# Patient Record
Sex: FEMALE | Race: WHITE | NOT HISPANIC OR LATINO | Employment: UNEMPLOYED | URBAN - METROPOLITAN AREA
[De-identification: names, ages, dates, MRNs, and addresses within clinical notes are randomized per-mention and may not be internally consistent; named-entity substitution may affect disease eponyms.]

---

## 2017-01-14 ENCOUNTER — APPOINTMENT (OUTPATIENT)
Dept: GENERAL RADIOLOGY | Facility: HOSPITAL | Age: 82
End: 2017-01-14

## 2017-01-14 ENCOUNTER — ANESTHESIA EVENT (OUTPATIENT)
Dept: PERIOP | Facility: HOSPITAL | Age: 82
End: 2017-01-14

## 2017-01-14 ENCOUNTER — ANESTHESIA (OUTPATIENT)
Dept: PERIOP | Facility: HOSPITAL | Age: 82
End: 2017-01-14

## 2017-01-14 ENCOUNTER — HOSPITAL ENCOUNTER (INPATIENT)
Facility: HOSPITAL | Age: 82
LOS: 3 days | Discharge: NURSING, REHAB OR LONG-TERM CARE | End: 2017-01-17
Attending: EMERGENCY MEDICINE | Admitting: INTERNAL MEDICINE

## 2017-01-14 DIAGNOSIS — S72.002A CLOSED LEFT HIP FRACTURE, INITIAL ENCOUNTER (HCC): Primary | ICD-10-CM

## 2017-01-14 DIAGNOSIS — Z74.09 IMPAIRED MOBILITY AND ADLS: ICD-10-CM

## 2017-01-14 DIAGNOSIS — Z74.09 IMPAIRED FUNCTIONAL MOBILITY, BALANCE, GAIT, AND ENDURANCE: ICD-10-CM

## 2017-01-14 DIAGNOSIS — M25.511 ACUTE PAIN OF RIGHT SHOULDER: ICD-10-CM

## 2017-01-14 DIAGNOSIS — Z78.9 IMPAIRED MOBILITY AND ADLS: ICD-10-CM

## 2017-01-14 LAB
ALBUMIN SERPL-MCNC: 3.8 G/DL (ref 3.2–4.8)
ALBUMIN/GLOB SERPL: 1.3 G/DL (ref 1.5–2.5)
ALP SERPL-CCNC: 74 U/L (ref 25–100)
ALT SERPL W P-5'-P-CCNC: 15 U/L (ref 7–40)
ANION GAP SERPL CALCULATED.3IONS-SCNC: 6 MMOL/L (ref 3–11)
AST SERPL-CCNC: 20 U/L (ref 0–33)
BACTERIA UR QL AUTO: ABNORMAL /HPF
BASOPHILS # BLD AUTO: 0.03 10*3/MM3 (ref 0–0.2)
BASOPHILS NFR BLD AUTO: 0.4 % (ref 0–1)
BILIRUB SERPL-MCNC: 0.7 MG/DL (ref 0.3–1.2)
BILIRUB UR QL STRIP: NEGATIVE
BUN BLD-MCNC: 12 MG/DL (ref 9–23)
BUN/CREAT SERPL: 20 (ref 7–25)
CALCIUM SPEC-SCNC: 9.7 MG/DL (ref 8.7–10.4)
CHLORIDE SERPL-SCNC: 101 MMOL/L (ref 99–109)
CLARITY UR: ABNORMAL
CO2 SERPL-SCNC: 31 MMOL/L (ref 20–31)
COD CRY URNS QL: ABNORMAL /HPF
COLOR UR: ABNORMAL
CREAT BLD-MCNC: 0.6 MG/DL (ref 0.6–1.3)
CRP SERPL-MCNC: 50.8 MG/DL (ref 0–10)
DEPRECATED RDW RBC AUTO: 55.2 FL (ref 37–54)
EOSINOPHIL # BLD AUTO: 0.77 10*3/MM3 (ref 0.1–0.3)
EOSINOPHIL NFR BLD AUTO: 9.6 % (ref 0–3)
ERYTHROCYTE [DISTWIDTH] IN BLOOD BY AUTOMATED COUNT: 15.5 % (ref 11.3–14.5)
ERYTHROCYTE [SEDIMENTATION RATE] IN BLOOD: 53 MM/HR (ref 0–30)
GFR SERPL CREATININE-BSD FRML MDRD: 94 ML/MIN/1.73
GLOBULIN UR ELPH-MCNC: 2.9 GM/DL
GLUCOSE BLD-MCNC: 101 MG/DL (ref 70–100)
GLUCOSE UR STRIP-MCNC: NEGATIVE MG/DL
HCT VFR BLD AUTO: 31.8 % (ref 34.5–44)
HGB BLD-MCNC: 10.4 G/DL (ref 11.5–15.5)
HGB UR QL STRIP.AUTO: NEGATIVE
HOLD SPECIMEN: NORMAL
HOLD SPECIMEN: NORMAL
HYALINE CASTS UR QL AUTO: ABNORMAL /LPF
IMM GRANULOCYTES # BLD: 0.02 10*3/MM3 (ref 0–0.03)
IMM GRANULOCYTES NFR BLD: 0.3 % (ref 0–0.6)
KETONES UR QL STRIP: NEGATIVE
LEUKOCYTE ESTERASE UR QL STRIP.AUTO: ABNORMAL
LYMPHOCYTES # BLD AUTO: 1.27 10*3/MM3 (ref 0.6–4.8)
LYMPHOCYTES NFR BLD AUTO: 15.9 % (ref 24–44)
MCH RBC QN AUTO: 31.6 PG (ref 27–31)
MCHC RBC AUTO-ENTMCNC: 32.7 G/DL (ref 32–36)
MCV RBC AUTO: 96.7 FL (ref 80–99)
MONOCYTES # BLD AUTO: 0.65 10*3/MM3 (ref 0–1)
MONOCYTES NFR BLD AUTO: 8.1 % (ref 0–12)
NEUTROPHILS # BLD AUTO: 5.25 10*3/MM3 (ref 1.5–8.3)
NEUTROPHILS NFR BLD AUTO: 65.7 % (ref 41–71)
NITRITE UR QL STRIP: NEGATIVE
PH UR STRIP.AUTO: 6 [PH] (ref 5–8)
PLATELET # BLD AUTO: 302 10*3/MM3 (ref 150–450)
PMV BLD AUTO: 8.6 FL (ref 6–12)
POTASSIUM BLD-SCNC: 3.7 MMOL/L (ref 3.5–5.5)
PROCALCITONIN SERPL-MCNC: 0.05 NG/ML
PROT SERPL-MCNC: 6.7 G/DL (ref 5.7–8.2)
PROT UR QL STRIP: NEGATIVE
RBC # BLD AUTO: 3.29 10*6/MM3 (ref 3.89–5.14)
RBC # UR: ABNORMAL /HPF
REF LAB TEST METHOD: ABNORMAL
SODIUM BLD-SCNC: 138 MMOL/L (ref 132–146)
SP GR UR STRIP: 1.02 (ref 1–1.03)
SQUAMOUS #/AREA URNS HPF: ABNORMAL /HPF
URATE SERPL-MCNC: 3.1 MG/DL (ref 3.1–7.8)
UROBILINOGEN UR QL STRIP: ABNORMAL
WBC NRBC COR # BLD: 7.99 10*3/MM3 (ref 3.5–10.8)
WBC UR QL AUTO: ABNORMAL /HPF
WHOLE BLOOD HOLD SPECIMEN: NORMAL
WHOLE BLOOD HOLD SPECIMEN: NORMAL

## 2017-01-14 PROCEDURE — 93005 ELECTROCARDIOGRAM TRACING: CPT | Performed by: EMERGENCY MEDICINE

## 2017-01-14 PROCEDURE — 72170 X-RAY EXAM OF PELVIS: CPT

## 2017-01-14 PROCEDURE — 25010000002 HYDROMORPHONE PER 4 MG: Performed by: EMERGENCY MEDICINE

## 2017-01-14 PROCEDURE — 87086 URINE CULTURE/COLONY COUNT: CPT | Performed by: EMERGENCY MEDICINE

## 2017-01-14 PROCEDURE — 84145 PROCALCITONIN (PCT): CPT | Performed by: EMERGENCY MEDICINE

## 2017-01-14 PROCEDURE — 25010000002 PROPOFOL 10 MG/ML EMULSION: Performed by: ANESTHESIOLOGY

## 2017-01-14 PROCEDURE — 0QH734Z INSERTION OF INTERNAL FIXATION DEVICE INTO LEFT UPPER FEMUR, PERCUTANEOUS APPROACH: ICD-10-PCS | Performed by: ORTHOPAEDIC SURGERY

## 2017-01-14 PROCEDURE — 85652 RBC SED RATE AUTOMATED: CPT | Performed by: EMERGENCY MEDICINE

## 2017-01-14 PROCEDURE — 25010000002 FENTANYL CITRATE (PF) 100 MCG/2ML SOLUTION: Performed by: ANESTHESIOLOGY

## 2017-01-14 PROCEDURE — 25010000002 SUCCINYLCHOLINE PER 20 MG: Performed by: ANESTHESIOLOGY

## 2017-01-14 PROCEDURE — 25010000003 CEFAZOLIN IN DEXTROSE 2-4 GM/100ML-% SOLUTION: Performed by: ORTHOPAEDIC SURGERY

## 2017-01-14 PROCEDURE — 84550 ASSAY OF BLOOD/URIC ACID: CPT | Performed by: EMERGENCY MEDICINE

## 2017-01-14 PROCEDURE — 85025 COMPLETE CBC W/AUTO DIFF WBC: CPT | Performed by: EMERGENCY MEDICINE

## 2017-01-14 PROCEDURE — 25010000002 ONDANSETRON PER 1 MG: Performed by: EMERGENCY MEDICINE

## 2017-01-14 PROCEDURE — C1713 ANCHOR/SCREW BN/BN,TIS/BN: HCPCS | Performed by: ORTHOPAEDIC SURGERY

## 2017-01-14 PROCEDURE — 99284 EMERGENCY DEPT VISIT MOD MDM: CPT

## 2017-01-14 PROCEDURE — 73560 X-RAY EXAM OF KNEE 1 OR 2: CPT

## 2017-01-14 PROCEDURE — 80053 COMPREHEN METABOLIC PANEL: CPT | Performed by: EMERGENCY MEDICINE

## 2017-01-14 PROCEDURE — 73502 X-RAY EXAM HIP UNI 2-3 VIEWS: CPT

## 2017-01-14 PROCEDURE — 76000 FLUOROSCOPY <1 HR PHYS/QHP: CPT

## 2017-01-14 PROCEDURE — 73030 X-RAY EXAM OF SHOULDER: CPT

## 2017-01-14 PROCEDURE — 71010 HC CHEST PA OR AP: CPT

## 2017-01-14 PROCEDURE — 25010000002 PHENYLEPHRINE PER 1 ML: Performed by: ANESTHESIOLOGY

## 2017-01-14 PROCEDURE — 81001 URINALYSIS AUTO W/SCOPE: CPT | Performed by: EMERGENCY MEDICINE

## 2017-01-14 PROCEDURE — 25010000002 ROPIVACAINE PER 1 MG: Performed by: ANESTHESIOLOGY

## 2017-01-14 PROCEDURE — 86140 C-REACTIVE PROTEIN: CPT | Performed by: EMERGENCY MEDICINE

## 2017-01-14 DEVICE — SCRW CANN 16THRD 6.5X95MM: Type: IMPLANTABLE DEVICE | Site: HIP | Status: FUNCTIONAL

## 2017-01-14 DEVICE — SCRW CANN THRD 7.3X16X85MM: Type: IMPLANTABLE DEVICE | Site: HIP | Status: FUNCTIONAL

## 2017-01-14 RX ORDER — DIPHENHYDRAMINE HCL 25 MG
25 CAPSULE ORAL EVERY 6 HOURS PRN
Status: DISCONTINUED | OUTPATIENT
Start: 2017-01-14 | End: 2017-01-17 | Stop reason: HOSPADM

## 2017-01-14 RX ORDER — ROPIVACAINE HYDROCHLORIDE 2 MG/ML
INJECTION, SOLUTION EPIDURAL; INFILTRATION AS NEEDED
Status: DISCONTINUED | OUTPATIENT
Start: 2017-01-14 | End: 2017-01-14 | Stop reason: SURG

## 2017-01-14 RX ORDER — SODIUM CHLORIDE 9 MG/ML
125 INJECTION, SOLUTION INTRAVENOUS CONTINUOUS
Status: DISCONTINUED | OUTPATIENT
Start: 2017-01-14 | End: 2017-01-17 | Stop reason: HOSPADM

## 2017-01-14 RX ORDER — HYDROCODONE BITARTRATE AND ACETAMINOPHEN 5; 325 MG/1; MG/1
1 TABLET ORAL EVERY 4 HOURS PRN
Status: DISCONTINUED | OUTPATIENT
Start: 2017-01-14 | End: 2017-01-17 | Stop reason: HOSPADM

## 2017-01-14 RX ORDER — NALOXONE HCL 0.4 MG/ML
0.1 VIAL (ML) INJECTION
Status: DISCONTINUED | OUTPATIENT
Start: 2017-01-14 | End: 2017-01-17 | Stop reason: HOSPADM

## 2017-01-14 RX ORDER — LIDOCAINE HYDROCHLORIDE 10 MG/ML
1 INJECTION, SOLUTION EPIDURAL; INFILTRATION; INTRACAUDAL; PERINEURAL ONCE
Status: DISCONTINUED | OUTPATIENT
Start: 2017-01-14 | End: 2017-01-17 | Stop reason: HOSPADM

## 2017-01-14 RX ORDER — BENAZEPRIL HYDROCHLORIDE 20 MG/1
40 TABLET ORAL DAILY
Status: DISCONTINUED | OUTPATIENT
Start: 2017-01-15 | End: 2017-01-17 | Stop reason: HOSPADM

## 2017-01-14 RX ORDER — HYDROMORPHONE HYDROCHLORIDE 1 MG/ML
0.25 INJECTION, SOLUTION INTRAMUSCULAR; INTRAVENOUS; SUBCUTANEOUS
Status: DISCONTINUED | OUTPATIENT
Start: 2017-01-14 | End: 2017-01-17 | Stop reason: HOSPADM

## 2017-01-14 RX ORDER — ONDANSETRON 4 MG/1
4 TABLET, FILM COATED ORAL EVERY 6 HOURS PRN
Status: DISCONTINUED | OUTPATIENT
Start: 2017-01-14 | End: 2017-01-17 | Stop reason: HOSPADM

## 2017-01-14 RX ORDER — AMLODIPINE BESYLATE 5 MG/1
5 TABLET ORAL DAILY
Status: DISCONTINUED | OUTPATIENT
Start: 2017-01-15 | End: 2017-01-17 | Stop reason: HOSPADM

## 2017-01-14 RX ORDER — FENTANYL CITRATE 50 UG/ML
INJECTION, SOLUTION INTRAMUSCULAR; INTRAVENOUS AS NEEDED
Status: DISCONTINUED | OUTPATIENT
Start: 2017-01-14 | End: 2017-01-14 | Stop reason: SURG

## 2017-01-14 RX ORDER — MAGNESIUM HYDROXIDE 1200 MG/15ML
LIQUID ORAL AS NEEDED
Status: DISCONTINUED | OUTPATIENT
Start: 2017-01-14 | End: 2017-01-14 | Stop reason: HOSPADM

## 2017-01-14 RX ORDER — DOCUSATE SODIUM 100 MG/1
100 CAPSULE, LIQUID FILLED ORAL 2 TIMES DAILY
Status: DISCONTINUED | OUTPATIENT
Start: 2017-01-14 | End: 2017-01-17 | Stop reason: HOSPADM

## 2017-01-14 RX ORDER — FENTANYL CITRATE 50 UG/ML
50 INJECTION, SOLUTION INTRAMUSCULAR; INTRAVENOUS
Status: DISCONTINUED | OUTPATIENT
Start: 2017-01-14 | End: 2017-01-17 | Stop reason: HOSPADM

## 2017-01-14 RX ORDER — SODIUM CHLORIDE 0.9 % (FLUSH) 0.9 %
1-10 SYRINGE (ML) INJECTION AS NEEDED
Status: DISCONTINUED | OUTPATIENT
Start: 2017-01-14 | End: 2017-01-17 | Stop reason: HOSPADM

## 2017-01-14 RX ORDER — FAMOTIDINE 10 MG/ML
20 INJECTION, SOLUTION INTRAVENOUS ONCE
Status: DISCONTINUED | OUTPATIENT
Start: 2017-01-14 | End: 2017-01-17 | Stop reason: HOSPADM

## 2017-01-14 RX ORDER — ACETAMINOPHEN 325 MG/1
650 TABLET ORAL EVERY 4 HOURS PRN
Status: DISCONTINUED | OUTPATIENT
Start: 2017-01-14 | End: 2017-01-17 | Stop reason: HOSPADM

## 2017-01-14 RX ORDER — DIPHENHYDRAMINE HYDROCHLORIDE 50 MG/ML
25 INJECTION INTRAMUSCULAR; INTRAVENOUS EVERY 6 HOURS PRN
Status: DISCONTINUED | OUTPATIENT
Start: 2017-01-14 | End: 2017-01-17 | Stop reason: HOSPADM

## 2017-01-14 RX ORDER — ONDANSETRON 2 MG/ML
4 INJECTION INTRAMUSCULAR; INTRAVENOUS ONCE
Status: COMPLETED | OUTPATIENT
Start: 2017-01-14 | End: 2017-01-14

## 2017-01-14 RX ORDER — BISACODYL 10 MG
10 SUPPOSITORY, RECTAL RECTAL DAILY PRN
Status: DISCONTINUED | OUTPATIENT
Start: 2017-01-14 | End: 2017-01-17 | Stop reason: HOSPADM

## 2017-01-14 RX ORDER — LANOLIN ALCOHOL/MO/W.PET/CERES
1000 CREAM (GRAM) TOPICAL DAILY
Status: DISCONTINUED | OUTPATIENT
Start: 2017-01-15 | End: 2017-01-17 | Stop reason: HOSPADM

## 2017-01-14 RX ORDER — FAMOTIDINE 20 MG/1
20 TABLET, FILM COATED ORAL ONCE
Status: COMPLETED | OUTPATIENT
Start: 2017-01-14 | End: 2017-01-14

## 2017-01-14 RX ORDER — ATRACURIUM BESYLATE 10 MG/ML
INJECTION, SOLUTION INTRAVENOUS AS NEEDED
Status: DISCONTINUED | OUTPATIENT
Start: 2017-01-14 | End: 2017-01-14 | Stop reason: SURG

## 2017-01-14 RX ORDER — ONDANSETRON 2 MG/ML
4 INJECTION INTRAMUSCULAR; INTRAVENOUS EVERY 6 HOURS PRN
Status: DISCONTINUED | OUTPATIENT
Start: 2017-01-14 | End: 2017-01-17 | Stop reason: HOSPADM

## 2017-01-14 RX ORDER — HYDROCODONE BITARTRATE AND ACETAMINOPHEN 5; 325 MG/1; MG/1
2 TABLET ORAL EVERY 4 HOURS PRN
Status: DISCONTINUED | OUTPATIENT
Start: 2017-01-14 | End: 2017-01-17 | Stop reason: HOSPADM

## 2017-01-14 RX ORDER — SODIUM CHLORIDE 0.9 % (FLUSH) 0.9 %
10 SYRINGE (ML) INJECTION AS NEEDED
Status: DISCONTINUED | OUTPATIENT
Start: 2017-01-14 | End: 2017-01-17 | Stop reason: HOSPADM

## 2017-01-14 RX ORDER — DOCUSATE SODIUM 100 MG/1
100 CAPSULE, LIQUID FILLED ORAL 2 TIMES DAILY PRN
Status: DISCONTINUED | OUTPATIENT
Start: 2017-01-14 | End: 2017-01-17 | Stop reason: HOSPADM

## 2017-01-14 RX ORDER — CEFAZOLIN SODIUM 2 G/100ML
2 INJECTION, SOLUTION INTRAVENOUS EVERY 8 HOURS
Status: COMPLETED | OUTPATIENT
Start: 2017-01-15 | End: 2017-01-15

## 2017-01-14 RX ORDER — SUCCINYLCHOLINE CHLORIDE 20 MG/ML
INJECTION INTRAMUSCULAR; INTRAVENOUS AS NEEDED
Status: DISCONTINUED | OUTPATIENT
Start: 2017-01-14 | End: 2017-01-14 | Stop reason: SURG

## 2017-01-14 RX ORDER — ROPIVACAINE HYDROCHLORIDE 2 MG/ML
10 INJECTION, SOLUTION EPIDURAL; INFILTRATION CONTINUOUS
Status: DISCONTINUED | OUTPATIENT
Start: 2017-01-14 | End: 2017-01-17 | Stop reason: HOSPADM

## 2017-01-14 RX ORDER — SENNA AND DOCUSATE SODIUM 50; 8.6 MG/1; MG/1
2 TABLET, FILM COATED ORAL 2 TIMES DAILY
Status: DISCONTINUED | OUTPATIENT
Start: 2017-01-14 | End: 2017-01-17 | Stop reason: HOSPADM

## 2017-01-14 RX ORDER — ONDANSETRON 2 MG/ML
4 INJECTION INTRAMUSCULAR; INTRAVENOUS ONCE AS NEEDED
Status: DISCONTINUED | OUTPATIENT
Start: 2017-01-14 | End: 2017-01-17 | Stop reason: HOSPADM

## 2017-01-14 RX ORDER — SODIUM CHLORIDE 9 MG/ML
100 INJECTION, SOLUTION INTRAVENOUS CONTINUOUS
Status: DISCONTINUED | OUTPATIENT
Start: 2017-01-14 | End: 2017-01-17 | Stop reason: HOSPADM

## 2017-01-14 RX ORDER — SODIUM CHLORIDE, SODIUM LACTATE, POTASSIUM CHLORIDE, CALCIUM CHLORIDE 600; 310; 30; 20 MG/100ML; MG/100ML; MG/100ML; MG/100ML
9 INJECTION, SOLUTION INTRAVENOUS CONTINUOUS
Status: DISCONTINUED | OUTPATIENT
Start: 2017-01-14 | End: 2017-01-17 | Stop reason: HOSPADM

## 2017-01-14 RX ORDER — SODIUM CHLORIDE, SODIUM LACTATE, POTASSIUM CHLORIDE, CALCIUM CHLORIDE 600; 310; 30; 20 MG/100ML; MG/100ML; MG/100ML; MG/100ML
INJECTION, SOLUTION INTRAVENOUS CONTINUOUS PRN
Status: DISCONTINUED | OUTPATIENT
Start: 2017-01-14 | End: 2017-01-14 | Stop reason: SURG

## 2017-01-14 RX ORDER — CEFAZOLIN SODIUM 2 G/100ML
2 INJECTION, SOLUTION INTRAVENOUS ONCE
Status: COMPLETED | OUTPATIENT
Start: 2017-01-14 | End: 2017-01-14

## 2017-01-14 RX ORDER — PROPOFOL 10 MG/ML
VIAL (ML) INTRAVENOUS AS NEEDED
Status: DISCONTINUED | OUTPATIENT
Start: 2017-01-14 | End: 2017-01-14 | Stop reason: SURG

## 2017-01-14 RX ORDER — HYDROMORPHONE HYDROCHLORIDE 1 MG/ML
0.5 INJECTION, SOLUTION INTRAMUSCULAR; INTRAVENOUS; SUBCUTANEOUS
Status: DISCONTINUED | OUTPATIENT
Start: 2017-01-14 | End: 2017-01-17 | Stop reason: HOSPADM

## 2017-01-14 RX ADMIN — FAMOTIDINE 20 MG: 20 TABLET ORAL at 20:32

## 2017-01-14 RX ADMIN — SODIUM CHLORIDE, POTASSIUM CHLORIDE, SODIUM LACTATE AND CALCIUM CHLORIDE: 600; 310; 30; 20 INJECTION, SOLUTION INTRAVENOUS at 15:30

## 2017-01-14 RX ADMIN — DOCUSATE SODIUM AND SENNOSIDES 2 TABLET: 8.6; 5 TABLET, FILM COATED ORAL at 20:32

## 2017-01-14 RX ADMIN — CEFAZOLIN SODIUM 2 G: 2 INJECTION, SOLUTION INTRAVENOUS at 23:48

## 2017-01-14 RX ADMIN — FENTANYL CITRATE 50 MCG: 50 INJECTION, SOLUTION INTRAMUSCULAR; INTRAVENOUS at 15:33

## 2017-01-14 RX ADMIN — ATRACURIUM BESYLATE 10 MG: 10 INJECTION, SOLUTION INTRAVENOUS at 15:33

## 2017-01-14 RX ADMIN — HYDROCODONE BITARTRATE AND ACETAMINOPHEN 1 TABLET: 5; 325 TABLET ORAL at 23:48

## 2017-01-14 RX ADMIN — DOCUSATE SODIUM 100 MG: 100 CAPSULE, LIQUID FILLED ORAL at 20:32

## 2017-01-14 RX ADMIN — HYDROMORPHONE HYDROCHLORIDE 0.25 MG: 1 INJECTION, SOLUTION INTRAMUSCULAR; INTRAVENOUS; SUBCUTANEOUS at 11:56

## 2017-01-14 RX ADMIN — FENTANYL CITRATE 50 MCG: 50 INJECTION, SOLUTION INTRAMUSCULAR; INTRAVENOUS at 15:55

## 2017-01-14 RX ADMIN — SUCCINYLCHOLINE CHLORIDE 100 MG: 20 INJECTION, SOLUTION INTRAMUSCULAR; INTRAVENOUS at 15:33

## 2017-01-14 RX ADMIN — ROPIVACAINE HYDROCHLORIDE 10 ML/HR: 2 INJECTION, SOLUTION EPIDURAL; INFILTRATION at 20:34

## 2017-01-14 RX ADMIN — ONDANSETRON 4 MG: 2 INJECTION INTRAMUSCULAR; INTRAVENOUS at 11:53

## 2017-01-14 RX ADMIN — SODIUM CHLORIDE, POTASSIUM CHLORIDE, SODIUM LACTATE AND CALCIUM CHLORIDE 9 ML/HR: 600; 310; 30; 20 INJECTION, SOLUTION INTRAVENOUS at 20:33

## 2017-01-14 RX ADMIN — SODIUM CHLORIDE 125 ML/HR: 9 INJECTION, SOLUTION INTRAVENOUS at 14:43

## 2017-01-14 RX ADMIN — Medication 50 ML: at 15:47

## 2017-01-14 RX ADMIN — PHENYLEPHRINE HYDROCHLORIDE 200 MCG: 10 INJECTION INTRAVENOUS at 15:52

## 2017-01-14 RX ADMIN — SODIUM CHLORIDE 100 ML/HR: 9 INJECTION, SOLUTION INTRAVENOUS at 21:03

## 2017-01-14 RX ADMIN — PROPOFOL 100 MG: 10 INJECTION, EMULSION INTRAVENOUS at 15:33

## 2017-01-14 RX ADMIN — CEFAZOLIN SODIUM 2 G: 2 INJECTION, SOLUTION INTRAVENOUS at 15:36

## 2017-01-14 NOTE — ED PROVIDER NOTES
Subjective   HPI Comments: Mrs. Alberts is a 88 y.o female who presents to the ED c/o LLE and RUE pain for the past two days. She notes that her left knee, left hip, and right shoulder have been worsening in pain for the past two days. She is unable to bear any weight on her left knee at this point. She did have a fall one month ago and was admitted for a subdural hematoma. She is currently undergoing physical therapy and notes that 5 days ago she went through a particularly strenuous workout. She also has associated chills, decreased appetite, and joint swelling, but denies any urinary problems, fever, or any other acute sx at this time. She does have a hx of HTN but denies any hx of gout.     Patient is a 88 y.o. female presenting with lower extremity pain and upper extremity pain.   History provided by:  Patient  Lower Extremity Issue   Location:  Hip and knee  Time since incident:  2 days  Injury: no    Hip location:  L hip  Knee location:  L knee  Chronicity:  Recurrent  Dislocation: no    Foreign body present:  No foreign bodies  Tetanus status:  Unknown  Prior injury to area:  Unable to specify  Relieved by:  None tried  Worsened by:  Nothing  Ineffective treatments:  None tried  Associated symptoms: decreased ROM and swelling    Associated symptoms: no fever and no neck pain    Upper Extremity Issue   Location:  Shoulder  Shoulder location:  R shoulder  Injury: no    Dislocation: no    Foreign body present:  No foreign bodies  Tetanus status:  Unknown  Prior injury to area:  Unable to specify  Relieved by:  None tried  Worsened by:  Nothing  Ineffective treatments:  None tried  Associated symptoms: decreased range of motion    Associated symptoms: no fever and no neck pain        Review of Systems   Constitutional: Positive for appetite change (Decrased) and chills. Negative for fever.   Genitourinary: Negative for difficulty urinating and dysuria.   Musculoskeletal: Positive for arthralgias and joint  "swelling. Negative for neck pain.   All other systems reviewed and are negative.      Past Medical History   Diagnosis Date   • Hypertension    11:17 AM  Neurosurgery follow up on 12/27/16  \"History of Present Illness Ms. Alberts is a very kind 88-year-old female who suffered a fall and had transient loss of consciousness. She was evaluated at [facility] and admitted overnight. CT revealed an acute skim subdural hematoma on the left. She was sent home with plans for a follow-up CT scan. The patient return 10 days later with altered mental status and headache. She had nausea and vomiting. Studies revealed significant acute on chronic subdural hematoma with marked left-to-right shift. As such on 11/27/2016 she underwent a left craniotomy for evacuation of acute subdural hematoma. Surgery was without overt intraoperative complication. Postoperatively, while in the hospital, her mental status seemed to wax and wane. She had some dysphagia. She was discharged to [facility] for further rehabilitation.     Today Ms. Alberts is one month postop. She is now home living with her daughter. She will start home physical and occupational therapy this week. She has some generalized weakness. She's a bit off balance at times. She ambulates around her home with a rolling walker. She complains of some mild visual disturbances. Her daughter says her speech and cognition have greatly improved. No incisional problems.\"    Admitted in 12/15 for UTI and shoulder pain, had a tap of left shoulder and cultures were negative. She saw Dr. Sim at that time. -ER       Allergies   Allergen Reactions   • Erythromycin        Past Surgical History   Procedure Laterality Date   • Appendectomy     • Joint replacement     • Total shoulder replacement     • Total hip arthroplasty Right    • [place] hole Left 11/27/2016     Procedure: HALIMA HOLE;  Surgeon: Jos Christian MD;  Location: [facility];  Service:        History reviewed. No pertinent family " history.    Social History     Social History   • Marital status:      Spouse name: N/A   • Number of children: N/A   • Years of education: N/A     Social History Main Topics   • Smoking status: Never Smoker   • Smokeless tobacco: None   • Alcohol use No   • Drug use: No   • Sexual activity: No     Other Topics Concern   • None     Social History Narrative    Lives with her daughter         Objective   Physical Exam   Constitutional: She is oriented to person, place, and time. She appears well-developed and well-nourished. No distress.   HENT:   Head: Normocephalic and atraumatic.   Mouth/Throat: Oropharynx is clear and moist.   Healing scar on left side of head from recent subdural evacuation   Eyes: Conjunctivae are normal.   Neck: Normal range of motion. Neck supple.   T and L spine normal   Cardiovascular: Normal rate and regular rhythm.    Murmur (2/6 systolic at base) heard.  Pulmonary/Chest: Effort normal and breath sounds normal. No respiratory distress.   Abdominal: Soft. There is no tenderness.   Musculoskeletal: She exhibits tenderness.   Upper Extremity: chronic synovitis of MCV and ulnar region  L shoulder: well healded scar, fair ROM  R should: Minimal tenderness to palpation, fair ROM but still painful. Not red or hot.  L hip: Tenderness to palpation and limited ROM  L knee: Mild inflammatory periarticular swelling that I could not definitely palpate synovial fluid, limited ROM. Neurovascullary intact   Neurological: She is alert and oriented to person, place, and time. She has normal strength and normal reflexes. No sensory deficit.   Memory embarrasment, moderate global weakness   Skin: Skin is warm and dry.   Psychiatric: She has a normal mood and affect. Her behavior is normal.   Nursing note and vitals reviewed.      Procedures         ED Course  ED Course       Course of Care      Lab Results (last 24 hours)     Procedure Component Value Units Date/Time    CBC & Differential [[id number]]  Collected:  01/14/17 1151    Specimen:  Blood Updated:  01/14/17 1220    Narrative:       The following orders were created for panel order CBC & Differential.  Procedure                               Abnormality         Status                     ---------                               -----------         ------                     CBC Auto Differential[[id number]]         Abnormal            Final result                 Please view results for these tests on the individual orders.    Comprehensive Metabolic Panel [[id number]]  (Abnormal) Collected:  01/14/17 1151    Specimen:  Blood Updated:  01/14/17 1237     Glucose 101 (H) mg/dL      BUN 12 mg/dL      Creatinine 0.60 mg/dL      Sodium 138 mmol/L      Potassium 3.7 mmol/L      Chloride 101 mmol/L      CO2 31.0 mmol/L      Calcium 9.7 mg/dL      Total Protein 6.7 g/dL      Albumin 3.80 g/dL      ALT (SGPT) 15 U/L      AST (SGOT) 20 U/L      Alkaline Phosphatase 74 U/L      Total Bilirubin 0.7 mg/dL      eGFR Non African Amer 94 mL/min/1.73      Globulin 2.9 gm/dL      A/G Ratio 1.3 (L) g/dL      BUN/Creatinine Ratio 20.0      Anion Gap 6.0 mmol/L     Narrative:       [employer] Guidelines    Stage                           Description                             GFR                      1                               Normal or High                          90+  2                               Mild decrease                            60-89  3                               Moderate decrease                   30-59  4                               Severe decrease                       15-29  5                               Kidney failure                             <15    Procalcitonin [[id number]] Collected:  01/14/17 1151    Specimen:  Blood Updated:  01/14/17 1250     Procalcitonin 0.05 ng/mL     Narrative:       As a Marker for Sepsis (Non-Neonates):   1. <0.5 ng/mL represents a low risk of severe sepsis and/or septic shock.  2. >2 ng/mL represents a  high risk of severe sepsis and/or septic shock.    As a Marker for Lower Respiratory Tract Infections that require antibiotic therapy:    PCT on Admission     Antibiotic Therapy       6-12 Hrs later  > 0.5                Strongly Recommended             >0.25 - <0.5         Recommended  0.1 - 0.25           Discouraged              Remeasure/reassess PCT  <0.1                 Strongly Discouraged     Remeasure/reassess PCT                     PCT values of < 0.5 ng/mL do not exclude an infection, because localized infections (without systemic signs) may be associated with such low concentrations, or a systemic infection in its initial stages (< 6 hours). Furthermore, increased PCT can occur without infection. PCT concentrations between 0.5 and 2.0 ng/mL should be interpreted taking into account the patient's history. It is recommended to retest PCT within 6-24 hours if any concentrations < 2 ng/mL are obtained.    Sedimentation Rate [[id number]]  (Abnormal) Collected:  01/14/17 1151    Specimen:  Blood Updated:  01/14/17 1323     Sed Rate 53 (H) mm/hr     C-reactive Protein [[id number]]  (Abnormal) Collected:  01/14/17 1151    Specimen:  Blood Updated:  01/14/17 1242     C-Reactive Protein 50.80 (H) mg/dL     Uric Acid [[id number]]  (Normal) Collected:  01/14/17 1151    Specimen:  Blood Updated:  01/14/17 1237     Uric Acid 3.1 mg/dL       Falsely depressed results may occur on samples drawn from patients receiving N-Acetylcysteine (NAC) or Metamizole.       CBC Auto Differential [[id number]]  (Abnormal) Collected:  01/14/17 1151    Specimen:  Blood Updated:  01/14/17 1220     WBC 7.99 10*3/mm3      RBC 3.29 (L) 10*6/mm3      Hemoglobin 10.4 (L) g/dL      Hematocrit 31.8 (L) %      MCV 96.7 fL      MCH 31.6 (H) pg      MCHC 32.7 g/dL      RDW 15.5 (H) %      RDW-SD 55.2 (H) fl      MPV 8.6 fL      Platelets 302 10*3/mm3      Neutrophil % 65.7 %      Lymphocyte % 15.9 (L) %      Monocyte % 8.1 %      Eosinophil % 9.6 (H) %       Basophil % 0.4 %      Immature Grans % 0.3 %      Neutrophils, Absolute 5.25 10*3/mm3      Lymphocytes, Absolute 1.27 10*3/mm3      Monocytes, Absolute 0.65 10*3/mm3      Eosinophils, Absolute 0.77 (H) 10*3/mm3      Basophils, Absolute 0.03 10*3/mm3      Immature Grans, Absolute 0.02 10*3/mm3     Urinalysis With / Culture If Indicated [[id number]]  (Abnormal) Collected:  01/14/17 1526    Specimen:  Urine from Urine, Clean Catch Updated:  01/14/17 1545     Color, UA Dark Yellow (A)      Appearance, UA Cloudy (A)      pH, UA 6.0      Specific Gravity, UA 1.019      Glucose, UA Negative      Ketones, UA Negative      Bilirubin, UA Negative      Blood, UA Negative      Protein, UA Negative      Leuk Esterase, UA Small (1+) (A)      Nitrite, UA Negative      Urobilinogen, UA 1.0 E.U./dL     Urinalysis, Microscopic Only [[id number]]  (Abnormal) Collected:  01/14/17 1526    Specimen:  Urine from Urine, Clean Catch Updated:  01/14/17 1545     RBC, UA 3-6 (A) /HPF      WBC, UA 6-12 (A) /HPF      Bacteria, UA None Seen /HPF      Squamous Epithelial Cells, UA 3-6 (A) /HPF      Hyaline Casts, UA 0-6 /LPF      Calcium Oxalate Crystals, UA Large/3+ /HPF      Methodology Manual Light Microscopy     Urine Culture [[id number]] Collected:  01/14/17 1526    Specimen:  Urine from Urine, Clean Catch Updated:  01/14/17 1533          Note: In addition to lab results from this visit, the labs listed above may include labs taken at another facility or during a different encounter within the last 24 hours. Please correlate lab times with ED admission and discharge times for further clarification of the services performed during this visit.    XR Chest 1 View   Preliminary Result   No acute chest pathology, stable since 11/27/2016.       DICTATED:     01/14/2017   EDITED:          01/14/2017              XR Shoulder 2+ View Right   Preliminary Result   Mild to moderate degenerative change of the glenohumeral   joint.       DICTATED:      01/14/2017   EDITED:          01/14/2017          XR Pelvis 1 or 2 View   Preliminary Result   Calcified fibroid. No acute pelvic pathology.       LEFT HIP: Normal anatomic alignment of the left acetabular joint is   preserved. There is suggestion of a subcapital fracture of the left   femoral neck with minimal displacement. Soft tissues are normal.       IMPRESSION: Suggestion of subcapital fracture of the left femoral neck.       LEFT KNEE: Normal anatomic alignment of the knee joint is preserved.   There is no acute fracture or subluxation. There are no areas of   osteolysis or osteosclerosis.       IMPRESSION: No acute osseous abnormality.       DICTATED:     01/14/2017   EDITED:          01/14/2017          XR Hip With or Without Pelvis 2 - 3 View Left   Preliminary Result   Calcified fibroid. No acute pelvic pathology.       LEFT HIP: Normal anatomic alignment of the left acetabular joint is   preserved. There is suggestion of a subcapital fracture of the left   femoral neck with minimal displacement. Soft tissues are normal.       IMPRESSION: Suggestion of subcapital fracture of the left femoral neck.       LEFT KNEE: Normal anatomic alignment of the knee joint is preserved.   There is no acute fracture or subluxation. There are no areas of   osteolysis or osteosclerosis.       IMPRESSION: No acute osseous abnormality.       DICTATED:     01/14/2017   EDITED:          01/14/2017          XR Knee 1 or 2 View Left   Preliminary Result   Calcified fibroid. No acute pelvic pathology.       LEFT HIP: Normal anatomic alignment of the left acetabular joint is   preserved. There is suggestion of a subcapital fracture of the left   femoral neck with minimal displacement. Soft tissues are normal.       IMPRESSION: Suggestion of subcapital fracture of the left femoral neck.       LEFT KNEE: Normal anatomic alignment of the knee joint is preserved.   There is no acute fracture or subluxation. There are no areas of    osteolysis or osteosclerosis.       IMPRESSION: No acute osseous abnormality.       DICTATED:     01/14/2017   EDITED:          01/14/2017          FL C Arm During Surgery    (Results Pending)       Vitals:    01/14/17 1715 01/14/17 1730 01/14/17 1745 01/14/17 2006   BP: 160/72 163/75 (!) 144/111 147/64   BP Location:    Left arm   Patient Position:       Pulse: 76 83 78 92   Resp: 16 16 16 16   Temp: 97.5 °F (36.4 °C) 97.6 °F (36.4 °C) 97.9 °F (36.6 °C) 98.6 °F (37 °C)   TempSrc: Temporal Artery  Temporal Artery  Oral Oral   SpO2: 100% 100% 98% 98%   Weight:       Height:           Medications   sodium chloride 0.9 % flush 10 mL ( Intravenous MAR Unhold 1/14/17 1850)   HYDROmorphone (DILAUDID) injection 0.25 mg ( Intravenous MAR Unhold 1/14/17 1850)   sodium chloride 0.9 % infusion (125 mL/hr Intravenous New Bag 1/14/17 1443)   sodium chloride 0.9 % flush 1-10 mL (not administered)   lactated ringers infusion (9 mL/hr Intravenous New Bag 1/14/17 2033)   lidocaine PF (XYLOCAINE) 1 % injection 1 mL (1 mL Infiltration Not Given 1/14/17 2033)   famotidine (PEPCID) injection 20 mg (20 mg Intravenous Not Given 1/14/17 2033)   lactated ringers bolus 500 mL (not administered)   FentaNYL Citrate (PF) (SUBLIMAZE) injection 50 mcg (not administered)   ondansetron (ZOFRAN) injection 4 mg (not administered)   ropivacaine (NAROPIN) 0.2% epidural pump (moog) 200 mL (10 mL/hr Infiltration New Bag 1/14/17 2034)   docusate sodium (COLACE) capsule 100 mg (100 mg Oral Given 1/14/17 2032)   benazepril (LOTENSIN) tablet 40 mg (not administered)   amLODIPine (NORVASC) tablet 5 mg (not administered)   vitamin B-12 (CYANOCOBALAMIN) tablet 1,000 mcg (not administered)   enoxaparin (LOVENOX) syringe 40 mg (not administered)   sodium chloride 0.9 % infusion (100 mL/hr Intravenous New Bag 1/14/17 6896)   acetaminophen (TYLENOL) tablet 650 mg (not administered)   HYDROcodone-acetaminophen (NORCO) 5-325 MG per tablet 1 tablet (not  administered)   HYDROcodone-acetaminophen (NORCO) 5-325 MG per tablet 2 tablet (not administered)   HYDROmorphone (DILAUDID) injection 0.5 mg (not administered)     And   naloxone (NARCAN) injection 0.1 mg (not administered)   ceFAZolin in dextrose (ANCEF) IVPB solution 2 g (not administered)   ondansetron (ZOFRAN) tablet 4 mg (not administered)     Or   ondansetron (ZOFRAN) injection 4 mg (not administered)   sennosides-docusate sodium (SENOKOT-S) 8.6-50 MG tablet 2 tablet (2 tablets Oral Given 1/14/17 2032)   docusate sodium (COLACE) capsule 100 mg (not administered)   bisacodyl (DULCOLAX) EC tablet 10 mg (not administered)   bisacodyl (DULCOLAX) suppository 10 mg (not administered)   magnesium hydroxide (MILK OF MAGNESIA) suspension 2400 mg/10mL 10 mL (not administered)   diphenhydrAMINE (BENADRYL) injection 25 mg (not administered)   diphenhydrAMINE (BENADRYL) capsule 25 mg (not administered)   ondansetron (ZOFRAN) injection 4 mg (4 mg Intravenous Given 1/14/17 1153)   ceFAZolin in dextrose (ANCEF) IVPB solution 2 g (2 g Intravenous Given 1/14/17 1536)   famotidine (PEPCID) tablet 20 mg (20 mg Oral Given 1/14/17 2032)       ECG/EMG Results (last 24 hours)     ** No results found for the last 24 hours. **                          MDM  Number of Diagnoses or Management Options  Acute pain of right shoulder:   Closed left hip fracture, initial encounter:   Diagnosis management comments:       I reviewed all available studies at the bedside with the patient and her family.  Her pelvis is unremarkable but her left hip has a very subtle supple Fracture that is present.  I think that is the likely source of her pain.    Her inflammatory markers are actually improved from her previous I do not don't think she has any sort of infection.    She will need orthopedic evaluation for possible repair of this.  She has been bearing weight on it.  A call to Dr. adame to evaluate the patient.    He generally admits to   [place].    All are agreeable with the plan       Amount and/or Complexity of Data Reviewed  Clinical lab tests: reviewed  Tests in the radiology section of CPT®: reviewed  Decide to obtain previous medical records or to obtain history from someone other than the patient: yes        Final diagnoses:   Closed left hip fracture, initial encounter   Acute pain of right shoulder   EMR Dragon/Transcription disclaimer:  Much of this encounter note is an electronic transcription/translation of spoken language to printed text. The electronic translation of spoken language may permit erroneous, or at times, nonsensical words or phrases to be inadvertently transcribed; Although I have reviewed the note for such errors, some may still exist.      Documentation assistance provided by arminda MULTANI.  Information recorded by the arminda was done at my direction and has been verified and validated by me.     Titi Multani  01/14/17 1156       Titi Multani  01/14/17 1210       Titi Multani  01/14/17 1313       Anand Rivas MD  01/14/17 0289

## 2017-01-14 NOTE — IP AVS SNAPSHOT
INTER-FACILITY TRANSFER   AFTER VISIT SUMMARY             Keila Alberts            Summary of Your Hospitalization        About Your Hospitalization     You were admitted on:  January 14, 2017 You last received care in the:  [address]      Reason for Hospitalization     Your primary diagnosis was:  Closed Fracture Of Hip    Your diagnoses also included:  High Blood Pressure, Status Post Head Surgery, Urinary Tract Infection      Care Providers     Provider Service Role Specialty    Bulmaro Camarillo MD Medicine Attending Provider Hospitalist    Edgar Albert MD Surgery Orthopedic Consulting Physician  Orthopedic Surgery     Edgar Albert MD Surgery Orthopedic Surgeon  Orthopedic Surgery      Your Allergies  Date Reviewed: 1/14/2017    Allergen Reactions    Erythromycin Not Noted      Pending Labs     Order Current Status    Green Top (No Gel) Collected (01/14/17 1151)    Quinn Draw In process       Medications    Based on the information you provided to us as well as any changes during this visit, the following is your updated medication list.  This is subject to change based on the care your receive at the receiving facility.  You should receive a new list once you are discharged.      If you have any questions or concerns, contact your primary care physician's office.             Your Medications      START taking these medications     enoxaparin 40 MG/0.4ML solution syringe   Inject 0.4 mL under the skin Daily. For 1 month   Last time this was given:  1/16/2017  4:26 PM   Commonly known as:  LOVENOX           HYDROcodone-acetaminophen 5-325 MG per tablet   Take 1 tablet by mouth Every 4 (Four) Hours As Needed for moderate pain (4-6) for up to 7 days.   Last time this was given:  1/17/2017 11:17 AM   Commonly known as:  NORCO             CONTINUE taking these medications     amLODIPine 5 MG tablet   Take 5 mg by mouth Daily.   Last time this was given:  1/17/2017  10:12 AM   Commonly known as:  NORVASC           benazepril 40 MG tablet   Take 40 mg by mouth Daily.   Last time this was given:  1/17/2017 10:12 AM   Commonly known as:  LOTENSIN           docusate sodium 100 MG capsule   Take 100 mg by mouth 2 (Two) Times a Day.   Last time this was given:  1/17/2017 10:12 AM   Commonly known as:  COLACE           PRESERVISION AREDS 2 capsule   Take 1 capsule by mouth Daily.           vitamin B-12 1000 MCG tablet   Take 1,000 mcg by mouth Daily.   Last time this was given:  1/17/2017 10:12 AM   Commonly known as:  CYANOCOBALAMIN                Where to Get Your Medications      These medications were sent to [facility] [address] [place] [address] - [address] [place] [address] - [phone]  - [phone] [place] [id number]     Phone:  [phone]     enoxaparin 40 MG/0.4ML solution syringe         Information about where to get these medications is not yet available     ! Ask your nurse or doctor about these medications     HYDROcodone-acetaminophen 5-325 MG per tablet                Additional Instructions    Information is subject to change based on the care your receive at the receiving facility.  If you have any questions or concerns, contact your primary care physician's office.          Activity Instructions     Continued PT as ordered at Inpatient rehabilitation facility. Still requires her knee immobilizer.           Diet Instructions     Regular diet.            Follow-ups for After Discharge        Follow-up Information     Follow up with Edgar Albert MD .    Specialty:  Orthopedic Surgery    Why:  APPOINTMENT:  January 30, 2017 at 1:00pm    Contact information:    [address]  [address]  [facility] [address]  [phone]          Follow up with [facility] .    Specialty:  Rehabilitation    Contact information:    [address]  [facility] [address] [phone]        Follow up with [facility]  Kain Clarke MD .    Specialty:  Internal Medicine    Contact information:    [address]   [facility] [id number] [phone]        Referrals and Follow-ups to Schedule     Follow-Up    As directed    Dr. Albert per his orders             Scheduled Appointments     Jan 24, 2017 10:30 AM EST   CT mary jo head wo contrast with MARY JO CT 1   [facility] ([place])    [address] [facility] [address] [phone]           Please arrive 15 minutes prior to appointment time.            Jan 24, 2017 12:00 PM EST   Office Visit with Jos Christian MD   [facility] (--)    [address] [facility]  [address]  [facility] [address] [phone]           Arrive 15 minutes prior to appointment.                         Patient Signature:  ____________________________________________________________    Date:  ____________________________________________________________        More Information      Hip Fracture  A hip fracture is a fracture of the upper part of your thigh bone (femur).   CAUSES  A hip fracture is caused by a direct blow to the side of your hip. This is usually the result of a fall but can occur in other circumstances, such as an automobile accident.  RISK FACTORS  There is an increased risk of hip fractures in people with:  · An unsteady walking pattern (gait) and those with conditions that contribute to poor balance, such as Parkinson's disease or dementia.  · Osteopenia and osteoporosis.  · Cancer that spreads to the leg bones.  · Certain metabolic diseases.  SYMPTOMS   Symptoms of hip fracture include:  · Pain over the injured hip.  · Inability to put weight on the leg in which the fracture occurred (although, some patients are able to walk after a hip fracture).  · Toes and foot of the affected leg point outward when you lie down.  DIAGNOSIS  A physical exam can determine if a hip fracture is likely to have occurred. X-ray  exams are needed to confirm the fracture and to look for other injuries. The X-ray exam can help to determine the type of hip fracture. Rarely, the fracture is not visible on an X-ray image and a CT scan or MRI will have to be done.  TREATMENT   The treatment for a fracture is usually surgery. This means using a screw, nail, or pipe to hold the bones in place.   HOME CARE INSTRUCTIONS  Take all medicines as directed by your health care provider.  SEEK MEDICAL CARE IF:  Pain continues, even after taking pain medicine.  MAKE SURE YOU:  · Understand these instructions.    · Will watch your condition.  · Will get help right away if you are not doing well or get worse.     This information is not intended to replace advice given to you by your health care provider. Make sure you discuss any questions you have with your health care provider.     Document Released: 12/18/2006 Document Revised: 12/23/2014 Document Reviewed: 07/30/2014  [employer] Interactive Patient Education ©2016 [employer]          Urinary Tract Infection  Urinary tract infections (UTIs) can develop anywhere along your urinary tract. Your urinary tract is your body's drainage system for removing wastes and extra water. Your urinary tract includes two kidneys, two ureters, a bladder, and a urethra. Your kidneys are a pair of bean-shaped organs. Each kidney is about the size of your fist. They are located below your ribs, one on each side of your spine.  CAUSES  Infections are caused by microbes, which are microscopic organisms, including fungi, viruses, and bacteria. These organisms are so small that they can only be seen through a microscope. Bacteria are the microbes that most commonly cause UTIs.  SYMPTOMS   Symptoms of UTIs may vary by age and gender of the patient and by the location of the infection. Symptoms in young women typically include a frequent and intense urge to urinate and a painful, burning feeling in the bladder or urethra during  urination. Older women and men are more likely to be tired, shaky, and weak and have muscle aches and abdominal pain. A fever may mean the infection is in your kidneys. Other symptoms of a kidney infection include pain in your back or sides below the ribs, nausea, and vomiting.  DIAGNOSIS  To diagnose a UTI, your caregiver will ask you about your symptoms. Your caregiver will also ask you to provide a urine sample. The urine sample will be tested for bacteria and white blood cells. White blood cells are made by your body to help fight infection.  TREATMENT   Typically, UTIs can be treated with medication. Because most UTIs are caused by a bacterial infection, they usually can be treated with the use of antibiotics. The choice of antibiotic and length of treatment depend on your symptoms and the type of bacteria causing your infection.  HOME CARE INSTRUCTIONS  · If you were prescribed antibiotics, take them exactly as your caregiver instructs you. Finish the medication even if you feel better after you have only taken some of the medication.  · Drink enough water and fluids to keep your urine clear or pale yellow.  · Avoid caffeine, tea, and carbonated beverages. They tend to irritate your bladder.  · Empty your bladder often. Avoid holding urine for long periods of time.  · Empty your bladder before and after sexual intercourse.  · After a bowel movement, women should cleanse from front to back. Use each tissue only once.  SEEK MEDICAL CARE IF:   · You have back pain.  · You develop a fever.  · Your symptoms do not begin to resolve within 3 days.  SEEK IMMEDIATE MEDICAL CARE IF:   · You have severe back pain or lower abdominal pain.  · You develop chills.  · You have nausea or vomiting.  · You have continued burning or discomfort with urination.  MAKE SURE YOU:   · Understand these instructions.  · Will watch your condition.  · Will get help right away if you are not doing well or get worse.     This information is  not intended to replace advice given to you by your health care provider. Make sure you discuss any questions you have with your health care provider.     Document Released: 09/27/2006 Document Revised: 09/07/2016 Document Reviewed: 01/25/2013  [employer] Interactive Patient Education ©2016 [employer]          Enoxaparin injection  What is this medicine?  ENOXAPARIN (ee nox a PA rin) is used after knee, hip, or abdominal surgeries to prevent blood clotting. It is also used to treat existing blood clots in the lungs or in the veins.  This medicine may be used for other purposes; ask your health care provider or pharmacist if you have questions.  What should I tell my health care provider before I take this medicine?  They need to know if you have any of these conditions:  -bleeding disorders, hemorrhage, or hemophilia  -infection of the heart or heart valves  -kidney or liver disease  -previous stroke  -prosthetic heart valve  -recent surgery or delivery of a baby  -ulcer in the stomach or intestine, diverticulitis, or other bowel disease  -an unusual or allergic reaction to enoxaparin, heparin, pork or pork products, other medicines, foods, dyes, or preservatives  -pregnant or trying to get pregnant  -breast-feeding  How should I use this medicine?  This medicine is for injection under the skin. It is usually given by a health-care professional. You or a family member may be trained on how to give the injections. If you are to give yourself injections, make sure you understand how to use the syringe, measure the dose if necessary, and give the injection. To avoid bruising, do not rub the site where this medicine has been injected. Do not take your medicine more often than directed. Do not stop taking except on the advice of your doctor or health care professional.  Make sure you receive a puncture-resistant container to dispose of the needles and syringes once you have finished with them. Do not reuse these items.  Return the container to your doctor or health care professional for proper disposal.  Talk to your pediatrician regarding the use of this medicine in children. Special care may be needed.  Overdosage: If you think you have taken too much of this medicine contact a poison control center or emergency room at once.  NOTE: This medicine is only for you. Do not share this medicine with others.  What if I miss a dose?  If you miss a dose, take it as soon as you can. If it is almost time for your next dose, take only that dose. Do not take double or extra doses.  What may interact with this medicine?  -aspirin and aspirin-like medicines  -certain medicines that treat or prevent blood clots  -dipyridamole  -NSAIDs, medicines for pain and inflammation, like ibuprofen or naproxen  This list may not describe all possible interactions. Give your health care provider a list of all the medicines, herbs, non-prescription drugs, or dietary supplements you use. Also tell them if you smoke, drink alcohol, or use illegal drugs. Some items may interact with your medicine.  What should I watch for while using this medicine?  Visit your doctor or health care professional for regular checks on your progress. Your condition will be monitored carefully while you are receiving this medicine.  Notify your doctor or health care professional and seek emergency treatment if you develop breathing problems; changes in vision; chest pain; severe, sudden headache; pain, swelling, warmth in the leg; trouble speaking; sudden numbness or weakness of the face, arm, or leg. These can be signs that your condition has gotten worse.  If you are going to have surgery, tell your doctor or health care professional that you are taking this medicine.  Do not stop taking this medicine without first talking to your doctor. Be sure to refill your prescription before you run out of medicine.  Avoid sports and activities that might cause injury while you are using  this medicine. Severe falls or injuries can cause unseen bleeding. Be careful when using sharp tools or knives. Consider using an electric razor. Take special care brushing or flossing your teeth. Report any injuries, bruising, or red spots on the skin to your doctor or health care professional.  What side effects may I notice from receiving this medicine?  Side effects that you should report to your doctor or health care professional as soon as possible:  -allergic reactions like skin rash, itching or hives, swelling of the face, lips, or tongue  -feeling faint or lightheaded, falls  -signs and symptoms of bleeding such as bloody or black, tarry stools; red or dark-brown urine; spitting up blood or brown material that looks like coffee grounds; red spots on the skin; unusual bruising or bleeding from the eye, gums, or nose  Side effects that usually do not require medical attention (report to your doctor or health care professional if they continue or are bothersome):  -pain, redness, or irritation at site where injected  This list may not describe all possible side effects. Call your doctor for medical advice about side effects. You may report side effects to FDA at 7-070-FDA-7430.  Where should I keep my medicine?  Keep out of the reach of children.  Store at room temperature between 15 and 30 degrees C (59 and 86 degrees F). Do not freeze. If your injections have been specially prepared, you may need to store them in the refrigerator. Ask your pharmacist. Throw away any unused medicine after the expiration date.  NOTE: This sheet is a summary. It may not cover all possible information. If you have questions about this medicine, talk to your doctor, pharmacist, or health care provider.     © 2016, Elsevier/[employer] Standard. (2015-04-21 16:06:21)          Acetaminophen; Hydrocodone tablets or capsules  What is this medicine?  ACETAMINOPHEN; HYDROCODONE (a set a RAVEN abdirizak fen; gera droe KOE done) is a pain reliever. It  is used to treat moderate to severe pain.  This medicine may be used for other purposes; ask your health care provider or pharmacist if you have questions.  What should I tell my health care provider before I take this medicine?  They need to know if you have any of these conditions:  -brain tumor  -Crohn's disease, inflammatory bowel disease, or ulcerative colitis  -drug abuse or addiction  -head injury  -heart or circulation problems  -if you often drink alcohol  -kidney disease or problems going to the bathroom  -liver disease  -lung disease, asthma, or breathing problems  -an unusual or allergic reaction to acetaminophen, hydrocodone, other opioid analgesics, other medicines, foods, dyes, or preservatives  -pregnant or trying to get pregnant  -breast-feeding  How should I use this medicine?  Take this medicine by mouth. Swallow it with a full glass of water. Follow the directions on the prescription label. If the medicine upsets your stomach, take the medicine with food or milk. Do not take more than you are told to take.  Talk to your pediatrician regarding the use of this medicine in children. This medicine is not approved for use in children.  Patients over 65 years may have a stronger reaction and need a smaller dose.  Overdosage: If you think you have taken too much of this medicine contact a poison control center or emergency room at once.  NOTE: This medicine is only for you. Do not share this medicine with others.  What if I miss a dose?  If you miss a dose, take it as soon as you can. If it is almost time for your next dose, take only that dose. Do not take double or extra doses.  What may interact with this medicine?  -alcohol  -antihistamines  -isoniazid  -medicines for depression, anxiety, or psychotic disturbances  -medicines for sleep  -muscle relaxants  -naltrexone  -narcotic medicines (opiates) for pain  -phenobarbital  -ritonavir  -tramadol  This list may not describe all possible interactions.  Give your health care provider a list of all the medicines, herbs, non-prescription drugs, or dietary supplements you use. Also tell them if you smoke, drink alcohol, or use illegal drugs. Some items may interact with your medicine.  What should I watch for while using this medicine?  Tell your doctor or health care professional if your pain does not go away, if it gets worse, or if you have new or a different type of pain. You may develop tolerance to the medicine. Tolerance means that you will need a higher dose of the medicine for pain relief. Tolerance is normal and is expected if you take the medicine for a long time.  Do not suddenly stop taking your medicine because you may develop a severe reaction. Your body becomes used to the medicine. This does NOT mean you are addicted. Addiction is a behavior related to getting and using a drug for a non-medical reason. If you have pain, you have a medical reason to take pain medicine. Your doctor will tell you how much medicine to take. If your doctor wants you to stop the medicine, the dose will be slowly lowered over time to avoid any side effects.  You may get drowsy or dizzy when you first start taking the medicine or change doses. Do not drive, use machinery, or do anything that may be dangerous until you know how the medicine affects you. Stand or sit up slowly.  There are different types of narcotic medicines (opiates) for pain. If you take more than one type at the same time, you may have more side effects. Give your health care provider a list of all medicines you use. Your doctor will tell you how much medicine to take. Do not take more medicine than directed. Call emergency for help if you have problems breathing.  The medicine will cause constipation. Try to have a bowel movement at least every 2 to 3 days. If you do not have a bowel movement for 3 days, call your doctor or health care professional.  Too much acetaminophen can be very dangerous. Do not  take Tylenol (acetaminophen) or medicines that contain acetaminophen with this medicine. Many non-prescription medicines contain acetaminophen. Always read the labels carefully.  What side effects may I notice from receiving this medicine?  Side effects that you should report to your doctor or health care professional as soon as possible:  -allergic reactions like skin rash, itching or hives, swelling of the face, lips, or tongue  -breathing problems  -confusion  -feeling faint or lightheaded, falls  -stomach pain  -yellowing of the eyes or skin  Side effects that usually do not require medical attention (report to your doctor or health care professional if they continue or are bothersome):  -nausea, vomiting  -stomach upset  This list may not describe all possible side effects. Call your doctor for medical advice about side effects. You may report side effects to FDA at 0-894-FDA-5000.  Where should I keep my medicine?  Keep out of the reach of children. This medicine can be abused. Keep your medicine in a safe place to protect it from theft. Do not share this medicine with anyone. Selling or giving away this medicine is dangerous and against the law.  This medicine may cause accidental overdose and death if it taken by other adults, children, or pets. Mix any unused medicine with a substance like cat litter or coffee grounds. Then throw the medicine away in a sealed container like a sealed bag or a coffee can with a lid. Do not use the medicine after the expiration date.  Store at room temperature between 15 and 30 degrees C (59 and 86 degrees F).  NOTE: This sheet is a summary. It may not cover all possible information. If you have questions about this medicine, talk to your doctor, pharmacist, or health care provider.     © 2016, [employer]vier/Gold Standard. (2015-11-18 15:29:20)          Incentive Spirometer  An incentive spirometer is a tool that can help keep your lungs clear and active. This tool measures how  well you are filling your lungs with each breath. Taking long, deep breaths may help reverse or decrease the chance of developing breathing (pulmonary) problems (especially infection) following:  · Surgery of the chest or abdomen.  · Surgery if you have a history of smoking or a lung problem.  · A long period of time when you are unable to move or be active.  BEFORE THE PROCEDURE   · If the spirometer includes an indicator to show your best effort, your nurse or respiratory therapist will set it to a desired goal.  · If possible, sit up straight or lean slightly forward. Try not to slouch.  · Hold the incentive spirometer in an upright position.  INSTRUCTIONS FOR USE   1. Sit on the edge of your bed if possible, or sit up as far as you can in bed or on a chair.  2. Hold the incentive spirometer in an upright position.  3. Breathe out normally.  4. Place the mouthpiece in your mouth and seal your lips tightly around it.  5. Breathe in slowly and as deeply as possible, raising the piston or the ball toward the top of the column.  6. Hold your breath for 3-5 seconds or for as long as possible. Allow the piston or ball to fall to the bottom of the column.  7. Remove the mouthpiece from your mouth and breathe out normally.  8. Rest for a few seconds and repeat Steps 1 through 7 at least 10 times every 1-2 hours when you are awake. Take your time and take a few normal breaths between deep breaths.  9. The spirometer may include an indicator to show your best effort. Use the indicator as a goal to work toward during each repetition.  10. After each set of 10 deep breaths, practice coughing to be sure your lungs are clear. If you have an incision (the cut made at the time of surgery), support your incision when coughing by placing a pillow or rolled-up towels firmly against it.  Once you are able to get out of bed, walk around indoors and cough well. You may stop using the incentive spirometer when instructed by your  caregiver.   RISKS AND COMPLICATIONS  · Breathing too quickly may cause dizziness. At an extreme, this could cause you to pass out. Take your time so you do not get dizzy or light-headed.  · If you are in pain, you may need to take or ask for pain medication before doing incentive spirometry. It is harder to take a deep breath if you are having pain.  AFTER USE  · Rest and breathe slowly and easily.  · It can be helpful to keep a log of your progress. Your caregiver can provide you with a simple table to help with this.  If you are using the spirometer at home, follow these instructions:  SEEK MEDICAL CARE IF:   · You are having difficultly using the spirometer.  · You have trouble using the spirometer as often as instructed.  · Your pain medication is not giving enough relief while using the spirometer.  · You develop fever of 100.5°F (38.1°C) or higher.  SEEK IMMEDIATE MEDICAL CARE IF:   · You cough up bloody sputum that had not been present before.  · You develop fever of 102°F (38.9°C) or greater.  · You develop worsening pain at or near the incision site.  MAKE SURE YOU:   · Understand these instructions.  · Will watch your condition.  · Will get help right away if you are not doing well or get worse.     This information is not intended to replace advice given to you by your health care provider. Make sure you discuss any questions you have with your health care provider.     Document Released: 04/29/2008 Document Revised: 01/08/2016 Document Reviewed: 07/27/2015  [employer] Interactive Patient Education ©2016 [employer]          How to Use Compression Stockings  Compression stockings are elastic socks that squeeze the legs. They help to increase blood flow to the legs, decrease swelling in the legs, and reduce the chance of developing blood clots in the lower legs. Compression stockings are often used by people who:  · Are recovering from surgery.  · Have poor circulation in their legs.  · Are prone to  getting blood clots in their legs.  · Have varicose veins.  · Sit or stay in bed for long periods of time.  HOW TO USE COMPRESSION STOCKINGS  Before you put on your compression stockings:  · Make sure that they are the correct size. If you do not know your size, ask your health care provider.  · Make sure that they are clean, dry, and in good condition.  · Check them for rips and tears. Do not put them on if they are ripped or torn.  Put your stockings on first thing in the morning, before you get out of bed. Keep them on for as long as your health care provider advises. When you are wearing your stockings:  · Keep them as smooth as possible. Do not allow them to bunch up. It is especially important to prevent the stockings from bunching up around your toes or behind your knees.  · Do not roll the stockings downward and leave them rolled down. This can decrease blood flow to your leg.  · Change them right away if they become wet or dirty.  When you take off your stockings, inspect your legs and feet. Anything that does not seem normal may require medical attention. Look for:  · Open sores.  · Red spots.  · Swelling.  INFORMATION AND TIPS  · Do not stop wearing your compression stockings without talking to your health care provider first.  · Wash your stockings everyday with mild detergent in cold or warm water. Do not use bleach. Air-dry your stockings or dry them in a clothes dryer on low heat.  · Replace your stockings every 3-6 months.  · If skin moisturizing is part of your treatment plan, apply lotion or cream at night so that your skin will be dry when you put on the stockings in the morning. It is harder to put the stockings on when you have lotion on your legs or feet.  SEEK MEDICAL CARE IF:  Remove your stockings and seek medical care if:  · You have a feeling of pins and needles in your feet or legs.  · You have any new changes in your skin.  · You have skin lesions that are getting worse.  · You have  swelling or pain that is getting worse.  SEEK IMMEDIATE MEDICAL CARE IF:  · You have numbness or tingling in your lower legs that does not get better immediately after you take the stockings off.  · Your toes or feet become cold and blue.  · You develop open sores or red spots on your legs that do not go away.  · You see or feel a warm spot on your leg.  · You have new swelling or soreness in your leg.  · You are short of breath or you have chest pain for no reason.  · You have a rapid or irregular heartbeat.  · You feel light-headed or dizzy.     This information is not intended to replace advice given to you by your health care provider. Make sure you discuss any questions you have with your health care provider.     Document Released: 10/15/2010 Document Revised: 05/03/2016 Document Reviewed: 11/25/2015  [employer] Interactive Patient Education ©2016 [employer]

## 2017-01-14 NOTE — Clinical Note
Level of Care: Med/Surg [1]   Diagnosis: Closed left hip fracture, initial encounter [650608]   Admitting Physician: THEA GARCIA [[id number]]   Attending Physician: THEA GARCIA [[id number]]

## 2017-01-14 NOTE — BRIEF OP NOTE
HIP PERCUTANEOUS PINNING  Procedure Note    Keila Alberts  1/14/2017    Pre-op Diagnosis:   Left hip femoral neck fracture    Post-op Diagnosis:     Post-Op Diagnosis Codes:     * Fracture of femoral neck, left [S72.002A]    Procedure/CPT® Codes:  VA PERCUT FIX PROX/NECK FEMUR FX [57718]    Procedure(s):  Left HIP PERCUTANEOUS PINNING    Surgeon(s):  Edgar Albert MD    Anesthesia: General, iliofascial catheter placed postop    Staff:   Circulator: Bhakti Ramsey RN  Radiology Technologist: Eben Reilly RT  Scrub Person: Dinorah Dias; Eloina Travis    Estimated Blood Loss: * No values recorded between 1/14/2017  3:27 PM and 1/14/2017  4:52 PM *    Specimens:                * No specimens in log *      Drains: none          Findings: minimally displaced left femoral neck fracture    Complications: none    Implants: Synthes 6.5 x1 and 7.3 x2 cannulated screws      Edgar Albert MD     Date: 1/14/2017  Time: 4:53 PM

## 2017-01-14 NOTE — IP AVS SNAPSHOT
AFTER VISIT SUMMARY             Keila Alberts           About your hospitalization     You were admitted on:  January 14, 2017 You last received care in the:  [address]       Procedures & Surgeries      Procedure(s) (LRB):  HIP PERCUTANEOUS PINNING (Left)     1/14/2017     Surgeon(s):  Edgar Albert MD  -------------------      Medications    If you or your caregiver advised us that you are currently taking a medication and that medication is marked below as “Resume”, this simply indicates that we have reviewed those medications to make sure our new therapy recommendations do not interfere.  If you have concerns about medications other than those new ones which we are prescribing today, please consult the physician who prescribed them (or your primary physician).  Our review of your home medications is not meant to indicate that we are directing their use.             Your Medications      START taking these medications     enoxaparin 40 MG/0.4ML solution syringe   Inject 0.4 mL under the skin Daily. For 1 month   Last time this was given:  1/16/2017  4:26 PM   Commonly known as:  LOVENOX           HYDROcodone-acetaminophen 5-325 MG per tablet   Take 1 tablet by mouth Every 4 (Four) Hours As Needed for moderate pain (4-6) for up to 7 days.   Last time this was given:  1/17/2017 11:17 AM   Commonly known as:  NORCO             CONTINUE taking these medications     amLODIPine 5 MG tablet   Take 5 mg by mouth Daily.   Last time this was given:  1/17/2017 10:12 AM   Commonly known as:  NORVASC           benazepril 40 MG tablet   Take 40 mg by mouth Daily.   Last time this was given:  1/17/2017 10:12 AM   Commonly known as:  LOTENSIN           docusate sodium 100 MG capsule   Take 100 mg by mouth 2 (Two) Times a Day.   Last time this was given:  1/17/2017 10:12 AM   Commonly known as:  COLACE           PRESERVISION AREDS 2 capsule   Take 1 capsule by mouth Daily.           vitamin B-12 1000  MCG tablet   Take 1,000 mcg by mouth Daily.   Last time this was given:  1/17/2017 10:12 AM   Commonly known as:  CYANOCOBALAMIN                Where to Get Your Medications      These medications were sent to [id number] - [place], [place] - [address] [place] [address] - [phone]  - [phone] FX  [address] [place] [id number]     Phone:  [phone]     enoxaparin 40 MG/0.4ML solution syringe         Information about where to get these medications is not yet available     ! Ask your nurse or doctor about these medications     HYDROcodone-acetaminophen 5-325 MG per tablet                  Your Medications      Your Medication List           Morning Noon Evening Bedtime As Needed    amLODIPine 5 MG tablet   Take 5 mg by mouth Daily.   Commonly known as:  NORVASC                                   benazepril 40 MG tablet   Take 40 mg by mouth Daily.   Commonly known as:  LOTENSIN                                   docusate sodium 100 MG capsule   Take 100 mg by mouth 2 (Two) Times a Day.   Commonly known as:  COLACE                                      enoxaparin 40 MG/0.4ML solution syringe   Inject 0.4 mL under the skin Daily. For 1 month   Commonly known as:  LOVENOX                                   HYDROcodone-acetaminophen 5-325 MG per tablet   Take 1 tablet by mouth Every 4 (Four) Hours As Needed for moderate pain (4-6) for up to 7 days.   Commonly known as:  NORCO                                   PRESERVISION AREDS 2 capsule   Take 1 capsule by mouth Daily.                                   vitamin B-12 1000 MCG tablet   Take 1,000 mcg by mouth Daily.   Commonly known as:  CYANOCOBALAMIN                                            Instructions for After Discharge        Activity Instructions     Continued PT as ordered at Inpatient rehabilitation facility. Still requires her knee immobilizer.           Diet Instructions     Regular diet.           Discharge References/Attachments     HIP  FRACTURE (ENGLISH)    URINARY TRACT INFECTION (ENGLISH)    ENOXAPARIN INJECTION (ENGLISH)    ACETAMINOPHEN; HYDROCODONE TABLETS OR CAPSULES (ENGLISH)    INCENTIVE SPIROMETER (ENGLISH)    COMPRESSION STOCKINGS (ENGLISH)       Follow-ups for After Discharge        Follow-up Information     Follow up with Edgar Albert MD .    Specialty:  Orthopedic Surgery    Why:  APPOINTMENT:  January 30, 2017 at 1:00pm    Contact information:    [address]  [address]  [place] [id number] [phone]          Follow up with [facility] .    Specialty:  Rehabilitation    Contact information:    [address]  [facility] [address] [phone]        Follow up with Kodak Clarke MD .    Specialty:  Internal Medicine    Contact information:    [address]  [place] [id number] [phone]        Referrals and Follow-ups to Schedule     Follow-Up    As directed    Dr. Albert per his orders             Scheduled Appointments     Jan 24, 2017 10:30 AM EST   CT mary jo head wo contrast with MARY JO CT 1   [facility]    [address] [facility] [address] [phone]           Please arrive 15 minutes prior to appointment time.            Jan 24, 2017 12:00 PM EST   Office Visit with Jos Christian MD   [facility] (--)    [address]  [facility] [address] [phone]           Arrive 15 minutes prior to appointment.              MyChart Signup     Our records indicate that you have declined [facility] sign[facility]. If you would like to sign up for [place], please email [email or web address] or call [phone] to obtain an activation code.         Summary of Your Hospitalization        Reason for Hospitalization     Your primary diagnosis was:  Closed Fracture Of Hip    Your diagnoses also included:  High Blood Pressure, Status Post Head Surgery,  Urinary Tract Infection      Care Providers     Provider Service Role Specialty    Bulmaro Camarillo MD Medicine Attending Provider Hospitalist    Edgar Albert MD Surgery Orthopedic Consulting Physician  Orthopedic Surgery     Edgar Albert MD Surgery Orthopedic Surgeon  Orthopedic Surgery      Your Allergies  Date Reviewed: 1/14/2017    Allergen Reactions    Erythromycin Not Noted      Pending Labs     Order Current Status    Green Top (No Gel) Collected (01/14/17 1151)    [place] Draw In process      Patient Belongings Returned     Document Return of Belongings Flowsheet     Were the patient bedside belongings sent home?   Yes   Belongings Retrieved from Security & Sent Home   N/A    Belongings Sent to Safe   --   Medications Retrieved from Pharmacy & Sent Home   N/A              More Information      Hip Fracture  A hip fracture is a fracture of the upper part of your thigh bone (femur).   CAUSES  A hip fracture is caused by a direct blow to the side of your hip. This is usually the result of a fall but can occur in other circumstances, such as an automobile accident.  RISK FACTORS  There is an increased risk of hip fractures in people with:  · An unsteady walking pattern (gait) and those with conditions that contribute to poor balance, such as Parkinson's disease or dementia.  · Osteopenia and osteoporosis.  · Cancer that spreads to the leg bones.  · Certain metabolic diseases.  SYMPTOMS   Symptoms of hip fracture include:  · Pain over the injured hip.  · Inability to put weight on the leg in which the fracture occurred (although, some patients are able to walk after a hip fracture).  · Toes and foot of the affected leg point outward when you lie down.  DIAGNOSIS  A physical exam can determine if a hip fracture is likely to have occurred. X-ray exams are needed to confirm the fracture and to look for other injuries. The X-ray exam can help to determine the type of hip fracture. Rarely, the  fracture is not visible on an X-ray image and a CT scan or MRI will have to be done.  TREATMENT   The treatment for a fracture is usually surgery. This means using a screw, nail, or pipe to hold the bones in place.   HOME CARE INSTRUCTIONS  Take all medicines as directed by your health care provider.  SEEK MEDICAL CARE IF:  Pain continues, even after taking pain medicine.  MAKE SURE YOU:  · Understand these instructions.    · Will watch your condition.  · Will get help right away if you are not doing well or get worse.     This information is not intended to replace advice given to you by your health care provider. Make sure you discuss any questions you have with your health care provider.     Document Released: 12/18/2006 Document Revised: 12/23/2014 Document Reviewed: 07/30/2014  [employer] Interactive Patient Education ©2016 Elsevier Inc.          Urinary Tract Infection  Urinary tract infections (UTIs) can develop anywhere along your urinary tract. Your urinary tract is your body's drainage system for removing wastes and extra water. Your urinary tract includes two kidneys, two ureters, a bladder, and a urethra. Your kidneys are a pair of bean-shaped organs. Each kidney is about the size of your fist. They are located below your ribs, one on each side of your spine.  CAUSES  Infections are caused by microbes, which are microscopic organisms, including fungi, viruses, and bacteria. These organisms are so small that they can only be seen through a microscope. Bacteria are the microbes that most commonly cause UTIs.  SYMPTOMS   Symptoms of UTIs may vary by age and gender of the patient and by the location of the infection. Symptoms in young women typically include a frequent and intense urge to urinate and a painful, burning feeling in the bladder or urethra during urination. Older women and men are more likely to be tired, shaky, and weak and have muscle aches and abdominal pain. A fever may mean the infection is in  your kidneys. Other symptoms of a kidney infection include pain in your back or sides below the ribs, nausea, and vomiting.  DIAGNOSIS  To diagnose a UTI, your caregiver will ask you about your symptoms. Your caregiver will also ask you to provide a urine sample. The urine sample will be tested for bacteria and white blood cells. White blood cells are made by your body to help fight infection.  TREATMENT   Typically, UTIs can be treated with medication. Because most UTIs are caused by a bacterial infection, they usually can be treated with the use of antibiotics. The choice of antibiotic and length of treatment depend on your symptoms and the type of bacteria causing your infection.  HOME CARE INSTRUCTIONS  · If you were prescribed antibiotics, take them exactly as your caregiver instructs you. Finish the medication even if you feel better after you have only taken some of the medication.  · Drink enough water and fluids to keep your urine clear or pale yellow.  · Avoid caffeine, tea, and carbonated beverages. They tend to irritate your bladder.  · Empty your bladder often. Avoid holding urine for long periods of time.  · Empty your bladder before and after sexual intercourse.  · After a bowel movement, women should cleanse from front to back. Use each tissue only once.  SEEK MEDICAL CARE IF:   · You have back pain.  · You develop a fever.  · Your symptoms do not begin to resolve within 3 days.  SEEK IMMEDIATE MEDICAL CARE IF:   · You have severe back pain or lower abdominal pain.  · You develop chills.  · You have nausea or vomiting.  · You have continued burning or discomfort with urination.  MAKE SURE YOU:   · Understand these instructions.  · Will watch your condition.  · Will get help right away if you are not doing well or get worse.     This information is not intended to replace advice given to you by your health care provider. Make sure you discuss any questions you have with your health care provider.      Document Released: 09/27/2006 Document Revised: 09/07/2016 Document Reviewed: 01/25/2013  [employer] Interactive Patient Education ©2016 Elsevier Inc.          Enoxaparin injection  What is this medicine?  ENOXAPARIN (ee nox a PA rin) is used after knee, hip, or abdominal surgeries to prevent blood clotting. It is also used to treat existing blood clots in the lungs or in the veins.  This medicine may be used for other purposes; ask your health care provider or pharmacist if you have questions.  What should I tell my health care provider before I take this medicine?  They need to know if you have any of these conditions:  -bleeding disorders, hemorrhage, or hemophilia  -infection of the heart or heart valves  -kidney or liver disease  -previous stroke  -prosthetic heart valve  -recent surgery or delivery of a baby  -ulcer in the stomach or intestine, diverticulitis, or other bowel disease  -an unusual or allergic reaction to enoxaparin, heparin, pork or pork products, other medicines, foods, dyes, or preservatives  -pregnant or trying to get pregnant  -breast-feeding  How should I use this medicine?  This medicine is for injection under the skin. It is usually given by a health-care professional. You or a family member may be trained on how to give the injections. If you are to give yourself injections, make sure you understand how to use the syringe, measure the dose if necessary, and give the injection. To avoid bruising, do not rub the site where this medicine has been injected. Do not take your medicine more often than directed. Do not stop taking except on the advice of your doctor or health care professional.  Make sure you receive a puncture-resistant container to dispose of the needles and syringes once you have finished with them. Do not reuse these items. Return the container to your doctor or health care professional for proper disposal.  Talk to your pediatrician regarding the use of this medicine in  children. Special care may be needed.  Overdosage: If you think you have taken too much of this medicine contact a poison control center or emergency room at once.  NOTE: This medicine is only for you. Do not share this medicine with others.  What if I miss a dose?  If you miss a dose, take it as soon as you can. If it is almost time for your next dose, take only that dose. Do not take double or extra doses.  What may interact with this medicine?  -aspirin and aspirin-like medicines  -certain medicines that treat or prevent blood clots  -dipyridamole  -NSAIDs, medicines for pain and inflammation, like ibuprofen or naproxen  This list may not describe all possible interactions. Give your health care provider a list of all the medicines, herbs, non-prescription drugs, or dietary supplements you use. Also tell them if you smoke, drink alcohol, or use illegal drugs. Some items may interact with your medicine.  What should I watch for while using this medicine?  Visit your doctor or health care professional for regular checks on your progress. Your condition will be monitored carefully while you are receiving this medicine.  Notify your doctor or health care professional and seek emergency treatment if you develop breathing problems; changes in vision; chest pain; severe, sudden headache; pain, swelling, warmth in the leg; trouble speaking; sudden numbness or weakness of the face, arm, or leg. These can be signs that your condition has gotten worse.  If you are going to have surgery, tell your doctor or health care professional that you are taking this medicine.  Do not stop taking this medicine without first talking to your doctor. Be sure to refill your prescription before you run out of medicine.  Avoid sports and activities that might cause injury while you are using this medicine. Severe falls or injuries can cause unseen bleeding. Be careful when using sharp tools or knives. Consider using an electric razor. Take  special care brushing or flossing your teeth. Report any injuries, bruising, or red spots on the skin to your doctor or health care professional.  What side effects may I notice from receiving this medicine?  Side effects that you should report to your doctor or health care professional as soon as possible:  -allergic reactions like skin rash, itching or hives, swelling of the face, lips, or tongue  -feeling faint or lightheaded, falls  -signs and symptoms of bleeding such as bloody or black, tarry stools; red or dark-brown urine; spitting up blood or brown material that looks like coffee grounds; red spots on the skin; unusual bruising or bleeding from the eye, gums, or nose  Side effects that usually do not require medical attention (report to your doctor or health care professional if they continue or are bothersome):  -pain, redness, or irritation at site where injected  This list may not describe all possible side effects. Call your doctor for medical advice about side effects. You may report side effects to FDA at 2-674-FDA-1839.  Where should I keep my medicine?  Keep out of the reach of children.  Store at room temperature between 15 and 30 degrees C (59 and 86 degrees F). Do not freeze. If your injections have been specially prepared, you may need to store them in the refrigerator. Ask your pharmacist. Throw away any unused medicine after the expiration date.  NOTE: This sheet is a summary. It may not cover all possible information. If you have questions about this medicine, talk to your doctor, pharmacist, or health care provider.     © 2016, Elsevier/Gold Standard. (2015-04-21 16:06:21)          Acetaminophen; Hydrocodone tablets or capsules  What is this medicine?  ACETAMINOPHEN; HYDROCODONE (a set a RAVEN abdirizak fen; gera droe KOE done) is a pain reliever. It is used to treat moderate to severe pain.  This medicine may be used for other purposes; ask your health care provider or pharmacist if you have  questions.  What should I tell my health care provider before I take this medicine?  They need to know if you have any of these conditions:  -brain tumor  -Crohn's disease, inflammatory bowel disease, or ulcerative colitis  -drug abuse or addiction  -head injury  -heart or circulation problems  -if you often drink alcohol  -kidney disease or problems going to the bathroom  -liver disease  -lung disease, asthma, or breathing problems  -an unusual or allergic reaction to acetaminophen, hydrocodone, other opioid analgesics, other medicines, foods, dyes, or preservatives  -pregnant or trying to get pregnant  -breast-feeding  How should I use this medicine?  Take this medicine by mouth. Swallow it with a full glass of water. Follow the directions on the prescription label. If the medicine upsets your stomach, take the medicine with food or milk. Do not take more than you are told to take.  Talk to your pediatrician regarding the use of this medicine in children. This medicine is not approved for use in children.  Patients over 65 years may have a stronger reaction and need a smaller dose.  Overdosage: If you think you have taken too much of this medicine contact a poison control center or emergency room at once.  NOTE: This medicine is only for you. Do not share this medicine with others.  What if I miss a dose?  If you miss a dose, take it as soon as you can. If it is almost time for your next dose, take only that dose. Do not take double or extra doses.  What may interact with this medicine?  -alcohol  -antihistamines  -isoniazid  -medicines for depression, anxiety, or psychotic disturbances  -medicines for sleep  -muscle relaxants  -naltrexone  -narcotic medicines (opiates) for pain  -phenobarbital  -ritonavir  -tramadol  This list may not describe all possible interactions. Give your health care provider a list of all the medicines, herbs, non-prescription drugs, or dietary supplements you use. Also tell them if you  smoke, drink alcohol, or use illegal drugs. Some items may interact with your medicine.  What should I watch for while using this medicine?  Tell your doctor or health care professional if your pain does not go away, if it gets worse, or if you have new or a different type of pain. You may develop tolerance to the medicine. Tolerance means that you will need a higher dose of the medicine for pain relief. Tolerance is normal and is expected if you take the medicine for a long time.  Do not suddenly stop taking your medicine because you may develop a severe reaction. Your body becomes used to the medicine. This does NOT mean you are addicted. Addiction is a behavior related to getting and using a drug for a non-medical reason. If you have pain, you have a medical reason to take pain medicine. Your doctor will tell you how much medicine to take. If your doctor wants you to stop the medicine, the dose will be slowly lowered over time to avoid any side effects.  You may get drowsy or dizzy when you first start taking the medicine or change doses. Do not drive, use machinery, or do anything that may be dangerous until you know how the medicine affects you. Stand or sit up slowly.  There are different types of narcotic medicines (opiates) for pain. If you take more than one type at the same time, you may have more side effects. Give your health care provider a list of all medicines you use. Your doctor will tell you how much medicine to take. Do not take more medicine than directed. Call emergency for help if you have problems breathing.  The medicine will cause constipation. Try to have a bowel movement at least every 2 to 3 days. If you do not have a bowel movement for 3 days, call your doctor or health care professional.  Too much acetaminophen can be very dangerous. Do not take Tylenol (acetaminophen) or medicines that contain acetaminophen with this medicine. Many non-prescription medicines contain acetaminophen.  Always read the labels carefully.  What side effects may I notice from receiving this medicine?  Side effects that you should report to your doctor or health care professional as soon as possible:  -allergic reactions like skin rash, itching or hives, swelling of the face, lips, or tongue  -breathing problems  -confusion  -feeling faint or lightheaded, falls  -stomach pain  -yellowing of the eyes or skin  Side effects that usually do not require medical attention (report to your doctor or health care professional if they continue or are bothersome):  -nausea, vomiting  -stomach upset  This list may not describe all possible side effects. Call your doctor for medical advice about side effects. You may report side effects to FDA at 4-547-FDA-0531.  Where should I keep my medicine?  Keep out of the reach of children. This medicine can be abused. Keep your medicine in a safe place to protect it from theft. Do not share this medicine with anyone. Selling or giving away this medicine is dangerous and against the law.  This medicine may cause accidental overdose and death if it taken by other adults, children, or pets. Mix any unused medicine with a substance like cat litter or coffee grounds. Then throw the medicine away in a sealed container like a sealed bag or a coffee can with a lid. Do not use the medicine after the expiration date.  Store at room temperature between 15 and 30 degrees C (59 and 86 degrees F).  NOTE: This sheet is a summary. It may not cover all possible information. If you have questions about this medicine, talk to your doctor, pharmacist, or health care provider.     © 2016, Elsevier/Gold Standard. (2015-11-18 15:29:20)          Incentive Spirometer  An incentive spirometer is a tool that can help keep your lungs clear and active. This tool measures how well you are filling your lungs with each breath. Taking long, deep breaths may help reverse or decrease the chance of developing breathing  (pulmonary) problems (especially infection) following:  · Surgery of the chest or abdomen.  · Surgery if you have a history of smoking or a lung problem.  · A long period of time when you are unable to move or be active.  BEFORE THE PROCEDURE   · If the spirometer includes an indicator to show your best effort, your nurse or respiratory therapist will set it to a desired goal.  · If possible, sit up straight or lean slightly forward. Try not to slouch.  · Hold the incentive spirometer in an upright position.  INSTRUCTIONS FOR USE   1. Sit on the edge of your bed if possible, or sit up as far as you can in bed or on a chair.  2. Hold the incentive spirometer in an upright position.  3. Breathe out normally.  4. Place the mouthpiece in your mouth and seal your lips tightly around it.  5. Breathe in slowly and as deeply as possible, raising the piston or the ball toward the top of the column.  6. Hold your breath for 3-5 seconds or for as long as possible. Allow the piston or ball to fall to the bottom of the column.  7. Remove the mouthpiece from your mouth and breathe out normally.  8. Rest for a few seconds and repeat Steps 1 through 7 at least 10 times every 1-2 hours when you are awake. Take your time and take a few normal breaths between deep breaths.  9. The spirometer may include an indicator to show your best effort. Use the indicator as a goal to work toward during each repetition.  10. After each set of 10 deep breaths, practice coughing to be sure your lungs are clear. If you have an incision (the cut made at the time of surgery), support your incision when coughing by placing a pillow or rolled-up towels firmly against it.  Once you are able to get out of bed, walk around indoors and cough well. You may stop using the incentive spirometer when instructed by your caregiver.   RISKS AND COMPLICATIONS  · Breathing too quickly may cause dizziness. At an extreme, this could cause you to pass out. Take your time  so you do not get dizzy or light-headed.  · If you are in pain, you may need to take or ask for pain medication before doing incentive spirometry. It is harder to take a deep breath if you are having pain.  AFTER USE  · Rest and breathe slowly and easily.  · It can be helpful to keep a log of your progress. Your caregiver can provide you with a simple table to help with this.  If you are using the spirometer at home, follow these instructions:  SEEK MEDICAL CARE IF:   · You are having difficultly using the spirometer.  · You have trouble using the spirometer as often as instructed.  · Your pain medication is not giving enough relief while using the spirometer.  · You develop fever of 100.5°F (38.1°C) or higher.  SEEK IMMEDIATE MEDICAL CARE IF:   · You cough up bloody sputum that had not been present before.  · You develop fever of 102°F (38.9°C) or greater.  · You develop worsening pain at or near the incision site.  MAKE SURE YOU:   · Understand these instructions.  · Will watch your condition.  · Will get help right away if you are not doing well or get worse.     This information is not intended to replace advice given to you by your health care provider. Make sure you discuss any questions you have with your health care provider.     Document Released: 04/29/2008 Document Revised: 01/08/2016 Document Reviewed: 07/27/2015  [employer] Interactive Patient Education ©2016 [employer]          How to Use Compression Stockings  Compression stockings are elastic socks that squeeze the legs. They help to increase blood flow to the legs, decrease swelling in the legs, and reduce the chance of developing blood clots in the lower legs. Compression stockings are often used by people who:  · Are recovering from surgery.  · Have poor circulation in their legs.  · Are prone to getting blood clots in their legs.  · Have varicose veins.  · Sit or stay in bed for long periods of time.  HOW TO USE COMPRESSION STOCKINGS  Before you  put on your compression stockings:  · Make sure that they are the correct size. If you do not know your size, ask your health care provider.  · Make sure that they are clean, dry, and in good condition.  · Check them for rips and tears. Do not put them on if they are ripped or torn.  Put your stockings on first thing in the morning, before you get out of bed. Keep them on for as long as your health care provider advises. When you are wearing your stockings:  · Keep them as smooth as possible. Do not allow them to bunch up. It is especially important to prevent the stockings from bunching up around your toes or behind your knees.  · Do not roll the stockings downward and leave them rolled down. This can decrease blood flow to your leg.  · Change them right away if they become wet or dirty.  When you take off your stockings, inspect your legs and feet. Anything that does not seem normal may require medical attention. Look for:  · Open sores.  · Red spots.  · Swelling.  INFORMATION AND TIPS  · Do not stop wearing your compression stockings without talking to your health care provider first.  · Wash your stockings everyday with mild detergent in cold or warm water. Do not use bleach. Air-dry your stockings or dry them in a clothes dryer on low heat.  · Replace your stockings every 3-6 months.  · If skin moisturizing is part of your treatment plan, apply lotion or cream at night so that your skin will be dry when you put on the stockings in the morning. It is harder to put the stockings on when you have lotion on your legs or feet.  SEEK MEDICAL CARE IF:  Remove your stockings and seek medical care if:  · You have a feeling of pins and needles in your feet or legs.  · You have any new changes in your skin.  · You have skin lesions that are getting worse.  · You have swelling or pain that is getting worse.  SEEK IMMEDIATE MEDICAL CARE IF:  · You have numbness or tingling in your lower legs that does not get better  immediately after you take the stockings off.  · Your toes or feet become cold and blue.  · You develop open sores or red spots on your legs that do not go away.  · You see or feel a warm spot on your leg.  · You have new swelling or soreness in your leg.  · You are short of breath or you have chest pain for no reason.  · You have a rapid or irregular heartbeat.  · You feel light-headed or dizzy.     This information is not intended to replace advice given to you by your health care provider. Make sure you discuss any questions you have with your health care provider.     Document Released: 10/15/2010 Document Revised: 05/03/2016 Document Reviewed: 11/25/2015  [employer] Interactive Patient Education ©2016 [employer]         PREVENTING SURGICAL SITE INFECTIONS     Surgical Site Infections FAQs  What is a Surgical Site Infection (SSI)?  A surgical site infection is an infection that occurs after surgery in the part of the body where the surgery took place. Most patients who have surgery do not develop an infection. However, infections develop in about 1 to 3 out of every 100 patients who have surgery.  Some of the common symptoms of a surgical site infection are:  · Redness and pain around the area where you had surgery  · Drainage of cloudy fluid from your surgical wound  · Fever  Can SSIs be treated?  Yes. Most surgical site infections can be treated with antibiotics. The antibiotic given to you depends on the bacteria (germs) causing the infection. Sometimes patients with SSIs also need another surgery to treat the infection.  What are some of the things that hospitals are doing to prevent SSIs?  To prevent SSIs, doctors, nurses, and other healthcare providers:  · Clean their hands and arms up to their elbows with an antiseptic agent just before the surgery.  · Clean their hands with soap and water or an alcohol-based hand rub before and after caring for each patient.  · May remove some of your hair immediately  before your surgery using electric clippers if the hair is in the same area where the procedure will occur. They should not shave you with a razor.  · Wear special hair covers, masks, gowns, and gloves during surgery to keep the surgery area clean.  · Give you antibiotics before your surgery starts. In most cases, you should get antibiotics within 60 minutes before the surgery starts and the antibiotics should be stopped within 24 hours after surgery.  · Clean the skin at the site of your surgery with a special soap that kills germs.  What can I do to help prevent SSIs?  Before your surgery:  · Tell your doctor about other medical problems you may have. Health problems such as allergies, diabetes, and obesity could affect your surgery and your treatment.  · Quit smoking. Patients who smoke get more infections. Talk to your doctor about how you can quit before your surgery.  · Do not shave near where you will have surgery. Shaving with a razor can irritate your skin and make it easier to develop an infection.  At the time of your surgery:  · Speak up if someone tries to shave you with a razor before surgery. Ask why you need to be shaved and talk with your surgeon if you have any concerns.  · Ask if you will get antibiotics before surgery.  After your surgery:  · Make sure that your healthcare providers clean their hands before examining you, either with soap and water or an alcohol-based hand rub.    If you do not see your providers clean their hands, please ask them to do so.  · Family and friends who visit you should not touch the surgical wound or dressings.  · Family and friends should clean their hands with soap and water or an alcohol-based hand rub before and after visiting you. If you do not see them clean their hands, ask them to clean their hands.  What do I need to do when I go home from the hospital?  · Before you go home, your doctor or nurse should explain everything you need to know about taking care  of your wound. Make sure you understand how to care for your wound before you leave the hospital.  · Always clean your hands before and after caring for your wound.  · Before you go home, make sure you know who to contact if you have questions or problems after you get home.  · If you have any symptoms of an infection, such as redness and pain at the surgery site, drainage, or fever, call your doctor immediately.  If you have additional questions, please ask your doctor or nurse.  Developed and co-sponsored by The Society for Healthcare Epidemiology of [place] (SHEA); Infectious Diseases Society of [place] (IDSA); American Hospital Association; Association for Professionals in Infection Control and Epidemiology (APIC); Centers for Disease Control and Prevention (CDC); and The Joint Commission.     This information is not intended to replace advice given to you by your health care provider. Make sure you discuss any questions you have with your health care provider.     Document Released: 12/23/2014 Document Revised: 01/08/2016 Document Reviewed: 03/02/2016  [employer] Interactive Patient Education ©2016 [employer]             SYMPTOMS OF A STROKE    Call 911 or have someone take you to the Emergency Department if you have any of the following:    · Sudden numbness or weakness of your face, arm or leg especially on one side of the body  · Sudden confusion, diffiiculty speaking or trouble understanding   · Changes in your vision or loss of sight in one eye  · Sudden severe headache with no known cause  · sudden dizziness, trouble walking, loss of balance or coordination    It is important to seek emergency care right away if you have further stroke symptoms. If you get emergency help quickly, the powerful clot-dissolving medicines can reduce the disabilities caused by a stroke.     For more information:    American Stroke Association  1-530-5-STROKE  www.strokeassociation.org           IF YOU SMOKE OR USE TOBACCO  PLEASE READ THE FOLLOWING:    Why is smoking bad for me?  Smoking increases the risk of heart disease, lung disease, vascular disease, stroke, and cancer.     If you smoke, STOP!    If you would like more information on quitting smoking, please visit the [employer] website: [email or web address]   This link will provide additional resources including the QUIT line and the [employer] support groups.     For more information:    American Cancer Society  ([phone] 496-4454    American Heart Association  1-842.202.2746               YOU ARE THE MOST IMPORTANT FACTOR IN YOUR RECOVERY.     Follow all instructions carefully.     I have reviewed my discharge instructions with my nurse, including the following information, if applicable:     Information about my illness and diagnosis   Follow up appointments (including lab draws)   Wound Care   Equipment Needs   Medications (new and continuing) along with side effects   Preventative information such as vaccines and smoking cessations   Diet   Pain   I know when to contact my Doctor's office or seek emergency care      I want my nurse to describe the side effects of my medications: YES NO   If the answer is no, I understand the side effects of my medications: YES NO   My nurse described the side effects of my medications in a way that I could understand: YES NO   I have taken my personal belongings and my own medications with me at discharge: YES NO            I have received this information and my questions have been answered. I have discussed any concerns I see with this plan with the nurse or physician. I understand these instructions.    Signature of Patient or Responsible Person: _____________________________________    Date: _________________  Time: __________________    Signature of Healthcare Provider: _______________________________________  Date: _________________  Time: __________________

## 2017-01-14 NOTE — ANESTHESIA POSTPROCEDURE EVALUATION
Patient: Keila Alberts    Procedure Summary     Date Anesthesia Start Anesthesia Stop Room / Location    01/14/17 1527 1656  [facility] / [facility]       Procedure Diagnosis Surgeon Provider    HIP PERCUTANEOUS PINNING (Left Hip) Fracture of femoral neck, left MD Tom Walls MD          Anesthesia Type: general  Last vitals  BP      Temp      Pulse     Resp      SpO2        Post Anesthesia Care and Evaluation    Patient location during evaluation: PACU  Patient participation: complete - patient participated  Level of consciousness: awake and alert  Pain score: 2  Pain management: adequate  Airway patency: patent  Anesthetic complications: No anesthetic complications    Cardiovascular status: hemodynamically stable and acceptable  Respiratory status: nonlabored ventilation, acceptable and nasal cannula  Hydration status: acceptable

## 2017-01-14 NOTE — OP NOTE
DATE OF PROCEDURE: 01/14/2016    PREOPERATIVE DIAGNOSIS: Left hip minimally displaced femoral neck fracture.     POSTOPERATIVE DIAGNOSIS: Left hip minimally displaced femoral neck fracture.     PROCEDURE PERFORMED: Left hip femoral neck fracture percutaneous pinning.    ANESTHESIA: General with iliofascial block performed postoperatively.     ESTIMATED BLOOD LOSS: Minimal.     COMPLICATIONS: None.     SPECIMENS: None.     IMPLANTS: Synthes 6.5 and 7.3 mm cannulated screws x3.     INDICATIONS: The patient is a very pleasant 88-year-old female who presented to the ER with complaints of worsening left hip pain over the past couple of days. There is no documented fall other than a fall a couple of months ago where she sustained a subdural hematoma treated with an evacuation by Dr. Christian on 11/27/2016. She did rehab at [facility] after this hospitalization and was back home living with her daughter and son-in-law and getting home physical therapy over the past couple of weeks when she presented to the ER with hip pain today. Evaluation in the emergency room revealed minimally displaced valgus impacted left femoral neck fracture. I was consulted for definitive management of this fracture. Upon my evaluation of this patient, she had an isolated complaint of left hip and knee pain. Left knee x-rays revealed no acute injury. 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 Greene. She normally ambulates with a walker or cane assist. X-rays of the left hip revealed a very minimally displaced femoral neck fracture. The skin was intact. There is no obvious deformity of left lower extremity. She is neurovascularly intact distally. I discussed with the patient and her family that I would recommend left hip percutaneous pinning. All the risks, benefits, and alternatives to surgery were discussed with them in detail and they agreed to proceed. A  surgical consent form was signed.     DESCRIPTION OF PROCEDURE: She was seen in the preoperative holding area. The left lower extremity was marked to confirm the correct operative site. She was seen by anesthesia. She received Ancef 2 g IV prophylactic antibiotics before incision time. She was brought back to the operating room and general anesthesia was induced without difficulty. She was placed on the fracture table and brought down to the perineal post. The well leg was placed in the well leg terrell. The left foot was placed in a fracture boot, but no significant traction was applied to the left lower extremity for this minimally displaced fracture. C-arm was brought in and confirmed we could obtain adequate AP and lateral images of the femoral head and neck. C-arm was backed out. The left lower extremity was then prepped and draped in the usual sterile fashion. Timeout was performed to confirm left hip percutaneous pinning on the correct patient.  I made a longitudinal incision in line with the femur just distal to the greater trochanter. This was taken down through subcutaneous tissue. Adequate hemostasis was maintained with Bovie electrocautery. The IT band fascia was incised, as was the vastus lateralis fascia. I took a guide pin and placed it at the level of the lesser trochanter. This was drilled up the femoral neck into the inferior third of the femoral head. It was adjusted to allow size 5 placement in AP and lateral planes. I then placed 2 more guide pins proximal to this and an inverted a triangle configuration with good spread throughout the femoral neck and head. Once again, these were adjusted to a allow a size 5 placement in AP and lateral planes. Once I was satisfied with placement of the guide pins I then measured them and drilled the lateral cortex. I then placed a 6.5 partially threaded cannulated screw through the inferior guide pin; this was measured to 95 mm in length. This had excellent  purchase. The 2 proximal guide pins were both measured to 85 mm in length. These were 7.3 mm partially threaded cannulated screws. These all had excellent purchase with manual screwdriver placement. Guide pins were then removed and final pictures were taken in the AP and lateral planes, which confirmed good placement of the cannulated screws in the femoral head and neck. The C-arm was backed out. The wound was copiously irrigated with bulb irrigation. The deep fascia was closed with 0 Vicryl. The dermis was closed with 2-0 Vicryl. The skin was closed with staples. Sterile dressing was applied with Xeroform and Covaderm. She was awakened from anesthesia without difficulty after anesthesia performed an iliofascial catheter with ultrasound guidance. She was taken off the fracture table and transferred to the recovery room in stable condition. All sponge and needle counts were correct x2.     PLAN: She will be admitted to Dr. Camarillo for medical management. We will plan on starting physical therapy tomorrow for weight-bearing as tolerated of the left lower extremity.   We will have the  work on rehab placement and plan on starting Lovenox tomorrow for DVT prophylaxis.        MD IQ Field/angel  DD: 01/14/2017 16:52:20  DT: 01/14/2017 17:30:26  Voice Rec. ID #[id number]  Voice Original ID #[id number]  Doc ID #[id number]  Rev. #0  cc:

## 2017-01-14 NOTE — IP AVS SNAPSHOT
ZAKI DAVILA   Facility: [facility] ([place])   SA: [facility]    MRN:  [id number]   PT#:     Report:  [id number] - Summary of Care Document           Basic Information     Date Of Birth Sex Race Ethnicity Preferred Language Preferred Written Language    7/15/1928 Female White or  Not  or  English English      Allergies as of 1/17/2017  Reviewed On: 1/14/2017 By: Micaela Patel RN       Noted Reaction Type Reactions    Erythromycin 11/16/2016          Current Medications Are     amLODIPine (NORVASC) 5 MG tablet Take 5 mg by mouth Daily.    benazepril (LOTENSIN) 40 MG tablet Take 40 mg by mouth Daily.    docusate sodium (COLACE) 100 MG capsule Take 100 mg by mouth 2 (Two) Times a Day.    enoxaparin (LOVENOX) 40 MG/0.4ML solution syringe Inject 0.4 mL under the skin Daily. For 1 month    HYDROcodone-acetaminophen (NORCO) 5-325 MG per tablet Take 1 tablet by mouth Every 4 (Four) Hours As Needed for moderate pain (4-6) for up to 7 days.    Multiple Vitamins-Minerals (PRESERVISION AREDS 2) capsule Take 1 capsule by mouth Daily.    vitamin B-12 (CYANOCOBALAMIN) 1000 MCG tablet Take 1,000 mcg by mouth Daily.      Current Immunizations     Name Date Dose VIS Date Route    Influenza Vac Quardvalent Preservative Free 3yrs Plus IM 11/17/2016 0.5 mL 42085 Intramuscular    Site: Right deltoid    Given By: John Campbell RN      Problem List as of 1/17/2017  Date Reviewed: 1/14/2017             Codes Priority Class Noted - Resolved    Subdural hematoma, post-traumatic ICD-10-CM: S06.5X9A  ICD-9-CM: 852.20   11/16/2016 - Present    Hypertension ICD-10-CM: I10  ICD-9-CM: 401.9   11/16/2016 - Present    Constipation ICD-10-CM: K59.00  ICD-9-CM: 564.00   11/16/2016 - Present    Normocytic anemia ICD-10-CM: D64.9  ICD-9-CM: 285.9   11/16/2016 - Present    Acute/Subacute Traumatic SDH (subdural hematoma) ICD-10-CM: I62.00  ICD-9-CM: 432.1   11/27/2016 - Present            Altered mental status ICD-10-CM: R41.82  ICD-9-CM: 780.97   11/27/2016 - Present    Leukocytosis, ? Etiology ICD-10-CM: D72.829  ICD-9-CM: 288.60   11/27/2016 - Present    s/p Left Craniotomy for Evacuation of Left SDH 11/27/16  ICD-10-CM: Z98.890  ICD-9-CM: V45.89   12/4/2016 - Present    Hyponatremia ICD-10-CM: E87.1  ICD-9-CM: 276.1   12/4/2016 - Present    6.9 x 5.3cm pelvic mass, seen on imaging prior to admission ICD-10-CM: R19.00  ICD-9-CM: 789.30   12/4/2016 - Present    * (Principal)Closed left hip fracture, s/p left hip percutaneous pinning ICD-10-CM: S72.002A  ICD-9-CM: 820.8   1/14/2017 - Present    UTI (urinary tract infection) ICD-10-CM: N39.0  ICD-9-CM: 599.0   1/15/2017 - Present      Diagnoses        Codes Comments    Closed left hip fracture, initial encounter    -  Primary ICD-10-CM: S72.002A  ICD-9-CM: 820.8 Subcapital    Acute pain of right shoulder     ICD-10-CM: M25.511  ICD-9-CM: 719.41     Impaired mobility and ADLs     ICD-10-CM: Z74.09  ICD-9-CM: 799.89     Impaired functional mobility, balance, gait, and endurance     ICD-10-CM: Z74.09  ICD-9-CM: V49.89       Lab and Imaging Results     Procedure Component Value Units Date/Time    CBC & Differential [[id number]] Collected:  01/17/17 0507    Specimen:  Blood Updated:  01/17/17 0637    Narrative:       The following orders were created for panel order CBC & Differential.  Procedure                               Abnormality         Status                     ---------                               -----------         ------                     CBC Auto Differential[[id number]]         Abnormal            Final result                 Please view results for these tests on the individual orders.    CBC Auto Differential [[id number]]  (Abnormal) Collected:  01/17/17 0507    Specimen:  Blood Updated:  01/17/17 0637     WBC 7.22 10*3/mm3      RBC 2.94 (L) 10*6/mm3      Hemoglobin 9.1 (L) g/dL      Hematocrit 28.2 (L) %      MCV 95.9 fL      MCH 31.0 pg       MCHC 32.3 g/dL      RDW 15.2 (H) %      RDW-SD 54.2 (H) fl      MPV 8.8 fL      Platelets 333 10*3/mm3      Neutrophil % 46.8 %      Lymphocyte % 26.9 %      Monocyte % 7.9 %      Eosinophil % 18.0 (H) %      Basophil % 0.3 %      Immature Grans % 0.1 %      Neutrophils, Absolute 3.38 10*3/mm3      Lymphocytes, Absolute 1.94 10*3/mm3      Monocytes, Absolute 0.57 10*3/mm3      Eosinophils, Absolute 1.30 (H) 10*3/mm3      Basophils, Absolute 0.02 10*3/mm3      Immature Grans, Absolute 0.01 10*3/mm3     FL C Arm During Surgery [[id number]] Collected:  01/16/17 1031     Updated:  01/16/17 1333    Narrative:       EXAMINATION: FL C ARM DURING SURGERY-01/14/2017     INDICATION: hip pinning; S72.002A-Fracture of unspecified part of neck  of left femur, initial encounter for closed fracture; M25.511-Pain in  right shoulder      TECHNIQUE: Use of fluoroscopy and intraoperative imaging during a left  hip repair.     COMPARISON: NONE     FINDINGS:   1. 1 minute 33 seconds fluoroscopic time was used.  2. 3 images were obtained during which 3 pins were placed into the left  femoral neck and head in excellent position for stabilization of the  fracture of the subcapital region of the left hip.       Impression:       1. Well-positioned pin placement for stabilization of left hip fracture.  2. Please refer to the procedural note otherwise.     D:  01/16/2017  E:  01/16/2017         This report was finalized on 1/16/2017 1:30 PM EST by Dr. Juan Durán MD.       CT Head Without Contrast [[id number]] Collected:  01/16/17 0921     Updated:  01/16/17 0958    Narrative:       EXAMINATION: CT HEAD WITHOUT CONTRAST-01/16/2017:      INDICATION: Followup brain surgery; S72.002A-Fracture of unspecified  part of neck of left femur, initial encounter for closed fracture;  M25.511-Pain in right shoulder; Z74.09-Other reduced mobility;  Z74.09-Other reduced mobility.         TECHNIQUE: CT scan of the head was performed without contrast.      COMPARISON: 12/27/2016.     FINDINGS: There is no intra-axial mass. There is a small chronic left  subdural hematoma deep to the calvarial flap, this has not changed  significantly since 12/27/2016 and there is little mass effect. There  are periventricular white matter changes typical of aging.       Impression:       Small chronic left subdural hematoma, insignificantly  changed since 12/27/2016.     D:  01/16/2017  E:  01/16/2017           This report was finalized on 1/16/2017 9:56 AM EST by Dr. Aj Coyle MD.       Urine Culture [[id number]] Collected:  01/14/17 1526    Specimen:  Urine from Urine, Clean Catch Updated:  01/16/17 0844     Urine Culture >100,000 CFU/mL Normal Urogenital Deena     CBC & Differential [[id number]] Collected:  01/16/17 0441    Specimen:  Blood Updated:  01/16/17 0524    Narrative:       The following orders were created for panel order CBC & Differential.  Procedure                               Abnormality         Status                     ---------                               -----------         ------                     CBC Auto Differential[[id number]]         Abnormal            Final result                 Please view results for these tests on the individual orders.    CBC Auto Differential [[id number]]  (Abnormal) Collected:  01/16/17 0441    Specimen:  Blood Updated:  01/16/17 0524     WBC 8.48 10*3/mm3      RBC 2.79 (L) 10*6/mm3      Hemoglobin 8.8 (L) g/dL      Hematocrit 26.9 (L) %      MCV 96.4 fL      MCH 31.5 (H) pg      MCHC 32.7 g/dL      RDW 15.3 (H) %      RDW-SD 54.6 (H) fl      MPV 8.5 fL      Platelets 274 10*3/mm3      Neutrophil % 65.4 %      Lymphocyte % 13.9 (L) %      Monocyte % 8.3 %      Eosinophil % 11.9 (H) %      Basophil % 0.4 %      Immature Grans % 0.1 %      Neutrophils, Absolute 5.55 10*3/mm3      Lymphocytes, Absolute 1.18 10*3/mm3      Monocytes, Absolute 0.70 10*3/mm3      Eosinophils, Absolute 1.01 (H) 10*3/mm3      Basophils, Absolute 0.03  10*3/mm3      Immature Grans, Absolute 0.01 10*3/mm3     XR Chest 1 View [[id number]] Collected:  01/14/17 1823     Updated:  01/15/17 1355    Narrative:          EXAMINATION: XR CHEST, SINGLE VIEW - 01/14/2017     INDICATION: S72.002A-Fracture of unspecified part of neck of left femur,  initial encounter for closed fracture; M25.511-Pain in right shoulder.      COMPARISON: 11/27/2016.     FINDINGS: The heart size is normal. There is no pneumothorax or  effusion. The lungs are free of opacities. The osseous structures of the  chest are normal.           Impression:       No acute chest pathology, stable since 11/27/2016.     DICTATED:     01/14/2017  EDITED:          01/14/2017     This report was finalized on 1/15/2017 1:53 PM by Dr. Nawaf Travis MD.       XR Shoulder 2+ View Right [[id number]] Collected:  01/14/17 1822     Updated:  01/15/17 9717    Narrative:          EXAMINATION: XR RIGHT SHOULDER, 2 VIEWS - 01/14/2017     INDICATION: S72.002A-Fracture of unspecified part of neck of left femur,  initial encounter for closed fracture; M25.511-Pain in right shoulder.     COMPARISON: None.     FINDINGS: There is some degenerative change around the humeral head. The  acromioclavicular joint is normal. The undersurface of the acromion  process causes some impingement on the supraspinatus muscle. There is no  acute fracture or subluxation.           Impression:       Mild to moderate degenerative change of the glenohumeral  joint.     DICTATED:     01/14/2017  EDITED:          01/14/2017     This report was finalized on 1/15/2017 1:53 PM by Dr. Nawaf Travis MD.       XR Pelvis 1 or 2 View [[id number]] Collected:  01/14/17 1808     Updated:  01/15/17 1356    Narrative:          EXAMINATION: XR PELVIS, 1-2 VIEWS, XR LEFT HIP, 2 VIEWS, XR LEFT KNEE,  1-2 VIEWS - 01/14/2017     INDICATION: Pain.      COMPARISON: None.     FINDINGS:   PELVIS: A large calcified fibroid is present in the pelvis. The  sacroiliac joints and pubic  symphysis are normal. 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           Impression:       Calcified fibroid. No acute pelvic pathology.     LEFT HIP: Normal anatomic alignment of the left acetabular joint is  preserved. There is suggestion of a subcapital fracture of the left  femoral neck with minimal displacement. Soft tissues are normal.     IMPRESSION: Suggestion of subcapital fracture of the left femoral neck.     LEFT KNEE: Normal anatomic alignment of the knee joint is preserved.  There is no acute fracture or subluxation. There are no areas of  osteolysis or osteosclerosis.     IMPRESSION: No acute osseous abnormality.     DICTATED:     01/14/2017  EDITED:          01/14/2017     This report was finalized on 1/15/2017 1:52 PM by Dr. Nawaf Travis MD.       XR Hip With or Without Pelvis 2 - 3 View Left [[id number]] Collected:  01/14/17 1808     Updated:  01/15/17 1354    Narrative:          EXAMINATION: XR PELVIS, 1-2 VIEWS, XR LEFT HIP, 2 VIEWS, XR LEFT KNEE,  1-2 VIEWS - 01/14/2017     INDICATION: Pain.      COMPARISON: None.     FINDINGS:   PELVIS: A large calcified fibroid is present in the pelvis. The  sacroiliac joints and pubic symphysis are normal. 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           Impression:       Calcified fibroid. No acute pelvic pathology.     LEFT HIP: Normal anatomic alignment of the left acetabular joint is  preserved. There is suggestion of a subcapital fracture of the left  femoral neck with minimal displacement. Soft tissues are normal.     IMPRESSION: Suggestion of subcapital fracture of the left femoral neck.     LEFT KNEE: Normal anatomic alignment of the knee joint is preserved.  There is no acute fracture or subluxation. There are no areas of  osteolysis or osteosclerosis.     IMPRESSION: No acute osseous abnormality.     DICTATED:     01/14/2017  EDITED:          01/14/2017     This report was finalized on 1/15/2017  1:52 PM by Dr. Nawaf Travis MD.       XR Knee 1 or 2 View Left [[id number]] Collected:  01/14/17 1808     Updated:  01/15/17 1354    Narrative:          EXAMINATION: XR PELVIS, 1-2 VIEWS, XR LEFT HIP, 2 VIEWS, XR LEFT KNEE,  1-2 VIEWS - 01/14/2017     INDICATION: Pain.      COMPARISON: None.     FINDINGS:   PELVIS: A large calcified fibroid is present in the pelvis. The  sacroiliac joints and pubic symphysis are normal. 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           Impression:       Calcified fibroid. No acute pelvic pathology.     LEFT HIP: Normal anatomic alignment of the left acetabular joint is  preserved. There is suggestion of a subcapital fracture of the left  femoral neck with minimal displacement. Soft tissues are normal.     IMPRESSION: Suggestion of subcapital fracture of the left femoral neck.     LEFT KNEE: Normal anatomic alignment of the knee joint is preserved.  There is no acute fracture or subluxation. There are no areas of  osteolysis or osteosclerosis.     IMPRESSION: No acute osseous abnormality.     DICTATED:     01/14/2017  EDITED:          01/14/2017     This report was finalized on 1/15/2017 1:52 PM by Dr. Nawaf Travis MD.       Basic Metabolic Panel [[id number]]  (Abnormal) Collected:  01/15/17 0442    Specimen:  Blood Updated:  01/15/17 0704     Glucose 98 mg/dL      BUN 9 mg/dL      Creatinine 0.50 (L) mg/dL      Sodium 139 mmol/L      Potassium 3.9 mmol/L      Chloride 103 mmol/L      CO2 29.0 mmol/L      Calcium 8.8 mg/dL      eGFR Non African Amer 116 mL/min/1.73      BUN/Creatinine Ratio 18.0      Anion Gap 7.0 mmol/L     Narrative:       National Kidney [employer] Guidelines    Stage                           Description                             GFR                      1                               Normal or High                          90+  2                               Mild decrease                            60-89  3                                Moderate decrease                   30-59  4                               Severe decrease                       15-29  5                               Kidney failure                             <15    CBC & Differential [[id number]] Collected:  01/15/17 0442    Specimen:  Blood Updated:  01/15/17 0628    Narrative:       The following orders were created for panel order CBC & Differential.  Procedure                               Abnormality         Status                     ---------                               -----------         ------                     CBC Auto Differential[[id number]]         Abnormal            Final result                 Please view results for these tests on the individual orders.    CBC Auto Differential [[id number]]  (Abnormal) Collected:  01/15/17 0442    Specimen:  Blood Updated:  01/15/17 0628     WBC 8.91 10*3/mm3      RBC 2.76 (L) 10*6/mm3      Hemoglobin 8.8 (L) g/dL      Hematocrit 26.8 (L) %      MCV 97.1 fL      MCH 31.9 (H) pg      MCHC 32.8 g/dL      RDW 15.8 (H) %      RDW-SD 55.9 (H) fl      MPV 8.7 fL      Platelets 281 10*3/mm3      Neutrophil % 60.2 %      Lymphocyte % 22.4 (L) %      Monocyte % 8.8 %      Eosinophil % 8.3 (H) %      Basophil % 0.2 %      Immature Grans % 0.1 %      Neutrophils, Absolute 5.36 10*3/mm3      Lymphocytes, Absolute 2.00 10*3/mm3      Monocytes, Absolute 0.78 10*3/mm3      Eosinophils, Absolute 0.74 (H) 10*3/mm3      Basophils, Absolute 0.02 10*3/mm3      Immature Grans, Absolute 0.01 10*3/mm3     [facility] Draw [[id number]] Collected:  01/14/17 1151    Specimen:  Blood Updated:  01/14/17 1601    Narrative:       The following orders were created for panel order [facility] Draw.  Procedure                               Abnormality         Status                     ---------                               -----------         ------                     Light Blue Top[[id number]]                                    Final result               Green Top  (Gel)[[id number]]                                   Final result               Lavender Top[[id number]]                                      Final result               Gold Top - SST[[id number]]                                    Final result               Green Top (No Gel)[[id number]]                                                             Please view results for these tests on the individual orders.    Light Blue Top [[id number]] Collected:  01/14/17 1151    Specimen:  Blood Updated:  01/14/17 1601     Extra Tube hold for add-on       Auto resulted       Green Top (Gel) [[id number]] Collected:  01/14/17 1151    Specimen:  Blood Updated:  01/14/17 1601     Extra Tube Hold for add-ons.       Auto resulted.       Lavender Top [[id number]] Collected:  01/14/17 1151    Specimen:  Blood Updated:  01/14/17 1601     Extra Tube hold for add-on       Auto resulted       Gold Top - SST [[id number]] Collected:  01/14/17 1151    Specimen:  Blood Updated:  01/14/17 1601     Extra Tube Hold for add-ons.       Auto resulted.       Urinalysis With / Culture If Indicated [[id number]]  (Abnormal) Collected:  01/14/17 1526    Specimen:  Urine from Urine, Clean Catch Updated:  01/14/17 1545     Color, UA Dark Yellow (A)      Appearance, UA Cloudy (A)      pH, UA 6.0      Specific Gravity, UA 1.019      Glucose, UA Negative      Ketones, UA Negative      Bilirubin, UA Negative      Blood, UA Negative      Protein, UA Negative      Leuk Esterase, UA Small (1+) (A)      Nitrite, UA Negative      Urobilinogen, UA 1.0 E.U./dL     Urinalysis, Microscopic Only [[id number]]  (Abnormal) Collected:  01/14/17 1526    Specimen:  Urine from Urine, Clean Catch Updated:  01/14/17 1545     RBC, UA 3-6 (A) /HPF      WBC, UA 6-12 (A) /HPF      Bacteria, UA None Seen /HPF      Squamous Epithelial Cells, UA 3-6 (A) /HPF      Hyaline Casts, UA 0-6 /LPF      Calcium Oxalate Crystals, UA Large/3+ /HPF      Methodology Manual Light Microscopy     Sedimentation Rate  [[id number]]  (Abnormal) Collected:  01/14/17 1151    Specimen:  Blood Updated:  01/14/17 1323     Sed Rate 53 (H) mm/hr     Procalcitonin [[id number]] Collected:  01/14/17 1151    Specimen:  Blood Updated:  01/14/17 1250     Procalcitonin 0.05 ng/mL     Narrative:       As a Marker for Sepsis (Non-Neonates):   1. <0.5 ng/mL represents a low risk of severe sepsis and/or septic shock.  2. >2 ng/mL represents a high risk of severe sepsis and/or septic shock.    As a Marker for Lower Respiratory Tract Infections that require antibiotic therapy:    PCT on Admission     Antibiotic Therapy       6-12 Hrs later  > 0.5                Strongly Recommended             >0.25 - <0.5         Recommended  0.1 - 0.25           Discouraged              Remeasure/reassess PCT  <0.1                 Strongly Discouraged     Remeasure/reassess PCT                     PCT values of < 0.5 ng/mL do not exclude an infection, because localized infections (without systemic signs) may be associated with such low concentrations, or a systemic infection in its initial stages (< 6 hours). Furthermore, increased PCT can occur without infection. PCT concentrations between 0.5 and 2.0 ng/mL should be interpreted taking into account the patient's history. It is recommended to retest PCT within 6-24 hours if any concentrations < 2 ng/mL are obtained.    C-reactive Protein [[id number]]  (Abnormal) Collected:  01/14/17 1151    Specimen:  Blood Updated:  01/14/17 1242     C-Reactive Protein 50.80 (H) mg/dL     Comprehensive Metabolic Panel [[id number]]  (Abnormal) Collected:  01/14/17 1151    Specimen:  Blood Updated:  01/14/17 1237     Glucose 101 (H) mg/dL      BUN 12 mg/dL      Creatinine 0.60 mg/dL      Sodium 138 mmol/L      Potassium 3.7 mmol/L      Chloride 101 mmol/L      CO2 31.0 mmol/L      Calcium 9.7 mg/dL      Total Protein 6.7 g/dL      Albumin 3.80 g/dL      ALT (SGPT) 15 U/L      AST (SGOT) 20 U/L      Alkaline Phosphatase 74 U/L      Total  Bilirubin 0.7 mg/dL      eGFR Non African Amer 94 mL/min/1.73      Globulin 2.9 gm/dL      A/G Ratio 1.3 (L) g/dL      BUN/Creatinine Ratio 20.0      Anion Gap 6.0 mmol/L     Narrative:       National Kidney Foundation Guidelines    Stage                           Description                             GFR                      1                               Normal or High                          90+  2                               Mild decrease                            60-89  3                               Moderate decrease                   30-59  4                               Severe decrease                       15-29  5                               Kidney failure                             <15    Uric Acid [[id number]]  (Normal) Collected:  01/14/17 1151    Specimen:  Blood Updated:  01/14/17 1237     Uric Acid 3.1 mg/dL       Falsely depressed results may occur on samples drawn from patients receiving N-Acetylcysteine (NAC) or Metamizole.       CBC & Differential [[id number]] Collected:  01/14/17 1151    Specimen:  Blood Updated:  01/14/17 1220    Narrative:       The following orders were created for panel order CBC & Differential.  Procedure                               Abnormality         Status                     ---------                               -----------         ------                     CBC Auto Differential[[id number]]         Abnormal            Final result                 Please view results for these tests on the individual orders.    CBC Auto Differential [[id number]]  (Abnormal) Collected:  01/14/17 1151    Specimen:  Blood Updated:  01/14/17 1220     WBC 7.99 10*3/mm3      RBC 3.29 (L) 10*6/mm3      Hemoglobin 10.4 (L) g/dL      Hematocrit 31.8 (L) %      MCV 96.7 fL      MCH 31.6 (H) pg      MCHC 32.7 g/dL      RDW 15.5 (H) %      RDW-SD 55.2 (H) fl      MPV 8.6 fL      Platelets 302 10*3/mm3      Neutrophil % 65.7 %      Lymphocyte % 15.9 (L) %      Monocyte % 8.1 %       "Eosinophil % 9.6 (H) %      Basophil % 0.4 %      Immature Grans % 0.3 %      Neutrophils, Absolute 5.25 10*3/mm3      Lymphocytes, Absolute 1.27 10*3/mm3      Monocytes, Absolute 0.65 10*3/mm3      Eosinophils, Absolute 0.77 (H) 10*3/mm3      Basophils, Absolute 0.03 10*3/mm3      Immature Grans, Absolute 0.02 10*3/mm3       Vitals     BP Pulse Temp Resp Ht Wt    115/59 86 97.9 °F (36.6 °C) (Oral) 16 63\" (160 cm) 120 lb (54.4 kg)    SpO2 BMI             92% 21.26 kg/m2       Vitals History      Social History     Category History    Smoking Tobacco Use Never Smoker    Smokeless Tobacco Use Unknown    Tobacco Comment     Alcohol Use No    Drug Use No    Sexual Activity No    ADL Not Asked      Patient Care Team        Relationship Specialty Notifications Start End    Kodak Clarke MD PCP - General Internal Medicine  11/16/16     Kodak Clarke MD Referring Physician Internal Medicine  12/27/16        Instructions for After Discharge        Follow-up Information     Follow up with Edgar Albert MD .    Specialty:  Orthopedic Surgery    Why:  APPOINTMENT:  January 30, 2017 at 1:00pm    Contact information:    [address]  [address]  [place] [id number]  [phone]          Follow up with [facility] .    Specialty:  Rehabilitation    Contact information:    [address] [facility] [id number] [phone]        Follow up with Kodak Clarke MD .    Specialty:  Internal Medicine    Contact information:    [address]  [facility] [address]  [phone]        Activity Instructions     Continued PT as ordered at Inpatient rehabilitation facility. Still requires her knee immobilizer.           Diet Instructions     Regular diet.           Referrals and Follow-ups to Schedule     Follow-Up    As directed    Dr. Albert per his orders             "

## 2017-01-14 NOTE — CONSULTS
Patient: Keila Alberts    Date of Admission: 1/14/2017 11:12 AM    YOB: 1928    Medical Record Number: [id number]    Attending Physician: Bulmaro Camarillo MD    Consulting Physician: Edgar Albert MD      Chief Complaints: Closed left hip fracture, initial encounter [S72.002A]      History of Present Illness: Ms. Alberts is a very pleasant 88-year-old female who presents to [facility] ER with left hip and knee pain and difficulty bearing weight on the left lower extremity.  She fell in November and sustained a subdural hematoma that required evacuation by Dr. Christian on November 27.  She went to rehabilitation at [facility] after this hospital stay and was eventually discharged home.  She has been doing home physical therapy.  She lives with her daughter and son-in-law.  She is supposed to use a walker or cane for assist with ambulation but they state that she often walks around the house without assist.  The patient does not remember a more recent fall but reports increased left hip and knee pain over the past couple of days.  Upon my evaluation of the patient in the ER her primary complaint is left hip pain.  She has a history of right hip and left shoulder replacements by Dr. Greene.       Allergies   Allergen Reactions   • Erythromycin         Home Medications:    (Not in a hospital admission)    Current Medications:  Scheduled Meds:   Continuous Infusions:  sodium chloride 125 mL/hr     PRN Meds:.•  HYDROmorphone  •  Insert peripheral IV **AND** sodium chloride    Past Medical History   Diagnosis Date   • Hypertension         Past Surgical History   Procedure Laterality Date   • Appendectomy     • Joint replacement     • Total shoulder replacement     • Total hip arthroplasty Right    • [place] hole Left 11/27/2016     Procedure: HALIMA HOLE;  Surgeon: Jos Christian MD;  Location: [facility];  Service:         Social History     Occupational History   • Not on file.     Social  History Main Topics   • Smoking status: Never Smoker   • Smokeless tobacco: Not on file   • Alcohol use No   • Drug use: No   • Sexual activity: No    Social History     Social History Narrative    Lives with her daughter      History reviewed. No pertinent family history.      Review of Systems:   HEENT: Patient denies any headaches, vision changes, change in hearing, or tinnitus, Patient denies any rhinorrhea,epistaxis, sinus pain, mouth or dental problems, sore throat or hoarseness, or dysphagia  Pulmonary: Patient denies any cough, congestion, SOA, or wheezing  Cardiovascular: Patient denies any chest pain, dyspnea, palpitations, weakness, intolerance of exercise, varicosities, swelling of extremities, known murmur  Gastrointestinal:  Patient denies nausea, vomiting, diarrhea, constipation, loss  of appetite, change in appetite, dysphagia, gas, heartburn, melena, change in bowel habits, use of laxatives or other drugs to alter the function of the gastrointestinal tract.  Genital/Urinary: Patient denies dysuria, change in color of urine, change in frequency of urination, pain with urgency, incontinence, retention, or nocturia.  Musculoskeletal: Patient denies increased warmth; redness; or swelling of joints; limitation of function; deformity; crepitation: pain in a joint or an extremity, the neck, or the back, especially with movement.  Neurological: Patient denies dizziness, tremor, ataxia, difficulty in speaking, change in speech, paresthesia, loss of sensation, seizures, syncope, changes in memory.  Endocrine system: Patient denies tremors, palpitations, intolerance of heat or cold, polyuria, polydipsia, polyphagia, diaphoresis, exophthalmos, or goiter.  Psychological: Patient denies thoughts/plans or harming self or other; depression,  insomnia, night terrors, stephanie, memory loss, disorientation.  Skin: Patient denies any bruising, rashes, discoloration, pruritus, wounds, ulcers, decubiti, changes in the hair  "or nails  Hematopoietic: Patient denies history of spontaneous or excessive bleeding, epistaxis, hematuria, melena, fatigue, enlarged or tender lymph nodes, pallor, history of anemia.    Physical Exam: 88 y.o. female  General Appearance:    Alert, cooperative, in no acute distress                 Vitals:    01/14/17 1111   BP: 147/67   BP Location: Left arm   Patient Position: Sitting   Pulse: 95   Resp: 16   Temp: 98.4 °F (36.9 °C)   TempSrc: Oral   SpO2: 96%   Weight: 120 lb (54.4 kg)   Height: 63\" (160 cm)        Extremities:  Left lower extremity skin is intact with no obvious deformity.  Thigh and calf are soft and nontender  5 out of 5 dorsiflexion, plantar flexion and EHL  Sensation intact to light touch  2+ dorsalis pedis pulse and brisk capillary refill      Diagnostic Tests:    Admission on 01/14/2017   Component Date Value Ref Range Status   • Glucose 01/14/2017 101* 70 - 100 mg/dL Final   • BUN 01/14/2017 12  9 - 23 mg/dL Final   • Creatinine 01/14/2017 0.60  0.60 - 1.30 mg/dL Final   • Sodium 01/14/2017 138  132 - 146 mmol/L Final   • Potassium 01/14/2017 3.7  3.5 - 5.5 mmol/L Final   • Chloride 01/14/2017 101  99 - 109 mmol/L Final   • CO2 01/14/2017 31.0  20.0 - 31.0 mmol/L Final   • Calcium 01/14/2017 9.7  8.7 - 10.4 mg/dL Final   • Total Protein 01/14/2017 6.7  5.7 - 8.2 g/dL Final   • Albumin 01/14/2017 3.80  3.20 - 4.80 g/dL Final   • ALT (SGPT) 01/14/2017 15  7 - 40 U/L Final   • AST (SGOT) 01/14/2017 20  0 - 33 U/L Final   • Alkaline Phosphatase 01/14/2017 74  25 - 100 U/L Final   • Total Bilirubin 01/14/2017 0.7  0.3 - 1.2 mg/dL Final   • eGFR Non African Amer 01/14/2017 94  >60 mL/min/1.73 Final   • Globulin 01/14/2017 2.9  gm/dL Final   • A/G Ratio 01/14/2017 1.3* 1.5 - 2.5 g/dL Final   • BUN/Creatinine Ratio 01/14/2017 20.0  7.0 - 25.0 Final   • Anion Gap 01/14/2017 6.0  3.0 - 11.0 mmol/L Final   • Procalcitonin 01/14/2017 0.05  ng/mL Final   • Sed Rate 01/14/2017 53* 0 - 30 mm/hr Final   • " C-Reactive Protein 01/14/2017 50.80* 0.00 - 10.00 mg/dL Final   • Uric Acid 01/14/2017 3.1  3.1 - 7.8 mg/dL Final    Falsely depressed results may occur on samples drawn from patients receiving N-Acetylcysteine (NAC) or Metamizole.   • WBC 01/14/2017 7.99  3.50 - 10.80 10*3/mm3 Final   • RBC 01/14/2017 3.29* 3.89 - 5.14 10*6/mm3 Final   • Hemoglobin 01/14/2017 10.4* 11.5 - 15.5 g/dL Final   • Hematocrit 01/14/2017 31.8* 34.5 - 44.0 % Final   • MCV 01/14/2017 96.7  80.0 - 99.0 fL Final   • MCH 01/14/2017 31.6* 27.0 - 31.0 pg Final   • MCHC 01/14/2017 32.7  32.0 - 36.0 g/dL Final   • RDW 01/14/2017 15.5* 11.3 - 14.5 % Final   • RDW-SD 01/14/2017 55.2* 37.0 - 54.0 fl Final   • MPV 01/14/2017 8.6  6.0 - 12.0 fL Final   • Platelets 01/14/2017 302  150 - 450 10*3/mm3 Final   • Neutrophil % 01/14/2017 65.7  41.0 - 71.0 % Final   • Lymphocyte % 01/14/2017 15.9* 24.0 - 44.0 % Final   • Monocyte % 01/14/2017 8.1  0.0 - 12.0 % Final   • Eosinophil % 01/14/2017 9.6* 0.0 - 3.0 % Final   • Basophil % 01/14/2017 0.4  0.0 - 1.0 % Final   • Immature Grans % 01/14/2017 0.3  0.0 - 0.6 % Final   • Neutrophils, Absolute 01/14/2017 5.25  1.50 - 8.30 10*3/mm3 Final   • Lymphocytes, Absolute 01/14/2017 1.27  0.60 - 4.80 10*3/mm3 Final   • Monocytes, Absolute 01/14/2017 0.65  0.00 - 1.00 10*3/mm3 Final   • Eosinophils, Absolute 01/14/2017 0.77* 0.10 - 0.30 10*3/mm3 Final   • Basophils, Absolute 01/14/2017 0.03  0.00 - 0.20 10*3/mm3 Final   • Immature Grans, Absolute 01/14/2017 0.02  0.00 - 0.03 10*3/mm3 Final       AP pelvis and left hip x-rays in the ER today reveal a very minimally displaced valgus impacted subcapital left femoral neck fracture with no significant degenerative changes  Left knee x-rays reveal mild degenerative changes but no acute findings      Assessment: 88-year-old female with left hip minimally displaced femoral neck fracture  Patient Active Problem List   Diagnosis   • Subdural hematoma, post-traumatic   •  Hypertension   • Constipation   • Normocytic anemia   • Acute/Subacute Traumatic SDH (subdural hematoma)   • Altered mental status   • Leukocytosis, ? Etiology   • s/p Left Craniotomy for Evacuation of Left SDH 11/27/16    • Hyponatremia   • 6.9 x 5.3cm pelvic mass, seen on imaging prior to admission   • Closed left hip fracture           Plan:  The patient voiced understanding of the risks, benefits, and alternative forms of treatment that were discussed and the patient consents to proceed with left hip percutaneous pinning.  I discussed with the patient and her family that I would recommend proceeding with left hip percutaneous pinning for this minimally displaced femoral neck fracture.  The only documented fall is a couple of months ago when she sustained a subdural hematoma that required evacuation by Dr. Christian.  However the fracture does look relatively new on X-ray and she has an acute complaint of left hip pain so likely from a more recent undocumented fall.  -I discussed all the risks, benefits and alternatives to left hip percutaneous pinning and they agreed to proceed.  They understand risks to include but not limited to infection, pain, stiffness, malunion, nonunion, AVN, screw cut out necessitating conversion to arthroplasty, leg length inequality, neurovascular injury, functional decline and medical risks associated with surgery.  They understand the long rehabilitation course involved.  They understand the significant morbidity and mortality risks associated with hip fractures.  -We will proceed with left hip percutaneous pinning later today pending OR availability.  -I will make her nothing by mouth and plan on Ancef for prophylactic antibiotics.  -Postoperatively plan on physical therapy and  for rehabilitation placement.  -She will be admitted to Dr. BERTRAND for medical management.  -Thank you very much for this consult this patient was a pleasure to evaluate and treat.      Discharge Plan:  Plan transfer to rehab in 3-5 days      Date: 1/14/2017    Edgar Albert MD

## 2017-01-14 NOTE — ANESTHESIA PROCEDURE NOTES
Airway  Urgency: elective    Airway not difficult    General Information and Staff    Patient location during procedure: OR  Anesthesiologist: BERNIE RODRIGUEZ    Indications and Patient Condition  Indications for airway management: airway protection    Preoxygenated: yes  MILS not maintained throughout  Mask difficulty assessment: 1 - vent by mask    Final Airway Details  Final airway type: endotracheal airway      Successful airway: ETT  Cuffed: yes   Successful intubation technique: direct laryngoscopy  Endotracheal tube insertion site: oral  Blade: Brii  Blade size: #3  ETT size: 7.0 mm  Cormack-Lehane Classification: grade I - full view of glottis  Placement verified by: chest auscultation and capnometry   Measured from: lips  ETT to lips (cm): 20  Number of attempts at approach: 1    Additional Comments  Negative epigastric sounds, Breath sound equal bilaterally with symmetric chest rise and fall

## 2017-01-14 NOTE — ANESTHESIA PROCEDURE NOTES
Peripheral Block    Patient location during procedure: OR  Reason for block: procedure for pain and at surgeon's request  Performed by  Anesthesiologist: BERNIE RODRIGUEZ  Preanesthetic Checklist  Completed: patient identified, site marked, surgical consent, pre-op evaluation, timeout performed, IV checked, risks and benefits discussed and monitors and equipment checked  Peripheral Block Prep:  Sterile barriers:cap, gloves and mask  Prep: ChloraPrep  Patient monitoring: blood pressure monitoring, continuous pulse oximetry and EKG  Peripheral Procedure  Guidance:ultrasound guided  Images:still images not obtained  Laterality:leftBlock Type:fascia iliaca catheter  Injection Technique:catheterNeedle Type:echogenic  Needle Gauge:18 G  Catheter Size:20 G (20g)  Medications  Preservative Free Saline:10ml  Local Injected:bupivacaine 0.25% without epinephrine and ropivacaine 0.2% Local Amount Injected:50mL  Post Assessment  Patient Tolerance:comfortable throughout block  Complications:no  Additional Notes  Procedure:          CATHETER at skin:  10                                Analgesia was achieved with General Anesthesia      Pt placed in supine position.   The insertion site was prepped in sterile fashion with Chlorapreop and clear plastic drapes.  A B-Pulido 18 g , 4 inch echogenic Touhy needle was advance In-plane under ultrasound guidance. The   Anterior superior Iliac crest was initially visualized and the probe was directed slightly medially and slightly towards the umbilicus.  The course of the needle was tracked over the sartorius muscle through the fascia Iliacus and into the anterior portion of the Iliacus muscle.  Major vessels where identified and avoided as where structures of the peritoneal cavity.  LA injection was made incrementally in 1-5ml amounts spread was visualized superiorly below fascia iliacus.  Injection was completed with negative aspiration of blood and negative intravascular injection.   Injection pressures where normal or minimal resistance.  A 20 g B-Pulido wire styleted catheter was then advance thru the needle and very easily placed in a superior or cephalad direction.  The catheter was secured at insertion site with skin afix , mastisol, steristreps.  A CHG tegaderm dressing was placed over the insertion site and the nerve catheter labeled and capped.  Thank You.

## 2017-01-14 NOTE — ANESTHESIA PREPROCEDURE EVALUATION
Anesthesia Evaluation      Airway   Mallampati: II  TM distance: >3 FB  Neck ROM: full  no difficulty expected  Dental      Pulmonary     breath sounds clear to auscultation  Cardiovascular   (+) hypertension, murmur,     Rhythm: regular  Rate: normal    Neuro/Psych  (+) weakness,    GI/Hepatic/Renal/Endo      Musculoskeletal     Abdominal    Substance History      OB/GYN          Other       ROS/Med Hx Other: Stable neuro status s/p evac SDH      Phys Exam Other: Missing several upper incisors                      Anesthesia Plan    ASA 3     general   (+ fascia iliaca block)  intravenous induction   Anesthetic plan and risks discussed with patient and child.    Plan discussed with CRNA.

## 2017-01-15 PROBLEM — N39.0 UTI (URINARY TRACT INFECTION): Status: ACTIVE | Noted: 2017-01-15

## 2017-01-15 LAB
ANION GAP SERPL CALCULATED.3IONS-SCNC: 7 MMOL/L (ref 3–11)
BASOPHILS # BLD AUTO: 0.02 10*3/MM3 (ref 0–0.2)
BASOPHILS NFR BLD AUTO: 0.2 % (ref 0–1)
BUN BLD-MCNC: 9 MG/DL (ref 9–23)
BUN/CREAT SERPL: 18 (ref 7–25)
CALCIUM SPEC-SCNC: 8.8 MG/DL (ref 8.7–10.4)
CHLORIDE SERPL-SCNC: 103 MMOL/L (ref 99–109)
CO2 SERPL-SCNC: 29 MMOL/L (ref 20–31)
CREAT BLD-MCNC: 0.5 MG/DL (ref 0.6–1.3)
DEPRECATED RDW RBC AUTO: 55.9 FL (ref 37–54)
EOSINOPHIL # BLD AUTO: 0.74 10*3/MM3 (ref 0.1–0.3)
EOSINOPHIL NFR BLD AUTO: 8.3 % (ref 0–3)
ERYTHROCYTE [DISTWIDTH] IN BLOOD BY AUTOMATED COUNT: 15.8 % (ref 11.3–14.5)
GFR SERPL CREATININE-BSD FRML MDRD: 116 ML/MIN/1.73
GLUCOSE BLD-MCNC: 98 MG/DL (ref 70–100)
HCT VFR BLD AUTO: 26.8 % (ref 34.5–44)
HGB BLD-MCNC: 8.8 G/DL (ref 11.5–15.5)
IMM GRANULOCYTES # BLD: 0.01 10*3/MM3 (ref 0–0.03)
IMM GRANULOCYTES NFR BLD: 0.1 % (ref 0–0.6)
LYMPHOCYTES # BLD AUTO: 2 10*3/MM3 (ref 0.6–4.8)
LYMPHOCYTES NFR BLD AUTO: 22.4 % (ref 24–44)
MCH RBC QN AUTO: 31.9 PG (ref 27–31)
MCHC RBC AUTO-ENTMCNC: 32.8 G/DL (ref 32–36)
MCV RBC AUTO: 97.1 FL (ref 80–99)
MONOCYTES # BLD AUTO: 0.78 10*3/MM3 (ref 0–1)
MONOCYTES NFR BLD AUTO: 8.8 % (ref 0–12)
NEUTROPHILS # BLD AUTO: 5.36 10*3/MM3 (ref 1.5–8.3)
NEUTROPHILS NFR BLD AUTO: 60.2 % (ref 41–71)
PLATELET # BLD AUTO: 281 10*3/MM3 (ref 150–450)
PMV BLD AUTO: 8.7 FL (ref 6–12)
POTASSIUM BLD-SCNC: 3.9 MMOL/L (ref 3.5–5.5)
RBC # BLD AUTO: 2.76 10*6/MM3 (ref 3.89–5.14)
SODIUM BLD-SCNC: 139 MMOL/L (ref 132–146)
WBC NRBC COR # BLD: 8.91 10*3/MM3 (ref 3.5–10.8)

## 2017-01-15 PROCEDURE — 25010000002 ROPIVACAINE PER 1 MG: Performed by: ANESTHESIOLOGY

## 2017-01-15 PROCEDURE — 25010000003 CEFTRIAXONE PER 250 MG: Performed by: NURSE PRACTITIONER

## 2017-01-15 PROCEDURE — 97116 GAIT TRAINING THERAPY: CPT

## 2017-01-15 PROCEDURE — 85025 COMPLETE CBC W/AUTO DIFF WBC: CPT | Performed by: ORTHOPAEDIC SURGERY

## 2017-01-15 PROCEDURE — 97166 OT EVAL MOD COMPLEX 45 MIN: CPT | Performed by: OCCUPATIONAL THERAPIST

## 2017-01-15 PROCEDURE — 80048 BASIC METABOLIC PNL TOTAL CA: CPT | Performed by: ORTHOPAEDIC SURGERY

## 2017-01-15 PROCEDURE — 97530 THERAPEUTIC ACTIVITIES: CPT | Performed by: OCCUPATIONAL THERAPIST

## 2017-01-15 PROCEDURE — 97162 PT EVAL MOD COMPLEX 30 MIN: CPT

## 2017-01-15 PROCEDURE — 25010000002 ENOXAPARIN PER 10 MG: Performed by: ORTHOPAEDIC SURGERY

## 2017-01-15 PROCEDURE — 25010000003 CEFAZOLIN IN DEXTROSE 2-4 GM/100ML-% SOLUTION: Performed by: ORTHOPAEDIC SURGERY

## 2017-01-15 RX ORDER — CEFTRIAXONE SODIUM 1 G/50ML
1 INJECTION, SOLUTION INTRAVENOUS EVERY 24 HOURS
Status: DISCONTINUED | OUTPATIENT
Start: 2017-01-15 | End: 2017-01-17 | Stop reason: HOSPADM

## 2017-01-15 RX ORDER — POLYETHYLENE GLYCOL 3350 17 G/17G
17 POWDER, FOR SOLUTION ORAL DAILY
Status: DISCONTINUED | OUTPATIENT
Start: 2017-01-15 | End: 2017-01-17 | Stop reason: HOSPADM

## 2017-01-15 RX ADMIN — CEFAZOLIN SODIUM 2 G: 2 INJECTION, SOLUTION INTRAVENOUS at 09:12

## 2017-01-15 RX ADMIN — ROPIVACAINE HYDROCHLORIDE 10 ML/HR: 2 INJECTION, SOLUTION EPIDURAL; INFILTRATION at 11:07

## 2017-01-15 RX ADMIN — ENOXAPARIN SODIUM 40 MG: 40 INJECTION SUBCUTANEOUS at 17:37

## 2017-01-15 RX ADMIN — HYDROCODONE BITARTRATE AND ACETAMINOPHEN 1 TABLET: 5; 325 TABLET ORAL at 09:13

## 2017-01-15 RX ADMIN — DOCUSATE SODIUM 100 MG: 100 CAPSULE, LIQUID FILLED ORAL at 17:38

## 2017-01-15 RX ADMIN — DOCUSATE SODIUM 100 MG: 100 CAPSULE, LIQUID FILLED ORAL at 09:13

## 2017-01-15 RX ADMIN — BENAZEPRIL HYDROCHLORIDE 40 MG: 20 TABLET, FILM COATED ORAL at 09:12

## 2017-01-15 RX ADMIN — AMLODIPINE BESYLATE 5 MG: 5 TABLET ORAL at 09:13

## 2017-01-15 RX ADMIN — HYDROCODONE BITARTRATE AND ACETAMINOPHEN 1 TABLET: 5; 325 TABLET ORAL at 17:37

## 2017-01-15 RX ADMIN — CYANOCOBALAMIN TAB 1000 MCG 1000 MCG: 1000 TAB at 09:12

## 2017-01-15 RX ADMIN — CEFTRIAXONE SODIUM 1 G: 1 INJECTION, SOLUTION INTRAVENOUS at 12:46

## 2017-01-15 RX ADMIN — HYDROCODONE BITARTRATE AND ACETAMINOPHEN 1 TABLET: 5; 325 TABLET ORAL at 22:33

## 2017-01-15 RX ADMIN — DOCUSATE SODIUM AND SENNOSIDES 2 TABLET: 8.6; 5 TABLET, FILM COATED ORAL at 09:12

## 2017-01-15 RX ADMIN — POLYETHYLENE GLYCOL 3350 17 G: 17 POWDER, FOR SOLUTION ORAL at 12:47

## 2017-01-15 RX ADMIN — DOCUSATE SODIUM AND SENNOSIDES 2 TABLET: 8.6; 5 TABLET, FILM COATED ORAL at 17:38

## 2017-01-15 RX ADMIN — HYDROCODONE BITARTRATE AND ACETAMINOPHEN 1 TABLET: 5; 325 TABLET ORAL at 04:41

## 2017-01-15 RX ADMIN — HYDROCODONE BITARTRATE AND ACETAMINOPHEN 1 TABLET: 5; 325 TABLET ORAL at 12:47

## 2017-01-15 NOTE — DISCHARGE PLACEMENT REQUEST
"Zaki Alberts (88 y.o. Female)     Date of Birth Social Security Number Address Home Phone MRN    07/15/1928  [address] [facility] [address] [phone] [id number]    Jewish Marital Status          Hindu        Admission Date Admission Type Admitting Provider Attending Provider Department, Room/Bed    17 Emergency Bulmaro Camarillo MD [place]ac[place]ha, [place], MD [address], [address]1    Discharge Date Discharge Disposition Discharge Destination                      Attending Provider: Bulmaro Camarillo MD     Allergies:  Erythromycin    Isolation:  None   Infection:  None   Code Status:  FULL    Ht:  63\" (160 cm)   Wt:  120 lb (54.4 kg)    Admission Cmt:  None   Principal Problem:  Closed left hip fracture, s/p left hip percutaneous pinning [S72.002A]                 Active Insurance as of 2017     Primary Coverage     Payor Plan Insurance Group Employer/Plan Group    MEDICARE MEDICARE A & B      Payor Plan Address Payor Plan Phone Number Effective From Effective To    [address] [phone] 1993     [place] [address]       Subscriber Name Subscriber Birth Date Member ID       ZAKI ALBERTS 7/15/1928 [id number]           Secondary Coverage     Payor Plan Insurance Group Employer/Plan Group    [facility] SUPP [id number]     Payor Plan Address Payor Plan Phone Number Effective From Effective To    [address]  [address]     [place] [address]       Subscriber Name Subscriber Birth Date Member ID       ZAKI ALBERTS 7/15/1928 [id number]                 Emergency Contacts      (Rel.) Home Phone Work Phone Mobile Phone    Aretha Lockhart (Daughter) [phone] -- [phone]               History & Physical      Bulmaro Camarillo MD at 2017 11:28 PM          Patient Name: Zaki Alberts  MRN: [id number]  : 7/15/1928  DOS: 2017    Attending: Bulmaro Camarillo MD    Primary Care Provider: Kodak Finnegan" MD Vince      Chief complaint:  Left hip pain.    Subjective   Patient is a 88 y.o. female with history of hypertension , osteoarthritis and history of a fall leading to subdural hematoma in November of this year for which she underwent a bur hole with hematoma evacuation by Dr. Zuniga.  After that hospitalization she was discharged to [facility] and eventually was discharged home with home health physical therapy.      She has since had a follow-up with Dr. Zuniga and she has another follow-up with a head CT scan scheduled for the 24th of this month.      She has been doing reasonably well and getting around the house with minimal difficulty.  She and yesterday complaint of increasing pain in her left hip.    She was brought to [facility] and  where she was diagnosed with left hip fracture.  I was kindly asked to admit her and Dr. Martin with orthopedic surgery was consulted.    Upon consultation it was recommended that she undergoes percutaneous pinning which was performed today under general and with ileo-fascial block.      She tolerated surgery well and is admitted to the hospital for further management.  I saw in her room postoperatively and she is doing fairly well.  She is awake and alert.  She has some pain in her left hip with movement.  No shortness breath or chest pain.  No nausea or vomiting.    Allergies:  Allergies   Allergen Reactions   • Erythromycin        Meds:  Prescriptions Prior to Admission   Medication Sig Dispense Refill Last Dose   • amLODIPine (NORVASC) 5 MG tablet Take 5 mg by mouth Daily.   Taking   • benazepril (LOTENSIN) 40 MG tablet Take 40 mg by mouth Daily.   Taking   • docusate sodium (COLACE) 100 MG capsule Take 100 mg by mouth 2 (Two) Times a Day.   Taking   • Multiple Vitamins-Minerals (PRESERVISION AREDS 2) capsule Take 1 capsule by mouth Daily.   Taking   • vitamin B-12 (CYANOCOBALAMIN) 1000 MCG tablet Take 1,000  "mcg by mouth Daily.   Taking         History:   Past Medical History   Diagnosis Date   • Hypertension       Past Surgical History   Procedure Laterality Date   • Appendectomy     • Joint replacement     • Total shoulder replacement     • Total hip arthroplasty Right    • Cartwright hole Left 11/27/2016     Procedure: HALIMA HOLE;  Surgeon: Jos Christian MD;  Location: [facility];  Service:      History reviewed. No pertinent family history.  Social History   Substance Use Topics   • Smoking status: Never Smoker   • Smokeless tobacco: None   • Alcohol use No   . Lives at home with her , a son and a daughter in law.    Review of Systems  Pertinent items are noted in HPI, all other systems reviewed and negative    Vital Signs  Visit Vitals   • /64 (BP Location: Left arm)   • Pulse 92   • Temp 98.6 °F (37 °C) (Oral)   • Resp 16   • Ht 63\" (160 cm)   • Wt 120 lb (54.4 kg)   • SpO2 98%   • BMI 21.26 kg/m2       Physical Exam:    General Appearance:    Alert, cooperative, in no acute distress   Head:    Normocephalic, without obvious abnormality, atraumatic   Eyes:            Lids and lashes normal, conjunctivae and sclerae normal, no   icterus, no pallor, corneas clear    Ears:    Ears appear intact with no abnormalities noted   Throat:   No oral lesions, no thrush, oral mucosa moist   Neck:   No adenopathy, supple, trachea midline, no thyromegaly, no     carotid bruit, no JVD        Lungs:     Clear to auscultation,respirations regular, even and                   unlabored    Heart:    Regular rhythm and normal rate, normal S1 and S2, 2/6          murmur, no gallop, no rub, no click   Abdomen:     Normal bowel sounds, no masses, no organomegaly, soft        non-tender, non-distended, no guarding, no rebound                 tenderness   Genitalia:    Deferred   Extremities:   Left hip with CDI dressing. No C/C/E.    Pulses:   Pulses palpable and equal bilaterally   Skin:   No bleeding, bruising or rash "   Neurologic:   Cranial nerves 2 - 12 grossly intact, sensation intact       I reviewed the patient's new clinical results.         Results from last 7 days  Lab Units 01/14/17  1151   WBC 10*3/mm3 7.99   HEMOGLOBIN g/dL 10.4*   HEMATOCRIT % 31.8*   PLATELETS 10*3/mm3 302       Results from last 7 days  Lab Units 01/14/17  1151   SODIUM mmol/L 138   POTASSIUM mmol/L 3.7   CHLORIDE mmol/L 101   TOTAL CO2 mmol/L 31.0   BUN mg/dL 12   CREATININE mg/dL 0.60   CALCIUM mg/dL 9.7   BILIRUBIN mg/dL 0.7   ALK PHOS U/L 74   ALT (SGPT) U/L 15   AST (SGOT) U/L 20   GLUCOSE mg/dL 101*     No results found for: HGBA1C        Assessment and Plan:      Principal Problem:    Closed left hip fracture, s/p left hip percutaneous pinning  Active Problems:    Hypertension    s/p Left Craniotomy for Evacuation of Left SDH 11/27/16     Anemia.    Plan  1. PT/OT. Start tomorrow.   2. Pain control-prns along with nerve block cath.   3. IS-encouraged  4. DVT proph- Lovenox.  5. Bowel regimen  6. Resume home medications as appropriate  7. Monitor post-op labs  8. DC planning . Likely to rehab( ? [facility]).  Consider f/u CT scan of the head prior to discharge, to document further improvement/resolution of remaining fluid   from recent injury ( November).       Discussed with patient and daughter.     Bulmaro Camarillo MD  01/14/17  11:43 PM           Electronically signed by Bulmaro Camarillo MD at 1/14/2017 11:52 PM           Operative/Procedure Notes (most recent note)      Edgar Albert MD at 1/14/2017  4:53 PM  Version 1 of 1         HIP PERCUTANEOUS PINNING  Procedure Note    Keila Alberts  1/14/2017    Pre-op Diagnosis:   Left hip femoral neck fracture    Post-op Diagnosis:     Post-Op Diagnosis Codes:     * Fracture of femoral neck, left [S72.002A]    Procedure/CPT® Codes:  IL PERCUT FIX PROX/NECK FEMUR FX [35633]    Procedure(s):  Left HIP PERCUTANEOUS PINNING    Surgeon(s):  Edgar Albert MD    Anesthesia: General,  "iliofascial catheter placed postop    Staff:   Circulator: Bhakti Ramsey RN  Radiology Technologist: Eben Reilly, RT  Scrub Person: Dinorah Dias; Eloina Travis    Estimated Blood Loss: * No values recorded between 1/14/2017  3:27 PM and 1/14/2017  4:52 PM *    Specimens:                * No specimens in log *      Drains: none          Findings: minimally displaced left femoral neck fracture    Complications: none    Implants: Synthes 6.5 x1 and 7.3 x2 cannulated screws      Edgar Albert MD     Date: 1/14/2017  Time: 4:53 PM         Electronically signed by Edgar Albert MD at 1/14/2017  4:54 PM           Physician Progress Notes (most recent note)      Edgar Albert MD at 1/15/2017  9:40 AM  Version 1 of 1         Visit Vitals   • /62   • Pulse 84   • Temp 98.8 °F (37.1 °C) (Oral)   • Resp 16   • Ht 63\" (160 cm)   • Wt 120 lb (54.4 kg)   • SpO2 93%   • BMI 21.26 kg/m2       Lab Results (last 24 hours)     Procedure Component Value Units Date/Time    CBC & Differential [[id number]] Collected:  01/14/17 1151    Specimen:  Blood Updated:  01/14/17 1220    Narrative:       The following orders were created for panel order CBC & Differential.  Procedure                               Abnormality         Status                     ---------                               -----------         ------                     CBC Auto Differential[[id number]]         Abnormal            Final result                 Please view results for these tests on the individual orders.    CBC Auto Differential [[id number]]  (Abnormal) Collected:  01/14/17 1151    Specimen:  Blood Updated:  01/14/17 1220     WBC 7.99 10*3/mm3      RBC 3.29 (L) 10*6/mm3      Hemoglobin 10.4 (L) g/dL      Hematocrit 31.8 (L) %      MCV 96.7 fL      MCH 31.6 (H) pg      MCHC 32.7 g/dL      RDW 15.5 (H) %      RDW-SD 55.2 (H) fl      MPV 8.6 fL      Platelets 302 10*3/mm3      Neutrophil % 65.7 %      Lymphocyte % " 15.9 (L) %      Monocyte % 8.1 %      Eosinophil % 9.6 (H) %      Basophil % 0.4 %      Immature Grans % 0.3 %      Neutrophils, Absolute 5.25 10*3/mm3      Lymphocytes, Absolute 1.27 10*3/mm3      Monocytes, Absolute 0.65 10*3/mm3      Eosinophils, Absolute 0.77 (H) 10*3/mm3      Basophils, Absolute 0.03 10*3/mm3      Immature Grans, Absolute 0.02 10*3/mm3     Comprehensive Metabolic Panel [[id number]]  (Abnormal) Collected:  01/14/17 1151    Specimen:  Blood Updated:  01/14/17 1237     Glucose 101 (H) mg/dL      BUN 12 mg/dL      Creatinine 0.60 mg/dL      Sodium 138 mmol/L      Potassium 3.7 mmol/L      Chloride 101 mmol/L      CO2 31.0 mmol/L      Calcium 9.7 mg/dL      Total Protein 6.7 g/dL      Albumin 3.80 g/dL      ALT (SGPT) 15 U/L      AST (SGOT) 20 U/L      Alkaline Phosphatase 74 U/L      Total Bilirubin 0.7 mg/dL      eGFR Non African Amer 94 mL/min/1.73      Globulin 2.9 gm/dL      A/G Ratio 1.3 (L) g/dL      BUN/Creatinine Ratio 20.0      Anion Gap 6.0 mmol/L     Narrative:       National Kidney Foundation Guidelines    Stage                           Description                             GFR                      1                               Normal or High                          90+  2                               Mild decrease                            60-89  3                               Moderate decrease                   30-59  4                               Severe decrease                       15-29  5                               Kidney failure                             <15    Uric Acid [[id number]]  (Normal) Collected:  01/14/17 1151    Specimen:  Blood Updated:  01/14/17 1237     Uric Acid 3.1 mg/dL       Falsely depressed results may occur on samples drawn from patients receiving N-Acetylcysteine (NAC) or Metamizole.       C-reactive Protein [[id number]]  (Abnormal) Collected:  01/14/17 1151    Specimen:  Blood Updated:  01/14/17 1242     C-Reactive Protein 50.80 (H) mg/dL      Procalcitonin [[id number]] Collected:  01/14/17 1151    Specimen:  Blood Updated:  01/14/17 1250     Procalcitonin 0.05 ng/mL     Narrative:       As a Marker for Sepsis (Non-Neonates):   1. <0.5 ng/mL represents a low risk of severe sepsis and/or septic shock.  2. >2 ng/mL represents a high risk of severe sepsis and/or septic shock.    As a Marker for Lower Respiratory Tract Infections that require antibiotic therapy:    PCT on Admission     Antibiotic Therapy       6-12 Hrs later  > 0.5                Strongly Recommended             >0.25 - <0.5         Recommended  0.1 - 0.25           Discouraged              Remeasure/reassess PCT  <0.1                 Strongly Discouraged     Remeasure/reassess PCT                     PCT values of < 0.5 ng/mL do not exclude an infection, because localized infections (without systemic signs) may be associated with such low concentrations, or a systemic infection in its initial stages (< 6 hours). Furthermore, increased PCT can occur without infection. PCT concentrations between 0.5 and 2.0 ng/mL should be interpreted taking into account the patient's history. It is recommended to retest PCT within 6-24 hours if any concentrations < 2 ng/mL are obtained.    Sedimentation Rate [[id number]]  (Abnormal) Collected:  01/14/17 1151    Specimen:  Blood Updated:  01/14/17 1323     Sed Rate 53 (H) mm/hr     Urinalysis With / Culture If Indicated [[id number]]  (Abnormal) Collected:  01/14/17 1526    Specimen:  Urine from Urine, Clean Catch Updated:  01/14/17 1545     Color, UA Dark Yellow (A)      Appearance, UA Cloudy (A)      pH, UA 6.0      Specific Gravity, UA 1.019      Glucose, UA Negative      Ketones, UA Negative      Bilirubin, UA Negative      Blood, UA Negative      Protein, UA Negative      Leuk Esterase, UA Small (1+) (A)      Nitrite, UA Negative      Urobilinogen, UA 1.0 E.U./dL     Urinalysis, Microscopic Only [[id number]]  (Abnormal) Collected:  01/14/17 1526    Specimen:   Urine from Urine, Clean Catch Updated:  01/14/17 1545     RBC, UA 3-6 (A) /HPF      WBC, UA 6-12 (A) /HPF      Bacteria, UA None Seen /HPF      Squamous Epithelial Cells, UA 3-6 (A) /HPF      Hyaline Casts, UA 0-6 /LPF      Calcium Oxalate Crystals, UA Large/3+ /HPF      Methodology Manual Light Microscopy     [place] Draw [[id number]] Collected:  01/14/17 1151    Specimen:  Blood Updated:  01/14/17 1601    Narrative:       The following orders were created for panel order [place] Draw.  Procedure                               Abnormality         Status                     ---------                               -----------         ------                     Light Blue Top[[id number]]                                    Final result               Green Top (Gel)[[id number]]                                   Final result               Lavender Top[[id number]]                                      Final result               Gold Top - SST[[id number]]                                    Final result               Green Top (No Gel)[[id number]]                                                             Please view results for these tests on the individual orders.    Light Blue Top [[id number]] Collected:  01/14/17 1151    Specimen:  Blood Updated:  01/14/17 1601     Extra Tube hold for add-on       Auto resulted       Green Top (Gel) [[id number]] Collected:  01/14/17 1151    Specimen:  Blood Updated:  01/14/17 1601     Extra Tube Hold for add-ons.       Auto resulted.       Lavender Top [[id number]] Collected:  01/14/17 1151    Specimen:  Blood Updated:  01/14/17 1601     Extra Tube hold for add-on       Auto resulted       Gold Top - SST [[id number]] Collected:  01/14/17 1151    Specimen:  Blood Updated:  01/14/17 1601     Extra Tube Hold for add-ons.       Auto resulted.       CBC & Differential [[id number]] Collected:  01/15/17 0442    Specimen:  Blood Updated:  01/15/17 0628    Narrative:       The following orders were created for panel  order CBC & Differential.  Procedure                               Abnormality         Status                     ---------                               -----------         ------                     CBC Auto Differential[[id number]]         Abnormal            Final result                 Please view results for these tests on the individual orders.    CBC Auto Differential [[id number]]  (Abnormal) Collected:  01/15/17 0442    Specimen:  Blood Updated:  01/15/17 0628     WBC 8.91 10*3/mm3      RBC 2.76 (L) 10*6/mm3      Hemoglobin 8.8 (L) g/dL      Hematocrit 26.8 (L) %      MCV 97.1 fL      MCH 31.9 (H) pg      MCHC 32.8 g/dL      RDW 15.8 (H) %      RDW-SD 55.9 (H) fl      MPV 8.7 fL      Platelets 281 10*3/mm3      Neutrophil % 60.2 %      Lymphocyte % 22.4 (L) %      Monocyte % 8.8 %      Eosinophil % 8.3 (H) %      Basophil % 0.2 %      Immature Grans % 0.1 %      Neutrophils, Absolute 5.36 10*3/mm3      Lymphocytes, Absolute 2.00 10*3/mm3      Monocytes, Absolute 0.78 10*3/mm3      Eosinophils, Absolute 0.74 (H) 10*3/mm3      Basophils, Absolute 0.02 10*3/mm3      Immature Grans, Absolute 0.01 10*3/mm3     Basic Metabolic Panel [[id number]]  (Abnormal) Collected:  01/15/17 0442    Specimen:  Blood Updated:  01/15/17 0704     Glucose 98 mg/dL      BUN 9 mg/dL      Creatinine 0.50 (L) mg/dL      Sodium 139 mmol/L      Potassium 3.9 mmol/L      Chloride 103 mmol/L      CO2 29.0 mmol/L      Calcium 8.8 mg/dL      eGFR Non African Amer 116 mL/min/1.73      BUN/Creatinine Ratio 18.0      Anion Gap 7.0 mmol/L     Narrative:       National Kidney Foundation Guidelines    Stage                           Description                             GFR                      1                               Normal or High                          90+  2                               Mild decrease                            60-89  3                               Moderate decrease                   30-59  4                                Severe decrease                       15-29  5                               Kidney failure                             <15    Urine Culture [[id number]]  (Normal) Collected:  01/14/17 1526    Specimen:  Urine from Urine, Clean Catch Updated:  01/15/17 0824     Urine Culture Culture in progress           Imaging Results (last 24 hours)     Procedure Component Value Units Date/Time    FL C Arm During Surgery [[id number]]      Updated:  01/14/17 1707    XR Pelvis 1 or 2 View [[id number]] Collected:  01/14/17 1808     Updated:  01/14/17 1808    Narrative:          EXAMINATION: XR PELVIS, 1-2 VIEWS, XR LEFT HIP, 2 VIEWS, XR LEFT KNEE,  1-2 VIEWS - 01/14/2017     INDICATION: Pain.      COMPARISON: None.     FINDINGS:   PELVIS: A large calcified fibroid is present in the pelvis. The  sacroiliac joints and pubic symphysis are normal. 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           Impression:       Calcified fibroid. No acute pelvic pathology.     LEFT HIP: Normal anatomic alignment of the left acetabular joint is  preserved. There is suggestion of a subcapital fracture of the left  femoral neck with minimal displacement. Soft tissues are normal.     IMPRESSION: Suggestion of subcapital fracture of the left femoral neck.     LEFT KNEE: Normal anatomic alignment of the knee joint is preserved.  There is no acute fracture or subluxation. There are no areas of  osteolysis or osteosclerosis.     IMPRESSION: No acute osseous abnormality.     DICTATED:     01/14/2017  EDITED:          01/14/2017       XR Hip With or Without Pelvis 2 - 3 View Left [[id number]] Collected:  01/14/17 1808     Updated:  01/14/17 1808    Narrative:          EXAMINATION: XR PELVIS, 1-2 VIEWS, XR LEFT HIP, 2 VIEWS, XR LEFT KNEE,  1-2 VIEWS - 01/14/2017     INDICATION: Pain.      COMPARISON: None.     FINDINGS:   PELVIS: A large calcified fibroid is present in the pelvis. The  sacroiliac joints and pubic symphysis are normal.  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           Impression:       Calcified fibroid. No acute pelvic pathology.     LEFT HIP: Normal anatomic alignment of the left acetabular joint is  preserved. There is suggestion of a subcapital fracture of the left  femoral neck with minimal displacement. Soft tissues are normal.     IMPRESSION: Suggestion of subcapital fracture of the left femoral neck.     LEFT KNEE: Normal anatomic alignment of the knee joint is preserved.  There is no acute fracture or subluxation. There are no areas of  osteolysis or osteosclerosis.     IMPRESSION: No acute osseous abnormality.     DICTATED:     01/14/2017  EDITED:          01/14/2017       XR Knee 1 or 2 View Left [[id number]] Collected:  01/14/17 1808     Updated:  01/14/17 1808    Narrative:          EXAMINATION: XR PELVIS, 1-2 VIEWS, XR LEFT HIP, 2 VIEWS, XR LEFT KNEE,  1-2 VIEWS - 01/14/2017     INDICATION: Pain.      COMPARISON: None.     FINDINGS:   PELVIS: A large calcified fibroid is present in the pelvis. The  sacroiliac joints and pubic symphysis are normal. 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           Impression:       Calcified fibroid. No acute pelvic pathology.     LEFT HIP: Normal anatomic alignment of the left acetabular joint is  preserved. There is suggestion of a subcapital fracture of the left  femoral neck with minimal displacement. Soft tissues are normal.     IMPRESSION: Suggestion of subcapital fracture of the left femoral neck.     LEFT KNEE: Normal anatomic alignment of the knee joint is preserved.  There is no acute fracture or subluxation. There are no areas of  osteolysis or osteosclerosis.     IMPRESSION: No acute osseous abnormality.     DICTATED:     01/14/2017  EDITED:          01/14/2017       XR Shoulder 2+ View Right [[id number]] Collected:  01/14/17 1822     Updated:  01/14/17 1822    Narrative:          EXAMINATION: XR RIGHT SHOULDER, 2 VIEWS -  01/14/2017     INDICATION: S72.002A-Fracture of unspecified part of neck of left femur,  initial encounter for closed fracture; M25.511-Pain in right shoulder.     COMPARISON: None.     FINDINGS: There is some degenerative change around the humeral head. The  acromioclavicular joint is normal. The undersurface of the acromion  process causes some impingement on the supraspinatus muscle. There is no  acute fracture or subluxation.           Impression:       Mild to moderate degenerative change of the glenohumeral  joint.     DICTATED:     01/14/2017  EDITED:          01/14/2017       XR Chest 1 View [[id number]] Collected:  01/14/17 1823     Updated:  01/14/17 1823    Narrative:          EXAMINATION: XR CHEST, SINGLE VIEW - 01/14/2017     INDICATION: S72.002A-Fracture of unspecified part of neck of left femur,  initial encounter for closed fracture; M25.511-Pain in right shoulder.      COMPARISON: 11/27/2016.     FINDINGS: The heart size is normal. There is no pneumothorax or  effusion. The lungs are free of opacities. The osseous structures of the  chest are normal.           Impression:       No acute chest pathology, stable since 11/27/2016.     DICTATED:     01/14/2017  EDITED:          01/14/2017                Patient Care Team:  Kodak Clarke MD as PCP - General (Internal Medicine)  Kodak Clarke MD as Referring Physician (Internal Medicine)    SUBJECTIVE: Keila reports she is doing well.  She is eating breakfast in a chair and reports she walked in the lee with therapy.    PHYSICAL EXAM  Left hip dressing is clean, dry and intact  Thigh and calf are soft nontender  Neurovascularly intact distally     Principal Problem:    Closed left hip fracture, s/p left hip percutaneous pinning  Active Problems:    Hypertension    s/p Left Craniotomy for Evacuation of Left SDH 11/27/16     UTI (urinary tract infection)      PLAN / DISPOSITION: 88-year-old female postop day #1 status post left  hip percutaneous pinning  -Continue to mobilize with therapy as tolerated  -Postop anemia, asymptomatic, monitor H&H  -Lovenox for DVT prophylaxis  -Rehabilitation planning for [facility]    Edgar Albert MD  01/15/17  9:40 AM         Electronically signed by Edgar Albert MD at 1/15/2017  9:41 AM           Physical Therapy Notes (most recent note)      Ynes Slaughter, PT at 1/15/2017  9:42 AM  Version 2 of 2         Acute Care - Physical Therapy Initial Evaluation  [facility]     Patient Name: Keila Alberts  : 7/15/1928  MRN: [id number]  Today's Date: 1/15/2017   Onset of Illness/Injury or Date of Surgery Date: 17  Date of Referral to PT: 17  Referring Physician: Dr. Albert      Admit Date: 2017     Visit Dx:    ICD-10-CM ICD-9-CM   1. Closed left hip fracture, initial encounter S72.002A 820.8   2. Acute pain of right shoulder M25.511 719.41   3. Impaired mobility and ADLs Z74.09 799.89   4. Impaired functional mobility, balance, gait, and endurance Z74.09 V49.89     Patient Active Problem List   Diagnosis   • Subdural hematoma, post-traumatic   • Hypertension   • Constipation   • Normocytic anemia   • Acute/Subacute Traumatic SDH (subdural hematoma)   • Altered mental status   • Leukocytosis, ? Etiology   • s/p Left Craniotomy for Evacuation of Left SDH 16    • Hyponatremia   • 6.9 x 5.3cm pelvic mass, seen on imaging prior to admission   • Closed left hip fracture, s/p left hip percutaneous pinning   • UTI (urinary tract infection)     Past Medical History   Diagnosis Date   • Hypertension      Past Surgical History   Procedure Laterality Date   • Appendectomy     • Joint replacement     • Total shoulder replacement     • Total hip arthroplasty Right    • Halima hole Left 2016     Procedure: HALIMA HOLE;  Surgeon: Jos Christian MD;  Location: [facility];  Service:           PT ASSESSMENT (last 72 hours)      PT Evaluation       01/15/17 0830 01/15/17 0829     Rehab Evaluation    Document Type evaluation;therapy note (daily note)  -AR evaluation  -    Subjective Information no complaints;agree to therapy  -AR no complaints;agree to therapy  -    Patient Effort, Rehab Treatment excellent  -AR     Symptoms Noted During/After Treatment fatigue;increased pain  -AR increased pain  -    Symptoms Noted Comment Nurse aware  -AR     General Information    Patient Profile Review yes  -AR yes  -EH    Onset of Illness/Injury or Date of Surgery Date 01/14/17  -AR 01/14/17  -    Referring Physician Dr. Albert  -AR MD [place]  -    General Observations pt supine with fascia iliaca nerve catheter and IV, daughter-in-law at bedside  -AR Pt in fowlers with dtr in law in room, SCDs donned  -    Pertinent History Of Current Problem Pt is an 88 yof who presetned to ED with c/o worsesning left hip pain x several days with no known fall. Imaging in ED revealed left hip minimally displaced femoral neck fracture. Pt is POD#1 L hip femoral neck fracture percuteneouos pinning with placement of fasica iliaca nerve catheter. Of mention, pt s/p fall with resultant left SDH requiring evacuation by Dr. Christian 11/27/16 at [facility]. She DC to acute rehab, and ultimately DC to her family's home in 12/22/16. She was receiving PT and SLP HH services, and was DC from OT HH last week.  -AR Pt with pain x 2 days in LLE and RUE, unclear cause, had rigerous PT workout 5 days ago. Recent hx of fall with subdural hematoma x 1 month ago with surgical evacuation, hospital stay with d/c to [facility] and subsequent d/c home with HHPT/OT/Speech. Graduated on OT recently.  -    Precautions/Limitations fall precautions;brace on when up;other (see comments);insensate limb   KI when up d/t fascia iliaca untl quad weakness resolved, HO  -AR fall precautions;brace on when up   iliofascial nerve block; KI until lucita strength return  -    Prior Level of Function independent:;all household mobility;gait;transfer;dressing;min  assist:;bathing   used walker in home, occasional use of cane in community  -AR independent:;all household mobility;dressing;min assist:;bathing;dependent:;driving  -EH    Equipment Currently Used at Home bath bench;cane, straight;grab bar;hospital bed;raised toilet;walker, rolling  -AR bath bench;walker, rolling;cane, straight;hospital bed   Pt and  living with dtr in law and son.  -EH    Plans/Goals Discussed With patient and family;agreed upon  -AR patient and family;agreed upon  -EH    Risks Reviewed patient and family:;LOB;nausea/vomiting;dizziness;increased discomfort;change in vital signs;increased drainage;lines disloged  -AR patient and family:;dizziness;LOB;increased discomfort;change in vital signs  -EH    Benefits Reviewed patient and family:;improve function;increase independence;increase balance;increase strength;decrease pain;decrease risk of DVT;increase knowledge  -AR patient and family:;improve function;increase independence  -    Barriers to Rehab none identified  -AR previous functional deficit;visual deficit  -EH    Living Environment    Lives With child(rose), dependent;spouse  -AR child(rose), adult;spouse  -EH    Living Arrangements house  -AR house  -EH    Home Accessibility stairs within home;tub/shower is not walk in;stairs to enter home  -AR stairs within home;stairs to enter home  -EH    Number of Stairs to Enter Home 1  -AR 1  -EH    Number of Stairs Within Home 1  -AR 1   between rooms of home  -EH    Transportation Available ambulance  -AR     Living Environment Comment Pt and her spouse live with their son and daughter-in-law and they have 24/hr assistance.   -AR     Clinical Impression    Date of Referral to PT  01/14/17  -    PT Diagnosis  [place]  -    Criteria for Skilled Therapeutic Interventions Met  yes;treatment indicated  -    Rehab Potential  good, to achieve stated therapy goals  -    Vital Signs    Pre Systolic BP Rehab  --   WNL in sitting prior to further  mobility.  -EH    Intra Systolic BP Rehab 112  -AR     Intra Treatment Diastolic BP 62  -AR     Intra Patient Position Sitting  -AR     Pain Assessment    Pain Assessment 0-10  -AR 0-10  -EH    Pain Score 0  -AR 0  -EH    Post Pain Score 5  -AR 5  -EH    Pain Type Acute pain  -AR Acute pain  -EH    Pain Location Hip  -AR Hip  -EH    Pain Orientation Left  -AR Left  -EH    Pain Intervention(s) Repositioned;Ambulation/increased activity  -AR Repositioned;Ambulation/increased activity;Medication (See MAR)   Med by NSG (NSg in room as PT exiting)  -EH    Response to Interventions nurse aware of pt'c c/o pain  -AR     Vision Assessment/Intervention    Visual Impairment WNL  -AR     Cognitive Assessment/Intervention    Current Cognitive/Communication Assessment functional   hard of hearing  -AR impaired   [place]  -EH    Orientation Status oriented x 4  -AR oriented x 4  -EH    Follows Commands/Answers Questions 100% of the time;able to follow single-step instructions  -% of the time;able to follow single-step instructions  -EH    Personal Safety WNL/WFL  -AR WNL/WFL  -    Personal Safety Interventions fall prevention program maintained  -AR fall prevention program maintained;elopement precautions initiated;gait belt;nonskid shoes/slippers when out of bed;supervised activity;muscle strengthening facilitated  -EH    ROM (Range of Motion)    General ROM no range of motion deficits identified   BUE  -AR lower extremity range of motion deficits identified  -EH    General ROM Detail  LLE hip /knee motion testing limited 22 ain  -EH    MMT (Manual Muscle Testing)    General MMT Assessment upper extremity strength deficits identified   RUE 3+/5, LUE 4/5  -AR     General MMT Assessment Detail  RLE functionally 4/5, LLE >3/5 DF. Sensation of knee absent.  -EH    Mobility Assessment/Training    Extremity Weight-Bearing Status left lower extremity  -AR left lower extremity  -EH    Left Lower Extremity Weight-Bearing  weight-bearing as tolerated  -AR weight-bearing as tolerated  -    Bed Mobility, Assessment/Treatment    Bed Mobility, Assistive Device bed rails;head of bed elevated  -AR bed rails;head of bed elevated  -    Bed Mobility, Scoot/Bridge, [place] contact guard assist;verbal cues required  -AR     Bed Mob, Supine to Sit, [place] minimum assist (75% patient effort);2 person assist required;verbal cues required  -AR minimum assist (75% patient effort);2 person assist required  -    Bed Mob, Sit to Supine, [place]  not tested  -    Bed Mobility, Comment Cues to sequence and for effective use of BUE. Issued leg  and educatd pt on use.   -AR     Transfer Assessment/Treatment    Transfers, Sit-Stand [place] minimum assist (75% patient effort);2 person assist required;verbal cues required  -AR minimum assist (75% patient effort);2 person assist required;verbal cues required  -    Transfers, Stand-Sit [place] verbal cues required;minimum assist (75% patient effort);2 person assist required  -AR minimum assist (75% patient effort);2 person assist required;verbal cues required  -    Transfers, Sit-Stand-Sit, Assist Device rolling walker;elevated surface  -AR rolling walker  -    Transfer, Safety Issues  step length decreased;steps too close front assistive device;sequencing ability decreased  -    Transfer, Impairments ROM decreased;sensation decreased;strength decreased;pain  -AR pain;strength decreased;sensation decreased  -    Transfer, Comment cues for hand placement, sequencing and chair approach   -AR VC for sequencing and hand placement.   -    Gait Assessment/Treatment    Gait, [place] Level  minimum assist (75% patient effort);2 person assist required  -    Gait, Assistive Device  rolling walker  -    Gait, Distance (Feet)  38  -    Gait, Gait Pattern Analysis  swing-to gait  -    Gait, Gait Deviations  right:;step length decreased;left:;weight-shifting  ability decreased;toe-to-floor clearance decreased;decreased heel strike;limb motion velocity decreased  -    Gait, Maintain Weight Bearing Status  able to maintain weight bearing status  -    Gait, Safety Issues  sequencing ability decreased;step length decreased;weight-shifting ability decreased;steps too close front assistive device  -    Gait, Impairments  pain;impaired balance;strength decreased;sensation decreased  -    Gait, Comment  Pt with tendency to hold walker too close, causing decreased balance. Pt assisted with positioning of walker. Pt performs step to gait with cueing for sequencing. BLE step length decreased. encouraged to increase RLE step length if able- progresses 1 inch pst LLE.  -    Therapy Exercises    Bilateral Upper Extremity AROM:;5 reps;supine;shoulder extension/flexion  -AR     Exercise Protocols  hip ORIF  -    Hip ORIF Exercises  left:;10 reps;ankle pumps/circles;glut set   educationon quad set performed with R knee; L unable.  -    Sensory Assessment/Intervention    Light Touch  [facility]  -    LLE Light Touch  other (see comments)   limited 2/2 nerve block at knee  -    General Interventions    Bed Mobility Training issued leg  and educated pt on use  -AR     Transfer Training educated pt on safe tranfer technqiue   -AR     Positioning and Restraints    Pre-Treatment Position in bed  -AR in bed  -    Post Treatment Position chair  -AR chair  -    In Chair reclined;call light within reach;encouraged to call for assist;exit alarm on;with family/caregiver;with nsg;on mechanical lift sling   nurse aware of need for KI,posted sign for KI  -AR notified [facility];reclined;call light within reach;encouraged to call for assist;exit alarm on;with family/caregiver;with [facility]      01/15/17 0000 01/14/17 2030    Muscle Tone Assessment    Muscle Tone Assessment  LLE  -ST    LLE Muscle Tone Assessment moderately decreased tone  -ST moderately decreased tone  -ST      User Key   (r) = Recorded By, (t) = Taken By, (c) = Cosigned By    Initials Name Provider Type     Ynes Slaughter, PT Physical Therapist    ONUR Moore, OT Occupational Therapist    ST Julianne Bullock RN Registered Nurse          Physical Therapy Education     Title: PT OT SLP Therapies (Active)     Topic: Physical Therapy (Done)     Point: Mobility training (Done)    Learning Progress Summary    Learner Readiness Method Response Comment Documented by Status   Patient Acceptance E VU,NR   01/15/17 0937 Done   Family Acceptance E VU,NR   01/15/17 0937 Done               Point: Home exercise program (Done)    Learning Progress Summary    Learner Readiness Method Response Comment Documented by Status   Patient Acceptance E VU,NR   01/15/17 0937 Done   Family Acceptance E VU,[facility] 01/15/17 0937 Done               Point: Body mechanics (Done)    Learning Progress Summary    Learner Readiness Method Response Comment Documented by Status   Patient Acceptance E VU,NR   01/15/17 0937 Done   Family Acceptance E VU,NR   01/15/17 0937 Done               Point: Precautions (Done)    Learning Progress Summary    Learner Readiness Method Response Comment Documented by Status   Patient Acceptance E VU,NR   01/15/17 0937 Done   Family Acceptance E VU,NR   01/15/17 0937 Done                      User Key     Initials Effective Dates Name Provider Type [facility] 06/19/15 -  Ynes Slaughter, PT Physical Therapist PT                PT Recommendation and Plan  Anticipated Discharge Disposition: inpatient rehabilitation facility  Planned Therapy Interventions: balance training, bed mobility training, gait training, home exercise program, strengthening, stair training, transfer training  PT Frequency: daily  Plan of Care Review  Plan Of Care Reviewed With: patient  Progress: improving  Outcome Summary/Follow up Plan: Pt ambulates into hallway with first PT session. Also demonstrates bed mobility and t/f with  MIN A x 2. Demonsrates gait/mobility deviations and continued need for education for safety and continued rehab.          IP PT Goals       01/15/17 0937          Bed Mobility PT LTG    Bed Mobility PT LTG, Date Established 01/15/17  -      Bed Mobility PT LTG, Time to Achieve 2 wks  -EH      Bed Mobility PT LTG, Activity Type supine to sit/sit to supine  -EH      Bed Mobility PT LTG, [place] Level supervision required  -EH      Transfer Training PT LTG    Transfer Training PT LTG, Date Established 01/15/17  -      Transfer Training PT LTG, Time to Achieve 2 wks  -EH      Transfer Training PT LTG, Activity Type sit to stand/stand to sit  -EH      Transfer Training PT LTG, [place] Level set up required;supervision required  -EH      Transfer Training PT LTG, Assist Device walker, rolling  -EH      Gait Training PT LTG    Gait Training Goal PT LTG, Date Established 01/15/17  -      Gait Training Goal PT LTG, Time to Achieve 2 wks  -EH      Gait Training Goal PT LTG, [place] Level contact guard assist  -EH      Gait Training Goal PT LTG, Assist Device walker, rolling  -EH      Gait Training Goal PT LTG, Distance to Achieve 350  -EH      Stair Training PT LTG    Stair Training Goal PT LTG, Date Established 01/15/17  -      Stair Training Goal PT LTG, Time to Achieve 2 wks  -EH      Stair Training Goal PT LTG, Number of Steps 1  -EH      Stair Training Goal PT LTG, [place] Level contact guard assist  -EH      Stair Training Goal PT LTG, Assist Device walker, rolling  -EH        User Key  (r) = Recorded By, (t) = Taken By, (c) = Cosigned By    Initials Name Provider Type     Ynes Slaughter, PT Physical Therapist                Outcome Measures       01/15/17 0829          How much help from another person do you currently need...    Turning from your back to your side while in flat bed without using bedrails? 3  -EH      Moving from lying on back to sitting on the side of a flat bed  without bedrails? 3  -EH      Moving to and from a bed to a chair (including a wheelchair)? 3  -EH      Standing up from a chair using your arms (e.g., wheelchair, bedside chair)? 3  -EH      Climbing 3-5 steps with a railing? 2  -EH      To walk in hospital room? 3  -EH      AM-PAC 6 Clicks Score 17  -EH      Functional Assessment    Outcome Measure Options AM-PAC 6 Clicks Basic Mobility (PT)  -        User Key  (r) = Recorded By, (t) = Taken By, (c) = Cosigned By    Initials Name Provider Type     Ynes Slaughter PT Physical Therapist           Time Calculation:         PT Charges       01/15/17 0941          Time Calculation    Start Time 829  -      PT Received On 01/15/17  -      PT Goal Re-Cert Due Date 17  -      Time Calculation- PT    Total Timed Code Minutes- PT 13 minute(s)  -        User Key  (r) = Recorded By, (t) = Taken By, (c) = Cosigned By    Initials Name Provider Type     Ynes Slaughter PT Physical Therapist          Therapy Charges for Today     Code Description Service Date Service Provider Modifiers Qty    05611379284 HC GAIT TRAINING EA 15 MIN 1/15/2017 Ynes Slaughter, PT GP 1    68704137659 HC PT EVAL MOD COMPLEXITY 4 1/15/2017 Ynes Slaughter, PT GP 1    32138578657 HC PT THER SUPP EA 15 MIN 1/15/2017 Ynes Slaughter PT GP 1          PT G-Codes  Outcome Measure Options: AM-PAC 6 Clicks Basic Mobility (PT)      Ynes Slaughter PT  1/15/2017          Electronically signed by Ynes Slaughter PT at 1/15/2017  9:45 AM      Ynes Slaughter PT at 1/15/2017  9:42 AM  Version 1 of 2         Acute Care - Physical Therapy Initial Evaluation   [place]     Patient Name: Keila Alberts  : 7/15/1928  MRN: [id number]  Today's Date: 1/15/2017   Onset of Illness/Injury or Date of Surgery Date: 17  Date of Referral to PT: 17  Referring Physician: Dr. Albert      Admit Date: 2017     Visit Dx:    ICD-10-CM ICD-9-CM   1. Closed left hip  fracture, initial encounter S72.002A 820.8   2. Acute pain of right shoulder M25.511 719.41   3. Impaired mobility and ADLs Z74.09 799.89   4. Impaired functional mobility, balance, gait, and endurance Z74.09 V49.89     Patient Active Problem List   Diagnosis   • Subdural hematoma, post-traumatic   • Hypertension   • Constipation   • Normocytic anemia   • Acute/Subacute Traumatic SDH (subdural hematoma)   • Altered mental status   • Leukocytosis, ? Etiology   • s/p Left Craniotomy for Evacuation of Left SDH 11/27/16    • Hyponatremia   • 6.9 x 5.3cm pelvic mass, seen on imaging prior to admission   • Closed left hip fracture, s/p left hip percutaneous pinning   • UTI (urinary tract infection)     Past Medical History   Diagnosis Date   • Hypertension      Past Surgical History   Procedure Laterality Date   • Appendectomy     • Joint replacement     • Total shoulder replacement     • Total hip arthroplasty Right    • Halima hole Left 11/27/2016     Procedure: HALIMA HOLE;  Surgeon: Jos Christian MD;  Location: [facility];  Service:           PT ASSESSMENT (last 72 hours)      PT Evaluation       01/15/17 0830 01/15/17 0829    Rehab Evaluation    Document Type evaluation;therapy note (daily note)  -AR evaluation  -    Subjective Information no complaints;agree to therapy  -AR no complaints;agree to therapy  -    Patient Effort, Rehab Treatment excellent  -AR     Symptoms Noted During/After Treatment fatigue;increased pain  -AR increased pain  -EH    Symptoms Noted Comment Nurse aware  -AR     General Information    Patient Profile Review yes  -AR yes  -EH    Onset of Illness/Injury or Date of Surgery Date 01/14/17  -AR 01/14/17  -    Referring Physician Dr. Albert  -AR MD [place]  -    General Observations pt supine with fascia iliaca nerve catheter and IV, daughter-in-law at bedside  -AR Pt in fowlers with dtr in law in room, SCDs donned  -    Pertinent History Of Current Problem Pt is an 88 yof who presetned to ED  with c/o worsesning left hip pain x several days with no known fall. Imaging in ED revealed left hip minimally displaced femoral neck fracture. Pt is POD#1 L hip femoral neck fracture percuteneouos pinning with placement of fasica iliaca nerve catheter. Of mention, pt s/p fall with resultant left SDH requiring evacuation by Dr. Christian 11/27/16 at [facility]. She DC to acute rehab, and ultimately DC to her family's home in 12/22/16. She was receiving PT and SLP HH services, and was DC from OT HH last week.  -AR Pt with pain x 2 days in LLE and RUE, unclear cause, had rigerous PT workout 5 days ago. Recent hx of fall with subdural hematoma x 1 month ago with surgical evacuation, hospital stay with d/c to [facility] and subsequent d/c home with HHPT/OT/Speech. Graduated on OT recently.  -EH    Precautions/Limitations fall precautions;brace on when up;other (see comments);insensate limb   KI when up d/t fascia iliaca untl quad weakness resolved, HO  -AR fall precautions;brace on when up   iliofascial nerve block; KI until lucita strength return  -EH    Prior Level of Function independent:;all household mobility;gait;transfer;dressing;min assist:;bathing   used walker in home, occasional use of cane in community  -AR independent:;all household mobility;dressing;min assist:;bathing;dependent:;driving  -EH    Equipment Currently Used at Home bath bench;cane, straight;grab bar;hospital bed;raised toilet;walker, rolling  -AR bath bench;walker, rolling;cane, straight;hospital bed   Pt and  living with dtr in law and son.  -EH    Plans/Goals Discussed With patient and family;agreed upon  -AR patient and family;agreed upon  -EH    Risks Reviewed patient and family:;LOB;nausea/vomiting;dizziness;increased discomfort;change in vital signs;increased drainage;lines disloged  -AR patient and family:;dizziness;LOB;increased discomfort;change in vital signs  -EH    Benefits Reviewed patient and family:;improve function;increase  independence;increase balance;increase strength;decrease pain;decrease risk of DVT;increase knowledge  -AR patient and family:;improve function;increase independence  -EH    Barriers to Rehab none identified  -AR previous functional deficit;visual deficit  -EH    Living Environment    Lives With child(rose), dependent;spouse  -AR child(rose), adult;spouse  -EH    Living Arrangements house  -AR house  -EH    Home Accessibility stairs within home;tub/shower is not walk in;stairs to enter home  -AR stairs within home;stairs to enter home  -EH    Number of Stairs to Enter Home 1  -AR 1  -EH    Number of Stairs Within Home 1  -AR 1   between rooms of home  -EH    Transportation Available ambulance  -AR     Living Environment Comment Pt and her spouse live with their son and daughter-in-law and they have 24/hr assistance.   -AR     Clinical Impression    Date of Referral to PT  01/14/17  -EH    PT Diagnosis  [place]  -    Criteria for Skilled Therapeutic Interventions Met  yes;treatment indicated  -EH    Rehab Potential  good, to achieve stated therapy goals  -EH    Vital Signs    Pre Systolic BP Rehab  --   WNL in sitting prior to further mobility.  -EH    Intra Systolic BP Rehab 112  -AR     Intra Treatment Diastolic BP 62  -AR     Intra Patient Position Sitting  -AR     Pain Assessment    Pain Assessment 0-10  -AR 0-10  -EH    Pain Score 0  -AR 0  -EH    Post Pain Score 5  -AR 5  -EH    Pain Type Acute pain  -AR Acute pain  -EH    Pain Location Hip  -AR Hip  -EH    Pain Orientation Left  -AR Left  -EH    Pain Intervention(s) Repositioned;Ambulation/increased activity  -AR Repositioned;Ambulation/increased activity;Medication (See MAR)   Med by NSG (NSg in room as PT exiting)  -EH    Response to Interventions nurse aware of pt'c c/o pain  -AR     Vision Assessment/Intervention    Visual Impairment WNL  -AR     Cognitive Assessment/Intervention    Current Cognitive/Communication Assessment functional   hard of hearing  -AR  impaired   [place]  -    Orientation Status oriented x 4  -AR oriented x 4  -EH    Follows Commands/Answers Questions 100% of the time;able to follow single-step instructions  -% of the time;able to follow single-step instructions  -    Personal Safety WNL/WFL  -AR WNL/WFL  -    Personal Safety Interventions fall prevention program maintained  -AR fall prevention program maintained;elopement precautions initiated;gait belt;nonskid shoes/slippers when out of bed;supervised activity;muscle strengthening facilitated  -    ROM (Range of Motion)    General ROM no range of motion deficits identified   BUE  -AR lower extremity range of motion deficits identified  -    General ROM Detail  LLE hip /knee motion testing limited 22 ain  -    MMT (Manual Muscle Testing)    General MMT Assessment upper extremity strength deficits identified   RUE 3+/5, LUE 4/5  -AR     General MMT Assessment Detail  RLE functionally 4/5, LLE >3/5 DF. Sensation of knee absent.  -    Mobility Assessment/Training    Extremity Weight-Bearing Status left lower extremity  -AR left lower extremity  -    Left Lower Extremity Weight-Bearing weight-bearing as tolerated  -AR weight-bearing as tolerated  -    Bed Mobility, Assessment/Treatment    Bed Mobility, Assistive Device bed rails;head of bed elevated  -AR bed rails;head of bed elevated  -    Bed Mobility, Scoot/Bridge, [place] contact guard assist;verbal cues required  -AR     Bed Mob, Supine to Sit, [place] minimum assist (75% patient effort);2 person assist required;verbal cues required  -AR minimum assist (75% patient effort);2 person assist required  -    Bed Mob, Sit to Supine, [place]  not tested  -    Bed Mobility, Comment Cues to sequence and for effective use of BUE. Issued leg  and educatd pt on use.   -AR     Transfer Assessment/Treatment    Transfers, Sit-Stand [place] minimum assist (75% patient effort);2 person assist required;verbal  cues required  -AR minimum assist (75% patient effort);2 person assist required;verbal cues required  -    Transfers, Stand-Sit [place] verbal cues required;minimum assist (75% patient effort);2 person assist required  -AR minimum assist (75% patient effort);2 person assist required;verbal cues required  -    Transfers, Sit-Stand-Sit, Assist Device rolling walker;elevated surface  -AR rolling walker  -    Transfer, Safety Issues  step length decreased;steps too close front assistive device;sequencing ability decreased  -    Transfer, Impairments ROM decreased;sensation decreased;strength decreased;pain  -AR pain;strength decreased;sensation decreased  -    Transfer, Comment cues for hand placement, sequencing and chair approach   -AR VC for sequencing and hand placement.   -    Gait Assessment/Treatment    Gait, [place] Level  minimum assist (75% patient effort);2 person assist required  -    Gait, Assistive Device  rolling walker  -    Gait, Distance (Feet)  38  -    Gait, Gait Pattern Analysis  swing-to gait  -    Gait, Gait Deviations  right:;step length decreased;left:;weight-shifting ability decreased;toe-to-floor clearance decreased;decreased heel strike;limb motion velocity decreased  -    Gait, Maintain Weight Bearing Status  able to maintain weight bearing status  -    Gait, Safety Issues  sequencing ability decreased;step length decreased;weight-shifting ability decreased;steps too close front assistive device  -    Gait, Impairments  pain;impaired balance;strength decreased;sensation decreased  -    Gait, Comment  Pt with tendency to hold walker too close, causing decreased balance. Pt assisted with positioning of walker. Pt performs step to gait with cueing for sequencing. BLE step length decreased. encouraged to increase RLE step length if able- progresses 1 inch pst LLE.  -    Therapy Exercises    Bilateral Upper Extremity AROM:;5 reps;supine;shoulder  extension/flexion  -AR     Exercise Protocols  hip ORIF  -    Hip ORIF Exercises  left:;10 reps;ankle pumps/circles;glut set   educationon quad set performed with R knee; L unable.  -    Sensory Assessment/Intervention    Light Touch  LLE  -EH    LLE Light Touch  other (see comments)   limited 2/2 nerve block at knee  -    General Interventions    Bed Mobility Training issued leg  and educated pt on use  -AR     Transfer Training educated pt on safe tranfer technqiue   -AR     Positioning and Restraints    Pre-Treatment Position in bed  -AR in bed  -    Post Treatment Position chair  -AR chair  -EH    In Chair reclined;call light within reach;encouraged to call for assist;exit alarm on;with family/caregiver;with nsg;on mechanical lift sling   nurse aware of need for KI,posted sign for KI  -AR notified nsg;reclined;call light within reach;encouraged to call for assist;exit alarm on;with family/caregiver;with nsg  -      01/15/17 0000 01/14/17 2030    Muscle Tone Assessment    Muscle Tone Assessment  LLE  -ST    LLE Muscle Tone Assessment moderately decreased tone  -ST moderately decreased tone  -ST      User Key  (r) = Recorded By, (t) = Taken By, (c) = Cosigned By    Initials Name Provider Type     Ynes Slaughter, PT Physical Therapist    ONUR Moore, OT Occupational Therapist    ST Julianne Bullock RN Registered Nurse          Physical Therapy Education     Title: PT OT SLP Therapies (Active)     Topic: Physical Therapy (Done)     Point: Mobility training (Done)    Learning Progress Summary    Learner Readiness Method Response Comment Documented by Status   Patient Acceptance E MILAN,NR   01/15/17 0937 Done   Family Acceptance E MILAN,NR   01/15/17 0937 Done               Point: Home exercise program (Done)    Learning Progress Summary    Learner Readiness Method Response Comment Documented by Status   Patient Acceptance E VU,NR   01/15/17 0937 Done   Family Acceptance E VU,NR    01/15/17 0937 Done               Point: Body mechanics (Done)    Learning Progress Summary    Learner Readiness Method Response Comment Documented by Status   Patient Acceptance E VU,NR   01/15/17 0937 Done   Family Acceptance E VU,NR   01/15/17 0937 Done               Point: Precautions (Done)    Learning Progress Summary    Learner Readiness Method Response Comment Documented by Status   Patient Acceptance E VU,NR   01/15/17 0937 Done   Family Acceptance E VU,NR   01/15/17 0937 Done                      User Key     Initials Effective Dates Name Provider Type Discipline     06/19/15 -  Ynes Slaughter, PT Physical Therapist PT                PT Recommendation and Plan  Anticipated Discharge Disposition: inpatient rehabilitation facility  Planned Therapy Interventions: balance training, bed mobility training, gait training, home exercise program, strengthening, stair training, transfer training  PT Frequency: daily  Plan of Care Review  Plan Of Care Reviewed With: patient  Progress: improving  Outcome Summary/Follow up Plan: Pt ambulates into hallway with first PT session. Also demonstrates bed mobility and t/f with MIN A x 2. Demonsrates gait/mobility deviations and continued need for education for safety and continued rehab.          IP PT Goals       01/15/17 0937          Bed Mobility PT LTG    Bed Mobility PT LTG, Date Established 01/15/17  -      Bed Mobility PT LTG, Time to Achieve 2 wks  -      Bed Mobility PT LTG, Activity Type supine to sit/sit to supine  -      Bed Mobility PT LTG, [place] Level supervision required  -      Transfer Training PT LTG    Transfer Training PT LTG, Date Established 01/15/17  -      Transfer Training PT LTG, Time to Achieve 2 wks  -      Transfer Training PT LTG, Activity Type sit to stand/stand to sit  -      Transfer Training PT LTG, [place] Level set up required;supervision required  -      Transfer Training PT LTG, Assist Device  walker, rolling  -EH      Gait Training PT LTG    Gait Training Goal PT LTG, Date Established 01/15/17  -EH      Gait Training Goal PT LTG, Time to Achieve 2 wks  -EH      Gait Training Goal PT LTG, [place] Level contact guard assist  -EH      Gait Training Goal PT LTG, Assist Device walker, rolling  -EH      Gait Training Goal PT LTG, Distance to Achieve 350  -EH      Stair Training PT LTG    Stair Training Goal PT LTG, Date Established 01/15/17  -EH      Stair Training Goal PT LTG, Time to Achieve 2 wks  -EH      Stair Training Goal PT LTG, Number of Steps 1  -EH      Stair Training Goal PT LTG, [place] Level contact guard assist  -EH      Stair Training Goal PT LTG, Assist Device walker, rolling  -EH        User Key  (r) = Recorded By, (t) = Taken By, (c) = Cosigned By    Initials Name Provider Type    JON Slaughter, PT Physical Therapist                Outcome Measures       01/15/17 0829          How much help from another person do you currently need...    Turning from your back to your side while in flat bed without using bedrails? 3  -EH      Moving from lying on back to sitting on the side of a flat bed without bedrails? 3  -EH      Moving to and from a bed to a chair (including a wheelchair)? 3  -EH      Standing up from a chair using your arms (e.g., wheelchair, bedside chair)? 3  -EH      Climbing 3-5 steps with a railing? 2  -EH      To walk in hospital room? 3  -EH      AM-PAC 6 Clicks Score 17  -EH      Functional Assessment    Outcome Measure Options AM-PAC 6 Clicks Basic Mobility (PT)  -        User Key  (r) = Recorded By, (t) = Taken By, (c) = Cosigned By    Initials Name Provider Type    JON Slaughter, PT Physical Therapist           Time Calculation:         PT Charges       01/15/17 0941          Time Calculation    Start Time 0829  -      Time Calculation- PT    Total Timed Code Minutes- PT 13 minute(s)  -        User Key  (r) = Recorded By, (t) = Taken By, (c) =  Cosigned By    Initials Name Provider Type     Ynes Slaughter, PT Physical Therapist          Therapy Charges for Today     Code Description Service Date Service Provider Modifiers Qty    39190749658 HC GAIT TRAINING EA 15 MIN 1/15/2017 Ynes Slaughter, PT GP 1    85679465274 HC PT EVAL MOD COMPLEXITY 4 1/15/2017 Ynes Slaughter, PT GP 1    27345934375 HC PT THER SUPP EA 15 MIN 1/15/2017 Ynes Slaughter, PT GP 1          PT G-Codes  Outcome Measure Options: AM-PAC 6 Clicks Basic Mobility (PT)      Ynes Slaughter PT  1/15/2017          Electronically signed by Ynes Slaughter PT at 1/15/2017  9:42 AM           Occupational Therapy Notes (most recent note)      Ramila Moore, OT at 1/15/2017  9:48 AM  Version 1 of 1         Acute Care - Occupational Therapy Initial Evaluation  [facility]     Patient Name: Keila Alberts  : 7/15/1928  MRN: [id number]  Today's Date: 1/15/2017  Onset of Illness/Injury or Date of Surgery Date: 17  Date of Referral to OT: 17  Referring Physician: Dr. Albert    Admit Date: 2017       ICD-10-CM ICD-9-CM   1. Closed left hip fracture, initial encounter S72.002A 820.8   2. Acute pain of right shoulder M25.511 719.41   3. Impaired mobility and ADLs Z74.09 799.89   4. Impaired functional mobility, balance, gait, and endurance Z74.09 V49.89     Patient Active Problem List   Diagnosis   • Subdural hematoma, post-traumatic   • Hypertension   • Constipation   • Normocytic anemia   • Acute/Subacute Traumatic SDH (subdural hematoma)   • Altered mental status   • Leukocytosis, ? Etiology   • s/p Left Craniotomy for Evacuation of Left SDH 16    • Hyponatremia   • 6.9 x 5.3cm pelvic mass, seen on imaging prior to admission   • Closed left hip fracture, s/p left hip percutaneous pinning   • UTI (urinary tract infection)     Past Medical History   Diagnosis Date   • Hypertension      Past Surgical History   Procedure Laterality Date   • Appendectomy      • Joint replacement     • Total shoulder replacement     • Total hip arthroplasty Right    • [place] hole Left 11/27/2016     Procedure: HALIMA HOLE;  Surgeon: Jos Christian MD;  Location: [facility];  Service:           OT ASSESSMENT FLOWSHEET (last 72 hours)      OT Evaluation       01/15/17 0830 01/15/17 0829 01/15/17 0000 01/14/17 2030       Rehab Evaluation    Document Type evaluation;therapy note (daily note)  -AR evaluation  -       Subjective Information no complaints;agree to therapy  -AR no complaints;agree to therapy  -EH       Patient Effort, Rehab Treatment excellent  -AR        Symptoms Noted During/After Treatment fatigue;increased pain  -AR increased pain  -EH       Symptoms Noted Comment Nurse aware  -AR        General Information    Patient Profile Review yes  -AR yes  -EH       Onset of Illness/Injury or Date of Surgery Date 01/14/17  -AR 01/14/17  -       Referring Physician Dr. Albert  -AR MD [place]  -       General Observations pt supine with fascia iliaca nerve catheter and IV, daughter-in-law at bedside  -AR Pt in fowlers with dtr in law in room, SCDs donned  -       Pertinent History Of Current Problem Pt is an 88 yof who presetned to ED with c/o worsesning left hip pain x several days with no known fall. Imaging in ED revealed left hip minimally displaced femoral neck fracture. Pt is POD#1 L hip femoral neck fracture percuteneouos pinning with placement of fasica iliaca nerve catheter. Of mention, pt s/p fall with resultant left SDH requiring evacuation by Dr. Christian 11/27/16 at [facility]. She DC to acute rehab, and ultimately DC to her family's home in 12/22/16. She was receiving PT and SLP HH services, and was DC from OT HH last week.  -AR Pt with pain x 2 days in LLE and RUE, unclear cause, had rigerous PT workout 5 days ago. Recent hx of fall with subdural hematoma x 1 month ago with surgical evacuation, hospital stay with d/c to [facility] and subsequent d/c home with HHPT/OT/Speech. Graduated  on OT recently.  -EH       Precautions/Limitations fall precautions;brace on when up;other (see comments);insensate limb   KI when up d/t fascia iliaca untl quad weakness resolved, HO  -AR fall precautions;brace on when up   iliofascial nerve block; KI until lucita strength return  -EH       Prior Level of Function independent:;all household mobility;gait;transfer;dressing;min assist:;bathing   used walker in home, occasional use of cane in community  -AR independent:;all household mobility;dressing;min assist:;bathing;dependent:;driving  -EH       Equipment Currently Used at Home bath bench;cane, straight;grab bar;hospital bed;raised toilet;walker, rolling  -AR bath bench;walker, rolling;cane, straight;hospital bed   Pt and  living with dtr in law and son.  -EH       Plans/Goals Discussed With patient and family;agreed upon  -AR patient and family;agreed upon  -       Risks Reviewed patient and family:;LOB;nausea/vomiting;dizziness;increased discomfort;change in vital signs;increased drainage;lines disloged  -AR patient and family:;dizziness;LOB;increased discomfort;change in vital signs  -EH       Benefits Reviewed patient and family:;improve function;increase independence;increase balance;increase strength;decrease pain;decrease risk of DVT;increase knowledge  -AR patient and family:;improve function;increase independence  -EH       Barriers to Rehab none identified  -AR previous functional deficit;visual deficit  -EH       Living Environment    Lives With child(rose), dependent;spouse  -AR child(rose), adult;spouse  -EH       Living Arrangements house  -AR house  -EH       Home Accessibility stairs within home;tub/shower is not walk in;stairs to enter home  -AR stairs within home;stairs to enter home  -EH       Number of Stairs to Enter Home 1  -AR 1  -EH       Number of Stairs Within Home 1  -AR 1   between rooms of home  -EH       Transportation Available ambulance  -AR        Living Environment Comment  Pt and her spouse live with their son and daughter-in-law and they have 24/hr assistance.   -AR        Clinical Impression    Date of Referral to OT 01/14/17  -AR        OT Diagnosis decreased independence with ADLS  -AR        Patient/Family Goals Statement go to [facility] for rehab  -AR        Criteria for Skilled Therapeutic Interventions Met yes;treatment indicated  -AR        Rehab Potential good, to achieve stated therapy goals  -AR        Therapy Frequency daily   per priority policy  -AR        Anticipated Equipment Needs At Discharge bathing equipment;dressing equipment   issued hip kit this date  -AR        Anticipated Discharge Disposition inpatient rehabilitation facility  -AR        Vital Signs    Pre Systolic BP Rehab  --   WNL in sitting prior to further mobility.  -EH       Intra Systolic BP Rehab 112  -AR        Intra Treatment Diastolic BP 62  -AR        Intra Patient Position Sitting  -AR        Pain Assessment    Pain Assessment 0-10  -AR 0-10  -EH       Pain Score 0  -AR 0  -EH       Post Pain Score 5  -AR 5  -EH       Pain Type Acute pain  -AR Acute pain  -EH       Pain Location Hip  -AR Hip  -EH       Pain Orientation Left  -AR Left  -EH       Pain Intervention(s) Repositioned;Ambulation/increased activity  -AR Repositioned;Ambulation/increased activity;Medication (See MAR)   Med by NSG (NSg in room as PT exiting)  -EH       Response to Interventions nurse aware of pt'c c/o pain  -AR        Vision Assessment/Intervention    Visual Impairment WNL  -AR        Cognitive Assessment/Intervention    Current Cognitive/Communication Assessment functional   hard of hearing  -AR impaired   [place]  -       Orientation Status oriented x 4  -AR oriented x 4  -EH       Follows Commands/Answers Questions 100% of the time;able to follow single-step instructions  -% of the time;able to follow single-step instructions  -EH       Personal Safety WNL/WFL  -AR WNL/WFL  -       Personal Safety  Interventions fall prevention program maintained  -AR fall prevention program maintained;elopement precautions initiated;gait belt;nonskid shoes/slippers when out of bed;supervised activity;muscle strengthening facilitated  -       ROM (Range of Motion)    General ROM no range of motion deficits identified   BUE  -AR lower extremity range of motion deficits identified  -       General ROM Detail  LLE hip /knee motion testing limited 22 ain  -EH       MMT (Manual Muscle Testing)    General MMT Assessment upper extremity strength deficits identified   RUE 3+/5, LUE 4/5  -AR        General MMT Assessment Detail  RLE functionally 4/5, LLE >3/5 DF. Sensation of knee absent.  -       Muscle Tone Assessment    Muscle Tone Assessment    LLE  -ST     LLE Muscle Tone Assessment   moderately decreased tone  -ST moderately decreased tone  -ST     Mobility Assessment/Training    Extremity Weight-Bearing Status left lower extremity  -AR left lower extremity  -EH       Left Lower Extremity Weight-Bearing weight-bearing as tolerated  -AR weight-bearing as tolerated  -       Bed Mobility, Assessment/Treatment    Bed Mobility, Assistive Device bed rails;head of bed elevated  -AR bed rails;head of bed elevated  -       Bed Mobility, Scoot/Bridge, [place] contact guard assist;verbal cues required  -AR        Bed Mob, Supine to Sit, [place] minimum assist (75% patient effort);2 person assist required;verbal cues required  -AR minimum assist (75% patient effort);2 person assist required  -       Bed Mob, Sit to Supine, [place]  not tested  -       Bed Mobility, Comment Cues to sequence and for effective use of BUE. Issued leg  and educatd pt on use.   -AR        Transfer Assessment/Treatment    Transfers, Sit-Stand [place] minimum assist (75% patient effort);2 person assist required;verbal cues required  -AR minimum assist (75% patient effort);2 person assist required;verbal cues required  -        Transfers, Stand-Sit [place] verbal cues required;minimum assist (75% patient effort);2 person assist required  -AR minimum assist (75% patient effort);2 person assist required;verbal cues required  -       Transfers, Sit-Stand-Sit, Assist Device rolling walker;elevated surface  -AR rolling walker  -EH       Transfer, Safety Issues  step length decreased;steps too close front assistive device;sequencing ability decreased  -       Transfer, Impairments ROM decreased;sensation decreased;strength decreased;pain  -AR pain;strength decreased;sensation decreased  -       Transfer, Comment cues for hand placement, sequencing and chair approach   -AR VC for sequencing and hand placement.   -       Functional Mobility    Functional Mobility- Ind. Level minimum assist (75% patient effort);2 person assist required  -AR        Functional Mobility- Device rolling walker  -AR        Upper Body Dressing Assessment/Training    UB Dressing Assess/Train, Clothing Type donning:;hospital gown  -AR        UB Dressing Assess/Train, Position edge of bed  -AR        UB Dressing Assess/Train, [place] minimum assist (75% patient effort);verbal cues required  -AR        Lower Body Dressing Assessment/Training    LB Dressing Assess/Train, Clothing Type donning:;slipper socks  -AR        LB Dressing Assess/Train, Position supine  -AR        LB Dressing Assess/Train, [place] dependent (less than 25% patient effort)  -AR        LB Dressing Assess/Train, Comment Issued hip kit and oriented pt to reacher and jg-dressing. Reinforcement needed. Pt required dependence to don/doff KI which was used for mobility. Educated pt and family on need for KI.   -AR        Therapy Exercises    Bilateral Upper Extremity AROM:;5 reps;supine;shoulder extension/flexion  -AR        Exercise Protocols  hip ORIF  -       Hip ORIF Exercises  left:;10 reps;ankle pumps/circles;glut set   educationon quad set performed with R knee; L unable.   -       Sensory Assessment/Intervention    Light Touch  LLE  -EH       LLE Light Touch  other (see comments)   limited 2/2 nerve block at knee  -       General Therapy Interventions    Adaptive Equipment Training Issued hip kit and eduated pt on use of reacher and leg   -AR        ADL Retraining Educated pt on incorporation of jg-dressing, AE use  -AR        Bed Mobility Training issued leg  and educated pt on use  -AR        Transfer Training educated pt on safe tranfer technqiue   -AR        Positioning and Restraints    Pre-Treatment Position in bed  -AR in bed  -       Post Treatment Position chair  -AR chair  -EH       In Chair reclined;call light within reach;encouraged to call for assist;exit alarm on;with family/caregiver;with nsg;on mechanical lift sling   nurse aware of need for KI,posted sign for KI  -AR notified nsg;reclined;call light within reach;encouraged to call for assist;exit alarm on;with family/caregiver;with nsg  -         User Key  (r) = Recorded By, (t) = Taken By, (c) = Cosigned By    Initials Name Effective [facility] Ynes Slaughter, PT 06/19/15 -     AR Ramila Moore, OT 06/22/15 -     ST Julianne Bullock, RN 06/16/16 -            Occupational Therapy Education     Title: PT OT SLP Therapies (Active)     Topic: Occupational Therapy (Active)     Point: ADL training (Done)    Description: Instruct learner(s) on proper safety adaptation and remediation techniques during self care or transfers.   Instruct in proper use of assistive devices.    Learning Progress Summary    Learner Readiness Method Response Comment Documented by Status   Patient Eager MYESHA HOYOS D VU, DU Educated pt and family on bed mobility, facial iliaca and need for brace on when up at present due to quad weakness, transfer training, ADL retraining AR 01/15/17 0943 Done   Family Eager MYESHA HOYOS D VU, DU Educated pt and family on bed mobility, facial iliaca and need for brace on when up at present due to quad  weakness, transfer training, ADL retraining AR 01/15/17 0943 Done               Point: Precautions (Done)    Description: Instruct learner(s) on prescribed precautions during self-care and functional transfers.    Learning Progress Summary    Learner Readiness Method Response Comment Documented by Status   Patient Eager MYESHA HOYOS D VU,MK Educated pt and family on bed mobility, facial iliaca and need for brace on when up at present due to quad weakness, transfer training, ADL retraining AR 01/15/17 0943 Done   Family Eager MYESHA HOYOS D VU, DU Educated pt and family on bed mobility, facial iliaca and need for brace on when up at present due to quad weakness, transfer training, ADL retraining AR 01/15/17 0943 Done               Point: Body mechanics (Done)    Description: Instruct learner(s) on proper positioning and spine alignment during self-care, functional mobility activities and/or exercises.    Learning Progress Summary    Learner Readiness Method Response Comment Documented by Status   Patient Eager MYESHA HOYOS D VU,DU Educated pt and family on bed mobility, facial iliaca and need for brace on when up at present due to quad weakness, transfer training, ADL retraining AR 01/15/17 0943 Done   Family Eager MYESHA HOYOS D VU,MK Educated pt and family on bed mobility, facial iliaca and need for brace on when up at present due to quad weakness, transfer training, ADL retraining AR 01/15/17 0943 Done                      User Key     Initials Effective Dates Name Provider Type Discipline    AR 06/22/15 -  Ramila Moore OT Occupational Therapist OT                  OT Recommendation and Plan  Anticipated Equipment Needs At Discharge: bathing equipment, dressing equipment (issued hip kit this date)  Anticipated Discharge Disposition: inpatient rehabilitation facility  Therapy Frequency: daily (per priority policy)  Plan of Care Review  Plan Of Care Reviewed With: patient, family  Progress: progress toward functional goals as  expected  Outcome Summary/Follow up Plan: OT evaluation complete and AE issued. Pt needs KI on when up due to quad weakness from fascia iliaca nerve catheter. Pt and family desire DC destination of acute rehab.           OT Goals       01/15/17 0945          Transfer Training OT LTG    Transfer Training OT LTG, Date Established 01/15/17  -AR      Transfer Training OT LTG, Time to Achieve by discharge  -AR      Transfer Training OT LTG, Activity Type sit to stand/stand to sit;toilet  -AR      Transfer Training OT LTG, [place] Level contact guard assist;verbal cues required  -AR      Transfer Training OT LTG, Assist Device commode, bedside;walker, rolling  -AR      Patient Education OT LTG    Patient Education OT LTG, Date Established 01/15/17  -AR      Patient Education OT LTG, Time to Achieve by discharge  -AR      Patient Education OT LTG, Education Type precautions per surgeon;1 hand/jg technique;adaptive equipment mgmt;sathya/doff brace  -AR      Patient Education OT LTG, Education Understanding demonstrates adequately;verbalizes understanding  -AR      LB Dressing OT LTG    LB Dressing Goal OT LTG, Date Established 01/15/17  -AR      LB Dressing Goal OT LTG, Time to Achieve by discharge  -AR      LB Dressing Goal OT LTG, Activity Type Pt will complete LB dressing task   -AR      LB Dressing Goal OT LTG, [place] Level verbal cues required;moderate assist (50% patient effort)  -AR      LB Dressing Goal OT LTG, Adaptive Equipment sock-aid;reacher  -AR        User Key  (r) = Recorded By, (t) = Taken By, (c) = Cosigned By    Initials Name Provider Type    ONUR Moore, OT Occupational Therapist                Outcome Measures       01/15/17 0830 01/15/17 0829       How much help from another person do you currently need...    Turning from your back to your side while in flat bed without using bedrails?  3  -EH     Moving from lying on back to sitting on the side of a flat bed without bedrails?  3   -EH     Moving to and from a bed to a chair (including a wheelchair)?  3  -EH     Standing up from a chair using your arms (e.g., wheelchair, bedside chair)?  3  -EH     Climbing 3-5 steps with a railing?  2  -EH     To walk in hospital room?  3  -EH     AM-PAC 6 Clicks Score  17  -EH     How much help from another is currently needed...    Putting on and taking off regular lower body clothing? 1  -AR      Bathing (including washing, rinsing, and drying) 2  -AR      Toileting (which includes using toilet bed pan or urinal) 2  -AR      Putting on and taking off regular upper body clothing 3  -AR      Taking care of personal grooming (such as brushing teeth) 3  -AR      Eating meals 4  -AR      Score 15  -AR      Functional Assessment    Outcome Measure Options AM-PAC 6 Clicks Daily Activity (OT)  -AR AM-PAC 6 Clicks Basic Mobility (PT)  -EH       User Key  (r) = Recorded By, (t) = Taken By, (c) = Cosigned By    Initials Name Provider Type     Ynes Slaughter, PT Physical Therapist    AR Ramila Moore OT Occupational Therapist          Time Calculation:   OT Start Time: 0830    Therapy Charges for Today     Code Description Service Date Service Provider Modifiers Qty    64889771676  OT EVAL MOD COMPLEXITY 4 1/15/2017 Ramila Moore OT GO 1    [id number]  OT THERAPEUTIC ACT EA 15 MIN 1/15/2017 Ramila Moore OT GO 1    [id number]  OT THER SUPP EA 15 MIN 1/15/2017 Ramila Moore OT GO 2               Ramila Moore OT  1/15/2017     Electronically signed by Ramila Moore OT at 1/15/2017  9:48 AM

## 2017-01-15 NOTE — PLAN OF CARE
Problem: Patient Care Overview (Adult)  Goal: Plan of Care Review  Outcome: Ongoing (interventions implemented as appropriate)    01/15/17 0945   Coping/Psychosocial Response Interventions   Plan Of Care Reviewed With patient;family   Patient Care Overview   Progress progress toward functional goals as expected   Outcome Evaluation   Outcome Summary/Follow up Plan OT evaluation complete and AE issued. Pt needs KI on when up due to quad weakness from fascia iliaca nerve catheter. Pt and family desire DC destination of acute rehab.          Problem: Inpatient Occupational Therapy  Goal: Transfer Training Goal 1 LTG- OT  Outcome: Ongoing (interventions implemented as appropriate)    01/15/17 0945   Transfer Training OT LTG   Transfer Training OT LTG, Date Established 01/15/17   Transfer Training OT LTG, Time to Achieve by discharge   Transfer Training OT LTG, Activity Type sit to stand/stand to sit;toilet   Transfer Training OT LTG, [place] Level contact guard assist;verbal cues required   Transfer Training OT LTG, Assist Device commode, bedside;walker, rolling       Goal: Patient Education Goal LTG- OT  Outcome: Ongoing (interventions implemented as appropriate)    01/15/17 0945   Patient Education OT LTG   Patient Education OT LTG, Date Established 01/15/17   Patient Education OT LTG, Time to Achieve by discharge   Patient Education OT LTG, Education Type precautions per surgeon;1 hand/jg technique;adaptive equipment mgmt;sathya/doff brace   Patient Education OT LTG, Education Understanding demonstrates adequately;verbalizes understanding       Goal: LB Dressing Goal LTG- OT  Outcome: Ongoing (interventions implemented as appropriate)    01/15/17 0945   LB Dressing OT LTG   LB Dressing Goal OT LTG, Date Established 01/15/17   LB Dressing Goal OT LTG, Time to Achieve by discharge   LB Dressing Goal OT LTG, Activity Type Pt will complete LB dressing task    LB Dressing Goal OT LTG, [place] Level verbal cues  required;moderate assist (50% patient effort)   LB Dressing Goal OT LTG, Adaptive Equipment sock-aid;reacher

## 2017-01-15 NOTE — PROGRESS NOTES
"IM progress note      Keila Alberts  [id number]  7/15/1928     LOS: 1 day     Attending: Bulmaro Camarillo MD    Primary Care Provider: Kodak Clarke MD      Chief Complaint/Reason for visit:    Chief Complaint   Patient presents with   • Leg Pain   • Arm Pain       Subjective   Feels ok. Pain control is adequate. Complains of constipation. Some difficulty emptying bladder. Denies f/c/n/v/sob/cp.    Objective     Vital Signs  Blood pressure 112/62, pulse 84, temperature 98.8 °F (37.1 °C), temperature source Oral, resp. rate 16, height 63\" (160 cm), weight 120 lb (54.4 kg), SpO2 93 %.  Temp (24hrs), Av.1 °F (36.7 °C), Min:97.5 °F (36.4 °C), Max:98.8 °F (37.1 °C)      Intake/Output:    Intake/Output Summary (Last 24 hours) at 01/15/17 0940  Last data filed at 01/15/17 0415   Gross per 24 hour   Intake   1900 ml   Output    500 ml   Net   1400 ml       Nutrition: PO    Respiratory: RA    Physical Therapy: ambulating with knee immobilizer    Physical Exam:     General Appearance:    Alert, cooperative, in no acute distress   Head:    Normocephalic, without obvious abnormality, atraumatic    Lungs:     Normal effort, symmetric chest rise, no crepitus, clear to      auscultation bilaterally, no chest wall tenderness, resonant to percussion throughout.                  Heart:    Regular rhythm and normal rate, normal S1 and S2   Abdomen:     Normal bowel sounds, no masses, no organomegaly, soft        non-tender, non-distended, no guarding, no rebound                tenderness   Extremities:   Left hip with Covaderm CDI. Knee immobilize in place.    Pulses:   Pulses palpable and equal bilaterally   Skin:   No bleeding, bruising or rash   Neurologic:   Moves all extremities with no obvious focal motor deficit.  Cranial nerves 2 - 12 grossly intact. Flexion and dorsiflexion intact bilateral feet.       Results Review:     I reviewed the patient's new clinical results.     Results from last 7 days  Lab " Units 01/15/17  0442 01/14/17  1151   WBC 10*3/mm3 8.91 7.99   HEMOGLOBIN g/dL 8.8* 10.4*   HEMATOCRIT % 26.8* 31.8*   PLATELETS 10*3/mm3 281 302       Results from last 7 days  Lab Units 01/15/17  0442 01/14/17  1151   SODIUM mmol/L 139 138   POTASSIUM mmol/L 3.9 3.7   CHLORIDE mmol/L 103 101   TOTAL CO2 mmol/L 29.0 31.0   BUN mg/dL 9 12   CREATININE mg/dL 0.50* 0.60   CALCIUM mg/dL 8.8 9.7   BILIRUBIN mg/dL  --  0.7   ALK PHOS U/L  --  74   ALT (SGPT) U/L  --  15   AST (SGOT) U/L  --  20   GLUCOSE mg/dL 98 101*     Results for ZAKI DAVILA (MRN [id number]) as of 1/15/2017 09:23   Ref. Range 1/14/2017 15:26   Color, UA Latest Ref Range: Yellow, Straw  Dark Yellow (A)   Appearance, UA Latest Ref Range: Clear  Cloudy (A)   Specific [place], UA Latest Ref Range: 1.001 - 1.030  1.019   pH, UA Latest Ref Range: 5.0 - 8.0  6.0   Glucose, UA Latest Ref Range: Negative  Negative   Ketones, UA Latest Ref Range: Negative  Negative   Blood, UA Latest Ref Range: Negative  Negative   Nitrite, UA Latest Ref Range: Negative  Negative   Leuk Esterase, UA Latest Ref Range: Negative  Small (1+) (A)   Protein, UA Latest Ref Range: Negative  Negative   Bilirubin, UA Latest Ref Range: Negative  Negative   Urobilinogen, UA Latest Ref Range: 0.2 - 1.0 E.U./dL  1.0 E.U./dL   RBC, UA Latest Ref Range: None Seen, 0-2 /HPF 3-6 (A)   WBC, UA Latest Ref Range: None Seen /HPF 6-12 (A)   Bacteria, UA Latest Ref Range: None Seen, Trace /HPF None Seen   Calcium Oxalate Crystals, UA Latest Ref Range: None Seen /HPF Large/3+   Squamous Epithelial Cells, UA Latest Ref Range: None Seen, 0-2 /HPF 3-6 (A)   Hyaline Casts, UA Latest Ref Range: 0 - 6 /LPF 0-6       I reviewed the patient's new imaging including images and reports.    All medications reviewed.     amLODIPine 5 mg Oral Daily   benazepril 40 mg Oral Daily   ceFAZolin 2 g Intravenous Q8H   ceftriaxone 1 g Intravenous Q24H   docusate sodium 100 mg Oral BID   enoxaparin 40 mg Subcutaneous  Q24H   famotidine 20 mg Intravenous Once   lidocaine PF 1 mL Infiltration Once   polyethylene glycol 17 g Oral Daily   sennosides-docusate sodium 2 tablet Oral BID   vitamin B-12 1,000 mcg Oral Daily       acetaminophen 650 mg Q4H PRN   bisacodyl 10 mg Daily PRN   bisacodyl 10 mg Daily PRN   diphenhydrAMINE 25 mg Q6H PRN   diphenhydrAMINE 25 mg Q6H PRN   docusate sodium 100 mg BID PRN   fentanyl 50 mcg Q5 Min PRN   HYDROcodone-acetaminophen 1 tablet Q4H PRN   HYDROcodone-acetaminophen 2 tablet Q4H PRN   HYDROmorphone 0.25 mg Q30 Min PRN   HYDROmorphone 0.5 mg Q2H PRN   And     naloxone 0.1 mg Q5 Min PRN   lactated ringers 500 mL Once PRN   magnesium hydroxide 10 mL Daily PRN   ondansetron 4 mg Once PRN   ondansetron 4 mg Q6H PRN   Or     ondansetron 4 mg Q6H PRN   sodium chloride 1-10 mL PRN   sodium chloride 10 mL PRN       Assessment/Plan     Principal Problem:    Closed left hip fracture, s/p left hip percutaneous pinning  Active Problems:    Hypertension    s/p Left Craniotomy for Evacuation of Left SDH 11/27/16     UTI (urinary tract infection)    Plan  1. PT/OT- WBAT LLE  2. Pain control-prns, nerve block   3. IS-encouraged  4. DVT proph- mechs/Lovenox  5. Bowel regimen- encouraged PRNs  6. Monitor post-op labs, H&H  7. DC planning when ready  CT head in AM  UTI- culture pending  PVR    Ramila Lyons, KATARZYNA  01/15/17  9:40 AM

## 2017-01-15 NOTE — PROGRESS NOTES
Acute Care - Occupational Therapy Initial Evaluation   [place]     Patient Name: Keila Alberts  : 7/15/1928  MRN: [id number]  Today's Date: 1/15/2017  Onset of Illness/Injury or Date of Surgery Date: 17  Date of Referral to OT: 17  Referring Physician: Dr. Albert    Admit Date: 2017       ICD-10-CM ICD-9-CM   1. Closed left hip fracture, initial encounter S72.002A 820.8   2. Acute pain of right shoulder M25.511 719.41   3. Impaired mobility and ADLs Z74.09 799.89   4. Impaired functional mobility, balance, gait, and endurance Z74.09 V49.89     Patient Active Problem List   Diagnosis   • Subdural hematoma, post-traumatic   • Hypertension   • Constipation   • Normocytic anemia   • Acute/Subacute Traumatic SDH (subdural hematoma)   • Altered mental status   • Leukocytosis, ? Etiology   • s/p Left Craniotomy for Evacuation of Left SDH 16    • Hyponatremia   • 6.9 x 5.3cm pelvic mass, seen on imaging prior to admission   • Closed left hip fracture, s/p left hip percutaneous pinning   • UTI (urinary tract infection)     Past Medical History   Diagnosis Date   • Hypertension      Past Surgical History   Procedure Laterality Date   • Appendectomy     • Joint replacement     • Total shoulder replacement     • Total hip arthroplasty Right    • [place] hole Left 2016     Procedure: HALIMA HOLE;  Surgeon: Jos Christian MD;  Location: [facility];  Service:           OT ASSESSMENT FLOWSHEET (last 72 hours)      OT Evaluation       01/15/17 0830 01/15/17 0829 01/15/17 0000 17 2030       Rehab Evaluation    Document Type evaluation;therapy note (daily note)  -AR evaluation  -EH       Subjective Information no complaints;agree to therapy  -AR no complaints;agree to therapy  -EH       Patient Effort, Rehab Treatment excellent  -AR        Symptoms Noted During/After Treatment fatigue;increased pain  -AR increased pain  -EH       Symptoms Noted Comment Nurse aware  -AR        General Information     Patient Profile Review yes  -AR yes  -       Onset of Illness/Injury or Date of Surgery Date 01/14/17  -AR 01/14/17  -       Referring Physician Dr. Albert  -AR MD [place]  -       General Observations pt supine with fascia iliaca nerve catheter and IV, daughter-in-law at bedside  -AR Pt in fowlers with dtr in law in room, SCDs donned  -       Pertinent History Of Current Problem Pt is an 88 yof who presetned to ED with c/o worsesning left hip pain x several days with no known fall. Imaging in ED revealed left hip minimally displaced femoral neck fracture. Pt is POD#1 L hip femoral neck fracture percuteneouos pinning with placement of fasica iliaca nerve catheter. Of mention, pt s/p fall with resultant left SDH requiring evacuation by Dr. Christian 11/27/16 at [facility]. She DC to acute rehab, and ultimately DC to her family's home in 12/22/16. She was receiving PT and SLP HH services, and was DC from OT HH last week.  -AR Pt with pain x 2 days in LLE and RUE, unclear cause, had rigerous PT workout 5 days ago. Recent hx of fall with subdural hematoma x 1 month ago with surgical evacuation, hospital stay with d/c to [facility] and subsequent d/c home with HHPT/OT/Speech. Graduated on OT recently.  -       Precautions/Limitations fall precautions;brace on when up;other (see comments);insensate limb   KI when up d/t fascia iliaca untl quad weakness resolved, HO  -AR fall precautions;brace on when up   iliofascial nerve block; KI until lucita strength return  -       Prior Level of Function independent:;all household mobility;gait;transfer;dressing;min assist:;bathing   used walker in home, occasional use of cane in community  -AR independent:;all household mobility;dressing;min assist:;bathing;dependent:;driving  -       Equipment Currently Used at Home bath bench;cane, straight;grab bar;hospital bed;raised toilet;walker, rolling  -AR bath bench;walker, rolling;cane, straight;hospital bed   Pt and  living with dtr in law  and son.  -EH       Plans/Goals Discussed With patient and family;agreed upon  -AR patient and family;agreed upon  -EH       Risks Reviewed patient and family:;LOB;nausea/vomiting;dizziness;increased discomfort;change in vital signs;increased drainage;lines disloged  -AR patient and family:;dizziness;LOB;increased discomfort;change in vital signs  -EH       Benefits Reviewed patient and family:;improve function;increase independence;increase balance;increase strength;decrease pain;decrease risk of DVT;increase knowledge  -AR patient and family:;improve function;increase independence  -EH       Barriers to Rehab none identified  -AR previous functional deficit;visual deficit  -EH       Living Environment    Lives With child(rose), dependent;spouse  -AR child(rose), adult;spouse  -EH       Living Arrangements house  -AR house  -EH       Home Accessibility stairs within home;tub/shower is not walk in;stairs to enter home  -AR stairs within home;stairs to enter home  -EH       Number of Stairs to Enter Home 1  -AR 1  -EH       Number of Stairs Within Home 1  -AR 1   between rooms of home  -EH       Transportation Available ambulance  -AR        Living Environment Comment Pt and her spouse live with their son and daughter-in-law and they have 24/hr assistance.   -AR        Clinical Impression    Date of Referral to OT 01/14/17  -AR        OT Diagnosis decreased independence with ADLS  -AR        Patient/Family Goals Statement go to [facility] for rehab  -AR        Criteria for Skilled Therapeutic Interventions Met yes;treatment indicated  -AR        Rehab Potential good, to achieve stated therapy goals  -AR        Therapy Frequency daily   per priority policy  -AR        Anticipated Equipment Needs At Discharge bathing equipment;dressing equipment   issued hip kit this date  -AR        Anticipated Discharge Disposition inpatient rehabilitation facility  -AR        Vital Signs    Pre Systolic BP Rehab  --   WNL in  sitting prior to further mobility.  -EH       Intra Systolic BP Rehab 112  -AR        Intra Treatment Diastolic BP 62  -AR        Intra Patient Position Sitting  -AR        Pain Assessment    Pain Assessment 0-10  -AR 0-10  -EH       Pain Score 0  -AR 0  -EH       Post Pain Score 5  -AR 5  -EH       Pain Type Acute pain  -AR Acute pain  -EH       Pain Location Hip  -AR Hip  -EH       Pain Orientation Left  -AR Left  -EH       Pain Intervention(s) Repositioned;Ambulation/increased activity  -AR Repositioned;Ambulation/increased activity;Medication (See MAR)   Med by NSG (NSg in room as PT exiting)  -EH       Response to Interventions nurse aware of pt'c c/o pain  -AR        Vision Assessment/Intervention    Visual Impairment WNL  -AR        Cognitive Assessment/Intervention    Current Cognitive/Communication Assessment functional   hard of hearing  -AR impaired   [place]  -       Orientation Status oriented x 4  -AR oriented x 4  -EH       Follows Commands/Answers Questions 100% of the time;able to follow single-step instructions  -% of the time;able to follow single-step instructions  -EH       Personal Safety WNL/WFL  -AR WNL/WFL  -       Personal Safety Interventions fall prevention program maintained  -AR fall prevention program maintained;elopement precautions initiated;gait belt;nonskid shoes/slippers when out of bed;supervised activity;muscle strengthening facilitated  -EH       ROM (Range of Motion)    General ROM no range of motion deficits identified   BUE  -AR lower extremity range of motion deficits identified  -EH       General ROM Detail  LLE hip /knee motion testing limited 22 ain  -EH       MMT (Manual Muscle Testing)    General MMT Assessment upper extremity strength deficits identified   RUE 3+/5, LUE 4/5  -AR        General MMT Assessment Detail  RLE functionally 4/5, LLE >3/5 DF. Sensation of knee absent.  -EH       Muscle Tone Assessment    Muscle Tone Assessment    LLE  -ST     LLE Muscle  Tone Assessment   moderately decreased tone  -ST moderately decreased tone  -ST     Mobility Assessment/Training    Extremity Weight-Bearing Status left lower extremity  -AR left lower extremity  -EH       Left Lower Extremity Weight-Bearing weight-bearing as tolerated  -AR weight-bearing as tolerated  -       Bed Mobility, Assessment/Treatment    Bed Mobility, Assistive Device bed rails;head of bed elevated  -AR bed rails;head of bed elevated  -       Bed Mobility, Scoot/Bridge, [place] contact guard assist;verbal cues required  -AR        Bed Mob, Supine to Sit, [place] minimum assist (75% patient effort);2 person assist required;verbal cues required  -AR minimum assist (75% patient effort);2 person assist required  -       Bed Mob, Sit to Supine, [place]  not tested  -       Bed Mobility, Comment Cues to sequence and for effective use of BUE. Issued leg  and educatd pt on use.   -AR        Transfer Assessment/Treatment    Transfers, Sit-Stand [place] minimum assist (75% patient effort);2 person assist required;verbal cues required  -AR minimum assist (75% patient effort);2 person assist required;verbal cues required  -       Transfers, Stand-Sit [place] verbal cues required;minimum assist (75% patient effort);2 person assist required  -AR minimum assist (75% patient effort);2 person assist required;verbal cues required  -       Transfers, Sit-Stand-Sit, Assist Device rolling walker;elevated surface  -AR rolling walker  -       Transfer, Safety Issues  step length decreased;steps too close front assistive device;sequencing ability decreased  -       Transfer, Impairments ROM decreased;sensation decreased;strength decreased;pain  -AR pain;strength decreased;sensation decreased  -       Transfer, Comment cues for hand placement, sequencing and chair approach   -AR VC for sequencing and hand placement.   -       Functional Mobility    Functional Mobility- Ind. Level  minimum assist (75% patient effort);2 person assist required  -AR        Functional Mobility- Device rolling walker  -AR        Upper Body Dressing Assessment/Training    UB Dressing Assess/Train, Clothing Type donning:;hospital gown  -AR        UB Dressing Assess/Train, Position edge of bed  -AR        UB Dressing Assess/Train, [place] minimum assist (75% patient effort);verbal cues required  -AR        Lower Body Dressing Assessment/Training    LB Dressing Assess/Train, Clothing Type donning:;slipper socks  -AR        LB Dressing Assess/Train, Position supine  -AR        LB Dressing Assess/Train, [place] dependent (less than 25% patient effort)  -AR        LB Dressing Assess/Train, Comment Issued hip kit and oriented pt to reacher and jg-dressing. Reinforcement needed. Pt required dependence to don/doff KI which was used for mobility. Educated pt and family on need for KI.   -AR        Therapy Exercises    Bilateral Upper Extremity AROM:;5 reps;supine;shoulder extension/flexion  -AR        Exercise Protocols  hip ORIF  -       Hip ORIF Exercises  left:;10 reps;ankle pumps/circles;glut set   educationon quad set performed with R knee; L unable.  -       Sensory Assessment/Intervention    Light Touch  LLE  -       LLE Light Touch  other (see comments)   limited 2/2 nerve block at knee  -       General Therapy Interventions    Adaptive Equipment Training Issued hip kit and eduated pt on use of reacher and leg   -AR        ADL Retraining Educated pt on incorporation of jg-dressing, AE use  -AR        Bed Mobility Training issued leg  and educated pt on use  -AR        Transfer Training educated pt on safe tranfer technqiue   -AR        Positioning and Restraints    Pre-Treatment Position in bed  -AR in bed  -       Post Treatment Position chair  -AR chair  -       In Chair reclined;call light within reach;encouraged to call for assist;exit alarm on;with family/caregiver;with  nsg;on mechanical lift sling   nurse aware of need for KI,posted sign for KI  -AR notified nsg;reclined;call light within reach;encouraged to call for assist;exit alarm on;with family/caregiver;with nsg  -         User Key  (r) = Recorded By, (t) = Taken By, (c) = Cosigned By    Initials Name Effective Dates     Ynes Slaughter, PT 06/19/15 -     ONUR Moore, OT 06/22/15 -     ST Julianne Bullock RN 06/16/16 -            Occupational Therapy Education     Title: PT OT SLP Therapies (Active)     Topic: Occupational Therapy (Active)     Point: ADL training (Done)    Description: Instruct learner(s) on proper safety adaptation and remediation techniques during self care or transfers.   Instruct in proper use of assistive devices.    Learning Progress Summary    Learner Readiness Method Response Comment Documented by Status   Patient Eager MYESHA HOYOS D VU, DU Educated pt and family on bed mobility, facial iliaca and need for brace on when up at present due to quad weakness, transfer training, ADL retraining AR 01/15/17 0943 Done   Family Eager MYESHA HOYOS D VU, DU Educated pt and family on bed mobility, facial iliaca and need for brace on when up at present due to quad weakness, transfer training, ADL retraining AR 01/15/17 0943 Done               Point: Precautions (Done)    Description: Instruct learner(s) on prescribed precautions during self-care and functional transfers.    Learning Progress Summary    Learner Readiness Method Response Comment Documented by Status   Patient Eager MYESHA HOYOS D VU, DU Educated pt and family on bed mobility, facial iliaca and need for brace on when up at present due to quad weakness, transfer training, ADL retraining AR 01/15/17 0943 Done   Family Eager MYESHA HOYOS D VU, DU Educated pt and family on bed mobility, facial iliaca and need for brace on when up at present due to quad weakness, transfer training, ADL retraining AR 01/15/17 0943 Done               Point: Body mechanics (Done)     Description: Instruct learner(s) on proper positioning and spine alignment during self-care, functional mobility activities and/or exercises.    Learning Progress Summary    Learner Readiness Method Response Comment Documented by Status   Patient Eager MYESHA HOYOS D VU, DU Educated pt and family on bed mobility, facial iliaca and need for brace on when up at present due to quad weakness, transfer training, ADL retraining AR 01/15/17 0943 Done   Family Eager MYESHA HOYOS D VU, DU Educated pt and family on bed mobility, facial iliaca and need for brace on when up at present due to quad weakness, transfer training, ADL retraining AR 01/15/17 0943 Done                      User Key     Initials Effective Dates Name Provider Type Discipline    AR 06/22/15 -  Ramila Moore, OT Occupational Therapist OT                  OT Recommendation and Plan  Anticipated Equipment Needs At Discharge: bathing equipment, dressing equipment (issued hip kit this date)  Anticipated Discharge Disposition: inpatient rehabilitation facility  Therapy Frequency: daily (per priority policy)  Plan of Care Review  Plan Of Care Reviewed With: patient, family  Progress: progress toward functional goals as expected  Outcome Summary/Follow up Plan: OT evaluation complete and AE issued. Pt needs KI on when up due to quad weakness from fascia iliaca nerve catheter. Pt and family desire DC destination of acute rehab.           OT Goals       01/15/17 0945          Transfer Training OT LTG    Transfer Training OT LTG, Date Established 01/15/17  -AR      Transfer Training OT LTG, Time to Achieve by discharge  -AR      Transfer Training OT LTG, Activity Type sit to stand/stand to sit;toilet  -AR      Transfer Training OT LTG, [place] Level contact guard assist;verbal cues required  -AR      Transfer Training OT LTG, Assist Device commode, bedside;walker, rolling  -AR      Patient Education OT LTG    Patient Education OT LTG, Date Established 01/15/17  -AR       Patient Education OT LTG, Time to Achieve by discharge  -AR      Patient Education OT LTG, Education Type precautions per surgeon;1 hand/jg technique;adaptive equipment mgmt;sathya/doff brace  -AR      Patient Education OT LTG, Education Understanding demonstrates adequately;verbalizes understanding  -AR      LB Dressing OT LTG    LB Dressing Goal OT LTG, Date Established 01/15/17  -AR      LB Dressing Goal OT LTG, Time to Achieve by discharge  -AR      LB Dressing Goal OT LTG, Activity Type Pt will complete LB dressing task   -AR      LB Dressing Goal OT LTG, [place] Level verbal cues required;moderate assist (50% patient effort)  -AR      LB Dressing Goal OT LTG, Adaptive Equipment sock-aid;reacher  -AR        User Key  (r) = Recorded By, (t) = Taken By, (c) = Cosigned By    Initials Name Provider Type    ONUR Moore, OT Occupational Therapist                Outcome Measures       01/15/17 0830 01/15/17 0829       How much help from another person do you currently need...    Turning from your back to your side while in flat bed without using bedrails?  3  -EH     Moving from lying on back to sitting on the side of a flat bed without bedrails?  3  -EH     Moving to and from a bed to a chair (including a wheelchair)?  3  -EH     Standing up from a chair using your arms (e.g., wheelchair, bedside chair)?  3  -EH     Climbing 3-5 steps with a railing?  2  -EH     To walk in hospital room?  3  -EH     AM-PAC 6 Clicks Score  17  -EH     How much help from another is currently needed...    Putting on and taking off regular lower body clothing? 1  -AR      Bathing (including washing, rinsing, and drying) 2  -AR      Toileting (which includes using toilet bed pan or urinal) 2  -AR      Putting on and taking off regular upper body clothing 3  -AR      Taking care of personal grooming (such as brushing teeth) 3  -AR      Eating meals 4  -AR      Score 15  -AR      Functional Assessment    Outcome Measure  Options AM-PAC 6 Clicks Daily Activity (OT)  -AR AM-PAC 6 Clicks Basic Mobility (PT)  -       User Key  (r) = Recorded By, (t) = Taken By, (c) = Cosigned By    Initials Name Provider Type     Ynes Slaughter, PT Physical Therapist    AR Ramila Moore OT Occupational Therapist          Time Calculation:   OT Start Time: 0830    Therapy Charges for Today     Code Description Service Date Service Provider Modifiers Qty    98767060105  OT EVAL MOD COMPLEXITY 4 1/15/2017 Ramila Moore, OT GO 1    54022709041  OT THERAPEUTIC ACT EA 15 MIN 1/15/2017 Ramila Moore OT GO 1    [id number]  OT THER SUPP EA 15 MIN 1/15/2017 Ramila Moore OT GO 2               Ramila Moore OT  1/15/2017

## 2017-01-15 NOTE — H&P
Patient Name: Keila Alberts  MRN: [id number]  : 7/15/1928  DOS: 2017    Attending: Bulmaro Camarillo MD    Primary Care Provider: Kodak Clarke MD      Chief complaint:  Left hip pain.    Subjective   Patient is a 88 y.o. female with history of hypertension , osteoarthritis and history of a fall leading to subdural hematoma in November of this year for which she underwent a bur hole with hematoma evacuation by Dr. Zuniga.  After that hospitalization she was discharged to [facility] and eventually was discharged home with home health physical therapy.      She has since had a follow-up with Dr. Zuniga and she has another follow-up with a head CT scan scheduled for the  of this month.      She has been doing reasonably well and getting around the house with minimal difficulty.  She and yesterday complaint of increasing pain in her left hip.    She was brought to [facility] where she was diagnosed with left hip fracture.  I was kindly asked to admit her and Dr. Martin with orthopedic surgery was consulted.    Upon consultation it was recommended that she undergoes percutaneous pinning which was performed today under general and with ileo-fascial block.      She tolerated surgery well and is admitted to the hospital for further management.  I saw in her room postoperatively and she is doing fairly well.  She is awake and alert.  She has some pain in her left hip with movement.  No shortness breath or chest pain.  No nausea or vomiting.    Allergies:  Allergies   Allergen Reactions   • Erythromycin        Meds:  Prescriptions Prior to Admission   Medication Sig Dispense Refill Last Dose   • amLODIPine (NORVASC) 5 MG tablet Take 5 mg by mouth Daily.   Taking   • benazepril (LOTENSIN) 40 MG tablet Take 40 mg by mouth Daily.   Taking   • docusate sodium (COLACE) 100 MG capsule Take 100 mg by mouth 2 (Two) Times a Day.   Taking   •  "Multiple Vitamins-Minerals (PRESERVISION AREDS 2) capsule Take 1 capsule by mouth Daily.   Taking   • vitamin B-12 (CYANOCOBALAMIN) 1000 MCG tablet Take 1,000 mcg by mouth Daily.   Taking         History:   Past Medical History   Diagnosis Date   • Hypertension       Past Surgical History   Procedure Laterality Date   • Appendectomy     • Joint replacement     • Total shoulder replacement     • Total hip arthroplasty Right    • [place] hole Left 11/27/2016     Procedure: HALIMA HOLE;  Surgeon: Jos Christian MD;  Location: [facility];  Service:      History reviewed. No pertinent family history.  Social History   Substance Use Topics   • Smoking status: Never Smoker   • Smokeless tobacco: None   • Alcohol use No   . Lives at home with her , a son and a daughter in law.    Review of Systems  Pertinent items are noted in HPI, all other systems reviewed and negative    Vital Signs  Visit Vitals   • /64 (BP Location: Left arm)   • Pulse 92   • Temp 98.6 °F (37 °C) (Oral)   • Resp 16   • Ht 63\" (160 cm)   • Wt 120 lb (54.4 kg)   • SpO2 98%   • BMI 21.26 kg/m2       Physical Exam:    General Appearance:    Alert, cooperative, in no acute distress   Head:    Normocephalic, without obvious abnormality, atraumatic   Eyes:            Lids and lashes normal, conjunctivae and sclerae normal, no   icterus, no pallor, corneas clear    Ears:    Ears appear intact with no abnormalities noted   Throat:   No oral lesions, no thrush, oral mucosa moist   Neck:   No adenopathy, supple, trachea midline, no thyromegaly, no     carotid bruit, no JVD        Lungs:     Clear to auscultation,respirations regular, even and                   unlabored    Heart:    Regular rhythm and normal rate, normal S1 and S2, 2/6          murmur, no gallop, no rub, no click   Abdomen:     Normal bowel sounds, no masses, no organomegaly, soft        non-tender, non-distended, no guarding, no rebound                 tenderness   Genitalia:    " Deferred   Extremities:   Left hip with CDI dressing. No C/C/E.    Pulses:   Pulses palpable and equal bilaterally   Skin:   No bleeding, bruising or rash   Neurologic:   Cranial nerves 2 - 12 grossly intact, sensation intact       I reviewed the patient's new clinical results.         Results from last 7 days  Lab Units 01/14/17  1151   WBC 10*3/mm3 7.99   HEMOGLOBIN g/dL 10.4*   HEMATOCRIT % 31.8*   PLATELETS 10*3/mm3 302       Results from last 7 days  Lab Units 01/14/17  1151   SODIUM mmol/L 138   POTASSIUM mmol/L 3.7   CHLORIDE mmol/L 101   TOTAL CO2 mmol/L 31.0   BUN mg/dL 12   CREATININE mg/dL 0.60   CALCIUM mg/dL 9.7   BILIRUBIN mg/dL 0.7   ALK PHOS U/L 74   ALT (SGPT) U/L 15   AST (SGOT) U/L 20   GLUCOSE mg/dL 101*     No results found for: HGBA1C        Assessment and Plan:      Principal Problem:    Closed left hip fracture, s/p left hip percutaneous pinning  Active Problems:    Hypertension    s/p Left Craniotomy for Evacuation of Left SDH 11/27/16     Anemia.    Plan  1. PT/OT. Start tomorrow.   2. Pain control-prns along with nerve block cath.   3. IS-encouraged  4. DVT proph- Lovenox.  5. Bowel regimen  6. Resume home medications as appropriate  7. Monitor post-op labs  8. DC planning . Likely to rehab( ? [facility]).  Consider f/u CT scan of the head prior to discharge, to document further improvement/resolution of remaining fluid   from recent injury ( November).       Discussed with patient and daughter.     Bulmaro Camarillo MD  01/14/17  11:43 PM

## 2017-01-15 NOTE — PROGRESS NOTES
JUAN MANUEL Ledesma    Acute pain service Inpatient Progress Note    Patient Name: Keila Alberts  :  7/15/1928  MRN:  [id number]        Acute Pain  Service Inpatient Progress Note:    Analgesia:Excellent  Pain Score:10  LOC: alert and awake  Resp Status: room air  Cardiac: VS stable  Side Effects:None  Catheter Site:clean  Cath type: peripheral nerve cath(MOOG pump)  Infusion rate: 10ml/hr  Catheter Plan:Catheter to remain Insit[facility]

## 2017-01-15 NOTE — PLAN OF CARE
Problem: Patient Care Overview (Adult)  Goal: Plan of Care Review  Outcome: Ongoing (interventions implemented as appropriate)    01/15/17 0937   Coping/Psychosocial Response Interventions   Plan Of Care Reviewed With patient   Outcome Evaluation   Outcome Summary/Follow up Plan Pt ambulates into hallway with first PT session. Also demonstrates bed mobility and t/f with MIN A x 2. Demonsrates gait/mobility deviations and continued need for education for safety and continued rehab.         Problem: Inpatient Physical Therapy  Goal: Bed Mobility Goal LTG- PT  Outcome: Ongoing (interventions implemented as appropriate)    01/15/17 0937   Bed Mobility PT LTG   Bed Mobility PT LTG, Date Established 01/15/17   Bed Mobility PT LTG, Time to Achieve 2 wks   Bed Mobility PT LTG, Activity Type supine to sit/sit to supine   Bed Mobility PT LTG, [place] Level supervision required       Goal: Transfer Training Goal 1 LTG- PT  Outcome: Ongoing (interventions implemented as appropriate)    01/15/17 0937   Transfer Training PT LTG   Transfer Training PT LTG, Date Established 01/15/17   Transfer Training PT LTG, Time to Achieve 2 wks   Transfer Training PT LTG, Activity Type sit to stand/stand to sit   Transfer Training PT LTG, [place] Level set up required;supervision required   Transfer Training PT LTG, Assist Device walker, rolling       Goal: Gait Training Goal LTG- PT  Outcome: Ongoing (interventions implemented as appropriate)    01/15/17 0937   Gait Training PT LTG   Gait Training Goal PT LTG, Date Established 01/15/17   Gait Training Goal PT LTG, Time to Achieve 2 wks   Gait Training Goal PT LTG, [place] Level contact guard assist   Gait Training Goal PT LTG, Assist Device walker, rolling   Gait Training Goal PT LTG, Distance to Achieve 350       Goal: Stair Training Goal LTG- PT  Outcome: Ongoing (interventions implemented as appropriate)    01/15/17 0937   Stair Training PT LTG   Stair Training Goal PT LTG,  Date Established 01/15/17   Stair Training Goal PT LTG, Time to Achieve 2 wks   Stair Training Goal PT LTG, Number of Steps 1   Stair Training Goal PT LTG, [place] Level contact guard assist   Stair Training Goal PT LTG, Assist Device walker, rolling

## 2017-01-15 NOTE — PROGRESS NOTES
"Visit Vitals   • /62   • Pulse 84   • Temp 98.8 °F (37.1 °C) (Oral)   • Resp 16   • Ht 63\" (160 cm)   • Wt 120 lb (54.4 kg)   • SpO2 93%   • BMI 21.26 kg/m2       Lab Results (last 24 hours)     Procedure Component Value Units Date/Time    CBC & Differential [[id number]] Collected:  01/14/17 1151    Specimen:  Blood Updated:  01/14/17 1220    Narrative:       The following orders were created for panel order CBC & Differential.  Procedure                               Abnormality         Status                     ---------                               -----------         ------                     CBC Auto Differential[[id number]]         Abnormal            Final result                 Please view results for these tests on the individual orders.    CBC Auto Differential [[id number]]  (Abnormal) Collected:  01/14/17 1151    Specimen:  Blood Updated:  01/14/17 1220     WBC 7.99 10*3/mm3      RBC 3.29 (L) 10*6/mm3      Hemoglobin 10.4 (L) g/dL      Hematocrit 31.8 (L) %      MCV 96.7 fL      MCH 31.6 (H) pg      MCHC 32.7 g/dL      RDW 15.5 (H) %      RDW-SD 55.2 (H) fl      MPV 8.6 fL      Platelets 302 10*3/mm3      Neutrophil % 65.7 %      Lymphocyte % 15.9 (L) %      Monocyte % 8.1 %      Eosinophil % 9.6 (H) %      Basophil % 0.4 %      Immature Grans % 0.3 %      Neutrophils, Absolute 5.25 10*3/mm3      Lymphocytes, Absolute 1.27 10*3/mm3      Monocytes, Absolute 0.65 10*3/mm3      Eosinophils, Absolute 0.77 (H) 10*3/mm3      Basophils, Absolute 0.03 10*3/mm3      Immature Grans, Absolute 0.02 10*3/mm3     Comprehensive Metabolic Panel [[id number]]  (Abnormal) Collected:  01/14/17 1151    Specimen:  Blood Updated:  01/14/17 1237     Glucose 101 (H) mg/dL      BUN 12 mg/dL      Creatinine 0.60 mg/dL      Sodium 138 mmol/L      Potassium 3.7 mmol/L      Chloride 101 mmol/L      CO2 31.0 mmol/L      Calcium 9.7 mg/dL      Total Protein 6.7 g/dL      Albumin 3.80 g/dL      ALT (SGPT) 15 U/L      AST (SGOT) 20 U/L  "     Alkaline Phosphatase 74 U/L      Total Bilirubin 0.7 mg/dL      eGFR Non African Amer 94 mL/min/1.73      Globulin 2.9 gm/dL      A/G Ratio 1.3 (L) g/dL      BUN/Creatinine Ratio 20.0      Anion Gap 6.0 mmol/L     Narrative:       National Kid[employer] Guidelines    Stage                           Description                             GFR                      1                               Normal or High                          90+  2                               Mild decrease                            60-89  3                               Moderate decrease                   30-59  4                               Severe decrease                       15-29  5                               Kidney failure                             <15    Uric Acid [[id number]]  (Normal) Collected:  01/14/17 1151    Specimen:  Blood Updated:  01/14/17 1237     Uric Acid 3.1 mg/dL       Falsely depressed results may occur on samples drawn from patients receiving N-Acetylcysteine (NAC) or Metamizole.       C-reactive Protein [[id number]]  (Abnormal) Collected:  01/14/17 1151    Specimen:  Blood Updated:  01/14/17 1242     C-Reactive Protein 50.80 (H) mg/dL     Procalcitonin [[id number]] Collected:  01/14/17 1151    Specimen:  Blood Updated:  01/14/17 1250     Procalcitonin 0.05 ng/mL     Narrative:       As a Marker for Sepsis (Non-Neonates):   1. <0.5 ng/mL represents a low risk of severe sepsis and/or septic shock.  2. >2 ng/mL represents a high risk of severe sepsis and/or septic shock.    As a Marker for Lower Respiratory Tract Infections that require antibiotic therapy:    PCT on Admission     Antibiotic Therapy       6-12 Hrs later  > 0.5                Strongly Recommended             >0.25 - <0.5         Recommended  0.1 - 0.25           Discouraged              Remeasure/reassess PCT  <0.1                 Strongly Discouraged     Remeasure/reassess PCT                     PCT values of < 0.5 ng/mL do not exclude an  infection, because localized infections (without systemic signs) may be associated with such low concentrations, or a systemic infection in its initial stages (< 6 hours). Furthermore, increased PCT can occur without infection. PCT concentrations between 0.5 and 2.0 ng/mL should be interpreted taking into account the patient's history. It is recommended to retest PCT within 6-24 hours if any concentrations < 2 ng/mL are obtained.    Sedimentation Rate [[id number]]  (Abnormal) Collected:  01/14/17 1151    Specimen:  Blood Updated:  01/14/17 1323     Sed Rate 53 (H) mm/hr     Urinalysis With / Culture If Indicated [[id number]]  (Abnormal) Collected:  01/14/17 1526    Specimen:  Urine from Urine, Clean Catch Updated:  01/14/17 1545     Color, UA Dark Yellow (A)      Appearance, UA Cloudy (A)      pH, UA 6.0      Specific Gravity, UA 1.019      Glucose, UA Negative      Ketones, UA Negative      Bilirubin, UA Negative      Blood, UA Negative      Protein, UA Negative      Leuk Esterase, UA Small (1+) (A)      Nitrite, UA Negative      Urobilinogen, UA 1.0 E.U./dL     Urinalysis, Microscopic Only [[id number]]  (Abnormal) Collected:  01/14/17 1526    Specimen:  Urine from Urine, Clean Catch Updated:  01/14/17 1545     RBC, UA 3-6 (A) /HPF      WBC, UA 6-12 (A) /HPF      Bacteria, UA None Seen /HPF      Squamous Epithelial Cells, UA 3-6 (A) /HPF      Hyaline Casts, UA 0-6 /LPF      Calcium Oxalate Crystals, UA Large/3+ /HPF      Methodology Manual Light Microscopy     [place] Draw [[id number]] Collected:  01/14/17 1151    Specimen:  Blood Updated:  01/14/17 1601    Narrative:       The following orders were created for panel order [place] Draw.  Procedure                               Abnormality         Status                     ---------                               -----------         ------                     Light Blue Top[[id number]]                                    Final result               Green Top (Gel)[[id number]]                                    Final result               Lavender Top[[id number]]                                      Final result               Gold Top - SST[[id number]]                                    Final result               Green Top (No Gel)[[id number]]                                                             Please view results for these tests on the individual orders.    Light Blue Top [[id number]] Collected:  01/14/17 1151    Specimen:  Blood Updated:  01/14/17 1601     Extra Tube hold for add-on       Auto resulted       Green Top (Gel) [[id number]] Collected:  01/14/17 1151    Specimen:  Blood Updated:  01/14/17 1601     Extra Tube Hold for add-ons.       Auto resulted.       Lavender Top [[id number]] Collected:  01/14/17 1151    Specimen:  Blood Updated:  01/14/17 1601     Extra Tube hold for add-on       Auto resulted       Gold Top - SST [[id number]] Collected:  01/14/17 1151    Specimen:  Blood Updated:  01/14/17 1601     Extra Tube Hold for add-ons.       Auto resulted.       CBC & Differential [[id number]] Collected:  01/15/17 0442    Specimen:  Blood Updated:  01/15/17 0628    Narrative:       The following orders were created for panel order CBC & Differential.  Procedure                               Abnormality         Status                     ---------                               -----------         ------                     CBC Auto Differential[[id number]]         Abnormal            Final result                 Please view results for these tests on the individual orders.    CBC Auto Differential [[id number]]  (Abnormal) Collected:  01/15/17 0442    Specimen:  Blood Updated:  01/15/17 0628     WBC 8.91 10*3/mm3      RBC 2.76 (L) 10*6/mm3      Hemoglobin 8.8 (L) g/dL      Hematocrit 26.8 (L) %      MCV 97.1 fL      MCH 31.9 (H) pg      MCHC 32.8 g/dL      RDW 15.8 (H) %      RDW-SD 55.9 (H) fl      MPV 8.7 fL      Platelets 281 10*3/mm3      Neutrophil % 60.2 %      Lymphocyte % 22.4 (L) %       Monocyte % 8.8 %      Eosinophil % 8.3 (H) %      Basophil % 0.2 %      Immature Grans % 0.1 %      Neutrophils, Absolute 5.36 10*3/mm3      Lymphocytes, Absolute 2.00 10*3/mm3      Monocytes, Absolute 0.78 10*3/mm3      Eosinophils, Absolute 0.74 (H) 10*3/mm3      Basophils, Absolute 0.02 10*3/mm3      Immature Grans, Absolute 0.01 10*3/mm3     Basic Metabolic Panel [[id number]]  (Abnormal) Collected:  01/15/17 0442    Specimen:  Blood Updated:  01/15/17 0704     Glucose 98 mg/dL      BUN 9 mg/dL      Creatinine 0.50 (L) mg/dL      Sodium 139 mmol/L      Potassium 3.9 mmol/L      Chloride 103 mmol/L      CO2 29.0 mmol/L      Calcium 8.8 mg/dL      eGFR Non African Amer 116 mL/min/1.73      BUN/Creatinine Ratio 18.0      Anion Gap 7.0 mmol/L     Narrative:       National Kidney Foundation Guidelines    Stage                           Description                             GFR                      1                               Normal or High                          90+  2                               Mild decrease                            60-89  3                               Moderate decrease                   30-59  4                               Severe decrease                       15-29  5                               Kidney failure                             <15    Urine Culture [[id number]]  (Normal) Collected:  01/14/17 1526    Specimen:  Urine from Urine, Clean Catch Updated:  01/15/17 0824     Urine Culture Culture in progress           Imaging Results (last 24 hours)     Procedure Component Value Units Date/Time    FL C Arm During Surgery [[id number]]      Updated:  01/14/17 1707    XR Pelvis 1 or 2 View [[id number]] Collected:  01/14/17 1808     Updated:  01/14/17 1808    Narrative:          EXAMINATION: XR PELVIS, 1-2 VIEWS, XR LEFT HIP, 2 VIEWS, XR LEFT KNEE,  1-2 VIEWS - 01/14/2017     INDICATION: Pain.      COMPARISON: None.     FINDINGS:   PELVIS: A large calcified fibroid is present in the  pelvis. The  sacroiliac joints and pubic symphysis are normal. 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           Impression:       Calcified fibroid. No acute pelvic pathology.     LEFT HIP: Normal anatomic alignment of the left acetabular joint is  preserved. There is suggestion of a subcapital fracture of the left  femoral neck with minimal displacement. Soft tissues are normal.     IMPRESSION: Suggestion of subcapital fracture of the left femoral neck.     LEFT KNEE: Normal anatomic alignment of the knee joint is preserved.  There is no acute fracture or subluxation. There are no areas of  osteolysis or osteosclerosis.     IMPRESSION: No acute osseous abnormality.     DICTATED:     01/14/2017  EDITED:          01/14/2017       XR Hip With or Without Pelvis 2 - 3 View Left [[id number]] Collected:  01/14/17 1808     Updated:  01/14/17 1808    Narrative:          EXAMINATION: XR PELVIS, 1-2 VIEWS, XR LEFT HIP, 2 VIEWS, XR LEFT KNEE,  1-2 VIEWS - 01/14/2017     INDICATION: Pain.      COMPARISON: None.     FINDINGS:   PELVIS: A large calcified fibroid is present in the pelvis. The  sacroiliac joints and pubic symphysis are normal. 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           Impression:       Calcified fibroid. No acute pelvic pathology.     LEFT HIP: Normal anatomic alignment of the left acetabular joint is  preserved. There is suggestion of a subcapital fracture of the left  femoral neck with minimal displacement. Soft tissues are normal.     IMPRESSION: Suggestion of subcapital fracture of the left femoral neck.     LEFT KNEE: Normal anatomic alignment of the knee joint is preserved.  There is no acute fracture or subluxation. There are no areas of  osteolysis or osteosclerosis.     IMPRESSION: No acute osseous abnormality.     DICTATED:     01/14/2017  EDITED:          01/14/2017       XR Knee 1 or 2 View Left [[id number]] Collected:  01/14/17 1808      Updated:  01/14/17 1808    Narrative:          EXAMINATION: XR PELVIS, 1-2 VIEWS, XR LEFT HIP, 2 VIEWS, XR LEFT KNEE,  1-2 VIEWS - 01/14/2017     INDICATION: Pain.      COMPARISON: None.     FINDINGS:   PELVIS: A large calcified fibroid is present in the pelvis. The  sacroiliac joints and pubic symphysis are normal. 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           Impression:       Calcified fibroid. No acute pelvic pathology.     LEFT HIP: Normal anatomic alignment of the left acetabular joint is  preserved. There is suggestion of a subcapital fracture of the left  femoral neck with minimal displacement. Soft tissues are normal.     IMPRESSION: Suggestion of subcapital fracture of the left femoral neck.     LEFT KNEE: Normal anatomic alignment of the knee joint is preserved.  There is no acute fracture or subluxation. There are no areas of  osteolysis or osteosclerosis.     IMPRESSION: No acute osseous abnormality.     DICTATED:     01/14/2017  EDITED:          01/14/2017       XR Shoulder 2+ View Right [[id number]] Collected:  01/14/17 1822     Updated:  01/14/17 1822    Narrative:          EXAMINATION: XR RIGHT SHOULDER, 2 VIEWS - 01/14/2017     INDICATION: S72.002A-Fracture of unspecified part of neck of left femur,  initial encounter for closed fracture; M25.511-Pain in right shoulder.     COMPARISON: None.     FINDINGS: There is some degenerative change around the humeral head. The  acromioclavicular joint is normal. The undersurface of the acromion  process causes some impingement on the supraspinatus muscle. There is no  acute fracture or subluxation.           Impression:       Mild to moderate degenerative change of the glenohumeral  joint.     DICTATED:     01/14/2017  EDITED:          01/14/2017       XR Chest 1 View [[id number]] Collected:  01/14/17 1823     Updated:  01/14/17 1823    Narrative:          EXAMINATION: XR CHEST, SINGLE VIEW - 01/14/2017     INDICATION:  S72.002A-Fracture of unspecified part of neck of left femur,  initial encounter for closed fracture; M25.511-Pain in right shoulder.      COMPARISON: 11/27/2016.     FINDINGS: The heart size is normal. There is no pneumothorax or  effusion. The lungs are free of opacities. The osseous structures of the  chest are normal.           Impression:       No acute chest pathology, stable since 11/27/2016.     DICTATED:     01/14/2017  EDITED:          01/14/2017                Patient Care Team:  Kodak Clarke MD as PCP - General (Internal Medicine)  Kodak Clarke MD as Referring Physician (Internal Medicine)    SUBJECTIVE: Keila reports she is doing well.  She is eating breakfast in a chair and reports she walked in the lee with therapy.    PHYSICAL EXAM  Left hip dressing is clean, dry and intact  Thigh and calf are soft nontender  Neurovascularly intact distally     Principal Problem:    Closed left hip fracture, s/p left hip percutaneous pinning  Active Problems:    Hypertension    s/p Left Craniotomy for Evacuation of Left SDH 11/27/16     UTI (urinary tract infection)      PLAN / DISPOSITION: 88-year-old female postop day #1 status post left hip percutaneous pinning  -Continue to mobilize with therapy as tolerated  -Postop anemia, asymptomatic, monitor H&H  -Lovenox for DVT prophylaxis  -Rehabilitation planning for [facility]an Albert MD  01/15/17  9:40 AM

## 2017-01-15 NOTE — PLAN OF CARE
Problem: Patient Care Overview (Adult)  Goal: Plan of Care Review  Outcome: Ongoing (interventions implemented as appropriate)    01/15/17 0210   Coping/Psychosocial Response Interventions   Plan Of Care Reviewed With patient;daughter   Patient Care Overview   Progress improving   Outcome Evaluation   Outcome Summary/Follow up Plan rested well this shift. pain controlled with ropivicaine       Goal: Adult Individualization and Mutuality  Outcome: Ongoing (interventions implemented as appropriate)  Goal: Discharge Needs Assessment  Outcome: Ongoing (interventions implemented as appropriate)

## 2017-01-15 NOTE — PROGRESS NOTES
Continued Stay Note  JUAN MANUEL Ledesma     Patient Name: Keila Alberts  MRN: [id number]  Today's Date: 1/15/2017    Admit Date: 1/14/2017          Discharge Plan       01/15/17 1144    Case Management/Social Work Plan    Plan [facility]    Patient/Family In Agreement With Plan yes    Additional Comments Met with patient and her daughter, Aretha, in the room per MD consult for a referral to [facility], and they agree with this discharge plan. Referral called to [place] at [phone], and clinical faxed to [phone]. CM will continue to follow.              Discharge Codes     None            [address]

## 2017-01-15 NOTE — PROGRESS NOTES
Acute Care - Physical Therapy Initial Evaluation  [facility]     Patient Name: Keila Alberts  : 7/15/1928  MRN: [id number]  Today's Date: 1/15/2017   Onset of Illness/Injury or Date of Surgery Date: 17  Date of Referral to PT: 17  Referring Physician: Dr. Albert      Admit Date: 2017     Visit Dx:    ICD-10-CM ICD-9-CM   1. Closed left hip fracture, initial encounter S72.002A 820.8   2. Acute pain of right shoulder M25.511 719.41   3. Impaired mobility and ADLs Z74.09 799.89   4. Impaired functional mobility, balance, gait, and endurance Z74.09 V49.89     Patient Active Problem List   Diagnosis   • Subdural hematoma, post-traumatic   • Hypertension   • Constipation   • Normocytic anemia   • Acute/Subacute Traumatic SDH (subdural hematoma)   • Altered mental status   • Leukocytosis, ? Etiology   • s/p Left Craniotomy for Evacuation of Left SDH 16    • Hyponatremia   • 6.9 x 5.3cm pelvic mass, seen on imaging prior to admission   • Closed left hip fracture, s/p left hip percutaneous pinning   • UTI (urinary tract infection)     Past Medical History   Diagnosis Date   • Hypertension      Past Surgical History   Procedure Laterality Date   • Appendectomy     • Joint replacement     • Total shoulder replacement     • Total hip arthroplasty Right    • [place] hole Left 2016     Procedure: HALIMA HOLE;  Surgeon: Jos Christian MD;  Location: [facility];  Service:           PT ASSESSMENT (last 72 hours)      PT Evaluation       01/15/17 0830 01/15/17 0829    Rehab Evaluation    Document Type evaluation;therapy note (daily note)  -AR evaluation  -EH    Subjective Information no complaints;agree to therapy  -AR no complaints;agree to therapy  -EH    Patient Effort, Rehab Treatment excellent  -AR     Symptoms Noted During/After Treatment fatigue;increased pain  -AR increased pain  -EH    Symptoms Noted Comment Nurse aware  -AR     General Information    Patient Profile Review yes  -AR yes  -EH     Onset of Illness/Injury or Date of Surgery Date 01/14/17  -AR 01/14/17  -    Referring Physician Dr. Albert  -AR MD [place]  -    General Observations pt supine with fascia iliaca nerve catheter and IV, daughter-in-law at bedside  -AR Pt in fowlers with dtr in law in room, SCDs donned  -    Pertinent History Of Current Problem Pt is an 88 yof who presetned to ED with c/o worsesning left hip pain x several days with no known fall. Imaging in ED revealed left hip minimally displaced femoral neck fracture. Pt is POD#1 L hip femoral neck fracture percuteneouos pinning with placement of fasica iliaca nerve catheter. Of mention, pt s/p fall with resultant left SDH requiring evacuation by Dr. Christian 11/27/16 at [facility]. She DC to acute rehab, and ultimately DC to her family's home in 12/22/16. She was receiving PT and SLP HH services, and was DC from OT HH last week.  -AR Pt with pain x 2 days in LLE and RUE, unclear cause, had rigerous PT workout 5 days ago. Recent hx of fall with subdural hematoma x 1 month ago with surgical evacuation, hospital stay with d/c to [facility] and subsequent d/c home with HHPT/OT/Speech. Graduated on OT recently.  -    Precautions/Limitations fall precautions;brace on when up;other (see comments);insensate limb   KI when up d/t fascia iliaca untl quad weakness resolved, HO  -AR fall precautions;brace on when up   iliofascial nerve block; KI until lucita strength return  -    Prior Level of Function independent:;all household mobility;gait;transfer;dressing;min assist:;bathing   used walker in home, occasional use of cane in community  -AR independent:;all household mobility;dressing;min assist:;bathing;dependent:;driving  -    Equipment Currently Used at Home bath bench;cane, straight;grab bar;hospital bed;raised toilet;walker, rolling  -AR bath bench;walker, rolling;cane, straight;hospital bed   Pt and  living with dtr in law and son.  -    Plans/Goals Discussed With patient and  family;agreed upon  -AR patient and family;agreed upon  -    Risks Reviewed patient and family:;LOB;nausea/vomiting;dizziness;increased discomfort;change in vital signs;increased drainage;lines disloged  -AR patient and family:;dizziness;LOB;increased discomfort;change in vital signs  -EH    Benefits Reviewed patient and family:;improve function;increase independence;increase balance;increase strength;decrease pain;decrease risk of DVT;increase knowledge  -AR patient and family:;improve function;increase independence  -EH    Barriers to Rehab none identified  -AR previous functional deficit;visual deficit  -EH    Living Environment    Lives With child(rose), dependent;spouse  -AR child(rose), adult;spouse  -EH    Living Arrangements house  -AR house  -EH    Home Accessibility stairs within home;tub/shower is not walk in;stairs to enter home  -AR stairs within home;stairs to enter home  -EH    Number of Stairs to Enter Home 1  -AR 1  -EH    Number of Stairs Within Home 1  -AR 1   between rooms of home  -EH    Transportation Available ambulance  -AR     Living Environment Comment Pt and her spouse live with their son and daughter-in-law and they have 24/hr assistance.   -AR     Clinical Impression    Date of Referral to PT  01/14/17  -EH    PT Diagnosis  [place]  -EH    Criteria for Skilled Therapeutic Interventions Met  yes;treatment indicated  -EH    Rehab Potential  good, to achieve stated therapy goals  -EH    Vital Signs    Pre Systolic BP Rehab  --   WNL in sitting prior to further mobility.  -EH    Intra Systolic BP Rehab 112  -AR     Intra Treatment Diastolic BP 62  -AR     Intra Patient Position Sitting  -AR     Pain Assessment    Pain Assessment 0-10  -AR 0-10  -EH    Pain Score 0  -AR 0  -EH    Post Pain Score 5  -AR 5  -EH    Pain Type Acute pain  -AR Acute pain  -EH    Pain Location Hip  -AR Hip  -EH    Pain Orientation Left  -AR Left  -EH    Pain Intervention(s) Repositioned;Ambulation/increased activity   -AR Repositioned;Ambulation/increased activity;Medication (See MAR)   Med by NSG (NSg in room as PT exiting)  -    Response to Interventions nurse aware of pt'c c/o pain  -AR     Vision Assessment/Intervention    Visual Impairment WNL  -AR     Cognitive Assessment/Intervention    Current Cognitive/Communication Assessment functional   hard of hearing  -AR impaired   [place]  -    Orientation Status oriented x 4  -AR oriented x 4  -    Follows Commands/Answers Questions 100% of the time;able to follow single-step instructions  -% of the time;able to follow single-step instructions  -    Personal Safety WNL/WFL  -AR WNL/WFL  -    Personal Safety Interventions fall prevention program maintained  -AR fall prevention program maintained;elopement precautions initiated;gait belt;nonskid shoes/slippers when out of bed;supervised activity;muscle strengthening facilitated  -    ROM (Range of Motion)    General ROM no range of motion deficits identified   BUE  -AR lower extremity range of motion deficits identified  -    General ROM Detail  LLE hip /knee motion testing limited 22 ain  -    MMT (Manual Muscle Testing)    General MMT Assessment upper extremity strength deficits identified   RUE 3+/5, LUE 4/5  -AR     General MMT Assessment Detail  RLE functionally 4/5, LLE >3/5 DF. Sensation of knee absent.  -    Mobility Assessment/Training    Extremity Weight-Bearing Status left lower extremity  -AR left lower extremity  -    Left Lower Extremity Weight-Bearing weight-bearing as tolerated  -AR weight-bearing as tolerated  -    Bed Mobility, Assessment/Treatment    Bed Mobility, Assistive Device bed rails;head of bed elevated  -AR bed rails;head of bed elevated  -    Bed Mobility, Scoot/Bridge, [place] contact guard assist;verbal cues required  -AR     Bed Mob, Supine to Sit, [place] minimum assist (75% patient effort);2 person assist required;verbal cues required  -AR minimum assist (75%  patient effort);2 person assist required  -    Bed Mob, Sit to Supine, [place]  not tested  -    Bed Mobility, Comment Cues to sequence and for effective use of BUE. Issued leg  and educatd pt on use.   -AR     Transfer Assessment/Treatment    Transfers, Sit-Stand [place] minimum assist (75% patient effort);2 person assist required;verbal cues required  -AR minimum assist (75% patient effort);2 person assist required;verbal cues required  -    Transfers, Stand-Sit [place] verbal cues required;minimum assist (75% patient effort);2 person assist required  -AR minimum assist (75% patient effort);2 person assist required;verbal cues required  -    Transfers, Sit-Stand-Sit, Assist Device rolling walker;elevated surface  -AR rolling walker  -    Transfer, Safety Issues  step length decreased;steps too close front assistive device;sequencing ability decreased  -    Transfer, Impairments ROM decreased;sensation decreased;strength decreased;pain  -AR pain;strength decreased;sensation decreased  -    Transfer, Comment cues for hand placement, sequencing and chair approach   -AR VC for sequencing and hand placement.   -    Gait Assessment/Treatment    Gait, [place] Level  minimum assist (75% patient effort);2 person assist required  -    Gait, Assistive Device  rolling walker  -    Gait, Distance (Feet)  38  -    Gait, Gait Pattern Analysis  swing-to gait  -    Gait, Gait Deviations  right:;step length decreased;left:;weight-shifting ability decreased;toe-to-floor clearance decreased;decreased heel strike;limb motion velocity decreased  -    Gait, Maintain Weight Bearing Status  able to maintain weight bearing status  -    Gait, Safety Issues  sequencing ability decreased;step length decreased;weight-shifting ability decreased;steps too close front assistive device  -    Gait, Impairments  pain;impaired balance;strength decreased;sensation decreased  -    Gait, Comment   Pt with tendency to hold walker too close, causing decreased balance. Pt assisted with positioning of walker. Pt performs step to gait with cueing for sequencing. BLE step length decreased. encouraged to increase RLE step length if able- progresses 1 inch pst LLE.  -    Therapy Exercises    Bilateral Upper Extremity AROM:;5 reps;supine;shoulder extension/flexion  -AR     Exercise Protocols  hip ORIF  -    Hip ORIF Exercises  left:;10 reps;ankle pumps/circles;glut set   educationon quad set performed with R knee; L unable.  -    Sensory Assessment/Intervention    Light Touch  LLE  -EH    LLE Light Touch  other (see comments)   limited 2/2 nerve block at knee  -    General Interventions    Bed Mobility Training issued leg  and educated pt on use  -AR     Transfer Training educated pt on safe tranfer technqiue   -AR     Positioning and Restraints    Pre-Treatment Position in bed  -AR in bed  -    Post Treatment Position chair  -AR chair  -    In Chair reclined;call light within reach;encouraged to call for assist;exit alarm on;with family/caregiver;with nsg;on mechanical lift sling   nurse aware of need for KI,posted sign for KI  -AR notified nsg;reclined;call light within reach;encouraged to call for assist;exit alarm on;with family/caregiver;with nsg  -      01/15/17 0000 01/14/17 2030    Muscle Tone Assessment    Muscle Tone Assessment  LLE  -ST    LLE Muscle Tone Assessment moderately decreased tone  -ST moderately decreased tone  -ST      User Key  (r) = Recorded By, (t) = Taken By, (c) = Cosigned By    Initials Name Provider Type     Ynes Slaughter, PT Physical Therapist    ONUR Moore, OT Occupational Therapist    ST Julianne Bullock, RN Registered Nurse          Physical Therapy Education     Title: PT OT SLP Therapies (Active)     Topic: Physical Therapy (Done)     Point: Mobility training (Done)    Learning Progress Summary    Learner Readiness Method Response Comment  Documented by Status   Patient Acceptance E VU,NR   01/15/17 0937 Done   Family Acceptance E VU,NR   01/15/17 0937 Done               Point: Home exercise program (Done)    Learning Progress Summary    Learner Readiness Method Response Comment Documented by Status   Patient Acceptance E VU,NR   01/15/17 0937 Done   Family Acceptance E VU,NR   01/15/17 0937 Done               Point: Body mechanics (Done)    Learning Progress Summary    Learner Readiness Method Response Comment Documented by Status   Patient Acceptance E VU,NR   01/15/17 0937 Done   Family Acceptance E VU,NR   01/15/17 0937 Done               Point: Precautions (Done)    Learning Progress Summary    Learner Readiness Method Response Comment Documented by Status   Patient Acceptance E VU,NR   01/15/17 0937 Done   Family Acceptance E VU,[facility] 01/15/17 0937 Done                      User Key     Initials Effective Dates Name Provider Type Discipline     06/19/15 -  Ynes Slaughter, PT Physical Therapist PT                PT Recommendation and Plan  Anticipated Discharge Disposition: inpatient rehabilitation facility  Planned Therapy Interventions: balance training, bed mobility training, gait training, home exercise program, strengthening, stair training, transfer training  PT Frequency: daily  Plan of Care Review  Plan Of Care Reviewed With: patient  Progress: improving  Outcome Summary/Follow up Plan: Pt ambulates into hallway with first PT session. Also demonstrates bed mobility and t/f with MIN A x 2. Demonsrates gait/mobility deviations and continued need for education for safety and continued rehab.          IP PT Goals       01/15/17 0937          Bed Mobility PT LTG    Bed Mobility PT LTG, Date Established 01/15/17  -      Bed Mobility PT LTG, Time to Achieve 2 wks  -      Bed Mobility PT LTG, Activity Type supine to sit/sit to supine  -      Bed Mobility PT LTG, [place] Level supervision required  -      Transfer  Training PT LTG    Transfer Training PT LTG, Date Established 01/15/17  -EH      Transfer Training PT LTG, Time to Achieve 2 wks  -EH      Transfer Training PT LTG, Activity Type sit to stand/stand to sit  -EH      Transfer Training PT LTG, [place] Level set up required;supervision required  -EH      Transfer Training PT LTG, Assist Device walker, rolling  -EH      Gait Training PT LTG    Gait Training Goal PT LTG, Date Established 01/15/17  -EH      Gait Training Goal PT LTG, Time to Achieve 2 wks  -EH      Gait Training Goal PT LTG, [place] Level contact guard assist  -EH      Gait Training Goal PT LTG, Assist Device walker, rolling  -EH      Gait Training Goal PT LTG, Distance to Achieve 350  -EH      Stair Training PT LTG    Stair Training Goal PT LTG, Date Established 01/15/17  -EH      Stair Training Goal PT LTG, Time to Achieve 2 wks  -EH      Stair Training Goal PT LTG, Number of Steps 1  -EH      Stair Training Goal PT LTG, [place] Level contact guard assist  -EH      Stair Training Goal PT LTG, Assist Device walker, rolling  -EH        User Key  (r) = Recorded By, (t) = Taken By, (c) = Cosigned By    Initials Name Provider Type     Ynes Slaughter, PT Physical Therapist                Outcome Measures       01/15/17 0829          How much help from another person do you currently need...    Turning from your back to your side while in flat bed without using bedrails? 3  -EH      Moving from lying on back to sitting on the side of a flat bed without bedrails? 3  -EH      Moving to and from a bed to a chair (including a wheelchair)? 3  -EH      Standing up from a chair using your arms (e.g., wheelchair, bedside chair)? 3  -EH      Climbing 3-5 steps with a railing? 2  -EH      To walk in hospital room? 3  -EH      AM-PAC 6 Clicks Score 17  -EH      Functional Assessment    Outcome Measure Options AM-PAC 6 Clicks Basic Mobility (PT)  -EH        User Key  (r) = Recorded By, (t) = Taken By,  (c) = Cosigned By    Initials Name Provider Type     Ynes Slaughter PT Physical Therapist           Time Calculation:         PT Charges       01/15/17 0941          Time Calculation    Start Time 0829  -      PT Received On 01/15/17  -      PT Goal Re-Cert Due Date 01/25/17  -      Time Calculation- PT    Total Timed Code Minutes- PT 13 minute(s)  -        User Key  (r) = Recorded By, (t) = Taken By, (c) = Cosigned By    Initials Name Provider Type     Ynes Slaughter PT Physical Therapist          Therapy Charges for Today     Code Description Service Date Service Provider Modifiers Qty    14401414002 HC GAIT TRAINING EA 15 MIN 1/15/2017 Ynes Slaughter, PT GP 1    99127529079 HC PT EVAL MOD COMPLEXITY 4 1/15/2017 Ynes Slaughter, PT GP 1    25823877181 HC PT THER SUPP EA 15 MIN 1/15/2017 Ynes Slaughter, PT GP 1          PT G-Codes  Outcome Measure Options: AM-PAC 6 Clicks Basic Mobility (PT)      Ynes Slaughter, PT  1/15/2017

## 2017-01-16 ENCOUNTER — APPOINTMENT (OUTPATIENT)
Dept: CT IMAGING | Facility: HOSPITAL | Age: 82
End: 2017-01-16

## 2017-01-16 LAB
BACTERIA SPEC AEROBE CULT: NORMAL
BASOPHILS # BLD AUTO: 0.03 10*3/MM3 (ref 0–0.2)
BASOPHILS NFR BLD AUTO: 0.4 % (ref 0–1)
DEPRECATED RDW RBC AUTO: 54.6 FL (ref 37–54)
EOSINOPHIL # BLD AUTO: 1.01 10*3/MM3 (ref 0.1–0.3)
EOSINOPHIL NFR BLD AUTO: 11.9 % (ref 0–3)
ERYTHROCYTE [DISTWIDTH] IN BLOOD BY AUTOMATED COUNT: 15.3 % (ref 11.3–14.5)
HCT VFR BLD AUTO: 26.9 % (ref 34.5–44)
HGB BLD-MCNC: 8.8 G/DL (ref 11.5–15.5)
IMM GRANULOCYTES # BLD: 0.01 10*3/MM3 (ref 0–0.03)
IMM GRANULOCYTES NFR BLD: 0.1 % (ref 0–0.6)
LYMPHOCYTES # BLD AUTO: 1.18 10*3/MM3 (ref 0.6–4.8)
LYMPHOCYTES NFR BLD AUTO: 13.9 % (ref 24–44)
MCH RBC QN AUTO: 31.5 PG (ref 27–31)
MCHC RBC AUTO-ENTMCNC: 32.7 G/DL (ref 32–36)
MCV RBC AUTO: 96.4 FL (ref 80–99)
MONOCYTES # BLD AUTO: 0.7 10*3/MM3 (ref 0–1)
MONOCYTES NFR BLD AUTO: 8.3 % (ref 0–12)
NEUTROPHILS # BLD AUTO: 5.55 10*3/MM3 (ref 1.5–8.3)
NEUTROPHILS NFR BLD AUTO: 65.4 % (ref 41–71)
PLATELET # BLD AUTO: 274 10*3/MM3 (ref 150–450)
PMV BLD AUTO: 8.5 FL (ref 6–12)
RBC # BLD AUTO: 2.79 10*6/MM3 (ref 3.89–5.14)
WBC NRBC COR # BLD: 8.48 10*3/MM3 (ref 3.5–10.8)

## 2017-01-16 PROCEDURE — 25010000002 ROPIVACAINE PER 1 MG: Performed by: ANESTHESIOLOGY

## 2017-01-16 PROCEDURE — 85025 COMPLETE CBC W/AUTO DIFF WBC: CPT | Performed by: ORTHOPAEDIC SURGERY

## 2017-01-16 PROCEDURE — 25010000003 CEFTRIAXONE PER 250 MG: Performed by: NURSE PRACTITIONER

## 2017-01-16 PROCEDURE — 97116 GAIT TRAINING THERAPY: CPT

## 2017-01-16 PROCEDURE — 70450 CT HEAD/BRAIN W/O DYE: CPT

## 2017-01-16 PROCEDURE — 25010000002 ENOXAPARIN PER 10 MG: Performed by: ORTHOPAEDIC SURGERY

## 2017-01-16 PROCEDURE — 97110 THERAPEUTIC EXERCISES: CPT

## 2017-01-16 RX ADMIN — ROPIVACAINE HYDROCHLORIDE 10 ML/HR: 2 INJECTION, SOLUTION EPIDURAL; INFILTRATION at 05:27

## 2017-01-16 RX ADMIN — DOCUSATE SODIUM 100 MG: 100 CAPSULE, LIQUID FILLED ORAL at 08:21

## 2017-01-16 RX ADMIN — CYANOCOBALAMIN TAB 1000 MCG 1000 MCG: 1000 TAB at 08:20

## 2017-01-16 RX ADMIN — ENOXAPARIN SODIUM 40 MG: 40 INJECTION SUBCUTANEOUS at 16:26

## 2017-01-16 RX ADMIN — POLYETHYLENE GLYCOL 3350 17 G: 17 POWDER, FOR SOLUTION ORAL at 08:21

## 2017-01-16 RX ADMIN — DOCUSATE SODIUM AND SENNOSIDES 2 TABLET: 8.6; 5 TABLET, FILM COATED ORAL at 19:41

## 2017-01-16 RX ADMIN — CEFTRIAXONE SODIUM 1 G: 1 INJECTION, SOLUTION INTRAVENOUS at 10:42

## 2017-01-16 RX ADMIN — HYDROCODONE BITARTRATE AND ACETAMINOPHEN 2 TABLET: 5; 325 TABLET ORAL at 08:36

## 2017-01-16 RX ADMIN — DOCUSATE SODIUM 100 MG: 100 CAPSULE, LIQUID FILLED ORAL at 19:41

## 2017-01-16 RX ADMIN — DOCUSATE SODIUM AND SENNOSIDES 2 TABLET: 8.6; 5 TABLET, FILM COATED ORAL at 08:20

## 2017-01-16 RX ADMIN — HYDROCODONE BITARTRATE AND ACETAMINOPHEN 2 TABLET: 5; 325 TABLET ORAL at 03:45

## 2017-01-16 RX ADMIN — HYDROCODONE BITARTRATE AND ACETAMINOPHEN 2 TABLET: 5; 325 TABLET ORAL at 21:22

## 2017-01-16 NOTE — PROGRESS NOTES
Acute Care - Physical Therapy Treatment Note  [facility]     Patient Name: Keila Alberts  : 7/15/1928  MRN: [id number]  Today's Date: 2017  Onset of Illness/Injury or Date of Surgery Date: 17  Date of Referral to PT: 17  Referring Physician: Dr. Albert    Admit Date: 2017    Visit Dx:    ICD-10-CM ICD-9-CM   1. Closed left hip fracture, initial encounter S72.002A 820.8   2. Acute pain of right shoulder M25.511 719.41   3. Impaired mobility and ADLs Z74.09 799.89   4. Impaired functional mobility, balance, gait, and endurance Z74.09 V49.89     Patient Active Problem List   Diagnosis   • Subdural hematoma, post-traumatic   • Hypertension   • Constipation   • Normocytic anemia   • Acute/Subacute Traumatic SDH (subdural hematoma)   • Altered mental status   • Leukocytosis, ? Etiology   • s/p Left Craniotomy for Evacuation of Left SDH 16    • Hyponatremia   • 6.9 x 5.3cm pelvic mass, seen on imaging prior to admission   • Closed left hip fracture, s/p left hip percutaneous pinning   • UTI (urinary tract infection)               Adult Rehabilitation Note       17 1311 17 0835       Rehab Assessment/Intervention    Discipline physical therapist  - physical therapist  -     Document Type therapy note (daily note)  - therapy note (daily note)  -MJ     Subjective Information agree to therapy;complains of;pain;fatigue  -MJ agree to therapy;complains of;pain  -MJ     Patient Effort, Rehab Treatment good  -MJ excellent  -MJ     Symptoms Noted During/After Treatment fatigue  -MJ fatigue  -MJ     Precautions/Limitations brace on when up;fall precautions;other (see comments)   knee immob when up due to fascia iliaca nerve cath; [place]  -MJ brace on when up;fall precautions;other (see comments)   kneee immob when up due to fascia iliaca nerve cath; [place]  -MJ     Recorded by [MJ] Petr Vo PT [MJ] Petr Vo PT     Pain Assessment    Pain Assessment 0-10  -MJ 0-10  -MJ     Pain Score 6   -MJ 2  -MJ     Post Pain Score 0  -MJ 1  -MJ     Pain Type Acute pain  -MJ Acute pain  -MJ     Pain Location Hip  -MJ Hip  -MJ     Pain Orientation Left  -MJ Left  -MJ     Pain Intervention(s) Repositioned;Ambulation/increased activity  -MJ Medication (See MAR);Repositioned;Ambulation/increased activity  -MJ     Recorded by [MJ] Petr Vo, PT [MJ] Petr Vo PT     Cognitive Assessment/Intervention    Current Cognitive/Communication Assessment functional  -MJ functional  -MJ     Orientation Status oriented x 4  -MJ oriented x 4  -MJ     Follows Commands/Answers Questions 100% of the time;able to follow multi-step instructions;needs cueing  -% of the time;able to follow multi-step instructions;needs cueing  -MJ     Personal Safety WNL/WFL  -MJ WNL/WFL  -MJ     Recorded by [MJ] Petr Vo, PT [MJ] Petr Vo PT     Mobility Assessment/Training    Extremity Weight-Bearing Status left lower extremity  -MJ left lower extremity  -MJ     Left Lower Extremity Weight-Bearing weight-bearing as tolerated  -MJ weight-bearing as tolerated  -MJ     Recorded by [MJ] Petr Vo, PT [MJ] Petr Vo PT     Bed Mobility, Assessment/Treatment    Bed Mobility, Assistive Device head of bed elevated;bed rails  -MJ bed rails;head of bed elevated  -MJ     Bed Mobility, Scoot/Bridge, [place] supervision required   to scoot up in bed  -MJ      Bed Mob, Supine to Sit, [place] not tested   Pt UI  -MJ minimum assist (75% patient effort);verbal cues required  -MJ     Bed Mob, Sit to Supine, [place] minimum assist (75% patient effort);verbal cues required  -MJ not tested   Pt UIC  -MJ     Bed Mobility, Safety Issues decreased use of legs for bridging/pushing  -MJ decreased use of legs for bridging/pushing  -MJ     Bed Mobility, Impairments ROM decreased;strength decreased;pain  -MJ ROM decreased;strength decreased;pain  -MJ     Bed Mobility, Comment Verbal cues for sequencing, assist with LEs. Increased time and  effort to perform  -MJ Knee immobilizer donned prior to OOB activity. Cues and assist to move LEs off EOB and to use UEs to push trunk to sitting. Increased time and effort to perform  -MJ     Recorded by [MJ] Petr Vo, PT [MJ] Petr Vo, PT     Transfer Assessment/Treatment    Transfers, Sit-Stand [place] minimum assist (75% patient effort);verbal cues required  -MJ minimum assist (75% patient effort);2 person assist required;verbal cues required  -MJ     Transfers, Stand-Sit [place] minimum assist (75% patient effort);verbal cues required  -MJ minimum assist (75% patient effort);2 person assist required;verbal cues required  -MJ     Transfers, Sit-Stand-Sit, Assist Device rolling walker  -MJ rolling walker  -MJ     Toilet Transfer, [place] contact guard assist;verbal cues required  -MJ contact guard assist;2 person assist required;verbal cues required  -MJ     Toilet Transfer, Assistive Device bedside commode without drop arms;rolling walker  -MJ bedside commode without drop arms;rolling walker  -MJ     Transfer, Safety Issues step length decreased;weight-shifting ability decreased  -MJ step length decreased;weight-shifting ability decreased  -MJ     Transfer, Impairments ROM decreased;strength decreased;pain  -MJ ROM decreased;strength decreased;pain  -MJ     Transfer, Comment Knee immobilizer donned prior to ambulation. Verbal cues for correct hand placement and to step L LE out prior to t/f. Assisted pt to BSC, verbal cues to reach back for BSC prior to t/f. Pt dependent for hygiene after toileting  -MJ Verbal cues for correct hand placement and to step L LE out prior to t/f. Assisted pt to BSC, pt dependent for hygiene after toileting  -MJ     Recorded by [MJ] Petr Vo, PT [MJ] Petr Vo, PT     Gait Assessment/Treatment    Gait, [place] minimum assist (75% patient effort);verbal cues required  -MJ contact guard assist;2 person assist required;verbal cues required  -MJ      Gait, Assistive Device rolling walker  -MJ rolling walker  -MJ     Gait, Distance (Feet) 60  -  -MJ     Gait, Gait Pattern Analysis swing-to gait  -MJ swing-to gait  -MJ     Gait, Gait Deviations right:;antalgic;elaina decreased;knee buckling;step length decreased;toe-to-floor clearance decreased;weight-shifting ability decreased  -MJ right:;antalgic;[place] decreased;step length decreased;weight-shifting ability decreased  -MJ     Gait, Safety Issues step length decreased;weight-shifting ability decreased  -MJ step length decreased;weight-shifting ability decreased  -MJ     Gait, Impairments ROM decreased;strength decreased;pain  -MJ ROM decreased;strength decreased;pain  -MJ     Gait, Comment Pt initially demo step to gait pattern, progressed to step through with verbal cues and practice. Pt with knee buckling noted as she fatigues. Gait distance limited by pain and fatigue  -MJ Pt initially demo step to gait pattern, progressed to step through with cues and practice. Cues to stay in middle of RW and to  feet during turn. Gait limited by pain and fatigue  -     Recorded by [MJ] Petr Vo, PT [MJ] Petr Vo, PT     Therapy Exercises    Exercise Protocols hip ORIF  -MJ hip ORIF  -MJ     Hip ORIF Exercises left:;15 reps;with assist;ankle pumps/circles;quad set;glut set;heel slides;hip abduction;SLR   Cues for technique. Vince w/SLR, heel slides, hip abd  -MJ left:;15 reps;completed protocol;with assist;ankle pumps/circles;quad set;glut set;heel slides;hip abduction;SAQ;SLR   Cues for technique. Vince w/hip abd, SLR, heel slides, SAQ  -MJ     Recorded by [MJ] Petr Vo, PT [MJ] Petr Vo, PT     Positioning and Restraints    Pre-Treatment Position sitting in chair/recliner  -MJ in bed  -MJ     Post Treatment Position bed  -MJ chair  -MJ     In Bed notified nsg;supine;call light within reach;encouraged to call for assist;with family/caregiver;exit alarm on  -MJ      In Chair  notified  nsg;reclined;call light within reach;encouraged to call for assist  -MJ     Recorded by [MJ] Petr Vo, PT [MJ] Petr Vo, PT       User Key  (r) = Recorded By, (t) = Taken By, (c) = Cosigned By    Initials Name Effective Dates    MJ Petr Vo, PT 03/14/16 -                 IP PT Goals       01/16/17 1348 01/16/17 0916 01/15/17 0937    Bed Mobility PT LTG    Bed Mobility PT LTG, Date Established   01/15/17  -EH    Bed Mobility PT LTG, Time to Achieve   2 wks  -EH    Bed Mobility PT LTG, Activity Type   supine to sit/sit to supine  -EH    Bed Mobility PT LTG, [place] Level   supervision required  -EH    Bed Mobility PT LTG, Date Goal Reviewed 01/16/17  -MJ 01/16/17  -MJ     Bed Mobility PT LTG, Outcome goal ongoing  - goal ongoing  -MJ     Transfer Training PT LTG    Transfer Training PT LTG, Date Established   01/15/17  -EH    Transfer Training PT LTG, Time to Achieve   2 wks  -EH    Transfer Training PT LTG, Activity Type   sit to stand/stand to sit  -EH    Transfer Training PT LTG, [place] Level   set up required;supervision required  -EH    Transfer Training PT LTG, Assist Device   walker, rolling  -EH    Transfer Training PT  LTG, Date Goal Reviewed 01/16/17  -MJ 01/16/17  -MJ     Transfer Training PT LTG, Outcome goal ongoing  - goal ongoing  -MJ     Gait Training PT LTG    Gait Training Goal PT LTG, Date Established   01/15/17  -EH    Gait Training Goal PT LTG, Time to Achieve   2 wks  -EH    Gait Training Goal PT LTG, [place] Level   contact guard assist  -EH    Gait Training Goal PT LTG, Assist Device   walker, rolling  -EH    Gait Training Goal PT LTG, Distance to Achieve   350  -EH    Gait Training Goal PT LTG, Date Goal Reviewed 01/16/17  -MJ 01/16/17  -MJ     Gait Training Goal PT LTG, Outcome goal ongoing  - goal ongoing  -MJ     Stair Training PT LTG    Stair Training Goal PT LTG, Date Established   01/15/17  -    Stair Training Goal PT LTG, Time to Achieve   2 wks  -EH     Stair Training Goal PT LTG, Number of Steps   1  -    Stair Training Goal PT LTG, [place] Level   contact guard assist  -    Stair Training Goal PT LTG, Assist Device   walker, rolling  -    Stair Training Goal PT LTG, Date Goal Reviewed 01/16/17  - 01/16/17  -     Stair Training Goal PT LTG, Outcome goal not met  - goal ongoing  -     Stair Training Goal PT LTG, Reason Goal Not Met --   No need to perform, DC to [facility]        User Key  (r) = Recorded By, (t) = Taken By, (c) = Cosigned By    Initials Name Provider Type     Ynes Slaughter, PT Physical Therapist     Petr Vo, PT Physical Therapist          Physical Therapy Education     Title: PT OT SLP Therapies (Active)     Topic: Physical Therapy (Done)     Point: Mobility training (Done)    Learning Progress Summary    Learner Readiness Method Response Comment Documented by Status   Patient Acceptance E,D VU,[facility] 01/16/17 1348 Done    Acceptance E,D VU,[facility] 01/16/17 0916 Done    Acceptance E ,[facility] 01/15/17 0937 Done   Family Acceptance E,D VU,[facility] 01/16/17 1348 Done    Acceptance E ,[facility] 01/15/17 0937 Done               Point: Home exercise program (Done)    Learning Progress Summary    Learner Readiness Method Response Comment Documented by Status   Patient Acceptance E,D VU,[facility] 01/16/17 1348 Done    Acceptance E,D VU,[facility] 01/16/17 0916 Done    Acceptance E ,[facility] 01/15/17 0937 Done   Family Acceptance E,D VU,[facility] 01/16/17 1348 Done    Acceptance E ,[facility] 01/15/17 0937 Done               Point: Body mechanics (Done)    Learning Progress Summary    Learner Readiness Method Response Comment Documented by Status   Patient Acceptance E,D VU,[facility] 01/16/17 1348 Done    Acceptance E,D VU,[facility] 01/16/17 0916 Done    Acceptance E VU,[facility] 01/15/17 0937 Done   Family Acceptance E,D VU,[facility] 01/16/17 1348 Done    Acceptance E ,[facility] 01/15/17 0937 Done               Point: Precautions (Done)    Learning  Progress Summary    Learner Readiness Method Response Comment Documented by Status   Patient Acceptance E,D KATALINA YATES   01/16/17 1348 Done    Acceptance E,D MILANNR   01/16/17 0916 Done    Acceptance E MILANNR   01/15/17 0937 Done   Family Acceptance E,D MILANNR   01/16/17 1348 Done    Acceptance E MILANNR   01/15/17 0937 Done                      User Key     Initials Effective Dates Name Provider Type Discipline     06/19/15 -  Ynes Slaughter, PT Physical Therapist PT     03/14/16 -  Petr Vo, PT Physical Therapist PT                    PT Recommendation and Plan  Anticipated Discharge Disposition: inpatient rehabilitation facility  Planned Therapy Interventions: balance training, bed mobility training, gait training, home exercise program, strengthening, stair training, transfer training  PT Frequency: 2 times/day  Plan of Care Review  Plan Of Care Reviewed With: patient  Progress: improving  Outcome Summary/Follow up Plan: Pt ambulated 60 feet with Vince, limited by pain and knee buckling. Pt still requires cues for sequencing. Will continue to progress mobility as able.           Outcome Measures       01/16/17 1311 01/16/17 0835 01/15/17 0830    How much help from another person do you currently need...    Turning from your back to your side while in flat bed without using bedrails? 3  -MJ 3  -MJ     Moving from lying on back to sitting on the side of a flat bed without bedrails? 3  -MJ 3  -MJ     Moving to and from a bed to a chair (including a wheelchair)? 3  -MJ 3  -MJ     Standing up from a chair using your arms (e.g., wheelchair, bedside chair)? 3  -MJ 2  -MJ     Climbing 3-5 steps with a railing? 2  -MJ 2  -MJ     To walk in hospital room? 3  -MJ 3  -MJ     AM-PAC 6 Clicks Score 17  -MJ 16  -MJ     How much help from another is currently needed...    Putting on and taking off regular lower body clothing?   1  -AR    Bathing (including washing, rinsing, and drying)   2  -AR    Toileting (which  includes using toilet bed pan or urinal)   2  -AR    Putting on and taking off regular upper body clothing   3  -AR    Taking care of personal grooming (such as brushing teeth)   3  -AR    Eating meals   4  -AR    Score   15  -AR    Functional Assessment    Outcome Measure Options AM-PAC 6 Clicks Basic Mobility (PT)  - AM-PAC 6 Clicks Basic Mobility (PT)  - AM-PAC 6 Clicks Daily Activity (OT)  -AR      01/15/17 0829          How much help from another person do you currently need...    Turning from your back to your side while in flat bed without using bedrails? 3  -EH      Moving from lying on back to sitting on the side of a flat bed without bedrails? 3  -EH      Moving to and from a bed to a chair (including a wheelchair)? 3  -EH      Standing up from a chair using your arms (e.g., wheelchair, bedside chair)? 3  -EH      Climbing 3-5 steps with a railing? 2  -EH      To walk in hospital room? 3  -EH      AM-PAC 6 Clicks Score 17  -EH      Functional Assessment    Outcome Measure Options AM-PAC 6 Clicks Basic Mobility (PT)  -        User Key  (r) = Recorded By, (t) = Taken By, (c) = Cosigned By    Initials Name Provider Type     Ynes Slaughter, PT Physical Therapist    AR Ramila Moore, OT Occupational Therapist    SIRENA Vo, OLIVER Physical Therapist           Time Calculation:         PT Charges       01/16/17 1350 01/16/17 0918       Time Calculation    Start Time 1311  - 0835  -MJ     PT Received On 01/16/17  -MJ 01/16/17  -     PT Goal Re-Cert Due Date 01/25/17  -MJ 01/25/17  -MJ     Time Calculation- PT    Total Timed Code Minutes- PT 27 minute(s)  -MJ 25 minute(s)  -MJ       User Key  (r) = Recorded By, (t) = Taken By, (c) = Cosigned By    Initials Name Provider Type    SIRENA Vo, OLIVER Physical Therapist          Therapy Charges for Today     Code Description Service Date Service Provider Modifiers Qty    75341873887 HC GAIT TRAINING EA 15 MIN 1/16/2017 Petr Vo, OLIVER GP 1     79470329537 HC PT THER PROC EA 15 MIN 1/16/2017 Petr Vo, PT GP 1    93449267512 HC PT THER SUPP EA 15 MIN 1/16/2017 Petr Vo, PT GP 2    46971687199 HC GAIT TRAINING EA 15 MIN 1/16/2017 Petr Vo, PT GP 1    01599340474 HC PT THER PROC EA 15 MIN 1/16/2017 Petr Vo, PT GP 1          PT G-Codes  Outcome Measure Options: AM-PAC 6 Clicks Basic Mobility (PT)    Petr Vo, PT  1/16/2017

## 2017-01-16 NOTE — PLAN OF CARE
Problem: Patient Care Overview (Adult)  Goal: Plan of Care Review  Outcome: Ongoing (interventions implemented as appropriate)    01/16/17 0916   Coping/Psychosocial Response Interventions   Plan Of Care Reviewed With patient   Patient Care Overview   Progress improving   Outcome Evaluation   Outcome Summary/Follow up Plan Pt increased gait distance to 104 feet with CGAx2. Increase frequency to BID. Will continue to progress mobility as able.          Problem: Inpatient Physical Therapy  Goal: Bed Mobility Goal LTG- PT  Outcome: Ongoing (interventions implemented as appropriate)    01/15/17 0937 01/16/17 0916   Bed Mobility PT LTG   Bed Mobility PT LTG, Date Established 01/15/17 --    Bed Mobility PT LTG, Time to Achieve 2 wks --    Bed Mobility PT LTG, Activity Type supine to sit/sit to supine --    Bed Mobility PT LTG, [place] Level supervision required --    Bed Mobility PT LTG, Date Goal Reviewed --  01/16/17   Bed Mobility PT LTG, Outcome --  goal ongoing       Goal: Transfer Training Goal 1 LTG- PT  Outcome: Ongoing (interventions implemented as appropriate)    01/15/17 0937 01/16/17 0916   Transfer Training PT LTG   Transfer Training PT LTG, Date Established 01/15/17 --    Transfer Training PT LTG, Time to Achieve 2 wks --    Transfer Training PT LTG, Activity Type sit to stand/stand to sit --    Transfer Training PT LTG, [place] set up required;supervision required --    Transfer Training PT LTG, Assist Device walker, rolling --    Transfer Training PT LTG, Date Goal Reviewed --  01/16/17   Transfer Training PT LTG, Outcome --  goal ongoing       Goal: Gait Training Goal LTG- PT  Outcome: Ongoing (interventions implemented as appropriate)    01/15/17 0937 01/16/17 0916   Gait Training PT LTG   Gait Training Goal PT LTG, Date Established 01/15/17 --    Gait Training Goal PT LTG, Time to Achieve 2 wks --    Gait Training Goal PT LTG, [place] contact guard assist --    Gait Training  Goal PT LTG, Assist Device walker, rolling --    Gait Training Goal PT LTG, Distance to Achieve 350 --    Gait Training Goal PT LTG, Date Goal Reviewed --  01/16/17   Gait Training Goal PT LTG, Outcome --  goal ongoing       Goal: Stair Training Goal LTG- PT  Outcome: Ongoing (interventions implemented as appropriate)    01/15/17 0937 01/16/17 0916   Stair Training PT LTG   Stair Training Goal PT LTG, Date Established 01/15/17 --    Stair Training Goal PT LTG, Time to Achieve 2 wks --    Stair Training Goal PT LTG, Number of Steps 1 --    Stair Training Goal PT LTG, [place] Level contact guard assist --    Stair Training Goal PT LTG, Assist Device walker, rolling --    Stair Training Goal PT LTG, Date Goal Reviewed --  01/16/17   Stair Training Goal PT LTG, Outcome --  goal ongoing

## 2017-01-16 NOTE — PROGRESS NOTES
"Visit Vitals   • /70   • Pulse 83   • Temp 98.8 °F (37.1 °C)   • Resp 16   • Ht 63\" (160 cm)   • Wt 120 lb (54.4 kg)   • SpO2 94%   • BMI 21.26 kg/m2       Lab Results (last 24 hours)     Procedure Component Value Units Date/Time    Urine Culture [[id number]]  (Normal) Collected:  01/14/17 1526    Specimen:  Urine from Urine, Clean Catch Updated:  01/15/17 0824     Urine Culture Culture in progress     CBC & Differential [[id number]] Collected:  01/16/17 0441    Specimen:  Blood Updated:  01/16/17 0524    Narrative:       The following orders were created for panel order CBC & Differential.  Procedure                               Abnormality         Status                     ---------                               -----------         ------                     CBC Auto Differential[[id number]]         Abnormal            Final result                 Please view results for these tests on the individual orders.    CBC Auto Differential [[id number]]  (Abnormal) Collected:  01/16/17 0441    Specimen:  Blood Updated:  01/16/17 0524     WBC 8.48 10*3/mm3      RBC 2.79 (L) 10*6/mm3      Hemoglobin 8.8 (L) g/dL      Hematocrit 26.9 (L) %      MCV 96.4 fL      MCH 31.5 (H) pg      MCHC 32.7 g/dL      RDW 15.3 (H) %      RDW-SD 54.6 (H) fl      MPV 8.5 fL      Platelets 274 10*3/mm3      Neutrophil % 65.4 %      Lymphocyte % 13.9 (L) %      Monocyte % 8.3 %      Eosinophil % 11.9 (H) %      Basophil % 0.4 %      Immature Grans % 0.1 %      Neutrophils, Absolute 5.55 10*3/mm3      Lymphocytes, Absolute 1.18 10*3/mm3      Monocytes, Absolute 0.70 10*3/mm3      Eosinophils, Absolute 1.01 (H) 10*3/mm3      Basophils, Absolute 0.03 10*3/mm3      Immature Grans, Absolute 0.01 10*3/mm3           Imaging Results (last 24 hours)     Procedure Component Value Units Date/Time    XR Pelvis 1 or 2 View [[id number]] Collected:  01/14/17 1808     Updated:  01/15/17 1354    Narrative:          EXAMINATION: XR PELVIS, 1-2 VIEWS, XR LEFT HIP, 2 " VIEWS, XR LEFT KNEE,  1-2 VIEWS - 01/14/2017     INDICATION: Pain.      COMPARISON: None.     FINDINGS:   PELVIS: A large calcified fibroid is present in the pelvis. The  sacroiliac joints and pubic symphysis are normal. 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           Impression:       Calcified fibroid. No acute pelvic pathology.     LEFT HIP: Normal anatomic alignment of the left acetabular joint is  preserved. There is suggestion of a subcapital fracture of the left  femoral neck with minimal displacement. Soft tissues are normal.     IMPRESSION: Suggestion of subcapital fracture of the left femoral neck.     LEFT KNEE: Normal anatomic alignment of the knee joint is preserved.  There is no acute fracture or subluxation. There are no areas of  osteolysis or osteosclerosis.     IMPRESSION: No acute osseous abnormality.     DICTATED:     01/14/2017  EDITED:          01/14/2017     This report was finalized on 1/15/2017 1:52 PM by Dr. Nawaf Travis MD.       XR Hip With or Without Pelvis 2 - 3 View Left [[id number]] Collected:  01/14/17 1808     Updated:  01/15/17 1354    Narrative:          EXAMINATION: XR PELVIS, 1-2 VIEWS, XR LEFT HIP, 2 VIEWS, XR LEFT KNEE,  1-2 VIEWS - 01/14/2017     INDICATION: Pain.      COMPARISON: None.     FINDINGS:   PELVIS: A large calcified fibroid is present in the pelvis. The  sacroiliac joints and pubic symphysis are normal. 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           Impression:       Calcified fibroid. No acute pelvic pathology.     LEFT HIP: Normal anatomic alignment of the left acetabular joint is  preserved. There is suggestion of a subcapital fracture of the left  femoral neck with minimal displacement. Soft tissues are normal.     IMPRESSION: Suggestion of subcapital fracture of the left femoral neck.     LEFT KNEE: Normal anatomic alignment of the knee joint is preserved.  There is no acute fracture or subluxation.  There are no areas of  osteolysis or osteosclerosis.     IMPRESSION: No acute osseous abnormality.     DICTATED:     01/14/2017  EDITED:          01/14/2017     This report was finalized on 1/15/2017 1:52 PM by Dr. Nawaf Travis MD.       XR Knee 1 or 2 View Left [[id number]] Collected:  01/14/17 1808     Updated:  01/15/17 1354    Narrative:          EXAMINATION: XR PELVIS, 1-2 VIEWS, XR LEFT HIP, 2 VIEWS, XR LEFT KNEE,  1-2 VIEWS - 01/14/2017     INDICATION: Pain.      COMPARISON: None.     FINDINGS:   PELVIS: A large calcified fibroid is present in the pelvis. The  sacroiliac joints and pubic symphysis are normal. 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           Impression:       Calcified fibroid. No acute pelvic pathology.     LEFT HIP: Normal anatomic alignment of the left acetabular joint is  preserved. There is suggestion of a subcapital fracture of the left  femoral neck with minimal displacement. Soft tissues are normal.     IMPRESSION: Suggestion of subcapital fracture of the left femoral neck.     LEFT KNEE: Normal anatomic alignment of the knee joint is preserved.  There is no acute fracture or subluxation. There are no areas of  osteolysis or osteosclerosis.     IMPRESSION: No acute osseous abnormality.     DICTATED:     01/14/2017  EDITED:          01/14/2017     This report was finalized on 1/15/2017 1:52 PM by Dr. Nawaf Travis MD.       XR Chest 1 View [[id number]] Collected:  01/14/17 1823     Updated:  01/15/17 1355    Narrative:          EXAMINATION: XR CHEST, SINGLE VIEW - 01/14/2017     INDICATION: S72.002A-Fracture of unspecified part of neck of left femur,  initial encounter for closed fracture; M25.511-Pain in right shoulder.      COMPARISON: 11/27/2016.     FINDINGS: The heart size is normal. There is no pneumothorax or  effusion. The lungs are free of opacities. The osseous structures of the  chest are normal.           Impression:       No acute chest pathology,  stable since 11/27/2016.     DICTATED:     01/14/2017  EDITED:          01/14/2017     This report was finalized on 1/15/2017 1:53 PM by Dr. Nawaf Travis MD.       XR Shoulder 2+ View Right [[id number]] Collected:  01/14/17 1822     Updated:  01/15/17 1355    Narrative:          EXAMINATION: XR RIGHT SHOULDER, 2 VIEWS - 01/14/2017     INDICATION: S72.002A-Fracture of unspecified part of neck of left femur,  initial encounter for closed fracture; M25.511-Pain in right shoulder.     COMPARISON: None.     FINDINGS: There is some degenerative change around the humeral head. The  acromioclavicular joint is normal. The undersurface of the acromion  process causes some impingement on the supraspinatus muscle. There is no  acute fracture or subluxation.           Impression:       Mild to moderate degenerative change of the glenohumeral  joint.     DICTATED:     01/14/2017  EDITED:          01/14/2017     This report was finalized on 1/15/2017 1:53 PM by Dr. Nawaf Travis MD.       CT Head Without Contrast [[id number]]      Updated:  01/16/17 0546          Patient Care Team:  Kodak Clarke MD as PCP - General (Internal Medicine)  Kodak Clarke MD as Referring Physician (Internal Medicine)    SUBJECTIVE: Keila reports she is doing well.  She walked 40 feet with therapy yesterday.  Pain is well-controlled and she is tolerating a regular diet.    PHYSICAL EXAM  Left hip dressing is clean, dry and intact  Thigh and calf are soft nontender  Neurovascularly intact distally     Principal Problem:    Closed left hip fracture, s/p left hip percutaneous pinning  Active Problems:    Hypertension    s/p Left Craniotomy for Evacuation of Left SDH 11/27/16     UTI (urinary tract infection)      PLAN / DISPOSITION: 88-year-old female postop day #2 status post left hip percutaneous pinning  -Continue to mobilize with therapy as tolerated  -H&H stable  -Lovenox for DVT prophylaxis  - for rehabilitation  [facility]    Edgar Albert MD  01/16/17  7:05 AM

## 2017-01-16 NOTE — PROGRESS NOTES
[facility]    Acute pain service Inpatient Progress Note    Patient Name: Keila Alberts  :  7/15/1928  MRN:  [id number]        Acute Pain  Service Inpatient Progress Note:    Analgesia:Excellent  Pain Score:0/10  LOC: alert and awake  Resp Status: supplemental oxygen  Cardiac: VS stable  Side Effects:None  Catheter Site:clean, dry and dressing intact  Cath type: peripheral nerve cath(MOOG pump)  Infusion rate: 10ml/hr  Catheter Plan:Catheter to remain Insitu and Continue catheter infusion rate unchanged

## 2017-01-16 NOTE — PROGRESS NOTES
Acute Care - Physical Therapy Treatment Note  [facility]     Patient Name: Keila Alberts  : 7/15/1928  MRN: [id number]  Today's Date: 2017  Onset of Illness/Injury or Date of Surgery Date: 17  Date of Referral to PT: 17  Referring Physician: Dr. Albert    Admit Date: 2017    Visit Dx:    ICD-10-CM ICD-9-CM   1. Closed left hip fracture, initial encounter S72.002A 820.8   2. Acute pain of right shoulder M25.511 719.41   3. Impaired mobility and ADLs Z74.09 799.89   4. Impaired functional mobility, balance, gait, and endurance Z74.09 V49.89     Patient Active Problem List   Diagnosis   • Subdural hematoma, post-traumatic   • Hypertension   • Constipation   • Normocytic anemia   • Acute/Subacute Traumatic SDH (subdural hematoma)   • Altered mental status   • Leukocytosis, ? Etiology   • s/p Left Craniotomy for Evacuation of Left SDH 16    • Hyponatremia   • 6.9 x 5.3cm pelvic mass, seen on imaging prior to admission   • Closed left hip fracture, s/p left hip percutaneous pinning   • UTI (urinary tract infection)               Adult Rehabilitation Note       17 0835          Rehab Assessment/Intervention    Discipline physical therapist  -      Document Type therapy note (daily note)  -      Subjective Information agree to therapy;complains of;pain  -MJ      Patient Effort, Rehab Treatment excellent  -MJ      Symptoms Noted During/After Treatment fatigue  -MJ      Precautions/Limitations brace on when up;fall precautions;other (see comments)   kneee immob when up due to fascia iliaca nerve cath; Jamul  -MJ      Recorded by [MJ] Petr Vo, OLIVER      Pain Assessment    Pain Assessment 0-10  -MJ      Pain Score 2  -MJ      Post Pain Score 1  -MJ      Pain Type Acute pain  -MJ      Pain Location Hip  -MJ      Pain Orientation Left  -MJ      Pain Intervention(s) Medication (See MAR);Repositioned;Ambulation/increased activity  -MJ      Recorded by [MJ] Petr Vo PT      Cognitive  Assessment/Intervention    Current Cognitive/Communication Assessment functional  -MJ      Orientation Status oriented x 4  -MJ      Follows Commands/Answers Questions 100% of the time;able to follow multi-step instructions;needs cueing  -MJ      Personal Safety WNL/WFL  -MJ      Recorded by [MJ] Petr Vo, PT      Mobility Assessment/Training    Extremity Weight-Bearing Status left lower extremity  -MJ      Left Lower Extremity Weight-Bearing weight-bearing as tolerated  -MJ      Recorded by [MJ] Petr Vo, PT      Bed Mobility, Assessment/Treatment    Bed Mobility, Assistive Device bed rails;head of bed elevated  -MJ      Bed Mob, Supine to Sit, [place] minimum assist (75% patient effort);verbal cues required  -MJ      Bed Mob, Sit to Supine, [place] not tested   Pt UIC  -MJ      Bed Mobility, Safety Issues decreased use of legs for bridging/pushing  -MJ      Bed Mobility, Impairments ROM decreased;strength decreased;pain  -MJ      Bed Mobility, Comment Knee immobilizer donned prior to OOB activity. Cues and assist to move LEs off EOB and to use UEs to push trunk to sitting. Increased time and effort to perform  -MJ      Recorded by [MJ] Petr Vo, PT      Transfer Assessment/Treatment    Transfers, Sit-Stand [place] minimum assist (75% patient effort);2 person assist required;verbal cues required  -MJ      Transfers, Stand-Sit [place] minimum assist (75% patient effort);2 person assist required;verbal cues required  -MJ      Transfers, Sit-Stand-Sit, Assist Device rolling walker  -MJ      Toilet Transfer, [place] contact guard assist;2 person assist required;verbal cues required  -MJ      Toilet Transfer, Assistive Device bedside commode without drop arms;rolling walker  -MJ      Transfer, Safety Issues step length decreased;weight-shifting ability decreased  -MJ      Transfer, Impairments ROM decreased;strength decreased;pain  -MJ      Transfer, Comment Verbal cues for correct hand  placement and to step L LE out prior to t/f. Assisted pt to BSC, pt dependent for hygiene after toileting  -      Recorded by [MJ] Petr Vo PT      Gait Assessment/Treatment    Gait, [place] Level contact guard assist;2 person assist required;verbal cues required  -      Gait, Assistive Device rolling walker  -      Gait, Distance (Feet) 104  -      Gait, Gait Pattern Analysis swing-to gait  -      Gait, Gait Deviations right:;antalgic;elaina decreased;step length decreased;weight-shifting ability decreased  -      Gait, Safety Issues step length decreased;weight-shifting ability decreased  -      Gait, Impairments ROM decreased;strength decreased;pain  -      Gait, Comment Pt initially demo step to gait pattern, progressed to step through with cues and practice. Cues to stay in middle of RW and to  feet during turn. Gait limited by pain and fatigue  -      Recorded by [MJ] Petr Vo PT      Therapy Exercises    Exercise Protocols hip ORIF  -      Hip ORIF Exercises left:;15 reps;completed protocol;with assist;ankle pumps/circles;quad set;glut set;heel slides;hip abduction;SAQ;SLR   Cues for technique. Vince w/hip abd, SLR, heel slides, SAQ  -MJ      Recorded by [MJ] Petr Vo PT      Positioning and Restraints    Pre-Treatment Position in bed  -      Post Treatment Position chair  -MJ      In Chair notified nsg;reclined;call light within reach;encouraged to call for assist  -MJ      Recorded by [MJ] Petr Vo PT        User Key  (r) = Recorded By, (t) = Taken By, (c) = Cosigned By    Initials Name Effective Dates    SIRENA Vo PT 03/14/16 -                 IP PT Goals       01/16/17 0916 01/15/17 0937       Bed Mobility PT LTG    Bed Mobility PT LTG, Date Established  01/15/17  -     Bed Mobility PT LTG, Time to Achieve  2 wks  -     Bed Mobility PT LTG, Activity Type  supine to sit/sit to supine  -     Bed Mobility PT LTG, [place] Level  supervision  required  -EH     Bed Mobility PT LTG, Date Goal Reviewed 01/16/17  -MJ      Bed Mobility PT LTG, Outcome goal ongoing  -      Transfer Training PT LTG    Transfer Training PT LTG, Date Established  01/15/17  -     Transfer Training PT LTG, Time to Achieve  2 wks  -EH     Transfer Training PT LTG, Activity Type  sit to stand/stand to sit  -EH     Transfer Training PT LTG, [place] Level  set up required;supervision required  -EH     Transfer Training PT LTG, Assist Device  walker, rolling  -EH     Transfer Training PT  LTG, Date Goal Reviewed 01/16/17  -MJ      Transfer Training PT LTG, Outcome goal ongoing  -      Gait Training PT LTG    Gait Training Goal PT LTG, Date Established  01/15/17  -     Gait Training Goal PT LTG, Time to Achieve  2 wks  -EH     Gait Training Goal PT LTG, [place] Level  contact guard assist  -EH     Gait Training Goal PT LTG, Assist Device  walker, rolling  -EH     Gait Training Goal PT LTG, Distance to Achieve  350  -EH     Gait Training Goal PT LTG, Date Goal Reviewed 01/16/17  -      Gait Training Goal PT LTG, Outcome goal ongoing  -      Stair Training PT LTG    Stair Training Goal PT LTG, Date Established  01/15/17  -     Stair Training Goal PT LTG, Time to Achieve  2 wks  -EH     Stair Training Goal PT LTG, Number of Steps  1  -EH     Stair Training Goal PT LTG, [place] Level  contact guard assist  -EH     Stair Training Goal PT LTG, Assist Device  walker, rolling  -EH     Stair Training Goal PT LTG, Date Goal Reviewed 01/16/17  -MJ      Stair Training Goal PT LTG, Outcome goal ongoing  -        User Key  (r) = Recorded By, (t) = Taken By, (c) = Cosigned By    Initials Name Provider Type    JON Slaughter, PT Physical Therapist    SIRENA Vo, PT Physical Therapist          Physical Therapy Education     Title: PT OT SLP Therapies (Active)     Topic: Physical Therapy (Done)     Point: Mobility training (Done)    Learning Progress Summary     Learner Readiness Method Response Comment Documented by Status   Patient Acceptance E,D VU,NR   01/16/17 0916 Done    Acceptance E VU,[facility] 01/15/17 0937 Done   Family Acceptance E VU,[facility] 01/15/17 0937 Done               Point: Home exercise program (Done)    Learning Progress Summary    Learner Readiness Method Response Comment Documented by Status   Patient Acceptance E,D VU,NR   01/16/17 0916 Done    Acceptance E VU,[facility] 01/15/17 0937 Done   Family Acceptance E VU,[facility] 01/15/17 0937 Done               Point: Body mechanics (Done)    Learning Progress Summary    Learner Readiness Method Response Comment Documented by Status   Patient Acceptance E,D VU,[facility] 01/16/17 0916 Done    Acceptance E VU,[facility] 01/15/17 0937 Done   Family Acceptance E VU,[facility] 01/15/17 0937 Done               Point: Precautions (Done)    Learning Progress Summary    Learner Readiness Method Response Comment Documented by Status   Patient Acceptance E,D VU,[facility] 01/16/17 0916 Done    Acceptance E VU,[facility] 01/15/17 0937 Done   Family Acceptance E VU,[facility] 01/15/17 0937 Done                      User Key     Initials Effective Dates Name Provider Type Discipline     06/19/15 -  Ynes Slaughter, PT Physical Therapist PT     03/14/16 -  Petr Vo, PT Physical Therapist PT                    PT Recommendation and Plan  Anticipated Discharge Disposition: inpatient rehabilitation facility  Planned Therapy Interventions: balance training, bed mobility training, gait training, home exercise program, strengthening, stair training, transfer training  PT Frequency: 2 times/day  Plan of Care Review  Plan Of Care Reviewed With: patient  Progress: improving  Outcome Summary/Follow up Plan: Pt increased gait distance to 104 feet with CGAx2. Increase frequency to BID. Will continue to progress mobility as able.           Outcome Measures       01/16/17 0835 01/15/17 0830 01/15/17 0829    How much help from another person do you  currently need...    Turning from your back to your side while in flat bed without using bedrails? 3  -MJ  3  -EH    Moving from lying on back to sitting on the side of a flat bed without bedrails? 3  -MJ  3  -EH    Moving to and from a bed to a chair (including a wheelchair)? 3  -MJ  3  -EH    Standing up from a chair using your arms (e.g., wheelchair, bedside chair)? 2  -MJ  3  -EH    Climbing 3-5 steps with a railing? 2  -MJ  2  -EH    To walk in hospital room? 3  -MJ  3  -EH    AM-PAC 6 Clicks Score 16  -MJ  17  -EH    How much help from another is currently needed...    Putting on and taking off regular lower body clothing?  1  -AR     Bathing (including washing, rinsing, and drying)  2  -AR     Toileting (which includes using toilet bed pan or urinal)  2  -AR     Putting on and taking off regular upper body clothing  3  -AR     Taking care of personal grooming (such as brushing teeth)  3  -AR     Eating meals  4  -AR     Score  15  -AR     Functional Assessment    Outcome Measure Options AM-PAC 6 Clicks Basic Mobility (PT)  - AM-PAC 6 Clicks Daily Activity (OT)  -AR AM-PAC 6 Clicks Basic Mobility (PT)  -      User Key  (r) = Recorded By, (t) = Taken By, (c) = Cosigned By    Initials Name Provider Type     Ynes Slaughter, PT Physical Therapist    AR Ramila Moore, OT Occupational Therapist    SIRENA Vo, OLIVER Physical Therapist           Time Calculation:         PT Charges       01/16/17 0918          Time Calculation    Start Time 0835  -MJ      PT Received On 01/16/17  -      PT Goal Re-Cert Due Date 01/25/17  -      Time Calculation- PT    Total Timed Code Minutes- PT 25 minute(s)  -        User Key  (r) = Recorded By, (t) = Taken By, (c) = Cosigned By    Initials Name Provider Type    SIRENA Vo, PT Physical Therapist          Therapy Charges for Today     Code Description Service Date Service Provider Modifiers Qty    38302548116 HC GAIT TRAINING EA 15 MIN 1/16/2017 Petr Vo,  PT GP 1    65590294825 HC PT THER PROC EA 15 MIN 1/16/2017 Petr Vo, PT GP 1    74817497470 HC PT THER SUPP EA 15 MIN 1/16/2017 Petr Vo, PT GP 2          PT G-Codes  Outcome Measure Options: AM-PAC 6 Clicks Basic Mobility (PT)    Petr Vo, PT  1/16/2017

## 2017-01-16 NOTE — PROGRESS NOTES
"IM progress note      Keila Alberts  [id number]  7/15/1928     LOS: 2 days     Attending: Bulmaro Camarillo MD    Primary Care Provider: Kodak Clarke MD      Chief Complaint/Reason for visit:    Chief Complaint   Patient presents with   • Leg Pain   • Arm Pain       Subjective   Doing ok. Pain is well controlled. Denies f/c/n/v/sob/cp.   Good progress so far.wy    Objective     Vital Signs  Blood pressure 98/54, pulse 85, temperature 98.8 °F (37.1 °C), resp. rate 16, height 63\" (160 cm), weight 120 lb (54.4 kg), SpO2 91 %.  Temp (24hrs), Av.1 °F (37.3 °C), Min:98.7 °F (37.1 °C), Max:99.7 °F (37.6 °C)      Intake/Output:    Intake/Output Summary (Last 24 hours) at 17 0852  Last data filed at 17 0311   Gross per 24 hour   Intake    290 ml   Output   1400 ml   Net  -1110 ml       Nutrition: PO    Respiratory: RA    Physical Therapy:  Plan Of Care Reviewed With: patient  Progress: improving  Outcome Summary/Follow up Plan: Pt increased gait distance to 104 feet with CGAx2. Increase frequency to BID. Will continue to progress mobility as able.     Physical Exam:     General Appearance:    Alert, cooperative, in no acute distress   Head:    Normocephalic, without obvious abnormality, atraumatic    Lungs:     Normal effort, symmetric chest rise, no crepitus, clear to      auscultation bilaterally         Heart:    Regular rhythm and normal rate, normal S1 and S2   Abdomen:     Normal bowel sounds, no masses, no organomegaly, soft        non-tender, non-distended, no guarding, no rebound                tenderness   Extremities:   Left hip with Covaderm CDI.     Pulses:   Pulses palpable and equal bilaterally   Skin:   No bleeding, bruising or rash   Neurologic:   Moves all extremities with no obvious focal motor deficit.  Cranial nerves 2 - 12 grossly intact. Flexion and dorsiflexion intact bilateral feet.       Results Review:     I reviewed the patient's new clinical results.     Results " from last 7 days  Lab Units 01/16/17  0441 01/15/17  0442 01/14/17  1151   WBC 10*3/mm3 8.48 8.91 7.99   HEMOGLOBIN g/dL 8.8* 8.8* 10.4*   HEMATOCRIT % 26.9* 26.8* 31.8*   PLATELETS 10*3/mm3 274 281 302       Results from last 7 days  Lab Units 01/15/17  0442 01/14/17  1151   SODIUM mmol/L 139 138   POTASSIUM mmol/L 3.9 3.7   CHLORIDE mmol/L 103 101   TOTAL CO2 mmol/L 29.0 31.0   BUN mg/dL 9 12   CREATININE mg/dL 0.50* 0.60   CALCIUM mg/dL 8.8 9.7   BILIRUBIN mg/dL  --  0.7   ALK PHOS U/L  --  74   ALT (SGPT) U/L  --  15   AST (SGOT) U/L  --  20   GLUCOSE mg/dL 98 101*     Microbiology Results (last 21 days)        Procedure Component Value - Date/Time       Urine Culture [[id number]] Collected: 01/14/17 1526       Lab Status: Final result Specimen: Urine from Urine, Clean Catch Updated: 01/16/17 0844        Urine Culture >100,000 CFU/mL Normal Urogenital Deena       I reviewed the patient's new imaging including images and reports.    All medications reviewed.     amLODIPine 5 mg Oral Daily   benazepril 40 mg Oral Daily   ceftriaxone 1 g Intravenous Q24H   docusate sodium 100 mg Oral BID   enoxaparin 40 mg Subcutaneous Q24H   famotidine 20 mg Intravenous Once   lidocaine PF 1 mL Infiltration Once   polyethylene glycol 17 g Oral Daily   sennosides-docusate sodium 2 tablet Oral BID   vitamin B-12 1,000 mcg Oral Daily       acetaminophen 650 mg Q4H PRN   bisacodyl 10 mg Daily PRN   bisacodyl 10 mg Daily PRN   diphenhydrAMINE 25 mg Q6H PRN   diphenhydrAMINE 25 mg Q6H PRN   docusate sodium 100 mg BID PRN   fentanyl 50 mcg Q5 Min PRN   HYDROcodone-acetaminophen 1 tablet Q4H PRN   HYDROcodone-acetaminophen 2 tablet Q4H PRN   HYDROmorphone 0.25 mg Q30 Min PRN   HYDROmorphone 0.5 mg Q2H PRN   And     naloxone 0.1 mg Q5 Min PRN   lactated ringers 500 mL Once PRN   magnesium hydroxide 10 mL Daily PRN   ondansetron 4 mg Once PRN   ondansetron 4 mg Q6H PRN   Or     ondansetron 4 mg Q6H PRN   sodium chloride 1-10 mL PRN   sodium  chloride 10 mL PRN       Assessment/Plan   Principal Problem:    Closed left hip fracture, s/p left hip percutaneous pinning  Active Problems:    Hypertension    s/p Left Craniotomy for Evacuation of Left SDH 11/27/16     UTI (urinary tract infection)    Acute blood loss anemia, mild, asymptomatic/stable.     Plan  1. PT/OT- WBAT LLE  2. Pain control-prns, nerve block   3. IS-encouraged  4. DVT proph- mechs/Lovenox  5. Bowel regimen  6. Monitor post-op labs, H&H  7. DC planning when ready  CT head pending  UTI- rocephin x3days    Ramila Lyons, APRN  01/16/17  8:52 AM

## 2017-01-16 NOTE — PROGRESS NOTES
Continued Stay Note  [facility]     Patient Name: Keila Alberts  MRN: [id number]  Today's Date: 1/16/2017    Admit Date: 1/14/2017          Discharge Plan       01/16/17 1409    Case Management/Social Work Plan    Plan [facility]    Patient/Family In Agreement With Plan yes    Additional Comments Ms. Alberts has a bed at [facility] for tomorrow, Tuesday, 1/17/17, if medically ready.  Notified patient and her daughter, Aretha, at the bedside.  CM will continue to follow.              Discharge Codes     None        Expected Discharge Date and Time     Expected Discharge Date Expected Discharge Time    Jan 17, 2017             Desire Alva

## 2017-01-16 NOTE — PLAN OF CARE
Problem: Patient Care Overview (Adult)  Goal: Plan of Care Review  Outcome: Ongoing (interventions implemented as appropriate)    01/16/17 0253   Coping/Psychosocial Response Interventions   Plan Of Care Reviewed With patient   Patient Care Overview   Progress progress toward functional goals as expected   Outcome Evaluation   Outcome Summary/Follow up Plan rested well this shift. pain meds given as needed.       Goal: Adult Individualization and Mutuality  Outcome: Ongoing (interventions implemented as appropriate)  Goal: Discharge Needs Assessment  Outcome: Ongoing (interventions implemented as appropriate)    Problem: Fall Risk (Adult)  Goal: Identify Related Risk Factors and Signs and Symptoms  Outcome: Ongoing (interventions implemented as appropriate)  Goal: Absence of Falls  Outcome: Ongoing (interventions implemented as appropriate)

## 2017-01-17 VITALS
DIASTOLIC BLOOD PRESSURE: 59 MMHG | HEIGHT: 63 IN | HEART RATE: 86 BPM | OXYGEN SATURATION: 92 % | BODY MASS INDEX: 21.26 KG/M2 | TEMPERATURE: 97.9 F | WEIGHT: 120 LBS | RESPIRATION RATE: 16 BRPM | SYSTOLIC BLOOD PRESSURE: 115 MMHG

## 2017-01-17 LAB
BASOPHILS # BLD AUTO: 0.02 10*3/MM3 (ref 0–0.2)
BASOPHILS NFR BLD AUTO: 0.3 % (ref 0–1)
DEPRECATED RDW RBC AUTO: 54.2 FL (ref 37–54)
EOSINOPHIL # BLD AUTO: 1.3 10*3/MM3 (ref 0.1–0.3)
EOSINOPHIL NFR BLD AUTO: 18 % (ref 0–3)
ERYTHROCYTE [DISTWIDTH] IN BLOOD BY AUTOMATED COUNT: 15.2 % (ref 11.3–14.5)
HCT VFR BLD AUTO: 28.2 % (ref 34.5–44)
HGB BLD-MCNC: 9.1 G/DL (ref 11.5–15.5)
IMM GRANULOCYTES # BLD: 0.01 10*3/MM3 (ref 0–0.03)
IMM GRANULOCYTES NFR BLD: 0.1 % (ref 0–0.6)
LYMPHOCYTES # BLD AUTO: 1.94 10*3/MM3 (ref 0.6–4.8)
LYMPHOCYTES NFR BLD AUTO: 26.9 % (ref 24–44)
MCH RBC QN AUTO: 31 PG (ref 27–31)
MCHC RBC AUTO-ENTMCNC: 32.3 G/DL (ref 32–36)
MCV RBC AUTO: 95.9 FL (ref 80–99)
MONOCYTES # BLD AUTO: 0.57 10*3/MM3 (ref 0–1)
MONOCYTES NFR BLD AUTO: 7.9 % (ref 0–12)
NEUTROPHILS # BLD AUTO: 3.38 10*3/MM3 (ref 1.5–8.3)
NEUTROPHILS NFR BLD AUTO: 46.8 % (ref 41–71)
PLATELET # BLD AUTO: 333 10*3/MM3 (ref 150–450)
PMV BLD AUTO: 8.8 FL (ref 6–12)
RBC # BLD AUTO: 2.94 10*6/MM3 (ref 3.89–5.14)
WBC NRBC COR # BLD: 7.22 10*3/MM3 (ref 3.5–10.8)

## 2017-01-17 PROCEDURE — 97530 THERAPEUTIC ACTIVITIES: CPT

## 2017-01-17 PROCEDURE — 97110 THERAPEUTIC EXERCISES: CPT

## 2017-01-17 PROCEDURE — 25010000002 ENOXAPARIN PER 10 MG: Performed by: ORTHOPAEDIC SURGERY

## 2017-01-17 PROCEDURE — 25010000003 CEFTRIAXONE PER 250 MG: Performed by: NURSE PRACTITIONER

## 2017-01-17 PROCEDURE — 25010000002 ROPIVACAINE PER 1 MG: Performed by: ANESTHESIOLOGY

## 2017-01-17 PROCEDURE — 85025 COMPLETE CBC W/AUTO DIFF WBC: CPT | Performed by: ORTHOPAEDIC SURGERY

## 2017-01-17 PROCEDURE — 97116 GAIT TRAINING THERAPY: CPT

## 2017-01-17 RX ORDER — HYDROCODONE BITARTRATE AND ACETAMINOPHEN 5; 325 MG/1; MG/1
1 TABLET ORAL EVERY 4 HOURS PRN
Refills: 0
Start: 2017-01-17 | End: 2017-01-24

## 2017-01-17 RX ADMIN — DOCUSATE SODIUM 100 MG: 100 CAPSULE, LIQUID FILLED ORAL at 10:12

## 2017-01-17 RX ADMIN — POLYETHYLENE GLYCOL 3350 17 G: 17 POWDER, FOR SOLUTION ORAL at 10:13

## 2017-01-17 RX ADMIN — CYANOCOBALAMIN TAB 1000 MCG 1000 MCG: 1000 TAB at 10:12

## 2017-01-17 RX ADMIN — ROPIVACAINE HYDROCHLORIDE 10 ML/HR: 2 INJECTION, SOLUTION EPIDURAL; INFILTRATION at 05:56

## 2017-01-17 RX ADMIN — DOCUSATE SODIUM AND SENNOSIDES 2 TABLET: 8.6; 5 TABLET, FILM COATED ORAL at 10:12

## 2017-01-17 RX ADMIN — HYDROCODONE BITARTRATE AND ACETAMINOPHEN 1 TABLET: 5; 325 TABLET ORAL at 11:17

## 2017-01-17 RX ADMIN — HYDROCODONE BITARTRATE AND ACETAMINOPHEN 2 TABLET: 5; 325 TABLET ORAL at 05:02

## 2017-01-17 RX ADMIN — HYDROCODONE BITARTRATE AND ACETAMINOPHEN 1 TABLET: 5; 325 TABLET ORAL at 16:02

## 2017-01-17 RX ADMIN — CEFTRIAXONE SODIUM 1 G: 1 INJECTION, SOLUTION INTRAVENOUS at 10:12

## 2017-01-17 RX ADMIN — BENAZEPRIL HYDROCHLORIDE 40 MG: 20 TABLET, FILM COATED ORAL at 10:12

## 2017-01-17 RX ADMIN — AMLODIPINE BESYLATE 5 MG: 5 TABLET ORAL at 10:12

## 2017-01-17 RX ADMIN — ENOXAPARIN SODIUM 40 MG: 40 INJECTION SUBCUTANEOUS at 16:01

## 2017-01-17 NOTE — PLAN OF CARE
Problem: Patient Care Overview (Adult)  Goal: Plan of Care Review  Outcome: Unable to achieve outcome(s) by discharge Date Met:  01/17/17 01/17/17 1528   Coping/Psychosocial Response Interventions   Plan Of Care Reviewed With patient;daughter   Outcome Evaluation   Outcome Summary/Follow up Plan Pt set to d/c to IRF this date. Pt preparing to d/c when OT arrived; agreeable to education, but declined OOB. Goals not met due to SLOS. OT to d/c at this time.         Problem: Inpatient Occupational Therapy  Goal: Transfer Training Goal 1 LTG- OT  Outcome: Unable to achieve outcome(s) by discharge Date Met:  01/17/17    01/15/17 0945 01/17/17 1528   Transfer Training OT LTG   Transfer Training OT LTG, Date Established 01/15/17 --    Transfer Training OT LTG, Time to Achieve by discharge --    Transfer Training OT LTG, Activity Type sit to stand/stand to sit;toilet --    Transfer Training OT LTG, [place] Level contact guard assist;verbal cues required --    Transfer Training OT LTG, Assist Device commode, bedside;walker, rolling --    Transfer Training OT LTG, Outcome --  goal not met       Goal: Patient Education Goal LTG- OT  Outcome: Unable to achieve outcome(s) by discharge Date Met:  01/17/17    01/15/17 0945 01/17/17 1528   Patient Education OT LTG   Patient Education OT LTG, Date Established 01/15/17 --    Patient Education OT LTG, Time to Achieve by discharge --    Patient Education OT LTG, Education Type precautions per surgeon;1 hand/jg technique;adaptive equipment mgmt;sathya/doff brace --    Patient Education OT LTG, Education Understanding demonstrates adequately;verbalizes understanding --    Patient Education OT LTG Outcome --  goal not met       Goal: LB Dressing Goal LTG- OT  Outcome: Unable to achieve outcome(s) by discharge Date Met:  01/17/17    01/15/17 0945 01/17/17 1528   LB Dressing OT LTG   LB Dressing Goal OT LTG, Date Established 01/15/17 --    LB Dressing Goal OT LTG, Time to Achieve by  discharge --    LB Dressing Goal OT LTG, Activity Type Pt will complete LB dressing task  --    LB Dressing Goal OT LTG, [place] Level verbal cues required;moderate assist (50% patient effort) --    LB Dressing Goal OT LTG, Adaptive Equipment sock-aid;reacher --    LB Dressing Goal OT LTG, Outcome --  goal not met

## 2017-01-17 NOTE — DISCHARGE INSTR - ACTIVITY
Continued PT as ordered at Inpatient rehabilitation facility. Still requires her knee immobilizer.

## 2017-01-17 NOTE — THERAPY DISCHARGE NOTE
Acute Care - Occupational Therapy Treatment Note/Discharge   [place]     Patient Name: Keila Alberts  : 7/15/1928  MRN: [id number]  Today's Date: 2017  Onset of Illness/Injury or Date of Surgery Date: 17  Date of Referral to OT: 17  Referring Physician: Dr. Albert      Admit Date: 2017    Visit Dx:     ICD-10-CM ICD-9-CM   1. Closed left hip fracture, initial encounter S72.002A 820.8   2. Acute pain of right shoulder M25.511 719.41   3. Impaired mobility and ADLs Z74.09 799.89   4. Impaired functional mobility, balance, gait, and endurance Z74.09 V49.89     Patient Active Problem List   Diagnosis   • Subdural hematoma, post-traumatic   • Hypertension   • Constipation   • Normocytic anemia   • Acute/Subacute Traumatic SDH (subdural hematoma)   • Altered mental status   • Leukocytosis, ? Etiology   • s/p Left Craniotomy for Evacuation of Left SDH 16    • Hyponatremia   • 6.9 x 5.3cm pelvic mass, seen on imaging prior to admission   • Closed left hip fracture, s/p left hip percutaneous pinning   • UTI (urinary tract infection)             Adult Rehabilitation Note       17 1505 17 1010 17 1311    Rehab Assessment/Intervention    Discipline occupational therapist  -TB physical therapist  -SC physical therapist  -MJ    Document Type therapy note (daily note);discharge summary  -TB therapy note (daily note);discharge summary  -SC therapy note (daily note)  -MJ    Subjective Information no complaints  -TB no complaints  -SC agree to therapy;complains of;pain;fatigue  -MJ    Patient Effort, Rehab Treatment good  -TB excellent  -SC good  -MJ    Symptoms Noted During/After Treatment  fatigue  -SC fatigue  -MJ    Precautions/Limitations brace on when up  -TB brace on when up;fall precautions  -SC brace on when up;fall precautions;other (see comments)   knee immob when up due to fascia iliaca nerve cath; [place]  -MJ    Specific Treatment Considerations OT met with Pt/Family to  reinforce education for use of AE and d/c planning  -TB      Recorded by [TB] Amy Caraballo, OT [SC] Jae Hess, PT [MJ] Petr Vo PT    Vital Signs    Pre Treatment Diastolic BP --   vitals stable  -TB      O2 Delivery Post Treatment room air  -TB      Pre Patient Position Supine  -TB      Intra Patient Position Supine  -TB      Post Patient Position Supine  -TB      Recorded by [TB] Amy Caraballo OT      Pain Assessment    Pain Assessment Yanes-Tripathi FACES  -TB No/denies pain  -SC 0-10  -MJ    Yanes-Tripathi FACES Pain Rating 2  -TB      Pain Score 2  -TB  6  -MJ    Post Pain Score 2  -TB  0  -MJ    Pain Type Acute pain  -TB  Acute pain  -MJ    Pain Location Hip  -TB  Hip  -MJ    Pain Orientation Left  -TB  Left  -MJ    Pain Intervention(s) Repositioned  -TB  Repositioned;Ambulation/increased activity  -MJ    Recorded by [TB] Amy Caraballo, OT [SC] Jae Hess, PT [MJ] Petr Vo PT    Cognitive Assessment/Intervention    Current Cognitive/Communication Assessment functional  -TB functional  -SC functional  -MJ    Orientation Status oriented x 4  -TB oriented x 4  -SC oriented x 4  -MJ    Follows Commands/Answers Questions 100% of the time  -% of the time  -% of the time;able to follow multi-step instructions;needs cueing  -MJ    Personal Safety mild impairment  -TB mild impairment  -SC WNL/WFL  -MJ    Personal Safety Interventions fall prevention program maintained;gait belt;nonskid shoes/slippers when out of bed  -TB gait belt;fall prevention program maintained  -SC     Recorded by [TB] Amy Caraballo, OT [SC] Jae Hess, PT [MJ] Petr Vo, PT    Mobility Assessment/Training    Extremity Weight-Bearing Status  left lower extremity  -SC left lower extremity  -MJ    Left Lower Extremity Weight-Bearing  weight-bearing as tolerated  -SC weight-bearing as tolerated  -MJ    Recorded by  [SC] Jae Hess, PT [MJ] Petr Vo PT    Bed Mobility,  Assessment/Treatment    Bed Mobility, Assistive Device   head of bed elevated;bed rails  -MJ    Bed Mobility, Scoot/Bridge, [place]   supervision required   to scoot up in bed  -MJ    Bed Mob, Supine to Sit, [place]   not tested   Pt UIC  -    Bed Mob, Sit to Supine, [place]   minimum assist (75% patient effort);verbal cues required  -MJ    Bed Mobility, Safety Issues   decreased use of legs for bridging/pushing  -    Bed Mobility, Impairments   ROM decreased;strength decreased;pain  -MJ    Bed Mobility, Comment  [place]  -SC Verbal cues for sequencing, assist with LEs. Increased time and effort to perform  -MJ    Recorded by  [SC] Jae Hess, PT [MJ] Petr Vo, PT    Transfer Assessment/Treatment    Transfers, Sit-Stand [place]  contact guard assist;verbal cues required  -SC minimum assist (75% patient effort);verbal cues required  -MJ    Transfers, Stand-Sit [place]  verbal cues required;contact guard assist  -SC minimum assist (75% patient effort);verbal cues required  -MJ    Transfers, Sit-Stand-Sit, Assist Device  rolling walker  -SC rolling walker  -    Toilet Transfer, [place]   contact guard assist;verbal cues required  -MJ    Toilet Transfer, Assistive Device   bedside commode without drop arms;rolling walker  -MJ    Transfer, Safety Issues  balance decreased during turns  -SC step length decreased;weight-shifting ability decreased  -    Transfer, Impairments  impaired balance;strength decreased  -SC ROM decreased;strength decreased;pain  -MJ    Transfer, Comment  cues for sequencing walker and leg with brace on  -SC Knee immobilizer donned prior to ambulation. Verbal cues for correct hand placement and to step L LE out prior to t/f. Assisted pt to BSC, verbal cues to reach back for BSC prior to t/f. Pt dependent for hygiene after toileting  -    Recorded by  [SC] Jae Hess, PT [MJ] Petr Vo, PT    Gait Assessment/Treatment    Gait, [place] Level   contact guard assist;verbal cues required  -SC minimum assist (75% patient effort);verbal cues required  -    Gait, Assistive Device  rolling walker  -SC rolling walker  -    Gait, Distance (Feet)  90  -SC 60  -MJ    Gait, Gait Pattern Analysis  swing-to gait  -SC swing-to gait  -    Gait, Gait Deviations  right:;antalgic;weight-shifting ability decreased  -SC right:;antalgic;elain[place] decreased;knee buckling;step length decreased;toe-to-floor clearance decreased;weight-shifting ability decreased  -    Gait, Safety Issues  balance decreased during turns;sequencing ability decreased  -SC step length decreased;weight-shifting ability decreased  -    Gait, Impairments  impaired balance;strength decreased;motor control impaired  -SC ROM decreased;strength decreased;pain  -    Gait, Comment  Repeted cues for sequencing walker and turns. (brace on)  -SC Pt initially demo step to gait pattern, progressed to step through with verbal cues and practice. Pt with knee buckling noted as she fatigues. Gait distance limited by pain and fatigue  -MJ    Recorded by  [SC] Jae Hess, PT [MJ] Petr Vo PT    Lower Body Dressing Assessment/Training    LB Dressing Assess/Train, Comment OT reinforced/demonstrated use of AE   -TB      Recorded by [TB] Amy Caraballo, OT      Therapy Exercises    Bilateral Upper Extremity AROM:;supine;shoulder extension/flexion;shoulder abduction/adduction;elbow flexion/extension;hand pumps  -TB      Exercise Protocols  hip ORIF  -SC hip ORIF  -    Hip ORIF Exercises  left:;15 reps;ankle pumps/circles;quad set;glut set;heel slides;hip abduction;LAQ  -SC left:;15 reps;with assist;ankle pumps/circles;quad set;glut set;heel slides;hip abduction;SLR   Cues for technique. Vince w/SLR, heel slides, hip abd  -MJ    Recorded by [TB] Amy Caraballo, OT [SC] Jae Hess, PT [MJ] Petr Vo, PT    Positioning and Restraints    Pre-Treatment Position in bed  -TB sitting in  chair/recliner  -SC sitting in chair/recliner  -    Post Treatment Position bed  -TB bsc  -SC bed  -MJ    In Bed supine;call light within reach;encouraged to call for assist;with family/caregiver  -TB with nsg  -SC notified nsg;supine;call light within reach;encouraged to call for assist;with family/caregiver;exit alarm on  -MJ    Recorded by [TB] Amy Caraballo, OT [SC] Jae Hess, PT [MJ] Petr Vo, PT      01/16/17 0835          Rehab Assessment/Intervention    Discipline physical therapist  -      Document Type therapy note (daily note)  -      Subjective Information agree to therapy;complains of;pain  -MJ      Patient Effort, Rehab Treatment excellent  -MJ      Symptoms Noted During/After Treatment fatigue  -MJ      Precautions/Limitations brace on when up;fall precautions;other (see comments)   kneee immob when up due to fascia iliaca nerve cath; [place]  -MJ      Recorded by [MJ] Petr Vo, PT      Pain Assessment    Pain Assessment 0-10  -MJ      Pain Score 2  -MJ      Post Pain Score 1  -MJ      Pain Type Acute pain  -MJ      Pain Location Hip  -MJ      Pain Orientation Left  -MJ      Pain Intervention(s) Medication (See MAR);Repositioned;Ambulation/increased activity  -MJ      Recorded by [MJ] Petr Vo, OLIVER      Cognitive Assessment/Intervention    Current Cognitive/Communication Assessment functional  -MJ      Orientation Status oriented x 4  -MJ      Follows Commands/Answers Questions 100% of the time;able to follow multi-step instructions;needs cueing  -MJ      Personal Safety WNL/WFL  -MJ      Recorded by [MJ] Petr Vo, OLIVER      Mobility Assessment/Training    Extremity Weight-Bearing Status left lower extremity  -MJ      Left Lower Extremity Weight-Bearing weight-bearing as tolerated  -MJ      Recorded by [MJ] Petr Vo, OLIVER      Bed Mobility, Assessment/Treatment    Bed Mobility, Assistive Device bed rails;head of bed elevated  -MJ      Bed Mob, Supine to Sit, [place]  minimum assist (75% patient effort);verbal cues required  -MJ      Bed Mob, Sit to Supine, [place] not tested   Pt UIC  -MJ      Bed Mobility, Safety Issues decreased use of legs for bridging/pushing  -MJ      Bed Mobility, Impairments ROM decreased;strength decreased;pain  -MJ      Bed Mobility, Comment Knee immobilizer donned prior to OOB activity. Cues and assist to move LEs off EOB and to use UEs to push trunk to sitting. Increased time and effort to perform  -MJ      Recorded by [MJ] Petr Vo, PT      Transfer Assessment/Treatment    Transfers, Sit-Stand [place] minimum assist (75% patient effort);2 person assist required;verbal cues required  -MJ      Transfers, Stand-Sit [place] minimum assist (75% patient effort);2 person assist required;verbal cues required  -MJ      Transfers, Sit-Stand-Sit, Assist Device rolling walker  -MJ      Toilet Transfer, [place] contact guard assist;2 person assist required;verbal cues required  -MJ      Toilet Transfer, Assistive Device bedside commode without drop arms;rolling walker  -MJ      Transfer, Safety Issues step length decreased;weight-shifting ability decreased  -MJ      Transfer, Impairments ROM decreased;strength decreased;pain  -MJ      Transfer, Comment Verbal cues for correct hand placement and to step L LE out prior to t/f. Assisted pt to BSC, pt dependent for hygiene after toileting  -MJ      Recorded by [MJ] Petr Vo, PT      Gait Assessment/Treatment    Gait, [place] Level contact guard assist;2 person assist required;verbal cues required  -MJ      Gait, Assistive Device rolling walker  -MJ      Gait, Distance (Feet) 104  -MJ      Gait, Gait Pattern Analysis swing-to gait  -MJ      Gait, Gait Deviations right:;antalgic;[place]aina decreased;step length decreased;weight-shifting ability decreased  -MJ      Gait, Safety Issues step length decreased;weight-shifting ability decreased  -MJ      Gait, Impairments ROM decreased;strength  decreased;pain  -MJ      Gait, Comment Pt initially demo step to gait pattern, progressed to step through with cues and practice. Cues to stay in middle of RW and to  feet during turn. Gait limited by pain and fatigue  -      Recorded by [MJ] Petr Vo, PT      Therapy Exercises    Exercise Protocols hip ORIF  -      Hip ORIF Exercises left:;15 reps;completed protocol;with assist;ankle pumps/circles;quad set;glut set;heel slides;hip abduction;SAQ;SLR   Cues for technique. Vince w/hip abd, SLR, heel slides, SAQ  -      Recorded by [MJ] Petr Vo PT      Positioning and Restraints    Pre-Treatment Position in bed  -      Post Treatment Position chair  -      In Chair notified nsg;reclined;call light within reach;encouraged to call for assist  -      Recorded by [MJ] Petr Vo, PT        User Key  (r) = Recorded By, (t) = Taken By, (c) = Cosigned By    Initials Name Effective Dates    QI Hess, PT 06/19/15 -     TB Amy Caraballo, OT 06/22/15 -     MJ Petr Vo, PT 03/14/16 -                 OT Goals       01/17/17 1528 01/15/17 0945       Transfer Training OT LTG    Transfer Training OT LTG, Date Established  01/15/17  -AR     Transfer Training OT LTG, Time to Achieve  by discharge  -AR     Transfer Training OT LTG, Activity Type  sit to stand/stand to sit;toilet  -AR     Transfer Training OT LTG, [place] Level  contact guard assist;verbal cues required  -AR     Transfer Training OT LTG, Assist Device  commode, bedside;walker, rolling  -AR     Transfer Training OT LTG, Outcome goal not met  -TB      Patient Education OT LTG    Patient Education OT LTG, Date Established  01/15/17  -AR     Patient Education OT LTG, Time to Achieve  by discharge  -AR     Patient Education OT LTG, Education Type  precautions per surgeon;1 hand/gj technique;adaptive equipment mgmt;sathya/doff brace  -AR     Patient Education OT LTG, Education Understanding  demonstrates adequately;verbalizes  "understanding  -AR     Patient Education OT LTG Outcome goal not met  -TB      LB Dressing OT LTG    LB Dressing Goal OT LTG, Date Established  01/15/17  -AR     LB Dressing Goal OT LTG, Time to Achieve  by discharge  -AR     LB Dressing Goal OT LTG, Activity Type  Pt will complete LB dressing task   -AR     LB Dressing Goal OT LTG, [place] Level  verbal cues required;moderate assist (50% patient effort)  -AR     LB Dressing Goal OT LTG, Adaptive Equipment  sock-aid;reacher  -AR     LB Dressing Goal OT LTG, Outcome goal not met  -TB        User Key  (r) = Recorded By, (t) = Taken By, (c) = Cosigned By    Initials Name Provider Type    TB Amy Caraballo, OT Occupational Therapist    AR Ramila Moore, OT Occupational Therapist          Occupational Therapy Education     Title: PT OT SLP Therapies (Active)     Topic: Occupational Therapy (Active)     Point: ADL training (Done)    Description: Instruct learner(s) on proper safety adaptation and remediation techniques during self care or transfers.   Instruct in proper use of assistive devices.    Learning Progress Summary    Learner Readiness Method Response Comment Documented by Status   Patient Acceptance HERRERA BRUNNER,NR Education reinforced for precautions per surgeon, use of AE to maintain precautions and \"what to expect at [facility]\" for d/c planning. TB 01/17/17 1526 Done    Eager MYESHA HOYOS D VU, DU Educated pt and family on bed mobility, facial iliaca and need for brace on when up at present due to quad weakness, transfer training, ADL retraining AR 01/15/17 0943 Done   Family Acceptance HERRERA BRUNNER NR Education reinforced for precautions per surgeon, use of AE to maintain precautions and \"what to expect at [facility]\" for d/c planning. TB 01/17/17 1526 Done    Eager MYESHA HOYOS D VU, DU Educated pt and family on bed mobility, facial iliaca and need for brace on when up at present due to quad weakness, transfer training, ADL retraining AR 01/15/17 0943 Done               Point: " "Precautions (Done)    Description: Instruct learner(s) on prescribed precautions during self-care and functional transfers.    Learning Progress Summary    Learner Readiness Method Response Comment Documented by Status   Patient Acceptance HERRERA BRUNNER,NR Education reinforced for precautions per surgeon, use of AE to maintain precautions and \"what to expect at [facility]\" for d/c planning. TB 01/17/17 1526 Done    Eager MYESHA HOYOS D VUDU Educated pt and family on bed mobility, facial iliaca and need for brace on when up at present due to quad weakness, transfer training, ADL retraining AR 01/15/17 0943 Done   Family Acceptance HERRERA BRUNNER,NR Education reinforced for precautions per surgeon, use of AE to maintain precautions and \"what to expect at [facility]\" for d/c planning. TB 01/17/17 1526 Done    Eager MYESHA HOYOS D VUDU Educated pt and family on bed mobility, facial iliaca and need for brace on when up at present due to quad weakness, transfer training, ADL retraining AR 01/15/17 0943 Done               Point: Body mechanics (Done)    Description: Instruct learner(s) on proper positioning and spine alignment during self-care, functional mobility activities and/or exercises.    Learning Progress Summary    Learner Readiness Method Response Comment Documented by Status   Patient Eager MYESHA HOYOS D VU, DU Educated pt and family on bed mobility, facial iliaca and need for brace on when up at present due to quad weakness, transfer training, ADL retraining AR 01/15/17 0943 Done   Family Eager MYESHA HOYOS D VUDU Educated pt and family on bed mobility, facial iliaca and need for brace on when up at present due to quad weakness, transfer training, ADL retraining AR 01/15/17 0943 Done                      User Key     Initials Effective Dates Name Provider Type Discipline    TB 06/22/15 -  Amy Caraballo, OT Occupational Therapist OT    AR 06/22/15 -  Ramila Moore, OT Occupational Therapist OT                OT Recommendation and Plan  Anticipated " Equipment Needs At Discharge: bathing equipment, dressing equipment (issued hip kit this date)  Anticipated Discharge Disposition: inpatient rehabilitation facility  Therapy Frequency: daily (per priority policy)  Plan of Care Review  Plan Of Care Reviewed With: patient, daughter  Progress: improving  Outcome Summary/Follow up Plan: Pt set to d/c to IRF this date.  Pt preparing to d/c when OT arrived; agreeable to education, but declined OOB.  Goals not met due to SLOS.  OT to d/c at this time.          Outcome Measures       01/17/17 1505 01/17/17 1010 01/16/17 1311    How much help from another person do you currently need...    Turning from your back to your side while in flat bed without using bedrails?  3  -SC 3  -MJ    Moving from lying on back to sitting on the side of a flat bed without bedrails?  3  -SC 3  -MJ    Moving to and from a bed to a chair (including a wheelchair)?  3  -SC 3  -MJ    Standing up from a chair using your arms (e.g., wheelchair, bedside chair)?  3  -SC 3  -MJ    Climbing 3-5 steps with a railing?  2  -SC 2  -MJ    To walk in hospital room?  3  -SC 3  -MJ    AM-PAC 6 Clicks Score  17  -SC 17  -MJ    How much help from another is currently needed...    Putting on and taking off regular lower body clothing? 1  -TB      Bathing (including washing, rinsing, and drying) 2  -TB      Toileting (which includes using toilet bed pan or urinal) 2  -TB      Putting on and taking off regular upper body clothing 3  -TB      Taking care of personal grooming (such as brushing teeth) 3  -TB      Eating meals 4  -TB      Score 15  -TB      Functional Assessment    Outcome Measure Options AM-PAC 6 Clicks Daily Activity (OT)  -TB AM-PAC 6 Clicks Basic Mobility (PT)  -SC AM-PAC 6 Clicks Basic Mobility (PT)  -MJ      01/16/17 0835 01/15/17 0830 01/15/17 0829    How much help from another person do you currently need...    Turning from your back to your side while in flat bed without using bedrails? 3  -MJ  3   -EH    Moving from lying on back to sitting on the side of a flat bed without bedrails? 3  -MJ  3  -EH    Moving to and from a bed to a chair (including a wheelchair)? 3  -MJ  3  -EH    Standing up from a chair using your arms (e.g., wheelchair, bedside chair)? 2  -MJ  3  -EH    Climbing 3-5 steps with a railing? 2  -MJ  2  -EH    To walk in hospital room? 3  -MJ  3  -EH    AM-PAC 6 Clicks Score 16  -MJ  17  -EH    How much help from another is currently needed...    Putting on and taking off regular lower body clothing?  1  -AR     Bathing (including washing, rinsing, and drying)  2  -AR     Toileting (which includes using toilet bed pan or urinal)  2  -AR     Putting on and taking off regular upper body clothing  3  -AR     Taking care of personal grooming (such as brushing teeth)  3  -AR     Eating meals  4  -AR     Score  15  -AR     Functional Assessment    Outcome Measure Options AM-PAC 6 Clicks Basic Mobility (PT)  - AM-PAC 6 Clicks Daily Activity (OT)  -AR AM-PAC 6 Clicks Basic Mobility (PT)  -      User Key  (r) = Recorded By, (t) = Taken By, (c) = Cosigned By    Initials Name Provider Type    QI Hess, PT Physical Therapist    TB Amy Caraballo, OT Occupational Therapist    JON Slaughter, PT Physical Therapist    AR Ramila Moore, OT Occupational Therapist    SIRENA Vo, PT Physical Therapist           Time Calculation:          Time Calculation- OT       01/17/17 1531          Time Calculation- OT    OT Start Time 1505  -TB      Total Timed Code Minutes- OT 15 minute(s)  -TB      OT Received On 01/17/17  -TB        User Key  (r) = Recorded By, (t) = Taken By, (c) = Cosigned By    Initials Name Provider Type    TB Amy Caraballo OT Occupational Therapist          Therapy Charges for Today     Code Description Service Date Service Provider Modifiers Qty    [id number]  OT THERAPEUTIC ACT EA 15 MIN 1/17/2017 Amy Caraballo OT GO 1               OT  Discharge Summary  Anticipated Discharge Disposition: inpatient rehabilitation facility  Reason for Discharge: Discharge from facility  Discharge Destination: Inpatient rehabilitation facility    Amy Caraballo OT  1/17/2017

## 2017-01-17 NOTE — PLAN OF CARE
Problem: Patient Care Overview (Adult)  Goal: Plan of Care Review  Outcome: Ongoing (interventions implemented as appropriate)    01/17/17 1050   Coping/Psychosocial Response Interventions   Plan Of Care Reviewed With patient   Patient Care Overview   Progress improving   Outcome Evaluation   Outcome Summary/Follow up Plan Leaving for rehab today. SHe is progressing towards her goals. Still required her knee immobilizer         Problem: Inpatient Physical Therapy  Goal: Bed Mobility Goal LTG- PT  Outcome: Ongoing (interventions implemented as appropriate)    01/15/17 0937 01/16/17 1348 01/17/17 1050   Bed Mobility PT LTG   Bed Mobility PT LTG, Date Established --  --  01/17/17   Bed Mobility PT LTG, Time to Achieve 2 wks --  --    Bed Mobility PT LTG, Activity Type supine to sit/sit to supine --  --    Bed Mobility PT LTG, [place] Level supervision required --  --    Bed Mobility PT LTG, Date Goal Reviewed --  01/16/17 --    Bed Mobility PT LTG, Outcome --  --  goal partially met   Bed Mobility PT LTG, Reason Goal Not Met --  --  discharged from facility       Goal: Transfer Training Goal 1 LTG- PT  Outcome: Unable to achieve outcome(s) by discharge Date Met:  01/17/17    01/15/17 0937 01/16/17 1348 01/17/17 1050   Transfer Training PT LTG   Transfer Training PT LTG, Date Established --  --  01/17/17   Transfer Training PT LTG, Time to Achieve 2 wks --  --    Transfer Training PT LTG, Activity Type sit to stand/stand to sit --  --    Transfer Training PT LTG, [place] Level set up required;supervision required --  --    Transfer Training PT LTG, Assist Device walker, rolling --  --    Transfer Training PT LTG, Date Goal Reviewed --  01/16/17 --    Transfer Training PT LTG, Outcome --  --  goal partially met   Transfer Training PT LTG, Reason Goal Not Met --  --  discharged from facility       Goal: Gait Training Goal LTG- PT  Outcome: Unable to achieve outcome(s) by discharge Date Met:  01/17/17 01/17/17  1050   Gait Training PT LTG   Gait Training Goal PT LTG, Date Established 01/17/17   Gait Training Goal PT LTG, Outcome goal not met   Gait Training Goal PT LTG, Reason Goal Not Met discharged fro       01/15/17 0937 01/16/17 1348 01/17/17 1050   Gait Training PT LTG   Gait Training Goal PT LTG, Date Established --  --  01/17/17   Gait Training Goal PT LTG, Time to Achieve 2 wks --  --    Gait Training Goal PT LTG, [place] Level contact guard assist --  --    Gait Training Goal PT LTG, Assist Device walker, rolling --  --    Gait Training Goal PT LTG, Distance to Achieve 350 --  --    Gait Training Goal PT LTG, Date Goal Reviewed --  01/16/17 --    Gait Training Goal PT LTG, Outcome --  --  goal not met   Gait Training Goal PT LTG, Reason Goal Not Met --  --  discharged from facility    facility

## 2017-01-17 NOTE — DISCHARGE SUMMARY
Patient Name: Keila Alberts  MRN: [id number]  : 7/15/1928  DOS: 2017    Attending: Bulmaro Camarillo MD    Primary Care Provider: Kodak Clarke MD    Date of Admission:.2017 11:12 AM    Date of Discharge:  2017    Discharge Diagnosis: Principal Problem:    Closed left hip fracture, s/p left hip percutaneous pinning  Active Problems:    Hypertension    s/p Left Craniotomy for Evacuation of Left SDH 16     UTI (urinary tract infection)      Hospital Course  Patient is a 88 y.o. female with history of hypertension , osteoarthritis and history of a fall leading to subdural hematoma in November of this year for which she underwent a bur hole with hematoma evacuation by Dr. Zuniga. After that hospitalization she was discharged to [facility] and eventually was discharged home with home health physical therapy.      She has since had a follow-up with Dr. Zuniga and she has another follow-up with a head CT scan scheduled for the  of this month. Due to need for further rehab at this time, the CT scan was completed during this admission.     She was brought to the emergerncy department and [facility] where she was diagnosed with left hip fracture. I was kindly asked to admit her and Dr. Albert with orthopedic surgery was consulted.   Upon consultation it was recommended that she undergoes percutaneous pinning which was performed  under general and with ileo-fascial block. She tolerated surgery well and was admitted to the hospital for further management.    The patient has done well postop. The patient has been able to ambulate with PT.  The patient has had good pain control with PO pain medications.   The patient was placed on DVT prophylaxis including Lovenox. The patient was encouraged to use IS for atelectasis prophylaxis.     She was diagnosed with UTI and received a short course of ceftriaxone.     The patient was placed on a bowel  regimen to prevent constipation while on pain medication.   The patient's H/H was monitored with a slight decrease that remained asymptomatic.  It is felt by all involved that the patient can discharge to [facility] at this time, and the patient has no further questions        Procedures Performed  Keila Alberts  1/14/2017     Pre-op Diagnosis:   Left hip femoral neck fracture     Post-op Diagnosis:   Post-Op Diagnosis Codes:  * Fracture of femoral neck, left [S72.002A]     Procedure/CPT® Codes:  SD PERCUT FIX PROX/NECK FEMUR FX [99768]     Procedure(s):  Left HIP PERCUTANEOUS PINNING     Surgeon(s):  Edgar Albert MD       Pertinent Test Results:    I reviewed the patient's new clinical results.     Results from last 7 days  Lab Units 01/17/17  0507 01/16/17  0441 01/15/17  0442   WBC 10*3/mm3 7.22 8.48 8.91   HEMOGLOBIN g/dL 9.1* 8.8* 8.8*   HEMATOCRIT % 28.2* 26.9* 26.8*   PLATELETS 10*3/mm3 333 274 281       Results from last 7 days  Lab Units 01/15/17  0442 01/14/17  1151   SODIUM mmol/L 139 138   POTASSIUM mmol/L 3.9 3.7   CHLORIDE mmol/L 103 101   TOTAL CO2 mmol/L 29.0 31.0   BUN mg/dL 9 12   CREATININE mg/dL 0.50* 0.60   CALCIUM mg/dL 8.8 9.7   BILIRUBIN mg/dL  --  0.7   ALK PHOS U/L  --  74   ALT (SGPT) U/L  --  15   AST (SGOT) U/L  --  20   GLUCOSE mg/dL 98 101*     CT Head Without Contrast [ODB772] (Order [id number])    Status: Final result         Patient Location      Patient Class Location Department Phone     Inpatient [address], [address], [phone]          PACS Images      Radiology Images       Study Result      EXAMINATION: CT HEAD WITHOUT CONTRAST-01/16/2017:       INDICATION: Followup brain surgery; S72.002A-Fracture of unspecified  part of neck of left femur, initial encounter for closed fracture;  M25.511-Pain in right shoulder; Z74.09-Other reduced mobility;  Z74.09-Other reduced mobility.       TECHNIQUE: CT scan of the head was performed without contrast.      COMPARISON:  "2016.      FINDINGS: There is no intra-axial mass. There is a small chronic left  subdural hematoma deep to the calvarial flap, this has not changed  significantly since 2016 and there is little mass effect. There  are periventricular white matter changes typical of aging.      IMPRESSION:  Small chronic left subdural hematoma, insignificantly  changed since 2016.      D: 2017  E: 2017     I reviewed the patient's new imaging including images and reports.      Physical therapy: Pt ambulated 60 feet with Vince, limited by pain and knee buckling. Pt still requires cues for sequencing. Will continue to progress mobility as able.     Discharge Assessment:    Vital Signs  Visit Vitals   • /69   • Pulse 76   • Temp 98.1 °F (36.7 °C) (Temporal Artery )   • Resp 18   • Ht 63\" (160 cm)   • Wt 120 lb (54.4 kg)   • SpO2 95%   • BMI 21.26 kg/m2     Temp (24hrs), Av.2 °F (36.8 °C), Min:97.5 °F (36.4 °C), Max:98.8 °F (37.1 °C)      General Appearance:    Alert, cooperative, in no acute distress   Lungs:     Clear to auscultation,respirations regular, even and                   unlabored    Heart:    Regular rhythm and normal rate, normal S1 and S2   Abdomen:     Normal bowel sounds, no masses, no organomegaly, soft        non-tender, non-distended, no guarding, no rebound                 tenderness   Extremities:   Moves all extremities well, no edema, no cyanosis, no              Redness. Left hip aquacel dressing CDI. Knee immobilizer in place. Nerve block present   Pulses:   Pulses palpable and equal bilaterally   Skin:   No bleeding, bruising or rash   Neurologic:   Cranial nerves 2 - 12 grossly intact, sensation intact. Flexion and dorsiflexion intact bilateral feet.       Discharge Disposition: [facility]    Discharge Medications   Keila Alberts   Home Medication Instructions KARLEE:[id number]    Printed on:17 1038   Medication Information                      amLODIPine (NORVASC) 5 MG " tablet  Take 5 mg by mouth Daily.             benazepril (LOTENSIN) 40 MG tablet  Take 40 mg by mouth Daily.             docusate sodium (COLACE) 100 MG capsule  Take 100 mg by mouth 2 (Two) Times a Day.             enoxaparin (LOVENOX) 40 MG/0.4ML solution syringe  Inject 0.4 mL under the skin Daily. For 1 month             HYDROcodone-acetaminophen (NORCO) 5-325 MG per tablet  Take 1 tablet by mouth Every 4 (Four) Hours As Needed for moderate pain (4-6) for up to 7 days.             Multiple Vitamins-Minerals (PRESERVISION AREDS 2) capsule  Take 1 capsule by mouth Daily.             vitamin B-12 (CYANOCOBALAMIN) 1000 MCG tablet  Take 1,000 mcg by mouth Daily.                 Discharge Diet: regular diet    Activity at Discharge: WBAT LLE    Follow-up Appointments:  Dr. Albert per his orders       KATARZYNA Raza  01/17/17  10:38 AM  Seen and examined by me. Agree with above. Discussed with patient.   Discussed with  regarding pt's hospitalization, family will call to reschedule appointment.  Discharge took over 30 min

## 2017-01-17 NOTE — THERAPY DISCHARGE NOTE
Acute Care - Physical Therapy Treatment Note/Discharge   [place]     Patient Name: Keila Alberts  : 7/15/1928  MRN: [id number]  Today's Date: 2017  Onset of Illness/Injury or Date of Surgery Date: 17  Date of Referral to PT: 17  Referring Physician: Dr. Albert    Admit Date: 2017    Visit Dx:    ICD-10-CM ICD-9-CM   1. Closed left hip fracture, initial encounter S72.002A 820.8   2. Acute pain of right shoulder M25.511 719.41   3. Impaired mobility and ADLs Z74.09 799.89   4. Impaired functional mobility, balance, gait, and endurance Z74.09 V49.89     Patient Active Problem List   Diagnosis   • Subdural hematoma, post-traumatic   • Hypertension   • Constipation   • Normocytic anemia   • Acute/Subacute Traumatic SDH (subdural hematoma)   • Altered mental status   • Leukocytosis, ? Etiology   • s/p Left Craniotomy for Evacuation of Left SDH 16    • Hyponatremia   • 6.9 x 5.3cm pelvic mass, seen on imaging prior to admission   • Closed left hip fracture, s/p left hip percutaneous pinning   • UTI (urinary tract infection)       Physical Therapy Education     Title: PT OT SLP Therapies (Active)     Topic: Physical Therapy (Done)     Point: Mobility training (Done)    Learning Progress Summary    Learner Readiness Method Response Comment Documented by Status   Patient Eager E MILAN,NR safety with mobility SC 17 1049 Done    Acceptance E,D VU,NR   17 1348 Done    Acceptance E,D VU,NR   17 0916 Done    Acceptance E VU,NR   01/15/17 0937 Done   Family Acceptance E,D VU,NR   17 1348 Done    Acceptance E VU,NR   01/15/17 0937 Done               Point: Home exercise program (Done)    Learning Progress Summary    Learner Readiness Method Response Comment Documented by Status   Patient Eager E MILAN,NR safety with mobility SC 17 1049 Done    Acceptance E,D VU,NR   17 1348 Done    Acceptance E,D VU,NR   17 0916 Done    Acceptance E VU,NR   01/15/17  0937 Done   Family Acceptance E,D VU,NR   01/16/17 1348 Done    Acceptance E VU,[facility] 01/15/17 0937 Done               Point: Body mechanics (Done)    Learning Progress Summary    Learner Readiness Method Response Comment Documented by Status   Patient Eager E VU,NR safety with mobility SC 01/17/17 1049 Done    Acceptance E,D VU,NR   01/16/17 1348 Done    Acceptance E,D VU,NR   01/16/17 0916 Done    Acceptance E VU,[facility] 01/15/17 0937 Done   Family Acceptance E,D VU,NR   01/16/17 1348 Done    Acceptance E VU,[facility] 01/15/17 0937 Done               Point: Precautions (Done)    Learning Progress Summary    Learner Readiness Method Response Comment Documented by Status   Patient Eager E VU,NR safety with mobility SC 01/17/17 1049 Done    Acceptance E,D VU,[facility] 01/16/17 1348 Done    Acceptance E,D VU,[facility] 01/16/17 0916 Done    Acceptance E VU,[facility] 01/15/17 0937 Done   Family Acceptance E,D VU,[facility] 01/16/17 1348 Done    Acceptance E VU,[facility] 01/15/17 0937 Done                      User Key     Initials Effective Dates Name Provider Type Discipline    SC 06/19/15 -  Jae Hess, PT Physical Therapist PT     06/19/15 -  Ynes Slaughter, PT Physical Therapist PT     03/14/16 -  Petr Vo, PT Physical Therapist PT                    IP PT Goals       01/17/17 1050 01/16/17 1348 01/16/17 0916    Bed Mobility PT LTG    Bed Mobility PT LTG, Date Established 01/17/17  -SC      Bed Mobility PT LTG, Date Goal Reviewed  01/16/17  - 01/16/17  -    Bed Mobility PT LTG, Outcome goal partially met  -SC goal ongoing  - goal ongoing  -    Bed Mobility PT LTG, Reason Goal Not Met discharged from facility  -SC      Transfer Training PT LTG    Transfer Training PT LTG, Date Established 01/17/17  -SC      Transfer Training PT  LTG, Date Goal Reviewed  01/16/17  - 01/16/17  -    Transfer Training PT LTG, Outcome goal partially met  -SC goal ongoing  - goal ongoing  -    Transfer Training PT LTG,  Reason Goal Not Met discharged from facility  -SC      Gait Training PT LTG    Gait Training Goal PT LTG, Date Established 01/17/17  -SC      Gait Training Goal PT LTG, Date Goal Reviewed  01/16/17  - 01/16/17  -    Gait Training Goal PT LTG, Outcome goal not met  -SC goal ongoing  - goal ongoing  -    Gait Training Goal PT LTG, Reason Goal Not Met discharged from facility  -SC      Stair Training PT LTG    Stair Training Goal PT LTG, Date Goal Reviewed  01/16/17  - 01/16/17  -    Stair Training Goal PT LTG, Outcome  goal not met  - goal ongoing  -    Stair Training Goal PT LTG, Reason Goal Not Met  --   No need to perform, DC to [facility]  -       01/15/17 0937          Bed Mobility PT LTG    Bed Mobility PT LTG, Date Established 01/15/17  -      Bed Mobility PT LTG, Time to Achieve 2 wks  -EH      Bed Mobility PT LTG, Activity Type supine to sit/sit to supine  -EH      Bed Mobility PT LTG, [place] Level supervision required  -EH      Transfer Training PT LTG    Transfer Training PT LTG, Date Established 01/15/17  -      Transfer Training PT LTG, Time to Achieve 2 wks  -EH      Transfer Training PT LTG, Activity Type sit to stand/stand to sit  -EH      Transfer Training PT LTG, [place] Level set up required;supervision required  -EH      Transfer Training PT LTG, Assist Device walker, rolling  -EH      Gait Training PT LTG    Gait Training Goal PT LTG, Date Established 01/15/17  -      Gait Training Goal PT LTG, Time to Achieve 2 wks  -EH      Gait Training Goal PT LTG, [place] Level contact guard assist  -EH      Gait Training Goal PT LTG, Assist Device walker, rolling  -EH      Gait Training Goal PT LTG, Distance to Achieve 350  -EH      Stair Training PT LTG    Stair Training Goal PT LTG, Date Established 01/15/17  -      Stair Training Goal PT LTG, Time to Achieve 2 wks  -EH      Stair Training Goal PT LTG, Number of Steps 1  -EH      Stair Training Goal PT LTG, [place]  Level contact guard assist  -      Stair Training Goal PT LTG, Assist Device walker, rolling  -        User Key  (r) = Recorded By, (t) = Taken By, (c) = Cosigned By    Initials Name Provider Type    SC Jae Hess, PT Physical Therapist     Ynes Slaughter, PT Physical Therapist    SIRENA Vo, OLIVER Physical Therapist              Adult Rehabilitation Note       01/17/17 1010 01/16/17 1311 01/16/17 0835    Rehab Assessment/Intervention    Discipline physical therapist  -SC physical therapist  - physical therapist  -    Document Type therapy note (daily note);discharge summary  -SC therapy note (daily note)  - therapy note (daily note)  -    Subjective Information no complaints  -SC agree to therapy;complains of;pain;fatigue  - agree to therapy;complains of;pain  -MJ    Patient Effort, Rehab Treatment excellent  -SC good  - excellent  -    Symptoms Noted During/After Treatment fatigue  -SC fatigue  - fatigue  -    Precautions/Limitations brace on when up;fall precautions  -SC brace on when up;fall precautions;other (see comments)   knee immob when up due to fascia iliaca nerve cath; [place]  -MJ brace on when up;fall precautions;other (see comments)   kneee immob when up due to fascia iliaca nerve cath; [place]  -MJ    Recorded by [SC] Jae Hess, PT [MJ] Petr Vo, PT [MJ] Petr Vo, PT    Pain Assessment    Pain Assessment No/denies pain  -SC 0-10  -MJ 0-10  -MJ    Pain Score  6  -MJ 2  -MJ    Post Pain Score  0  -MJ 1  -MJ    Pain Type  Acute pain  -MJ Acute pain  -MJ    Pain Location  Hip  - Hip  -MJ    Pain Orientation  Left  -MJ Left  -MJ    Pain Intervention(s)  Repositioned;Ambulation/increased activity  - Medication (See MAR);Repositioned;Ambulation/increased activity  -    Recorded by [SC] Jae Hess, PT [MJ] Petr Vo PT [MJ] Petr Vo PT    Cognitive Assessment/Intervention    Current Cognitive/Communication Assessment functional  -SC functional  -  functional  -MJ    Orientation Status oriented x 4  -SC oriented x 4  -MJ oriented x 4  -MJ    Follows Commands/Answers Questions 100% of the time  -% of the time;able to follow multi-step instructions;needs cueing  -% of the time;able to follow multi-step instructions;needs cueing  -MJ    Personal Safety mild impairment  -SC WNL/WFL  -MJ WNL/WFL  -MJ    Personal Safety Interventions gait belt;fall prevention program maintained  -SC      Recorded by [SC] Jae Hess, PT [MJ] Petr Vo, PT [MJ] Petr Vo, PT    Mobility Assessment/Training    Extremity Weight-Bearing Status left lower extremity  -SC left lower extremity  -MJ left lower extremity  -MJ    Left Lower Extremity Weight-Bearing weight-bearing as tolerated  -SC weight-bearing as tolerated  -MJ weight-bearing as tolerated  -MJ    Recorded by [SC] Jae Hess, PT [MJ] Petr Vo, PT [MJ] Petr Vo PT    Bed Mobility, Assessment/Treatment    Bed Mobility, Assistive Device  head of bed elevated;bed rails  - bed rails;head of bed elevated  -    Bed Mobility, Scoot/Bridge, [place]  supervision required   to scoot up in bed  -MJ     Bed Mob, Supine to Sit, [place]  not tested   Pt UI  - minimum assist (75% patient effort);verbal cues required  -MJ    Bed Mob, Sit to Supine, [place]  minimum assist (75% patient effort);verbal cues required  -MJ not tested   Pt UIC  -    Bed Mobility, Safety Issues  decreased use of legs for bridging/pushing  -MJ decreased use of legs for bridging/pushing  -MJ    Bed Mobility, Impairments  ROM decreased;strength decreased;pain  -MJ ROM decreased;strength decreased;pain  -MJ    Bed Mobility, Comment UI  -SC Verbal cues for sequencing, assist with LEs. Increased time and effort to perform  -MJ Knee immobilizer donned prior to OOB activity. Cues and assist to move LEs off EOB and to use UEs to push trunk to sitting. Increased time and effort to perform  -MJ    Recorded by [SC] Jae  TRESSA Hess, PT [MJ] Petr Vo, PT [MJ] Petr Vo, PT    Transfer Assessment/Treatment    Transfers, Sit-Stand [place] contact guard assist;verbal cues required  -SC minimum assist (75% patient effort);verbal cues required  -MJ minimum assist (75% patient effort);2 person assist required;verbal cues required  -MJ    Transfers, Stand-Sit [place] verbal cues required;contact guard assist  -SC minimum assist (75% patient effort);verbal cues required  -MJ minimum assist (75% patient effort);2 person assist required;verbal cues required  -MJ    Transfers, Sit-Stand-Sit, Assist Device rolling walker  -SC rolling walker  -MJ rolling walker  -MJ    Toilet Transfer, [place]  contact guard assist;verbal cues required  -MJ contact guard assist;2 person assist required;verbal cues required  -MJ    Toilet Transfer, Assistive Device  bedside commode without drop arms;rolling walker  -MJ bedside commode without drop arms;rolling walker  -MJ    Transfer, Safety Issues balance decreased during turns  -SC step length decreased;weight-shifting ability decreased  -MJ step length decreased;weight-shifting ability decreased  -MJ    Transfer, Impairments impaired balance;strength decreased  -SC ROM decreased;strength decreased;pain  -MJ ROM decreased;strength decreased;pain  -MJ    Transfer, Comment cues for sequencing walker and leg with brace on  -SC Knee immobilizer donned prior to ambulation. Verbal cues for correct hand placement and to step L LE out prior to t/f. Assisted pt to BSC, verbal cues to reach back for BSC prior to t/f. Pt dependent for hygiene after toileting  -MJ Verbal cues for correct hand placement and to step L LE out prior to t/f. Assisted pt to BSC, pt dependent for hygiene after toileting  -    Recorded by [SC] Jae Hess, PT [MJ] Petr Vo, PT [MJ] Petr Vo, PT    Gait Assessment/Treatment    Gait, [place] Level contact guard assist;verbal cues required  -SC minimum assist (75%  patient effort);verbal cues required  - contact guard assist;2 person assist required;verbal cues required  -MJ    Gait, Assistive Device rolling walker  -SC rolling walker  -MJ rolling walker  -MJ    Gait, Distance (Feet) 90  -SC 60  -  -MJ    Gait, Gait Pattern Analysis swing-to gait  -SC swing-to gait  -MJ swing-to gait  -MJ    Gait, Gait Deviations right:;antalgic;weight-shifting ability decreased  -SC right:;antalgic;[place] decreased;knee buckling;step length decreased;toe-to-floor clearance decreased;weight-shifting ability decreased  -MJ right:;antalgic;[place] decreased;step length decreased;weight-shifting ability decreased  -MJ    Gait, Safety Issues balance decreased during turns;sequencing ability decreased  -SC step length decreased;weight-shifting ability decreased  -MJ step length decreased;weight-shifting ability decreased  -MJ    Gait, Impairments impaired balance;strength decreased;motor control impaired  -SC ROM decreased;strength decreased;pain  -MJ ROM decreased;strength decreased;pain  -MJ    Gait, Comment Repeted cues for sequencing walker and turns. (brace on)  -SC Pt initially demo step to gait pattern, progressed to step through with verbal cues and practice. Pt with knee buckling noted as she fatigues. Gait distance limited by pain and fatigue  - Pt initially demo step to gait pattern, progressed to step through with cues and practice. Cues to stay in middle of RW and to  feet during turn. Gait limited by pain and fatigue  -    Recorded by [SC] Jae Hess, PT [MJ] Petr Vo, PT [MJ] Petr Vo, PT    Therapy Exercises    Exercise Protocols hip ORIF  -SC hip ORIF  - hip ORIF  -    Hip ORIF Exercises left:;15 reps;ankle pumps/circles;quad set;glut set;heel slides;hip abduction;LAQ  -SC left:;15 reps;with assist;ankle pumps/circles;quad set;glut set;heel slides;hip abduction;SLR   Cues for technique. Vince w/SLR, heel slides, hip abd  - left:;15 reps;completed  protocol;with assist;ankle pumps/circles;quad set;glut set;heel slides;hip abduction;SAQ;SLR   Cues for technique. Vince w/hip abd, SLR, heel slides, SAQ  -MJ    Recorded by [SC] Jae Hess, PT [MJ] Petr Vo, PT [MJ] Petr Vo PT    Positioning and Restraints    Pre-Treatment Position sitting in chair/recliner  -SC sitting in chair/recliner  -MJ in bed  -MJ    Post Treatment Position bsc  -SC bed  -MJ chair  -MJ    In Bed with nsg  -SC notified nsg;supine;call light within reach;encouraged to call for assist;with family/caregiver;exit alarm on  -MJ     In Chair   notified nsg;reclined;call light within reach;encouraged to call for assist  -MJ    Recorded by [SC] Jae Hess, PT [MJ] Petr Vo PT [MJ] Petr Vo PT      User Key  (r) = Recorded By, (t) = Taken By, (c) = Cosigned By    Initials Name Effective Dates    SC Jae Hess, PT 06/19/15 -     SIRENA Vo PT 03/14/16 -           PT Recommendation and Plan  Anticipated Discharge Disposition: inpatient rehabilitation facility  Planned Therapy Interventions: balance training, bed mobility training, gait training, home exercise program, strengthening, stair training, transfer training  PT Frequency: 2 times/day  Plan of Care Review  Plan Of Care Reviewed With: patient  Progress: improving  Outcome Summary/Follow up Plan: Leaving for rehab today. SHe is progressing towards her goals. Still required her knee immobilizer          Outcome Measures       01/17/17 1010 01/16/17 1311 01/16/17 0835    How much help from another person do you currently need...    Turning from your back to your side while in flat bed without using bedrails? 3  -SC 3  -MJ 3  -MJ    Moving from lying on back to sitting on the side of a flat bed without bedrails? 3  -SC 3  -MJ 3  -MJ    Moving to and from a bed to a chair (including a wheelchair)? 3  -SC 3  -MJ 3  -MJ    Standing up from a chair using your arms (e.g., wheelchair, bedside chair)? 3  -SC 3  -MJ 2  -MJ     Climbing 3-5 steps with a railing? 2  -SC 2  -MJ 2  -MJ    To walk in hospital room? 3  -SC 3  -MJ 3  -MJ    AM-PAC 6 Clicks Score 17  -SC 17  -MJ 16  -MJ    Functional Assessment    Outcome Measure Options AM-PAC 6 Clicks Basic Mobility (PT)  -SC AM-PAC 6 Clicks Basic Mobility (PT)  -MJ AM-PAC 6 Clicks Basic Mobility (PT)  -MJ      01/15/17 0830 01/15/17 0829       How much help from another person do you currently need...    Turning from your back to your side while in flat bed without using bedrails?  3  -EH     Moving from lying on back to sitting on the side of a flat bed without bedrails?  3  -EH     Moving to and from a bed to a chair (including a wheelchair)?  3  -EH     Standing up from a chair using your arms (e.g., wheelchair, bedside chair)?  3  -EH     Climbing 3-5 steps with a railing?  2  -EH     To walk in hospital room?  3  -EH     AM-PAC 6 Clicks Score  17  -EH     How much help from another is currently needed...    Putting on and taking off regular lower body clothing? 1  -AR      Bathing (including washing, rinsing, and drying) 2  -AR      Toileting (which includes using toilet bed pan or urinal) 2  -AR      Putting on and taking off regular upper body clothing 3  -AR      Taking care of personal grooming (such as brushing teeth) 3  -AR      Eating meals 4  -AR      Score 15  -AR      Functional Assessment    Outcome Measure Options AM-PAC 6 Clicks Daily Activity (OT)  -AR AM-PAC 6 Clicks Basic Mobility (PT)  -EH       User Key  (r) = Recorded By, (t) = Taken By, (c) = Cosigned By    Initials Name Provider Type    QI Hess, PT Physical Therapist    EH Ynes Slaughter, PT Physical Therapist    AR Ramila Moore, OT Occupational Therapist    SIRENA Vo, PT Physical Therapist           Time Calculation:         PT Charges       01/17/17 1053          Time Calculation    Start Time 1010  -SC      PT Received On 01/17/17  -SC      PT Goal Re-Cert Due Date 01/25/17  -SC      Time  Calculation- PT    Total Timed Code Minutes- PT 30 minute(s)  -SC        User Key  (r) = Recorded By, (t) = Taken By, (c) = Cosigned By    Initials Name Provider Type    SC Jae Hess, PT Physical Therapist          Therapy Charges for Today     Code Description Service Date Service Provider Modifiers Qty    66564187867 HC GAIT TRAINING EA 15 MIN 1/17/2017 Jae Hess, PT GP 1    41107234503 HC PT THER PROC EA 15 MIN 1/17/2017 Jae Hess, PT GP 1          PT G-Codes  Outcome Measure Options: AM-PAC 6 Clicks Basic Mobility (PT)    PT Discharge Summary  Anticipated Discharge Disposition: inpatient rehabilitation facility  Reason for Discharge: Discharge from facility  Outcomes Achieved: Patient able to partially acheive established goals  Discharge Destination: Inpatient rehabilitation facility    Jae Hess, PT  1/17/2017

## 2017-01-17 NOTE — PROGRESS NOTES
"Visit Vitals   • /82   • Pulse 93   • Temp 98.8 °F (37.1 °C) (Temporal Artery )   • Resp 18   • Ht 63\" (160 cm)   • Wt 120 lb (54.4 kg)   • SpO2 94%   • BMI 21.26 kg/m2       Lab Results (last 24 hours)     Procedure Component Value Units Date/Time    Urine Culture [[id number]] Collected:  01/14/17 1526    Specimen:  Urine from Urine, Clean Catch Updated:  01/16/17 0844     Urine Culture >100,000 CFU/mL Normal Urogenital Deena           Imaging Results (last 24 hours)     Procedure Component Value Units Date/Time    CT Head Without Contrast [[id number]] Collected:  01/16/17 0921     Updated:  01/16/17 0958    Narrative:       EXAMINATION: CT HEAD WITHOUT CONTRAST-01/16/2017:      INDICATION: Followup brain surgery; S72.002A-Fracture of unspecified  part of neck of left femur, initial encounter for closed fracture;  M25.511-Pain in right shoulder; Z74.09-Other reduced mobility;  Z74.09-Other reduced mobility.         TECHNIQUE: CT scan of the head was performed without contrast.     COMPARISON: 12/27/2016.     FINDINGS: There is no intra-axial mass. There is a small chronic left  subdural hematoma deep to the calvarial flap, this has not changed  significantly since 12/27/2016 and there is little mass effect. There  are periventricular white matter changes typical of aging.       Impression:       Small chronic left subdural hematoma, insignificantly  changed since 12/27/2016.     D:  01/16/2017  E:  01/16/2017           This report was finalized on 1/16/2017 9:56 AM EST by Dr. Aj Coyle MD.       FL C Arm During Surgery [[id number]] Collected:  01/16/17 1031     Updated:  01/16/17 1333    Narrative:       EXAMINATION: FL C ARM DURING SURGERY-01/14/2017     INDICATION: hip pinning; S72.002A-Fracture of unspecified part of neck  of left femur, initial encounter for closed fracture; M25.511-Pain in  right shoulder      TECHNIQUE: Use of fluoroscopy and intraoperative imaging during a left  hip repair.   "   COMPARISON: NONE     FINDINGS:   1. 1 minute 33 seconds fluoroscopic time was used.  2. 3 images were obtained during which 3 pins were placed into the left  femoral neck and head in excellent position for stabilization of the  fracture of the subcapital region of the left hip.       Impression:       1. Well-positioned pin placement for stabilization of left hip fracture.  2. Please refer to the procedural note otherwise.     D:  01/16/2017  E:  01/16/2017         This report was finalized on 1/16/2017 1:30 PM EST by Dr. Juan Durán MD.             Patient Care Team:  Kodak Clarke MD as PCP - General (Internal Medicine)  Kodak Clarke MD as Referring Physician (Internal Medicine)    SUBJECTIVE: Keila reports she is doing well.  Pain is well-controlled.  She walked 60 feet with therapy yesterday.  She feels ready to go to rehabilitation today.    PHYSICAL EXAM  Left hip Aquacel dressing is clean, dry and intact  Thigh and calf are soft nontender  Neurovascularly intact distally     Principal Problem:    Closed left hip fracture, s/p left hip percutaneous pinning  Active Problems:    Hypertension    s/p Left Craniotomy for Evacuation of Left SDH 11/27/16     UTI (urinary tract infection)      PLAN / DISPOSITION: 88-year-old female postop day #3 status post left hip percutaneous pinning  -Okay with me to transfer to rehabilitation today  -Okay to shower with Aquacel dressing in place once ilio fascial catheter discontinued  -Continue to mobilize with therapy as tolerated  -Lovenox for DVT prophylaxis  -Follow-up with me in 2-3 weeks     Edgar Albert MD  01/17/17  5:44 AM

## 2017-01-17 NOTE — PLAN OF CARE
Problem: Patient Care Overview (Adult)  Goal: Plan of Care Review  Outcome: Ongoing (interventions implemented as appropriate)    01/16/17 2031 01/17/17 0150   Coping/Psychosocial Response Interventions   Plan Of Care Reviewed With --  patient   Patient Care Overview   Progress improving --    Outcome Evaluation   Outcome Summary/Follow up Plan --  rested well. pain controlled with ropivicainie and pain meds given. up with assist in room. no c/o       Goal: Adult Individualization and Mutuality  Outcome: Ongoing (interventions implemented as appropriate)  Goal: Discharge Needs Assessment  Outcome: Ongoing (interventions implemented as appropriate)    Problem: Fall Risk (Adult)  Goal: Identify Related Risk Factors and Signs and Symptoms  Outcome: Ongoing (interventions implemented as appropriate)  Goal: Absence of Falls  Outcome: Ongoing (interventions implemented as appropriate)

## 2017-01-17 NOTE — PROGRESS NOTES
Continued Stay Note  [facility]     Patient Name: Keila Alberts  MRN: [id number]  Today's Date: 1/17/2017    Admit Date: 1/14/2017          Discharge Plan       01/17/17 0932    Case Management/Social Work Plan    Plan [facility]    Patient/Family In Agreement With Plan yes    Final Note    Final Note  has a bed at [facility] today, per Jeannette at facility.  Notified patient's daughter, Aretha, ph [phone].  Notified Ramila ALONSO.    R/M ambulance scheduled for 4pm today.  PCS form on chart.    Please call report to [facility] Stroke [facility], ph [phone].  Please have copy of the transfer summary and any scripts in the discharge packet.  Thank you.              Discharge Codes     None        Expected Discharge Date and Time     Expected Discharge Date Expected Discharge Time    Jan 17, 2017             Desire Alva

## 2017-01-17 NOTE — PROGRESS NOTES
[facility]    Acute pain service Inpatient Progress Note    Patient Name: Keila Alberts  :  7/15/1928  MRN:  [id number]        Acute Pain  Service Inpatient Progress Note:    Analgesia:Excellent  LOC: alert and awake  Cardiac: VS stable  Side Effects:None  Catheter Site:clean  Catheter Plan:Anesthesia removed catheter and tip intact

## 2017-01-17 NOTE — PLAN OF CARE
Problem: Patient Care Overview (Adult)  Goal: Plan of Care Review  Outcome: Ongoing (interventions implemented as appropriate)    01/16/17 2031   Coping/Psychosocial Response Interventions   Plan Of Care Reviewed With patient   Patient Care Overview   Progress improving         Problem: Fall Risk (Adult)  Goal: Identify Related Risk Factors and Signs and Symptoms  Outcome: Ongoing (interventions implemented as appropriate)    01/16/17 2031   Fall Risk   Fall Risk: Related Risk Factors gait/mobility problems;history of falls;sleep pattern alteration;environment unfamiliar   Fall Risk: Signs and Symptoms presence of risk factors

## 2017-01-24 ENCOUNTER — APPOINTMENT (OUTPATIENT)
Dept: CT IMAGING | Facility: HOSPITAL | Age: 82
End: 2017-01-24

## 2017-02-22 ENCOUNTER — OFFICE VISIT (OUTPATIENT)
Dept: NEUROSURGERY | Facility: CLINIC | Age: 82
End: 2017-02-22

## 2017-02-22 VITALS — HEIGHT: 63 IN | WEIGHT: 127 LBS | TEMPERATURE: 97.9 F | BODY MASS INDEX: 22.5 KG/M2

## 2017-02-22 DIAGNOSIS — S06.5X0A: Primary | ICD-10-CM

## 2017-02-22 PROCEDURE — 99024 POSTOP FOLLOW-UP VISIT: CPT | Performed by: NEUROLOGICAL SURGERY

## 2017-02-22 NOTE — PROGRESS NOTES
Patient: Keila Alberts  : 7/15/1928    Primary Care Provider: Kodak Clarke MD    Requesting Provider: As above        History    Chief Complaint: Left subdural hematoma.    History of Present Illness: Ms. Alberts is an 88-year-old woman who presented with altered level of consciousness to the hospital and was noted to have a progressive left subdural hematoma for which she underwent craniotomy to evacuate on 2016.  Postoperatively she had substantial language dysfunction that ultimately improved.  Since I have seen her last she was afflicted with a left femur fracture that required surgical intervention by Dr. Albert.  She's been to rehabilitation and now presents for follow-up.  She reports no headaches.  She occasionally has some word finding difficulties.  She is ambulatory for short distances around her home.    Review of Systems   Constitutional: Negative for activity change, appetite change, chills, diaphoresis, fatigue, fever and unexpected weight change.   HENT: Positive for hearing loss. Negative for congestion, dental problem, drooling, ear discharge, ear pain, facial swelling, mouth sores, nosebleeds, postnasal drip, rhinorrhea, sinus pressure, sneezing, sore throat, tinnitus, trouble swallowing and voice change.    Eyes: Positive for visual disturbance. Negative for photophobia, pain, discharge, redness and itching.   Respiratory: Negative for apnea, cough, choking, chest tightness, shortness of breath, wheezing and stridor.    Cardiovascular: Negative for chest pain, palpitations and leg swelling.   Gastrointestinal: Positive for constipation. Negative for abdominal distention, abdominal pain, anal bleeding, blood in stool, diarrhea, nausea, rectal pain and vomiting.   Musculoskeletal: Positive for arthralgias and joint swelling. Negative for back pain, gait problem, myalgias, neck pain and neck stiffness.   Skin: Negative for color change, pallor, rash and wound.  "  Allergic/Immunologic: Negative for environmental allergies, food allergies and immunocompromised state.   Neurological: Positive for headaches. Negative for dizziness, tremors, seizures, syncope, facial asymmetry, speech difficulty, weakness, light-headedness and numbness.   Hematological: Negative for adenopathy. Does not bruise/bleed easily.   Psychiatric/Behavioral: Negative for agitation, behavioral problems, confusion, decreased concentration, dysphoric mood, self-injury, sleep disturbance and suicidal ideas. The patient is nervous/anxious. The patient is not hyperactive.        The patient's past medical history, past surgical history, family history, and social history have been reviewed at length in the electronic medical record.    Physical Exam:   Visit Vitals   • Temp 97.9 °F (36.6 °C)   • Ht 63\" (160 cm)   • Wt 127 lb (57.6 kg)   • BMI 22.5 kg/m2     The patient is awake, alert, and in good spirits.  She follows all commands.  She is well oriented.  Her speech is generally fluent.  There is no pronator drift.  Her incision is well-healed.    Medical Decision Making    Data Review:   CT scan of the brain from mid January of this year demonstrates some residual low-density fluid over the left convexity.  There is no mass effect.  This is consistent with her postop state.    Diagnosis:   Left subdural hematoma status post craniotomy for evacuation.    Treatment Options:   Ms. Alberts is actually doing quite well.  I'm very pleased with her progress.  At this juncture she will follow-up in my clinic on an as-needed basis.       Diagnosis Plan   1. Subdural hematoma, post-traumatic, without loss of consciousness, initial encounter             I, Dr. Christian, personally performed the services described in the documentation, as scribed in my presence, and it is both accurate and complete.  Scribed for Jos Christian MD by Abnre Linares CMA on 02/22/2017 at 11:48 AM    "

## 2017-09-01 ENCOUNTER — OFFICE VISIT (OUTPATIENT)
Dept: FAMILY MEDICINE CLINIC | Facility: CLINIC | Age: 82
End: 2017-09-01

## 2017-09-01 VITALS
BODY MASS INDEX: 23.04 KG/M2 | HEART RATE: 97 BPM | HEIGHT: 63 IN | WEIGHT: 130 LBS | SYSTOLIC BLOOD PRESSURE: 164 MMHG | RESPIRATION RATE: 14 BRPM | OXYGEN SATURATION: 100 % | TEMPERATURE: 97.2 F | DIASTOLIC BLOOD PRESSURE: 78 MMHG

## 2017-09-01 DIAGNOSIS — D64.9 ANEMIA, UNSPECIFIED TYPE: ICD-10-CM

## 2017-09-01 DIAGNOSIS — D64.89 ANEMIA DUE TO OTHER CAUSE: ICD-10-CM

## 2017-09-01 DIAGNOSIS — R30.0 DYSURIA: ICD-10-CM

## 2017-09-01 DIAGNOSIS — M17.11 ARTHRITIS OF RIGHT KNEE: ICD-10-CM

## 2017-09-01 DIAGNOSIS — I10 ESSENTIAL HYPERTENSION: Primary | ICD-10-CM

## 2017-09-01 DIAGNOSIS — M25.552 LEFT HIP PAIN: ICD-10-CM

## 2017-09-01 DIAGNOSIS — S06.5X0S: ICD-10-CM

## 2017-09-01 DIAGNOSIS — R53.83 OTHER FATIGUE: ICD-10-CM

## 2017-09-01 PROBLEM — H35.30 MACULAR DEGENERATION: Status: ACTIVE | Noted: 2017-09-01

## 2017-09-01 LAB
ALBUMIN SERPL-MCNC: 4.2 G/DL (ref 3.2–4.8)
ALBUMIN/GLOB SERPL: 1.4 G/DL (ref 1.5–2.5)
ALP SERPL-CCNC: 85 U/L (ref 25–100)
ALT SERPL-CCNC: 13 U/L (ref 7–40)
AST SERPL-CCNC: 24 U/L (ref 0–33)
BASOPHILS # BLD AUTO: 0.07 10*3/MM3 (ref 0–0.2)
BASOPHILS NFR BLD AUTO: 1 % (ref 0–1)
BILIRUB BLD-MCNC: NEGATIVE MG/DL
BILIRUB SERPL-MCNC: 0.6 MG/DL (ref 0.3–1.2)
BUN SERPL-MCNC: 12 MG/DL (ref 9–23)
BUN/CREAT SERPL: 20 (ref 7–25)
CALCIUM SERPL-MCNC: 9.4 MG/DL (ref 8.7–10.4)
CHLORIDE SERPL-SCNC: 104 MMOL/L (ref 99–109)
CLARITY, POC: CLEAR
CO2 SERPL-SCNC: 29 MMOL/L (ref 20–31)
COLOR UR: YELLOW
CREAT SERPL-MCNC: 0.6 MG/DL (ref 0.6–1.3)
EOSINOPHIL # BLD AUTO: 0.67 10*3/MM3 (ref 0–0.3)
EOSINOPHIL NFR BLD AUTO: 9.4 % (ref 0–3)
ERYTHROCYTE [DISTWIDTH] IN BLOOD BY AUTOMATED COUNT: 14.3 % (ref 11.3–14.5)
FERRITIN SERPL-MCNC: 154 NG/ML (ref 10–291)
GLOBULIN SER CALC-MCNC: 3.1 GM/DL
GLUCOSE SERPL-MCNC: 88 MG/DL (ref 70–100)
GLUCOSE UR STRIP-MCNC: NEGATIVE MG/DL
HCT VFR BLD AUTO: 34.4 % (ref 34.5–44)
HGB BLD-MCNC: 11.1 G/DL (ref 11.5–15.5)
IMM GRANULOCYTES # BLD: 0.02 10*3/MM3 (ref 0–0.03)
IMM GRANULOCYTES NFR BLD: 0.3 % (ref 0–0.6)
IRON SATN MFR SERPL: 23 % (ref 15–50)
IRON SERPL-MCNC: 63 MCG/DL (ref 50–175)
KETONES UR QL: NEGATIVE
LEUKOCYTE EST, POC: NEGATIVE
LYMPHOCYTES # BLD AUTO: 2.18 10*3/MM3 (ref 0.6–4.8)
LYMPHOCYTES NFR BLD AUTO: 30.4 % (ref 24–44)
MCH RBC QN AUTO: 30.7 PG (ref 27–31)
MCHC RBC AUTO-ENTMCNC: 32.3 G/DL (ref 32–36)
MCV RBC AUTO: 95.3 FL (ref 80–99)
MONOCYTES # BLD AUTO: 0.42 10*3/MM3 (ref 0–1)
MONOCYTES NFR BLD AUTO: 5.9 % (ref 0–12)
NEUTROPHILS # BLD AUTO: 3.8 10*3/MM3 (ref 1.5–8.3)
NEUTROPHILS NFR BLD AUTO: 53 % (ref 41–71)
NITRITE UR-MCNC: NEGATIVE MG/ML
PH UR: 6 [PH] (ref 5–8)
PLATELET # BLD AUTO: 414 10*3/MM3 (ref 150–450)
POTASSIUM SERPL-SCNC: 4.4 MMOL/L (ref 3.5–5.5)
PROT SERPL-MCNC: 7.3 G/DL (ref 5.7–8.2)
PROT UR STRIP-MCNC: NEGATIVE MG/DL
RBC # BLD AUTO: 3.61 10*6/MM3 (ref 3.89–5.14)
RBC # UR STRIP: NEGATIVE /UL
SODIUM SERPL-SCNC: 134 MMOL/L (ref 132–146)
SP GR UR: 1.02 (ref 1–1.03)
TIBC SERPL-MCNC: 278 MCG/DL (ref 250–450)
TSH SERPL DL<=0.005 MIU/L-ACNC: 1.96 MIU/ML (ref 0.35–5.35)
UIBC SERPL-MCNC: 215 MCG/DL
UROBILINOGEN UR QL: NORMAL
VIT B12 SERPL-MCNC: >2000 PG/ML (ref 211–911)
WBC # BLD AUTO: 7.16 10*3/MM3 (ref 3.5–10.8)

## 2017-09-01 PROCEDURE — 99203 OFFICE O/P NEW LOW 30 MIN: CPT | Performed by: FAMILY MEDICINE

## 2017-09-01 PROCEDURE — 81003 URINALYSIS AUTO W/O SCOPE: CPT | Performed by: FAMILY MEDICINE

## 2017-09-01 RX ORDER — DIGOXIN 125 MCG
125 TABLET ORAL
COMMUNITY
End: 2018-08-09 | Stop reason: SDUPTHER

## 2017-09-01 RX ORDER — HYDROCODONE BITARTRATE AND ACETAMINOPHEN 5; 325 MG/1; MG/1
1 TABLET ORAL EVERY 4 HOURS PRN
Qty: 18 TABLET | Refills: 0 | Status: SHIPPED | OUTPATIENT
Start: 2017-09-01 | End: 2017-12-20 | Stop reason: SDUPTHER

## 2017-09-01 NOTE — PROGRESS NOTES
Chief Complaint   Patient presents with   • Hypertension   • Leg Pain   • Establish Care   • Med Refill   • Urinary Tract Infection     possible uti she gets these frequently       Subjective     Hypertension   This is a chronic problem. The current episode started more than 1 year ago. The problem is unchanged. Condition status: up and down. Associated symptoms include headaches (left side at times. ) and shortness of breath (not bad). Pertinent negatives include no chest pain. (Occ cough off and on, no continous dry cough, ? allergies  ) The current treatment provides moderate improvement. There are no compliance problems.  There is no history of angina, kidney disease, CAD/MI, CVA or heart failure.   Leg Pain    The incident occurred more than 1 week ago (since pinning of left hip and right knee OA., + pain and swelling). The pain is present in the left hip and right knee. The quality of the pain is described as aching. The pain is moderate. Pertinent negatives include no inability to bear weight. Treatments tried: occ hydrocodone and helps with the pain. The treatment provided moderate relief.   Urinary Tract Infection    This is a recurrent problem. The current episode started more than 1 month ago. The problem occurs intermittently. The quality of the pain is described as burning (at times). The pain is mild. There has been no fever. Associated symptoms include frequency and urgency.         Had a SDH last Nov. Had fallen and got worse and had to have chinedu hole and removed clot    cramps and pain in legs at times    Dr Albert pinned left hip p fracture in Jan 2017    Occasionally has a cough.  Thinks it could be the pressure medication but is not consistent and has not daily.  Could also be possibly allergies.  Does have some drainage and congestion.    Past Medical History,Medications, Allergies, and social history was reviewed.    Review of Systems   Constitutional: Positive for fatigue. Negative for  "unexpected weight change.   HENT: Negative for congestion and sore throat.    Respiratory: Positive for cough (Occasional cough) and shortness of breath (not bad).    Cardiovascular: Negative.  Negative for chest pain.   Gastrointestinal: Negative.    Genitourinary: Positive for frequency and urgency.   Musculoskeletal: Positive for arthralgias, back pain, gait problem and myalgias.   Neurological: Positive for dizziness (BP was sl increased and  , card gave digoxin in the past) and headaches (left side at times. ). Negative for syncope.   Psychiatric/Behavioral: Negative.        Objective     Vitals:    09/01/17 1212   BP: 164/78   Pulse: 97   Resp: 14   Temp: 97.2 °F (36.2 °C)   TempSrc: Temporal Artery    SpO2: 100%   Weight: 130 lb (59 kg)   Height: 63\" (160 cm)        Physical Exam   Constitutional: She is oriented to person, place, and time. She appears well-developed and well-nourished.   thin, hard of hearing   HENT:   Head: Normocephalic and atraumatic.   Right Ear: Hearing, tympanic membrane, external ear and ear canal normal.   Left Ear: Hearing, tympanic membrane, external ear and ear canal normal.   Mouth/Throat: Oropharynx is clear and moist.   Eyes: Conjunctivae and EOM are normal. Pupils are equal, round, and reactive to light.   Neck: Normal range of motion. Neck supple. No thyromegaly present.   Cardiovascular: Normal rate and regular rhythm.  Exam reveals no gallop and no friction rub.    Murmur heard.   Systolic murmur is present with a grade of 2/6   Pulmonary/Chest: Effort normal and breath sounds normal. No respiratory distress. She has no wheezes. She has no rales.   Abdominal: Soft. Bowel sounds are normal. She exhibits no distension. There is no tenderness. There is no rebound and no guarding.   Musculoskeletal: She exhibits no edema.   Mild swelling of the right knee and pain with range of motion.  Some pain with range of motion of the left hip.  Antalgic gait.   Neurological: She is " alert and oriented to person, place, and time.   Skin: Skin is warm and dry.   Psychiatric: She has a normal mood and affect. Her behavior is normal.   Nursing note and vitals reviewed.    Urinalysis was negative.    Assessment/Plan     Problem List Items Addressed This Visit        Cardiovascular and Mediastinum    Essential hypertension - Primary    Relevant Orders    CBC & Differential    Comprehensive Metabolic Panel       Nervous and Auditory    Subdural hematoma, post-traumatic      Other Visit Diagnoses     Left hip pain        After hip fracture and pininng 1/2017    Relevant Medications    HYDROcodone-acetaminophen (NORCO) 5-325 MG per tablet    Arthritis of right knee        Relevant Medications    HYDROcodone-acetaminophen (NORCO) 5-325 MG per tablet    Anemia due to other cause        Relevant Orders    CBC & Differential    Vitamin B12    Iron and TIBC    Ferritin    Other fatigue        Relevant Orders    CBC & Differential    Comprehensive Metabolic Panel    TSH    Vitamin B12    Iron and TIBC    Ferritin    Anemia, unspecified type         Relevant Orders    Iron and TIBC    Ferritin    Dysuria        Relevant Orders    POC Urinalysis Dipstick, Automated (Completed)           Follow up: Return in about 3 months (around 12/1/2017), or if symptoms worsen or fail to improve.     Jose dated on 9/1/2017   was reviewed and appropriate.      DISCUSSION  Hypertension.  Blood pressure is a little bit elevated today.  Daughter states blood pressures good at home after she relaxes.  We will continue to monitor.  No changes at this time.    Multiple medical problems.    Complains of dysuria.  Urinalysis was normal.  Has this intermittently.  No definite urinary tract infection.  Last culture was also negative.  Since symptoms are intermittent and not consistent, continue to follow.    Anemia.  Recheck CBC, check B12 and iron studies.    Because of fatigue, we will also check TSH.    Check labs as  noted    Lortab or acute pain when needed    History of subdural hematoma.  Stable.  No new symptoms.    Pain of right knee most likely related to arthritis and left hip pain from recent hip fracture repair.  Prescription for hydrocodone 5/325 was given to use as needed for severe pain to help her rest and sleep.  Daughter is aware of side effects.    Cough may be related to allergies or congestion.  If persisting, consider changing blood pressure medication.      MEDICATIONS PRESCRIBED  Requested Prescriptions     Signed Prescriptions Disp Refills   • HYDROcodone-acetaminophen (NORCO) 5-325 MG per tablet 18 tablet 0     Sig: Take 1 tablet by mouth Every 4 (Four) Hours As Needed for Moderate Pain .          Ronaldo Travis MD

## 2017-10-20 ENCOUNTER — APPOINTMENT (OUTPATIENT)
Dept: LAB | Facility: HOSPITAL | Age: 82
End: 2017-10-20

## 2017-10-20 ENCOUNTER — TRANSCRIBE ORDERS (OUTPATIENT)
Dept: ADMINISTRATIVE | Facility: HOSPITAL | Age: 82
End: 2017-10-20

## 2017-10-20 ENCOUNTER — TRANSCRIBE ORDERS (OUTPATIENT)
Dept: LAB | Facility: HOSPITAL | Age: 82
End: 2017-10-20

## 2017-10-20 DIAGNOSIS — M25.512 LEFT SHOULDER PAIN, UNSPECIFIED CHRONICITY: Primary | ICD-10-CM

## 2017-10-20 DIAGNOSIS — M25.519 SHOULDER PAIN, UNSPECIFIED CHRONICITY, UNSPECIFIED LATERALITY: Primary | ICD-10-CM

## 2017-10-20 LAB
CRP SERPL-MCNC: 0.22 MG/DL (ref 0–1)
DEPRECATED RDW RBC AUTO: 53.1 FL (ref 37–54)
ERYTHROCYTE [DISTWIDTH] IN BLOOD BY AUTOMATED COUNT: 14.7 % (ref 11.3–14.5)
ERYTHROCYTE [SEDIMENTATION RATE] IN BLOOD: 47 MM/HR (ref 0–30)
HCT VFR BLD AUTO: 34.9 % (ref 34.5–44)
HGB BLD-MCNC: 11.3 G/DL (ref 11.5–15.5)
MCH RBC QN AUTO: 31.7 PG (ref 27–31)
MCHC RBC AUTO-ENTMCNC: 32.4 G/DL (ref 32–36)
MCV RBC AUTO: 97.8 FL (ref 80–99)
PLATELET # BLD AUTO: 405 10*3/MM3 (ref 150–450)
PMV BLD AUTO: 9.2 FL (ref 6–12)
RBC # BLD AUTO: 3.57 10*6/MM3 (ref 3.89–5.14)
WBC NRBC COR # BLD: 8.64 10*3/MM3 (ref 3.5–10.8)

## 2017-10-20 PROCEDURE — 85652 RBC SED RATE AUTOMATED: CPT | Performed by: ORTHOPAEDIC SURGERY

## 2017-10-20 PROCEDURE — 36415 COLL VENOUS BLD VENIPUNCTURE: CPT | Performed by: ORTHOPAEDIC SURGERY

## 2017-10-20 PROCEDURE — 86140 C-REACTIVE PROTEIN: CPT | Performed by: ORTHOPAEDIC SURGERY

## 2017-10-20 PROCEDURE — 85027 COMPLETE CBC AUTOMATED: CPT | Performed by: ORTHOPAEDIC SURGERY

## 2017-10-24 ENCOUNTER — FLU SHOT (OUTPATIENT)
Dept: FAMILY MEDICINE CLINIC | Facility: CLINIC | Age: 82
End: 2017-10-24

## 2017-10-24 PROCEDURE — G0008 ADMIN INFLUENZA VIRUS VAC: HCPCS | Performed by: FAMILY MEDICINE

## 2017-10-24 PROCEDURE — 90662 IIV NO PRSV INCREASED AG IM: CPT | Performed by: FAMILY MEDICINE

## 2017-10-25 ENCOUNTER — HOSPITAL ENCOUNTER (OUTPATIENT)
Dept: GENERAL RADIOLOGY | Facility: HOSPITAL | Age: 82
Discharge: HOME | End: 2017-10-25
Attending: ORTHOPAEDIC SURGERY | Admitting: ORTHOPAEDIC SURGERY

## 2017-10-25 ENCOUNTER — HOSPITAL ENCOUNTER (OUTPATIENT)
Dept: CT IMAGING | Facility: HOSPITAL | Age: 82
Discharge: HOME | End: 2017-10-25
Attending: ORTHOPAEDIC SURGERY

## 2017-10-25 DIAGNOSIS — M25.512 LEFT SHOULDER PAIN, UNSPECIFIED CHRONICITY: ICD-10-CM

## 2017-10-25 PROCEDURE — 73200 CT UPPER EXTREMITY W/O DYE: CPT

## 2017-10-25 PROCEDURE — 73040 CONTRAST X-RAY OF SHOULDER: CPT

## 2017-10-25 PROCEDURE — 0 IOPAMIDOL 61 % SOLUTION: Performed by: ORTHOPAEDIC SURGERY

## 2017-10-25 RX ORDER — LIDOCAINE HYDROCHLORIDE 10 MG/ML
10 INJECTION, SOLUTION INFILTRATION; PERINEURAL ONCE
Status: COMPLETED | OUTPATIENT
Start: 2017-10-25 | End: 2017-10-25

## 2017-10-25 RX ADMIN — IOPAMIDOL 12 ML: 612 INJECTION, SOLUTION INTRAVENOUS at 14:10

## 2017-10-25 RX ADMIN — LIDOCAINE HYDROCHLORIDE 10 ML: 10 INJECTION, SOLUTION INFILTRATION; PERINEURAL at 14:10

## 2017-12-20 ENCOUNTER — HOSPITAL ENCOUNTER (OUTPATIENT)
Dept: GENERAL RADIOLOGY | Facility: HOSPITAL | Age: 82
Discharge: HOME | End: 2017-12-20
Admitting: NURSE PRACTITIONER

## 2017-12-20 ENCOUNTER — OFFICE VISIT (OUTPATIENT)
Dept: FAMILY MEDICINE CLINIC | Facility: CLINIC | Age: 82
End: 2017-12-20

## 2017-12-20 VITALS
HEIGHT: 63 IN | HEART RATE: 74 BPM | DIASTOLIC BLOOD PRESSURE: 72 MMHG | TEMPERATURE: 97.7 F | WEIGHT: 122 LBS | OXYGEN SATURATION: 98 % | BODY MASS INDEX: 21.62 KG/M2 | RESPIRATION RATE: 18 BRPM | SYSTOLIC BLOOD PRESSURE: 122 MMHG

## 2017-12-20 DIAGNOSIS — J18.9 PNEUMONIA OF LEFT LOWER LOBE DUE TO INFECTIOUS ORGANISM: Primary | ICD-10-CM

## 2017-12-20 DIAGNOSIS — M17.11 ARTHRITIS OF RIGHT KNEE: ICD-10-CM

## 2017-12-20 DIAGNOSIS — M25.552 LEFT HIP PAIN: ICD-10-CM

## 2017-12-20 PROCEDURE — 99214 OFFICE O/P EST MOD 30 MIN: CPT | Performed by: NURSE PRACTITIONER

## 2017-12-20 PROCEDURE — 71020 HC CHEST PA AND LATERAL: CPT

## 2017-12-20 RX ORDER — LEVOFLOXACIN 500 MG/1
500 TABLET, FILM COATED ORAL DAILY
Qty: 10 TABLET | Refills: 0 | Status: SHIPPED | OUTPATIENT
Start: 2017-12-20 | End: 2018-02-19

## 2017-12-20 RX ORDER — HYDROCODONE BITARTRATE AND ACETAMINOPHEN 5; 325 MG/1; MG/1
1 TABLET ORAL EVERY 4 HOURS PRN
Qty: 18 TABLET | Refills: 0 | Status: SHIPPED | OUTPATIENT
Start: 2017-12-20 | End: 2018-02-19 | Stop reason: SDUPTHER

## 2017-12-20 NOTE — PROGRESS NOTES
Subjective   Keila Alberts is a 89 y.o. female.     History of Present Illness   Cough and chest congestion for the past 2 weeks, seems to be getting worse  Taking OTC Tylenol  Cough worse at night when lays down, seemed to struggle to breath last night when she laid down  No hx of asthma or pneumonia  Deep wet juicy cough  No chest pain with inspiration or cough  No fever or chills, no night sweats  She has been visiting her son in law in the hospital over the past 2 weeks  She is accompanied by her daughter.      The following portions of the patient's history were reviewed and updated as appropriate: allergies, current medications, past family history, past medical history, past social history, past surgical history and problem list.    Review of Systems   Constitutional: Negative for chills and fever.   HENT: Positive for congestion and hearing loss. Negative for ear pain, postnasal drip, rhinorrhea, sinus pain, sinus pressure, sneezing, sore throat, trouble swallowing and voice change.    Respiratory: Positive for cough and chest tightness. Negative for wheezing.    Cardiovascular: Negative.  Negative for chest pain, palpitations and leg swelling.   Gastrointestinal: Positive for constipation.   Musculoskeletal: Positive for arthralgias and gait problem.   Neurological: Positive for light-headedness (with deep breathing ). Negative for headaches.   Hematological: Negative for adenopathy.   Psychiatric/Behavioral: Positive for sleep disturbance (due to cough). Negative for confusion.       Objective   Physical Exam   Constitutional: She is oriented to person, place, and time. She appears well-developed and well-nourished. No distress.   HENT:   Head: Normocephalic.   Right Ear: Tympanic membrane, external ear and ear canal normal.   Left Ear: Tympanic membrane, external ear and ear canal normal.   Nose: No mucosal edema or rhinorrhea. Right sinus exhibits no maxillary sinus tenderness and no frontal sinus  tenderness. Left sinus exhibits no maxillary sinus tenderness and no frontal sinus tenderness.   Mouth/Throat: Uvula is midline, oropharynx is clear and moist and mucous membranes are normal. No oropharyngeal exudate, posterior oropharyngeal edema or posterior oropharyngeal erythema.   Neck: Neck supple.   Cardiovascular: Normal rate, regular rhythm and normal heart sounds.    Pulmonary/Chest: Effort normal. No respiratory distress. She has no decreased breath sounds. She has wheezes in the left lower field. She has rhonchi in the left lower field. She has rales in the left lower field. She exhibits no tenderness.   Lymphadenopathy:        Head (right side): No tonsillar, no preauricular, no posterior auricular and no occipital adenopathy present.        Head (left side): No tonsillar, no preauricular, no posterior auricular and no occipital adenopathy present.     She has no cervical adenopathy.   Neurological: She is alert and oriented to person, place, and time.   Skin: Skin is warm and dry. No rash noted.   Psychiatric: She has a normal mood and affect. Her speech is normal and behavior is normal. Judgment and thought content normal. Cognition and memory are normal.   Nursing note and vitals reviewed.      Assessment/Plan   Keila was seen today for uri.    Diagnoses and all orders for this visit:    Pneumonia of left lower lobe due to infectious organism  -     XR Chest PA & Lateral    Left hip pain  Comments:  After hip fracture and pininng 1/2017  Orders:  -     HYDROcodone-acetaminophen (NORCO) 5-325 MG per tablet; Take 1 tablet by mouth Every 4 (Four) Hours As Needed for Moderate Pain .    Arthritis of right knee  -     HYDROcodone-acetaminophen (NORCO) 5-325 MG per tablet; Take 1 tablet by mouth Every 4 (Four) Hours As Needed for Moderate Pain .    Other orders  -     levoFLOXacin (LEVAQUIN) 500 MG tablet; Take 1 tablet by mouth Daily.      Will send pt for CXR and treat with Levaquin  She needs a refill  of [place], advised that she use with caution as this can cause drowsiness and make constipation worse  Recommend OTC Mucinex plain and plenty of fluids, rest and Robitussin or Delsym cough syrup as needed  If having difficulty breathing or not improving advised pt to go to ER. Her daughter agrees.

## 2017-12-26 NOTE — PROGRESS NOTES
Spoke with pt's daughter and went over results/instructions. Verbalized understanding. She confirms pt is getting better with the medication.

## 2018-02-19 ENCOUNTER — OFFICE VISIT (OUTPATIENT)
Dept: FAMILY MEDICINE CLINIC | Facility: CLINIC | Age: 83
End: 2018-02-19

## 2018-02-19 VITALS
RESPIRATION RATE: 12 BRPM | WEIGHT: 119.5 LBS | DIASTOLIC BLOOD PRESSURE: 78 MMHG | HEIGHT: 63 IN | TEMPERATURE: 97.8 F | SYSTOLIC BLOOD PRESSURE: 158 MMHG | BODY MASS INDEX: 21.17 KG/M2 | OXYGEN SATURATION: 98 % | HEART RATE: 78 BPM

## 2018-02-19 DIAGNOSIS — M25.552 LEFT HIP PAIN: ICD-10-CM

## 2018-02-19 DIAGNOSIS — R00.0 TACHYCARDIA: ICD-10-CM

## 2018-02-19 DIAGNOSIS — R63.4 WEIGHT LOSS: Primary | ICD-10-CM

## 2018-02-19 DIAGNOSIS — Z79.899 HIGH RISK MEDICATION USE: ICD-10-CM

## 2018-02-19 DIAGNOSIS — F41.9 ANXIETY: ICD-10-CM

## 2018-02-19 DIAGNOSIS — M17.11 ARTHRITIS OF RIGHT KNEE: ICD-10-CM

## 2018-02-19 DIAGNOSIS — R31.21 ASYMPTOMATIC MICROSCOPIC HEMATURIA: ICD-10-CM

## 2018-02-19 LAB
ALBUMIN SERPL-MCNC: 4.2 G/DL (ref 3.2–4.8)
ALBUMIN/GLOB SERPL: 1.8 G/DL (ref 1.5–2.5)
ALP SERPL-CCNC: 75 U/L (ref 25–100)
ALT SERPL-CCNC: 13 U/L (ref 7–40)
AST SERPL-CCNC: 17 U/L (ref 0–33)
BASOPHILS # BLD AUTO: 0.06 10*3/MM3 (ref 0–0.2)
BASOPHILS NFR BLD AUTO: 0.7 % (ref 0–1)
BILIRUB BLD-MCNC: NEGATIVE MG/DL
BILIRUB SERPL-MCNC: 0.5 MG/DL (ref 0.3–1.2)
BUN SERPL-MCNC: 10 MG/DL (ref 9–23)
BUN/CREAT SERPL: 20 (ref 7–25)
CALCIUM SERPL-MCNC: 9.4 MG/DL (ref 8.7–10.4)
CHLORIDE SERPL-SCNC: 100 MMOL/L (ref 99–109)
CLARITY, POC: CLEAR
CO2 SERPL-SCNC: 29 MMOL/L (ref 20–31)
COLOR UR: YELLOW
CREAT SERPL-MCNC: 0.5 MG/DL (ref 0.6–1.3)
DIGOXIN SERPL-MCNC: 0.71 NG/ML (ref 0.8–2)
EOSINOPHIL # BLD AUTO: 0.44 10*3/MM3 (ref 0–0.3)
EOSINOPHIL NFR BLD AUTO: 5.1 % (ref 0–3)
ERYTHROCYTE [DISTWIDTH] IN BLOOD BY AUTOMATED COUNT: 14.2 % (ref 11.3–14.5)
GFR SERPLBLD CREATININE-BSD FMLA CKD-EPI: 116 ML/MIN/1.73
GFR SERPLBLD CREATININE-BSD FMLA CKD-EPI: 141 ML/MIN/1.73
GLOBULIN SER CALC-MCNC: 2.3 GM/DL
GLUCOSE SERPL-MCNC: 104 MG/DL (ref 70–100)
GLUCOSE UR STRIP-MCNC: NEGATIVE MG/DL
HCT VFR BLD AUTO: 32.7 % (ref 34.5–44)
HGB BLD-MCNC: 10.7 G/DL (ref 11.5–15.5)
IMM GRANULOCYTES # BLD: 0.03 10*3/MM3 (ref 0–0.03)
IMM GRANULOCYTES NFR BLD: 0.4 % (ref 0–0.6)
KETONES UR QL: ABNORMAL
LEUKOCYTE EST, POC: NEGATIVE
LYMPHOCYTES # BLD AUTO: 2.01 10*3/MM3 (ref 0.6–4.8)
LYMPHOCYTES NFR BLD AUTO: 23.5 % (ref 24–44)
MCH RBC QN AUTO: 31.9 PG (ref 27–31)
MCHC RBC AUTO-ENTMCNC: 32.7 G/DL (ref 32–36)
MCV RBC AUTO: 97.6 FL (ref 80–99)
MONOCYTES # BLD AUTO: 0.46 10*3/MM3 (ref 0–1)
MONOCYTES NFR BLD AUTO: 5.4 % (ref 0–12)
NEUTROPHILS # BLD AUTO: 5.56 10*3/MM3 (ref 1.5–8.3)
NEUTROPHILS NFR BLD AUTO: 64.9 % (ref 41–71)
NITRITE UR-MCNC: NEGATIVE MG/ML
PH UR: 5.5 [PH] (ref 5–8)
PLATELET # BLD AUTO: 470 10*3/MM3 (ref 150–450)
POTASSIUM SERPL-SCNC: 4 MMOL/L (ref 3.5–5.5)
PROT SERPL-MCNC: 6.5 G/DL (ref 5.7–8.2)
PROT UR STRIP-MCNC: ABNORMAL MG/DL
RBC # BLD AUTO: 3.35 10*6/MM3 (ref 3.89–5.14)
RBC # UR STRIP: ABNORMAL /UL
SODIUM SERPL-SCNC: 136 MMOL/L (ref 132–146)
SP GR UR: 1.03 (ref 1–1.03)
TSH SERPL DL<=0.005 MIU/L-ACNC: 0.93 MIU/ML (ref 0.35–5.35)
UROBILINOGEN UR QL: ABNORMAL
WBC # BLD AUTO: 8.56 10*3/MM3 (ref 3.5–10.8)

## 2018-02-19 PROCEDURE — 99214 OFFICE O/P EST MOD 30 MIN: CPT | Performed by: FAMILY MEDICINE

## 2018-02-19 PROCEDURE — 81003 URINALYSIS AUTO W/O SCOPE: CPT | Performed by: FAMILY MEDICINE

## 2018-02-19 RX ORDER — HYDROCODONE BITARTRATE AND ACETAMINOPHEN 5; 325 MG/1; MG/1
1 TABLET ORAL EVERY 4 HOURS PRN
Qty: 18 TABLET | Refills: 0 | Status: SHIPPED | OUTPATIENT
Start: 2018-02-19 | End: 2018-03-13 | Stop reason: SDUPTHER

## 2018-02-19 RX ORDER — CITALOPRAM 20 MG/1
TABLET ORAL
Qty: 30 TABLET | Refills: 2 | Status: SHIPPED | OUTPATIENT
Start: 2018-02-19 | End: 2018-06-05 | Stop reason: SDUPTHER

## 2018-02-19 NOTE — PROGRESS NOTES
Assessment/Plan     Problem List Items Addressed This Visit     None      Visit Diagnoses     Weight loss    -  Primary    Relevant Orders    Comprehensive Metabolic Panel    CBC & Differential    TSH    Tachycardia        Relevant Orders    Digoxin Level    Left hip pain        After hip fracture and pininng 2017    Relevant Medications    HYDROcodone-acetaminophen (NORCO) 5-325 MG per tablet    Arthritis of right knee        Relevant Medications    HYDROcodone-acetaminophen (NORCO) 5-325 MG per tablet    Anxiety        Relevant Medications    citalopram (CELEXA) 20 MG tablet    High risk medication use        Relevant Orders    Digoxin Level    Asymptomatic microscopic hematuria        Relevant Orders    POC Urinalysis Dipstick, Automated (Completed)    Urine Culture - Urine, Urine, Clean Catch           Follow up: Return if symptoms worsen or fail to improve, for follow up depends on review of labs and testing.     DISCUSSION  Weight loss.  Recommend check labs as noted.  Urinalysis showed very small amount of blood so we will check urine culture.  Check labs as well.    Tachycardia.  Has been on digoxin.  Check level.    Episodes of shakiness with anxiety and depressed mood.  May be anxiety attacks.  Try Celexa as noted.  Call in 2 weeks with update.  Her daughter agrees.    Chronic pain of left hip and arthritis.  Needs to have the left hip replaced.  Scheduled for May at this time.  Refilled hydrocodone.  Did not increase dose since it does cause a little sedation.    Further plan once we have labs back.      MEDICATIONS PRESCRIBED  Requested Prescriptions     Signed Prescriptions Disp Refills   • HYDROcodone-acetaminophen (NORCO) 5-325 MG per tablet 18 tablet 0     Sig: Take 1 tablet by mouth Every 4 (Four) Hours As Needed for Moderate Pain .   • citalopram (CELEXA) 20 MG tablet 30 tablet 2     Si/2 po daily for 1 week then one po daily            Jose dated on 2018   was reviewed and  "appropriate.        -------------------------------------------    Subjective     Chief Complaint   Patient presents with   • not wanting to eat   • shaking spells   • Hip Pain     18 hip replacement surgery.    • Depression     anxiety attacks, no one can do anything right or to suit her, pt is complaining all of the time about something       HPI    Left hip and knee pain   Need hip replacement  Supposed to get a new hip  Pain med helps some, causes her to be sleeping  Pain come back  Fell one year ago and had blood clot in the past    Shakiness  No chest pain now  Issues with breathing  + short of breath  Heart beat increases and arms and hands shaky  No syncope  + dizziness at times    Anxiety  At times  Upset at times.   is real sick + AD  Had to leave home because of illness  Hard to take care of self  + sad at times    Has lost some wt , about 10 pounds,  appetite per dtr    Constipated at times  X 2-3 yrs  Takes meds at times            Past Medical History,Medications, Allergies, and social history was reviewed.    Review of Systems   Constitutional: Positive for fatigue and unexpected weight change (losing weight).   HENT: Negative.    Respiratory: Positive for shortness of breath.    Cardiovascular: Negative.    Gastrointestinal: Negative.    Genitourinary: Negative.    Musculoskeletal: Positive for arthralgias.   Neurological: Negative.    Psychiatric/Behavioral: Positive for dysphoric mood. The patient is nervous/anxious.        Objective     Vitals:    18 1236 18 1305   BP: 160/66 158/78   Pulse: 78    Resp: 12    Temp: 97.8 °F (36.6 °C)    TempSrc: Temporal Artery     SpO2: 98%    Weight: 54.2 kg (119 lb 8 oz)    Height: 160 cm (62.99\")         Physical Exam   Constitutional: She is oriented to person, place, and time. She appears well-developed and well-nourished.   Uses a walker.  Antalgic gait.  Hard of hearing.   HENT:   Head: Normocephalic and atraumatic.   Right " Ear: Hearing, tympanic membrane, external ear and ear canal normal.   Left Ear: Hearing, tympanic membrane, external ear and ear canal normal.   Mouth/Throat: Oropharynx is clear and moist.   Eyes: Conjunctivae and EOM are normal. Pupils are equal, round, and reactive to light.   Neck: Normal range of motion. Neck supple. No thyromegaly present.   Cardiovascular: Normal rate, regular rhythm and normal heart sounds.  Exam reveals no gallop and no friction rub.    No murmur heard.  Pulmonary/Chest: Effort normal and breath sounds normal. No respiratory distress. She has no wheezes. She has no rales.   Abdominal: Soft. Bowel sounds are normal. She exhibits no distension. There is no tenderness.   Musculoskeletal: She exhibits no edema.        Left hip: She exhibits decreased range of motion and tenderness.   Neurological: She is alert and oriented to person, place, and time.   Skin: Skin is warm and dry.   Psychiatric: Her speech is normal and behavior is normal. Her mood appears anxious. Cognition and memory are not impaired.   Nursing note and vitals reviewed.              Ronaldo Travis MD

## 2018-02-21 ENCOUNTER — TELEPHONE (OUTPATIENT)
Dept: FAMILY MEDICINE CLINIC | Facility: CLINIC | Age: 83
End: 2018-02-21

## 2018-02-21 LAB
BACTERIA UR CULT: NORMAL
BACTERIA UR CULT: NORMAL

## 2018-02-21 NOTE — TELEPHONE ENCOUNTER
----- Message from Ronaldo Travis MD sent at 2/20/2018  5:26 PM EST -----  Please call: Blood work shows that the kidney function is good.  Liver function tests are normal.  Mild anemia which is a little bit worse than it was 4 months ago but better than it was a year ago.  Not a big drop.  Platelet counts are up just a little but not worrisome.  Thyroid test is normal.  The digoxin level is almost normal range and better than it was a year ago.  On the digoxin, I was looking to make sure she was not toxic and she is not toxic at all and would not change the dose.  Await urine culture results and we will let them know once we get that back.

## 2018-02-21 NOTE — TELEPHONE ENCOUNTER
PROVIDED PT THE FOLLOWING INFORMATION   Blood work shows that the kidney function is good.  Liver function tests are normal.  Mild anemia which is a little bit worse than it was 4 months ago but better than it was a year ago.  Not a big drop.  Platelet counts are up just a little but not worrisome.  Thyroid test is normal.  The digoxin level is almost normal range and better than it was a year ago. Await urine culture results and we will let them know once we get that back.     SHE VERBALIZED UNDERSTANDING THE RESULTS.

## 2018-02-21 NOTE — TELEPHONE ENCOUNTER
Called pt no answer left voicemail. If pt calls back please inform them of blood work results. Blood work shows that the kidney function is good.  Liver function tests are normal.  Mild anemia which is a little bit worse than it was 4 months ago but better than it was a year ago.  Not a big drop.  Platelet counts are up just a little but not worrisome.  Thyroid test is normal.  The digoxin level is almost normal range and better than it was a year ago. Await urine culture results and we will let them know once we get that back.

## 2018-03-12 NOTE — PROGRESS NOTES
Date: 3/13/2018  Provider seeing patient: Ronaldo Travis MD  Patient's PCP: Ronaldo Travis MD  Patient's Name: Keila Alberts  .  Referring Surgeon: Khanh Dee MD    Planned Procedure: Total left hip replacement, [facility]    Date of Operation: 3/27/2018    HPI/Chief Complaint:    Keila Alberts is a 89 y.o. female who was referred for a pre-operative evaluation.       HPI   Top have left hip replacement. Had been scheduled for May and now moved up. INcreased pain in the pain in the left hip.     + arthritis of the left hip  Left knee pain as well      Had R THR in the past   Pins in the past in the left hip per dtr    Having injections in the eye for macular degeneration        Preop Risk  None  history of tachycardia, no CAD. No CHF    Problem List   Patient Active Problem List   Diagnosis   • History of subdural hematoma (post traumatic)   • Essential hypertension   • Constipation   • Normocytic anemia   • Acute/Subacute Traumatic SDH (subdural hematoma)   • Leukocytosis, ? Etiology   • s/p Left Craniotomy for Evacuation of Left SDH 11/27/16    • Hyponatremia   • 6.9 x 5.3cm pelvic mass, seen on imaging prior to admission   • Closed left hip fracture, s/p left hip percutaneous pinning   • Macular degeneration   • Heart murmur        Current Meds  Current Outpatient Prescriptions   Medication Sig Dispense Refill   • amLODIPine (NORVASC) 5 MG tablet Take 5 mg by mouth Daily.     • benazepril (LOTENSIN) 40 MG tablet Take 40 mg by mouth Daily.     • citalopram (CELEXA) 20 MG tablet 1/2 po daily for 1 week then one po daily 30 tablet 2   • digoxin (LANOXIN) 125 MCG tablet Take 125 mcg by mouth Daily.     • docusate sodium (COLACE) 100 MG capsule Take 100 mg by mouth 2 (Two) Times a Day.     • HYDROcodone-acetaminophen (NORCO) 5-325 MG per tablet Take 1 tablet by mouth Every 6 (Six) Hours As Needed for Moderate Pain . 30 tablet 0   • Multiple Vitamins-Minerals (PRESERVISION AREDS 2) capsule Take 1  "capsule by mouth Daily.     • mupirocin (BACTROBAN) 2 % ointment      • vitamin B-12 (CYANOCOBALAMIN) 1000 MCG tablet Take 1,000 mcg by mouth Daily.       No current facility-administered medications for this visit.        Allergies: is allergic to erythromycin. No allergy to metal or jewelry    PMH  Past Medical History:   Diagnosis Date   • Arthritis    • Hyperlipidemia    • Hypertension      NO history of DVT or PE      Past Surgical History   has a past surgical history that includes Appendectomy; Joint replacement; Total shoulder replacement (Left); Total hip arthroplasty (Right); Cari Hole (Left, 11/27/2016); and Hip Percutaneous Pinning (Left, 1/14/2017).    Cardiac History: Tachycardia    Anesthesia Complications: Nausea.     SH:   reports that she has never smoked. She has never used smokeless tobacco. She reports that she does not drink alcohol or use drugs.    FH:  family history includes Arthritis in her other; Cancer in her other; Heart attack in her other; Hypertension in her other.    Review Of Systems:    Review of Systems   Constitutional: Negative.    HENT: Negative.    Respiratory: Positive for cough (sl cough ? BP med). Negative for shortness of breath.    Cardiovascular: Negative.  Negative for chest pain and leg swelling.   Gastrointestinal: Negative.    Genitourinary: Negative.         Occ burn   Musculoskeletal: Positive for arthralgias (left hip and knee).   Neurological: Positive for tremors (hand shakes with increased pain ). Negative for dizziness, syncope and headaches.   Psychiatric/Behavioral: Negative.          Objective      Vitals:    03/13/18 0909   BP: 146/70   Pulse: 71   Resp: 14   Temp: 97.8 °F (36.6 °C)   TempSrc: Temporal Artery    SpO2: 99%   Weight: 54.9 kg (121 lb)   Height: 160 cm (62.99\")        Physical Exam   Constitutional: She is oriented to person, place, and time. She appears well-developed and well-nourished.   HENT:   Head: Normocephalic and atraumatic.   Right " Ear: Hearing, tympanic membrane, external ear and ear canal normal.   Left Ear: Hearing, tympanic membrane, external ear and ear canal normal.   Mouth/Throat: Oropharynx is clear and moist.   Eyes: Conjunctivae and EOM are normal. Pupils are equal, round, and reactive to light.   Neck: Normal range of motion. Neck supple. Carotid bruit is present (left). No thyromegaly present.   Cardiovascular: Normal rate and regular rhythm.  Exam reveals no gallop and no friction rub.    Murmur heard.   Systolic murmur is present with a grade of 2/6   Pulmonary/Chest: Effort normal and breath sounds normal. No respiratory distress. She has no wheezes. She has no rales.   Abdominal: Soft. Bowel sounds are normal. She exhibits no distension. There is no tenderness. There is no rebound and no guarding.   Musculoskeletal: She exhibits no edema.   In wheelchair.Pain with movement of the left leg.   Neurological: She is alert and oriented to person, place, and time.   Skin: Skin is warm and dry.   Psychiatric: She has a normal mood and affect. Her behavior is normal.   Nursing note and vitals reviewed.        EKG:       ECG 12 Lead  Date/Time: 3/13/2018 9:40 AM  Performed by: DERRICK WOODS  Authorized by: DERRICK WOODS   Comparison: compared with previous ECG from 1/14/2017  Similar to previous ECG  Rhythm: sinus rhythm  Rate: normal  BPM: 63  Conduction: conduction normal  ST Segments: ST segments normal  T Waves: T waves normal  QRS axis: normal  Other: no other findings  Clinical impression: normal ECG                Assessment     Problem List Items Addressed This Visit        Cardiovascular and Mediastinum    Essential hypertension    Heart murmur    Relevant Orders    Adult Transthoracic Echo Complete W/ Cont if Necessary Per Protocol       Other    History of subdural hematoma (post traumatic)      Other Visit Diagnoses     Preoperative clearance    -  Primary    Relevant Orders    CBC & Differential    Comprehensive Metabolic  Panel    PT & PTT ([facility])    Urinalysis - Urine, Clean Catch    Arthritis of left hip        Bruit of left carotid artery        Relevant Orders    Duplex Carotid Ultrasound CAR    Left hip pain        After hip fracture and pininng 1/2017    Relevant Medications    HYDROcodone-acetaminophen (NORCO) 5-325 MG per tablet    Arthritis of right knee        Relevant Medications    HYDROcodone-acetaminophen (NORCO) 5-325 MG per tablet           Orders Placed This Encounter   Procedures   • Comprehensive Metabolic Panel   • PT & PTT ([facility])   • Urinalysis - Urine, Clean Catch   • ECG 12 Lead     This order was created via procedure documentation   • Adult Transthoracic Echo Complete W/ Cont if Necessary Per Protocol     Standing Status:   Future     Standing Expiration Date:   3/13/2019     Scheduling Instructions:      ASAP. [facility] surg 3/27 for hip replacement     Order Specific Question:   Reason for exam?     Answer:   Murmur or Click     Order Specific Question:   Murmur or Click specification?     Answer:   Suspicion of Valvular Heart Disease   • CBC & Differential     Order Specific Question:   Manual Differential     Answer:   No       Keila Alberts is at medium risk for a ana maría-operative cardiac event for the planned procedure.  Determination on clearance for this procedure once labs reviewed.     Due to heart murmur, we will check echocardiogram and due to carotid bruit, we will check ultrasound of carotids.  We will try and get these done before surgery and if all clear, should be fine for surgery.  She is at increased risk given advanced age of 89 years of age.    Recommend Routine postoperative treatment per anesthesiologist and surgeon.        Ronaldo Travis MD       ECHO showed moderate to severe aortic regurgitation. Rec see cardiology for clearance. Family notified. .        Ronaldo Travis MD

## 2018-03-13 ENCOUNTER — OFFICE VISIT (OUTPATIENT)
Dept: FAMILY MEDICINE CLINIC | Facility: CLINIC | Age: 83
End: 2018-03-13

## 2018-03-13 VITALS
BODY MASS INDEX: 21.44 KG/M2 | WEIGHT: 121 LBS | DIASTOLIC BLOOD PRESSURE: 70 MMHG | RESPIRATION RATE: 14 BRPM | SYSTOLIC BLOOD PRESSURE: 146 MMHG | TEMPERATURE: 97.8 F | HEART RATE: 71 BPM | HEIGHT: 63 IN | OXYGEN SATURATION: 99 %

## 2018-03-13 DIAGNOSIS — R01.1 HEART MURMUR: ICD-10-CM

## 2018-03-13 DIAGNOSIS — Z01.818 PREOPERATIVE CLEARANCE: Primary | ICD-10-CM

## 2018-03-13 DIAGNOSIS — M16.12 ARTHRITIS OF LEFT HIP: ICD-10-CM

## 2018-03-13 DIAGNOSIS — M25.552 LEFT HIP PAIN: ICD-10-CM

## 2018-03-13 DIAGNOSIS — M17.11 ARTHRITIS OF RIGHT KNEE: ICD-10-CM

## 2018-03-13 DIAGNOSIS — I10 ESSENTIAL HYPERTENSION: ICD-10-CM

## 2018-03-13 DIAGNOSIS — R09.89 BRUIT OF LEFT CAROTID ARTERY: ICD-10-CM

## 2018-03-13 DIAGNOSIS — Z87.828 HISTORY OF SUBDURAL HEMATOMA (POST TRAUMATIC): ICD-10-CM

## 2018-03-13 PROBLEM — N39.0 UTI (URINARY TRACT INFECTION): Status: RESOLVED | Noted: 2017-01-15 | Resolved: 2018-03-13

## 2018-03-13 PROCEDURE — 93000 ELECTROCARDIOGRAM COMPLETE: CPT | Performed by: FAMILY MEDICINE

## 2018-03-13 PROCEDURE — 99214 OFFICE O/P EST MOD 30 MIN: CPT | Performed by: FAMILY MEDICINE

## 2018-03-13 RX ORDER — HYDROCODONE BITARTRATE AND ACETAMINOPHEN 5; 325 MG/1; MG/1
1 TABLET ORAL EVERY 6 HOURS PRN
Qty: 30 TABLET | Refills: 0 | Status: SHIPPED | OUTPATIENT
Start: 2018-03-13 | End: 2018-08-09

## 2018-03-14 LAB
ALBUMIN SERPL-MCNC: 3.9 G/DL (ref 3.2–4.8)
ALBUMIN/GLOB SERPL: 1.5 G/DL (ref 1.5–2.5)
ALP SERPL-CCNC: 75 U/L (ref 25–100)
ALT SERPL-CCNC: 12 U/L (ref 7–40)
APPEARANCE UR: ABNORMAL
APTT PPP: 30.3 SECONDS (ref 24–31)
AST SERPL-CCNC: 17 U/L (ref 0–33)
BASOPHILS # BLD AUTO: 0.1 10*3/MM3 (ref 0–0.2)
BASOPHILS NFR BLD AUTO: 1 % (ref 0–1)
BILIRUB SERPL-MCNC: 0.5 MG/DL (ref 0.3–1.2)
BILIRUB UR QL STRIP: NEGATIVE
BUN SERPL-MCNC: 16 MG/DL (ref 9–23)
BUN/CREAT SERPL: 32 (ref 7–25)
CALCIUM SERPL-MCNC: 8.9 MG/DL (ref 8.7–10.4)
CHLORIDE SERPL-SCNC: 101 MMOL/L (ref 99–109)
CO2 SERPL-SCNC: 29 MMOL/L (ref 20–31)
COLOR UR: YELLOW
CREAT SERPL-MCNC: 0.5 MG/DL (ref 0.6–1.3)
EOSINOPHIL # BLD AUTO: 2.32 10*3/MM3 (ref 0–0.3)
EOSINOPHIL NFR BLD AUTO: 22.3 % (ref 0–3)
ERYTHROCYTE [DISTWIDTH] IN BLOOD BY AUTOMATED COUNT: 14 % (ref 11.3–14.5)
GFR SERPLBLD CREATININE-BSD FMLA CKD-EPI: 116 ML/MIN/1.73
GFR SERPLBLD CREATININE-BSD FMLA CKD-EPI: 141 ML/MIN/1.73
GLOBULIN SER CALC-MCNC: 2.6 GM/DL
GLUCOSE SERPL-MCNC: 96 MG/DL (ref 70–100)
GLUCOSE UR QL: NEGATIVE
HCT VFR BLD AUTO: 31.8 % (ref 34.5–44)
HGB BLD-MCNC: 10.5 G/DL (ref 11.5–15.5)
HGB UR QL STRIP: NEGATIVE
IMM GRANULOCYTES # BLD: 0.03 10*3/MM3 (ref 0–0.03)
IMM GRANULOCYTES NFR BLD: 0.3 % (ref 0–0.6)
INR PPP: 0.95 (ref 0.91–1.09)
KETONES UR QL STRIP: ABNORMAL
LEUKOCYTE ESTERASE UR QL STRIP: ABNORMAL
LYMPHOCYTES # BLD AUTO: 1.87 10*3/MM3 (ref 0.6–4.8)
LYMPHOCYTES NFR BLD AUTO: 18 % (ref 24–44)
MCH RBC QN AUTO: 32.2 PG (ref 27–31)
MCHC RBC AUTO-ENTMCNC: 33 G/DL (ref 32–36)
MCV RBC AUTO: 97.5 FL (ref 80–99)
MONOCYTES # BLD AUTO: 0.61 10*3/MM3 (ref 0–1)
MONOCYTES NFR BLD AUTO: 5.9 % (ref 0–12)
NEUTROPHILS # BLD AUTO: 5.46 10*3/MM3 (ref 1.5–8.3)
NEUTROPHILS NFR BLD AUTO: 52.5 % (ref 41–71)
NITRITE UR QL STRIP: NEGATIVE
PH UR STRIP: 5.5 [PH] (ref 5–8)
PLATELET # BLD AUTO: 428 10*3/MM3 (ref 150–450)
POTASSIUM SERPL-SCNC: 4.2 MMOL/L (ref 3.5–5.5)
PROT SERPL-MCNC: 6.5 G/DL (ref 5.7–8.2)
PROT UR QL STRIP: ABNORMAL
PROTHROMBIN TIME: 10 SECONDS (ref 9.6–11.5)
RBC # BLD AUTO: 3.26 10*6/MM3 (ref 3.89–5.14)
SODIUM SERPL-SCNC: 137 MMOL/L (ref 132–146)
SP GR UR: ABNORMAL (ref 1–1.03)
UROBILINOGEN UR STRIP-MCNC: ABNORMAL MG/DL
WBC # BLD AUTO: 10.39 10*3/MM3 (ref 3.5–10.8)

## 2018-03-16 ENCOUNTER — HOSPITAL ENCOUNTER (OUTPATIENT)
Dept: CARDIOLOGY | Facility: HOSPITAL | Age: 83
Discharge: HOME | End: 2018-03-16

## 2018-03-16 ENCOUNTER — HOSPITAL ENCOUNTER (OUTPATIENT)
Dept: CARDIOLOGY | Facility: HOSPITAL | Age: 83
Discharge: HOME | End: 2018-03-16
Admitting: FAMILY MEDICINE

## 2018-03-16 DIAGNOSIS — R09.89 BRUIT OF LEFT CAROTID ARTERY: ICD-10-CM

## 2018-03-16 DIAGNOSIS — R01.1 HEART MURMUR: ICD-10-CM

## 2018-03-16 DIAGNOSIS — I35.1 AORTIC VALVE INSUFFICIENCY, ETIOLOGY OF CARDIAC VALVE DISEASE UNSPECIFIED: Primary | ICD-10-CM

## 2018-03-16 DIAGNOSIS — Z01.810 PREOPERATIVE CARDIOVASCULAR EXAMINATION: ICD-10-CM

## 2018-03-16 LAB
BH CV ECHO MEAS - AI DEC SLOPE: 278 CM/SEC^2
BH CV ECHO MEAS - AI MAX PG: 109.6 MMHG
BH CV ECHO MEAS - AI MAX VEL: 523.4 CM/SEC
BH CV ECHO MEAS - AI P1/2T: 551.4 MSEC
BH CV ECHO MEAS - AO MAX PG (FULL): 22 MMHG
BH CV ECHO MEAS - AO MAX PG: 26 MMHG
BH CV ECHO MEAS - AO MEAN PG (FULL): 10.7 MMHG
BH CV ECHO MEAS - AO MEAN PG: 12.5 MMHG
BH CV ECHO MEAS - AO ROOT AREA (BSA CORRECTED): 1.7
BH CV ECHO MEAS - AO ROOT AREA: 5.4 CM^2
BH CV ECHO MEAS - AO ROOT DIAM: 2.6 CM
BH CV ECHO MEAS - AO V2 MAX: 255.3 CM/SEC
BH CV ECHO MEAS - AO V2 MEAN: 161.1 CM/SEC
BH CV ECHO MEAS - AO V2 VTI: 59.1 CM
BH CV ECHO MEAS - ASC AORTA: 2.6 CM
BH CV ECHO MEAS - AVA(I,A): 0.68 CM^2
BH CV ECHO MEAS - AVA(I,D): 0.68 CM^2
BH CV ECHO MEAS - AVA(V,A): 0.77 CM^2
BH CV ECHO MEAS - AVA(V,D): 0.77 CM^2
BH CV ECHO MEAS - BSA(HAYCOCK): 1.6 M^2
BH CV ECHO MEAS - BSA(HAYCOCK): 1.6 M^2
BH CV ECHO MEAS - BSA: 1.6 M^2
BH CV ECHO MEAS - BSA: 1.6 M^2
BH CV ECHO MEAS - BZI_BMI: 21.4 KILOGRAMS/M^2
BH CV ECHO MEAS - BZI_BMI: 21.4 KILOGRAMS/M^2
BH CV ECHO MEAS - BZI_METRIC_HEIGHT: 160 CM
BH CV ECHO MEAS - BZI_METRIC_HEIGHT: 160 CM
BH CV ECHO MEAS - BZI_METRIC_WEIGHT: 54.9 KG
BH CV ECHO MEAS - BZI_METRIC_WEIGHT: 54.9 KG
BH CV ECHO MEAS - CONTRAST EF (2CH): 70 ML/M^2
BH CV ECHO MEAS - CONTRAST EF 4CH: 78.9 ML/M^2
BH CV ECHO MEAS - EDV(CUBED): 40.5 ML
BH CV ECHO MEAS - EDV(MOD-SP2): 20 ML
BH CV ECHO MEAS - EDV(MOD-SP4): 38 ML
BH CV ECHO MEAS - EDV(TEICH): 48.5 ML
BH CV ECHO MEAS - EF(CUBED): 78.4 %
BH CV ECHO MEAS - EF(MOD-SP2): 70 %
BH CV ECHO MEAS - EF(MOD-SP4): 78.9 %
BH CV ECHO MEAS - EF(TEICH): 71.8 %
BH CV ECHO MEAS - ESV(CUBED): 8.7 ML
BH CV ECHO MEAS - ESV(MOD-SP2): 6 ML
BH CV ECHO MEAS - ESV(MOD-SP4): 8 ML
BH CV ECHO MEAS - ESV(TEICH): 13.7 ML
BH CV ECHO MEAS - FS: 40 %
BH CV ECHO MEAS - IVS/LVPW: 0.94
BH CV ECHO MEAS - IVSD: 1 CM
BH CV ECHO MEAS - LA DIMENSION: 2.2 CM
BH CV ECHO MEAS - LA/AO: 0.85
BH CV ECHO MEAS - LAT PEAK E' VEL: 8.3 CM/SEC
BH CV ECHO MEAS - LV DIASTOLIC VOL/BSA (35-75): 24.3 ML/M^2
BH CV ECHO MEAS - LV MASS(C)D: 106.5 GRAMS
BH CV ECHO MEAS - LV MASS(C)DI: 68.2 GRAMS/M^2
BH CV ECHO MEAS - LV MAX PG: 4 MMHG
BH CV ECHO MEAS - LV MEAN PG: 1.8 MMHG
BH CV ECHO MEAS - LV SYSTOLIC VOL/BSA (12-30): 5.1 ML/M^2
BH CV ECHO MEAS - LV V1 MAX: 99.4 CM/SEC
BH CV ECHO MEAS - LV V1 MEAN: 60.7 CM/SEC
BH CV ECHO MEAS - LV V1 VTI: 20.4 CM
BH CV ECHO MEAS - LVIDD: 3.4 CM
BH CV ECHO MEAS - LVIDS: 2.1 CM
BH CV ECHO MEAS - LVLD AP2: 5.6 CM
BH CV ECHO MEAS - LVLD AP4: 5.7 CM
BH CV ECHO MEAS - LVLS AP2: 4.3 CM
BH CV ECHO MEAS - LVLS AP4: 4.5 CM
BH CV ECHO MEAS - LVOT AREA (M): 2 CM^2
BH CV ECHO MEAS - LVOT AREA: 2 CM^2
BH CV ECHO MEAS - LVOT DIAM: 1.6 CM
BH CV ECHO MEAS - LVPWD: 1.1 CM
BH CV ECHO MEAS - MED PEAK E' VEL: 5.3 CM/SEC
BH CV ECHO MEAS - MV A MAX VEL: 109.1 CM/SEC
BH CV ECHO MEAS - MV DEC TIME: 0.25 SEC
BH CV ECHO MEAS - MV E MAX VEL: 75 CM/SEC
BH CV ECHO MEAS - MV E/A: 0.69
BH CV ECHO MEAS - PA ACC SLOPE: 570.5 CM/SEC^2
BH CV ECHO MEAS - PA ACC TIME: 0.15 SEC
BH CV ECHO MEAS - PA MAX PG: 6.2 MMHG
BH CV ECHO MEAS - PA PR(ACCEL): 10.9 MMHG
BH CV ECHO MEAS - PA V2 MAX: 124.3 CM/SEC
BH CV ECHO MEAS - SI(AO): 205.7 ML/M^2
BH CV ECHO MEAS - SI(CUBED): 20.3 ML/M^2
BH CV ECHO MEAS - SI(LVOT): 25.8 ML/M^2
BH CV ECHO MEAS - SI(MOD-SP2): 9 ML/M^2
BH CV ECHO MEAS - SI(MOD-SP4): 19.2 ML/M^2
BH CV ECHO MEAS - SI(TEICH): 22.3 ML/M^2
BH CV ECHO MEAS - SV(AO): 321.2 ML
BH CV ECHO MEAS - SV(CUBED): 31.7 ML
BH CV ECHO MEAS - SV(LVOT): 40.3 ML
BH CV ECHO MEAS - SV(MOD-SP2): 14 ML
BH CV ECHO MEAS - SV(MOD-SP4): 30 ML
BH CV ECHO MEAS - SV(TEICH): 34.8 ML
BH CV ECHO MEAS - TAPSE (>1.6): 2.49 CM2
BH CV ECHO MEAS - TR MAX VEL: 246.6 CM/SEC
BH CV XLRA - RV BASE: 1.9 CM
BH CV XLRA - RV LENGTH: 4.4 CM
BH CV XLRA - RV MID: 1.4 CM
BH CV XLRA - TDI S': 11.2 CM/SEC
BH CV XLRA MEAS LEFT CCA RATIO VEL: 76.1 CM/SEC
BH CV XLRA MEAS LEFT DIST CCA EDV: 11.8 CM/SEC
BH CV XLRA MEAS LEFT DIST CCA PSV: 61.4 CM/SEC
BH CV XLRA MEAS LEFT DIST ICA EDV: 21.1 CM/SEC
BH CV XLRA MEAS LEFT DIST ICA PSV: 83.5 CM/SEC
BH CV XLRA MEAS LEFT ICA RATIO VEL: 87.4 CM/SEC
BH CV XLRA MEAS LEFT ICA/CCA RATIO: 1.1
BH CV XLRA MEAS LEFT MID CCA EDV: 15.7 CM/SEC
BH CV XLRA MEAS LEFT MID CCA PSV: 73.7 CM/SEC
BH CV XLRA MEAS LEFT MID ICA EDV: 21.1 CM/SEC
BH CV XLRA MEAS LEFT MID ICA PSV: 83 CM/SEC
BH CV XLRA MEAS LEFT PROX CCA EDV: 12.8 CM/SEC
BH CV XLRA MEAS LEFT PROX CCA PSV: 76.6 CM/SEC
BH CV XLRA MEAS LEFT PROX ECA EDV: 9.6 CM/SEC
BH CV XLRA MEAS LEFT PROX ECA PSV: 115.2 CM/SEC
BH CV XLRA MEAS LEFT PROX ICA EDV: 22.1 CM/SEC
BH CV XLRA MEAS LEFT PROX ICA PSV: 87.9 CM/SEC
BH CV XLRA MEAS LEFT PROX SCLA PSV: 81.5 CM/SEC
BH CV XLRA MEAS LEFT VERTEBRAL A EDV: 12.6 CM/SEC
BH CV XLRA MEAS LEFT VERTEBRAL A PSV: 38.3 CM/SEC
BH CV XLRA MEAS RIGHT CCA RATIO VEL: 73.9 CM/SEC
BH CV XLRA MEAS RIGHT DIST CCA EDV: 13.2 CM/SEC
BH CV XLRA MEAS RIGHT DIST CCA PSV: 67.3 CM/SEC
BH CV XLRA MEAS RIGHT DIST ICA EDV: 23.7 CM/SEC
BH CV XLRA MEAS RIGHT DIST ICA PSV: 95.7 CM/SEC
BH CV XLRA MEAS RIGHT ICA RATIO VEL: 109 CM/SEC
BH CV XLRA MEAS RIGHT ICA/CCA RATIO: 1.5
BH CV XLRA MEAS RIGHT MID CCA EDV: 14.3 CM/SEC
BH CV XLRA MEAS RIGHT MID CCA PSV: 67.8 CM/SEC
BH CV XLRA MEAS RIGHT MID ICA EDV: 30.7 CM/SEC
BH CV XLRA MEAS RIGHT MID ICA PSV: 109.6 CM/SEC
BH CV XLRA MEAS RIGHT PROX CCA EDV: 11 CM/SEC
BH CV XLRA MEAS RIGHT PROX CCA PSV: 74.6 CM/SEC
BH CV XLRA MEAS RIGHT PROX ECA EDV: 6.6 CM/SEC
BH CV XLRA MEAS RIGHT PROX ECA PSV: 79.4 CM/SEC
BH CV XLRA MEAS RIGHT PROX ICA EDV: 15.7 CM/SEC
BH CV XLRA MEAS RIGHT PROX ICA PSV: 61.7 CM/SEC
BH CV XLRA MEAS RIGHT PROX SCLA PSV: 80.9 CM/SEC
BH CV XLRA MEAS RIGHT VERTEBRAL A EDV: 10.9 CM/SEC
BH CV XLRA MEAS RIGHT VERTEBRAL A PSV: 40.6 CM/SEC
E/E' RATIO: 8.9
LEFT ATRIUM VOLUME INDEX: 25.6 ML/M2
MAXIMAL PREDICTED HEART RATE: 131 BPM
STRESS TARGET HR: 111 BPM

## 2018-03-16 PROCEDURE — 93880 EXTRACRANIAL BILAT STUDY: CPT | Performed by: INTERNAL MEDICINE

## 2018-03-16 PROCEDURE — 93306 TTE W/DOPPLER COMPLETE: CPT

## 2018-03-16 PROCEDURE — 93306 TTE W/DOPPLER COMPLETE: CPT | Performed by: INTERNAL MEDICINE

## 2018-03-16 PROCEDURE — 93880 EXTRACRANIAL BILAT STUDY: CPT

## 2018-03-19 ENCOUNTER — TELEPHONE (OUTPATIENT)
Dept: FAMILY MEDICINE CLINIC | Facility: CLINIC | Age: 83
End: 2018-03-19

## 2018-03-19 NOTE — TELEPHONE ENCOUNTER
SPOKE WITH HER AND INFORMED HER OF CARDIO SOBIA RAMSAY AND FLOYD GOING TO CALL AND SEE ABOUT APPT.

## 2018-03-19 NOTE — TELEPHONE ENCOUNTER
----- Message from Reyes Zamudio sent at 3/19/2018  2:11 PM EDT -----  Contact: MANSI /  FLOYD DAUGHTER OF ZAKI BARRIENTOS CALLED AND HAS SOME QUESTIONS ABOUT HER MOTHER REGARDING CARDIOLOGY.  FLOYD IS ON [address].    FLOYD [phone]

## 2018-03-20 NOTE — PROGRESS NOTES
Subjective:     Encounter Date:03/22/2018    Primary Care Physician: Ronaldo Travis MD      Patient ID: Keila Alberts is a 89 y.o. female.    Chief Complaint:cc    PROBLEM LIST:  1. Valvular heart disease  a. 3/16/18 EF greater than 70%.  Moderate to severe aortic regurgitation.  Mild aortic stenosis.  2. Hypertension  3. Dyslipidemia  4. Arthritis  5. Surgeries:  a. Appendectomy  b. [place] hole procedure  c. Left hip pinning  d. Left shoulder replacement  e. Right hip replaced  f. Bilateral cataract extraction      Allergies   Allergen Reactions   • Erythromycin          Current Outpatient Prescriptions:   •  amLODIPine (NORVASC) 5 MG tablet, Take 5 mg by mouth Daily., Disp: , Rfl:   •  benazepril (LOTENSIN) 40 MG tablet, Take 40 mg by mouth Daily., Disp: , Rfl:   •  citalopram (CELEXA) 20 MG tablet, 1/2 po daily for 1 week then one po daily (Patient taking differently: No sig reported), Disp: 30 tablet, Rfl: 2  •  digoxin (LANOXIN) 125 MCG tablet, Take 125 mcg by mouth Daily., Disp: , Rfl:   •  docusate sodium (COLACE) 100 MG capsule, Take 100 mg by mouth 2 (Two) Times a Day., Disp: , Rfl:   •  HYDROcodone-acetaminophen (NORCO) 5-325 MG per tablet, Take 1 tablet by mouth Every 6 (Six) Hours As Needed for Moderate Pain ., Disp: 30 tablet, Rfl: 0  •  Multiple Vitamin (MULTI-VITAMIN DAILY PO), Take  by mouth Daily., Disp: , Rfl:   •  Multiple Vitamins-Minerals (PRESERVISION AREDS 2) capsule, Take 1 capsule by mouth Daily., Disp: , Rfl:   •  mupirocin (BACTROBAN) 2 % ointment, , Disp: , Rfl:   •  polyethyl glycol-propyl glycol (SYSTANE) 0.4-0.3 % solution ophthalmic solution, Every 1 (One) Hour As Needed., Disp: , Rfl:   •  polyethylene glycol (MIRALAX) packet, Take 17 g by mouth Daily., Disp: , Rfl:         History of Present Illness    Patient is an 89-year-old  female who we are seeing an urgent consult secondary to upcoming surgical procedure and recent abnormal echocardiogram suggesting moderate to severe  aortic regurgitation.  She has no previous history of any coronary issues.  She is currently significantly limited in physical activity secondary to hip pain.  No reported chest pain, pressure, tightness.  Denies any increasing shortness of breath.  She has some chronic lower extremity edema which per her family does not appear to be worse.  Has some occasional elevated heart rates.  Patient started notes that this seems to coincide with significant episodes of hip pain.  She is scheduled for her surgery early next week.    The following portions of the patient's history were reviewed and updated as appropriate: allergies, current medications, past family history, past medical history, past social history, past surgical history and problem list.    Family History   Problem Relation Age of Onset   • Arthritis Other    • Cancer Other    • Heart attack Other    • Hypertension Other        Social History   Substance Use Topics   • Smoking status: Never Smoker   • Smokeless tobacco: Never Used   • Alcohol use No         Review of Systems   Constitution: Negative for fever, weakness and malaise/fatigue.   HENT: Positive for hearing loss. Negative for nosebleeds.    Eyes: Positive for vision loss in left eye and vision loss in right eye. Negative for redness and visual disturbance.   Cardiovascular: Positive for palpitations. Negative for orthopnea and paroxysmal nocturnal dyspnea.   Respiratory: Negative for cough, snoring, sputum production and wheezing.    Hematologic/Lymphatic: Negative for bleeding problem.   Skin: Negative for flushing, itching and rash.   Musculoskeletal: Positive for arthritis, muscle weakness and myalgias. Negative for falls, joint pain and muscle cramps.   Gastrointestinal: Negative for abdominal pain, diarrhea, heartburn, nausea and vomiting.   Genitourinary: Positive for bladder incontinence and urgency. Negative for hematuria.   Neurological: Positive for headaches. Negative for excessive  "daytime sleepiness, dizziness and tremors.   Psychiatric/Behavioral: Positive for depression. Negative for substance abuse. The patient is not nervous/anxious.           Objective:    height is 160 cm (63\") and weight is 54 kg (119 lb). Her blood pressure is 160/70 and her pulse is 70.         Physical Exam   Constitutional: She is oriented to person, place, and time. She appears well-developed.   Sitting in wheelchair   HENT:   Head: Normocephalic and atraumatic.   Mouth/Throat: Oropharynx is clear and moist.   Eyes: Conjunctivae are normal. Pupils are equal, round, and reactive to light.   Neck: Normal carotid pulses and no JVD present. Carotid bruit is not present. No thyromegaly present.   Cardiovascular: Normal rate, regular rhythm, S1 normal and S2 normal.  Exam reveals no gallop and no friction rub.    Murmur heard.   Early systolic murmur is present with a grade of 3/6    Early diastolic murmur is present with a grade of 1/6   Pulses:       Carotid pulses are 2+ on the right side, and 2+ on the left side.       Dorsalis pedis pulses are 2+ on the right side, and 2+ on the left side.        Posterior tibial pulses are 2+ on the right side, and 2+ on the left side.   Pulmonary/Chest: No respiratory distress. She has no wheezes. She has no rales. She exhibits no tenderness.   Abdominal: She exhibits no distension, no abdominal bruit and no mass. There is no hepatosplenomegaly. There is no tenderness. There is no rebound.   Musculoskeletal: She exhibits edema (1+ bilateral.  Bilateral venous varicosities). She exhibits no tenderness or deformity.   Lymphadenopathy:     She has no cervical adenopathy.   Neurological: She is alert and oriented to person, place, and time. She has normal strength.   Skin: Skin is warm and dry. No rash noted. No cyanosis. Nails show no clubbing.   Psychiatric: She has a normal mood and affect. Cognition and memory are normal.         ECG 12 Lead  Date/Time: 3/22/2018 10:39 " AM  Performed by: PRAMOD RAMSAY  Authorized by: PRAMOD RAMSAY   Rhythm: sinus rhythm  Other findings: LVH                  Assessment:   Assessment/Plan      Keila was seen today for cc.    Diagnoses and all orders for this visit:    Preop cardiovascular exam    Nonrheumatic aortic valve insufficiency    Other orders  -     ECG 12 Lead      1.  Moderate to severe aortic regurgitation by echocardiogram.  This is asymptomatic.  Given the patient's age and frailty, she has not a candidate for any aggressive interventional measures to treat this.  We'll simply treat medically.  There is no evidence of heart failure or volume overload at this time.  2.  Preop left hip replacement, patient is a non-modifiable elevated risk due to age and aortic insufficiency.  Will need to be careful with fluids, in the perioperative period but given her inability to stand, would proceed with surgery at elevated risk.  Discussed this with the patient and the daughter who understand and agree.       Mariam COLÓN scribed portions of this dictation for  Dr. Ramsay.  I, Pramod Ramsay MD, personally performed the services described in this documentation as scribed by the above individual in my presence, and it is both accurate and complete    Dictated utilizing Dragon dictation

## 2018-03-22 ENCOUNTER — OFFICE VISIT (OUTPATIENT)
Dept: CARDIOLOGY | Facility: CLINIC | Age: 83
End: 2018-03-22

## 2018-03-22 VITALS
BODY MASS INDEX: 21.09 KG/M2 | DIASTOLIC BLOOD PRESSURE: 70 MMHG | HEART RATE: 70 BPM | SYSTOLIC BLOOD PRESSURE: 160 MMHG | WEIGHT: 119 LBS | HEIGHT: 63 IN

## 2018-03-22 DIAGNOSIS — I35.1 NONRHEUMATIC AORTIC VALVE INSUFFICIENCY: ICD-10-CM

## 2018-03-22 DIAGNOSIS — Z01.810 PREOP CARDIOVASCULAR EXAM: Primary | ICD-10-CM

## 2018-03-22 PROCEDURE — 93000 ELECTROCARDIOGRAM COMPLETE: CPT | Performed by: INTERNAL MEDICINE

## 2018-03-22 PROCEDURE — 99203 OFFICE O/P NEW LOW 30 MIN: CPT | Performed by: INTERNAL MEDICINE

## 2018-03-22 RX ORDER — POLYETHYLENE GLYCOL 3350 17 G/17G
17 POWDER, FOR SOLUTION ORAL DAILY
COMMUNITY
End: 2021-01-01

## 2018-06-05 DIAGNOSIS — F41.9 ANXIETY: ICD-10-CM

## 2018-06-06 RX ORDER — CITALOPRAM 20 MG/1
TABLET ORAL
Qty: 30 TABLET | Refills: 1 | Status: SHIPPED | OUTPATIENT
Start: 2018-06-06 | End: 2018-08-09 | Stop reason: SDUPTHER

## 2018-08-09 ENCOUNTER — OFFICE VISIT (OUTPATIENT)
Dept: FAMILY MEDICINE CLINIC | Facility: CLINIC | Age: 83
End: 2018-08-09

## 2018-08-09 VITALS
SYSTOLIC BLOOD PRESSURE: 148 MMHG | OXYGEN SATURATION: 99 % | BODY MASS INDEX: 22.59 KG/M2 | RESPIRATION RATE: 12 BRPM | TEMPERATURE: 97.7 F | HEIGHT: 63 IN | DIASTOLIC BLOOD PRESSURE: 72 MMHG | WEIGHT: 127.5 LBS | HEART RATE: 74 BPM

## 2018-08-09 DIAGNOSIS — R26.89 LOSS OF BALANCE: ICD-10-CM

## 2018-08-09 DIAGNOSIS — R00.0 TACHYCARDIA: ICD-10-CM

## 2018-08-09 DIAGNOSIS — R05.9 COUGH: ICD-10-CM

## 2018-08-09 DIAGNOSIS — R51.9 NONINTRACTABLE EPISODIC HEADACHE, UNSPECIFIED HEADACHE TYPE: ICD-10-CM

## 2018-08-09 DIAGNOSIS — F41.9 ANXIETY: ICD-10-CM

## 2018-08-09 DIAGNOSIS — I10 ESSENTIAL HYPERTENSION: Primary | ICD-10-CM

## 2018-08-09 DIAGNOSIS — D64.9 ANEMIA, UNSPECIFIED TYPE: ICD-10-CM

## 2018-08-09 LAB
ALBUMIN SERPL-MCNC: 4.29 G/DL (ref 3.2–4.8)
ALBUMIN/GLOB SERPL: 1.6 G/DL (ref 1.5–2.5)
ALP SERPL-CCNC: 79 U/L (ref 25–100)
ALT SERPL-CCNC: 12 U/L (ref 7–40)
AST SERPL-CCNC: 18 U/L (ref 0–33)
BASOPHILS # BLD AUTO: 0.07 10*3/MM3 (ref 0–0.2)
BASOPHILS NFR BLD AUTO: 0.7 % (ref 0–1)
BILIRUB SERPL-MCNC: 0.6 MG/DL (ref 0.3–1.2)
BUN SERPL-MCNC: 13 MG/DL (ref 9–23)
BUN/CREAT SERPL: 21 (ref 7–25)
CALCIUM SERPL-MCNC: 9.4 MG/DL (ref 8.7–10.4)
CHLORIDE SERPL-SCNC: 98 MMOL/L (ref 99–109)
CO2 SERPL-SCNC: 31 MMOL/L (ref 20–31)
CREAT SERPL-MCNC: 0.62 MG/DL (ref 0.6–1.3)
DIGOXIN SERPL-MCNC: 0.99 NG/ML (ref 0.8–2)
EOSINOPHIL # BLD AUTO: 1.81 10*3/MM3 (ref 0–0.3)
EOSINOPHIL NFR BLD AUTO: 18.9 % (ref 0–3)
ERYTHROCYTE [DISTWIDTH] IN BLOOD BY AUTOMATED COUNT: 15 % (ref 11.3–14.5)
GLOBULIN SER CALC-MCNC: 2.6 GM/DL
GLUCOSE SERPL-MCNC: 98 MG/DL (ref 70–100)
HCT VFR BLD AUTO: 34.3 % (ref 34.5–44)
HGB BLD-MCNC: 11 G/DL (ref 11.5–15.5)
IMM GRANULOCYTES # BLD: 0.02 10*3/MM3 (ref 0–0.03)
IMM GRANULOCYTES NFR BLD: 0.2 % (ref 0–0.6)
LYMPHOCYTES # BLD AUTO: 2.43 10*3/MM3 (ref 0.6–4.8)
LYMPHOCYTES NFR BLD AUTO: 25.3 % (ref 24–44)
MCH RBC QN AUTO: 31.5 PG (ref 27–31)
MCHC RBC AUTO-ENTMCNC: 32.1 G/DL (ref 32–36)
MCV RBC AUTO: 98.3 FL (ref 80–99)
MONOCYTES # BLD AUTO: 0.46 10*3/MM3 (ref 0–1)
MONOCYTES NFR BLD AUTO: 4.8 % (ref 0–12)
NEUTROPHILS # BLD AUTO: 4.8 10*3/MM3 (ref 1.5–8.3)
NEUTROPHILS NFR BLD AUTO: 50.1 % (ref 41–71)
PLATELET # BLD AUTO: 418 10*3/MM3 (ref 150–450)
POTASSIUM SERPL-SCNC: 4.6 MMOL/L (ref 3.5–5.5)
PROT SERPL-MCNC: 6.9 G/DL (ref 5.7–8.2)
RBC # BLD AUTO: 3.49 10*6/MM3 (ref 3.89–5.14)
SODIUM SERPL-SCNC: 136 MMOL/L (ref 132–146)
WBC # BLD AUTO: 9.59 10*3/MM3 (ref 3.5–10.8)

## 2018-08-09 PROCEDURE — 99214 OFFICE O/P EST MOD 30 MIN: CPT | Performed by: FAMILY MEDICINE

## 2018-08-09 RX ORDER — CITALOPRAM 20 MG/1
20 TABLET ORAL DAILY
Qty: 90 TABLET | Refills: 1 | Status: SHIPPED | OUTPATIENT
Start: 2018-08-09 | End: 2019-02-12 | Stop reason: SDUPTHER

## 2018-08-09 RX ORDER — AMLODIPINE BESYLATE 5 MG/1
5 TABLET ORAL DAILY
Qty: 90 TABLET | Refills: 1 | Status: SHIPPED | OUTPATIENT
Start: 2018-08-09 | End: 2019-05-27 | Stop reason: SDUPTHER

## 2018-08-09 RX ORDER — DIGOXIN 125 MCG
125 TABLET ORAL
Qty: 90 TABLET | Refills: 1 | Status: SHIPPED | OUTPATIENT
Start: 2018-08-09 | End: 2019-02-12 | Stop reason: SDUPTHER

## 2018-08-09 RX ORDER — ASPIRIN 81 MG/1
81 TABLET ORAL DAILY
COMMUNITY
End: 2019-10-10

## 2018-08-09 RX ORDER — LORATADINE 10 MG/1
10 TABLET ORAL DAILY
COMMUNITY
End: 2018-08-09 | Stop reason: SDUPTHER

## 2018-08-09 RX ORDER — LORATADINE 10 MG/1
10 TABLET ORAL DAILY
Qty: 90 TABLET | Refills: 1 | Status: SHIPPED | OUTPATIENT
Start: 2018-08-09 | End: 2019-10-10

## 2018-08-09 RX ORDER — LOSARTAN POTASSIUM 50 MG/1
50 TABLET ORAL DAILY
Qty: 90 TABLET | Refills: 1 | Status: SHIPPED | OUTPATIENT
Start: 2018-08-09 | End: 2019-02-12 | Stop reason: SDUPTHER

## 2018-08-09 NOTE — PROGRESS NOTES
Assessment/Plan       Problems Addressed this Visit        Cardiovascular and Mediastinum    Essential hypertension - Primary    Relevant Medications    losartan (COZAAR) 50 MG tablet    amLODIPine (NORVASC) 5 MG tablet    Other Relevant Orders    CBC & Differential    Comprehensive Metabolic Panel      Other Visit Diagnoses     Cough        Anemia, unspecified type        Loss of balance        Relevant Orders    Ambulatory Referral to Physical Therapy Evaluate and treat, Vestibular, Neuro    Anxiety        Relevant Medications    citalopram (CeleXA) 20 MG tablet    Tachycardia        Relevant Medications    digoxin (LANOXIN) 125 MCG tablet    Other Relevant Orders    Digoxin Level    Nonintractable episodic headache, unspecified headache type         Relevant Orders    Digoxin Level            Follow up: Return for follow up depends on review of labs and testing.     DISCUSSION  Hypertension.  Blood pressure is fairly stable.  However she is having a cough.  Recommend change to losartan 50 mg daily and restart amlodipine 5 mg daily.  Call if cough does not improve.    History of anemia.  Recheck CBC.    History of tachycardia.  On Lanoxin.  Recheck digoxin level.    Occasional headache since intracranial bleed.  Comes and goes.  No increased pain however.  Bleed was several years ago.    Occasional loss of balance.  Recommend physical therapy evaluation.  Daughter believes that she can get her to physical therapy and not have to do home health.      MEDICATIONS PRESCRIBED  Requested Prescriptions     Signed Prescriptions Disp Refills   • losartan (COZAAR) 50 MG tablet 90 tablet 1     Sig: Take 1 tablet by mouth Daily.   • amLODIPine (NORVASC) 5 MG tablet 90 tablet 1     Sig: Take 1 tablet by mouth Daily.   • citalopram (CeleXA) 20 MG tablet 90 tablet 1     Sig: Take 1 tablet by mouth Daily.   • digoxin (LANOXIN) 125 MCG tablet 90 tablet 1     Sig: Take 1 tablet by mouth Daily.   • loratadine (CLARITIN) 10 MG  tablet 90 tablet 1     Sig: Take 1 tablet by mouth Daily.        -------------------------------------------    Subjective     Chief Complaint   Patient presents with   • Cough     possible that it is related to bp meds and would like to discuss this and changing meds.    • Labs Only     pt is fasting due for lipid         Hypertension   This is a chronic problem. The current episode started more than 1 year ago. The problem is unchanged. Associated symptoms include shortness of breath (occ and not often). Pertinent negatives include no chest pain. There are no associated agents to hypertension. Current antihypertension treatment includes calcium channel blockers.       Cough  Worse at night  Coughs during the day as well  Breathes through mouth and dry mouth    Had been taking 1/2 amlodipine and did not help the cough, no amlodipine for last week    On Benazepril 40 mg as well    After surg, took 2 asa daily and now back on 1 asa    Hip   Had replacement  Had to hold P T due to scar tissue  Goes back next week to ortho  Pain is better    Loses balance   Right knee swells   Sees ortho next week  Ankles swell at times    Has not fallen yet again    Has some P T on hip ( 12 days of inpt rehab ) and home P T, complained of pain and then stopped the P T    Headaches  Had bleed in the past    Depression  Celexa has helped  No side effects of this  Living with Dtr now      History   Smoking Status   • Never Smoker   Smokeless Tobacco   • Never Used        Past Medical History,Medications, Allergies, and social history was reviewed.      Review of Systems   Constitutional: Positive for fatigue.   HENT: Negative.    Respiratory: Positive for cough and shortness of breath (occ and not often).    Cardiovascular: Negative.  Negative for chest pain.   Gastrointestinal: Negative.    Musculoskeletal: Positive for arthralgias and gait problem.   Psychiatric/Behavioral: Positive for depressed mood.       Objective     Vitals:     "08/09/18 1102   BP: 148/72   Pulse: 74   Resp: 12   Temp: 97.7 °F (36.5 °C)   TempSrc: Temporal Artery    SpO2: 99%   Weight: 57.8 kg (127 lb 8 oz)   Height: 160 cm (62.99\")          Physical Exam   Constitutional: She is oriented to person, place, and time. She appears well-developed and well-nourished.   Uses a walker.   HENT:   Head: Normocephalic and atraumatic.   Right Ear: Hearing and external ear normal.   Left Ear: Hearing and external ear normal.   Mouth/Throat: Oropharynx is clear and moist.   Eyes: Pupils are equal, round, and reactive to light. Conjunctivae and EOM are normal.   Cardiovascular: Normal rate and regular rhythm.  Exam reveals no friction rub.    Murmur heard.   Systolic murmur is present with a grade of 3/6   Pulmonary/Chest: Effort normal and breath sounds normal. No respiratory distress. She has no wheezes. She has no rales.   Musculoskeletal: She exhibits edema (trace).   Neurological: She is alert and oriented to person, place, and time.   Skin: Skin is warm.   Psychiatric: She has a normal mood and affect.   Nursing note and vitals reviewed.                Ronaldo Travis MD    "

## 2019-01-09 ENCOUNTER — TELEPHONE (OUTPATIENT)
Dept: FAMILY MEDICINE CLINIC | Facility: CLINIC | Age: 84
End: 2019-01-09

## 2019-01-09 NOTE — TELEPHONE ENCOUNTER
----- Message from Hermelinda Duarte sent at 1/9/2019  3:31 PM EST -----  Contact: DR WOODS   PATIENT IS CURRENTLY TAKING LOSARTAN AND WAS TOLD THAT THEY ARE TAKING IT OFF THE MARKET. PATIENT WANTS TO KNOW IF SHE SUPPOSED TO STOP TAKING THIS MEDICATION AND START TAKING SOMETHING ELSE? WHAT SHOULD SHE DO?  [phone] (MOLLIE DAUGHTER)

## 2019-02-11 ENCOUNTER — HOSPITAL ENCOUNTER (OUTPATIENT)
Dept: GENERAL RADIOLOGY | Facility: HOSPITAL | Age: 84
Discharge: HOME | End: 2019-02-11
Admitting: FAMILY MEDICINE

## 2019-02-11 ENCOUNTER — OFFICE VISIT (OUTPATIENT)
Dept: FAMILY MEDICINE CLINIC | Facility: CLINIC | Age: 84
End: 2019-02-11

## 2019-02-11 VITALS
WEIGHT: 121 LBS | HEART RATE: 72 BPM | TEMPERATURE: 98.6 F | HEIGHT: 63 IN | SYSTOLIC BLOOD PRESSURE: 154 MMHG | RESPIRATION RATE: 16 BRPM | BODY MASS INDEX: 21.44 KG/M2 | DIASTOLIC BLOOD PRESSURE: 84 MMHG

## 2019-02-11 DIAGNOSIS — M25.561 CHRONIC PAIN OF RIGHT KNEE: ICD-10-CM

## 2019-02-11 DIAGNOSIS — G89.29 CHRONIC PAIN OF RIGHT KNEE: ICD-10-CM

## 2019-02-11 DIAGNOSIS — H53.9 VISUAL DISTURBANCE: ICD-10-CM

## 2019-02-11 DIAGNOSIS — I10 ESSENTIAL HYPERTENSION: ICD-10-CM

## 2019-02-11 DIAGNOSIS — Z01.818 PREOP EXAMINATION: Primary | ICD-10-CM

## 2019-02-11 DIAGNOSIS — K59.04 CHRONIC IDIOPATHIC CONSTIPATION: ICD-10-CM

## 2019-02-11 LAB
ALBUMIN SERPL-MCNC: 4.29 G/DL (ref 3.2–4.8)
ALBUMIN/GLOB SERPL: 2.1 G/DL (ref 1.5–2.5)
ALP SERPL-CCNC: 70 U/L (ref 25–100)
ALT SERPL-CCNC: 17 U/L (ref 7–40)
AST SERPL-CCNC: 25 U/L (ref 0–33)
BASOPHILS # BLD AUTO: 0.06 10*3/MM3 (ref 0–0.2)
BASOPHILS NFR BLD AUTO: 0.7 % (ref 0–1)
BILIRUB BLD-MCNC: NEGATIVE MG/DL
BILIRUB SERPL-MCNC: 0.7 MG/DL (ref 0.3–1.2)
BUN SERPL-MCNC: 12 MG/DL (ref 9–23)
BUN/CREAT SERPL: 18.8 (ref 7–25)
CALCIUM SERPL-MCNC: 8.9 MG/DL (ref 8.7–10.4)
CHLORIDE SERPL-SCNC: 100 MMOL/L (ref 99–109)
CLARITY, POC: CLEAR
CO2 SERPL-SCNC: 29 MMOL/L (ref 20–31)
COLOR UR: YELLOW
CREAT SERPL-MCNC: 0.64 MG/DL (ref 0.6–1.3)
EOSINOPHIL # BLD AUTO: 1.09 10*3/MM3 (ref 0–0.3)
EOSINOPHIL NFR BLD AUTO: 12 % (ref 0–3)
ERYTHROCYTE [DISTWIDTH] IN BLOOD BY AUTOMATED COUNT: 14.7 % (ref 11.3–14.5)
GLOBULIN SER CALC-MCNC: 2 GM/DL
GLUCOSE SERPL-MCNC: 92 MG/DL (ref 70–100)
GLUCOSE UR STRIP-MCNC: NEGATIVE MG/DL
HCT VFR BLD AUTO: 33.7 % (ref 34.5–44)
HGB BLD-MCNC: 11 G/DL (ref 11.5–15.5)
IMM GRANULOCYTES # BLD AUTO: 0.04 10*3/MM3 (ref 0–0.05)
IMM GRANULOCYTES NFR BLD AUTO: 0.4 % (ref 0–0.6)
INR PPP: 1.06 (ref 0.85–1.16)
KETONES UR QL: NEGATIVE
LEUKOCYTE EST, POC: ABNORMAL
LYMPHOCYTES # BLD AUTO: 2.3 10*3/MM3 (ref 0.6–4.8)
LYMPHOCYTES NFR BLD AUTO: 25.4 % (ref 24–44)
MCH RBC QN AUTO: 31.9 PG (ref 27–31)
MCHC RBC AUTO-ENTMCNC: 32.6 G/DL (ref 32–36)
MCV RBC AUTO: 97.7 FL (ref 80–99)
MONOCYTES # BLD AUTO: 0.54 10*3/MM3 (ref 0–1)
MONOCYTES NFR BLD AUTO: 6 % (ref 0–12)
NEUTROPHILS # BLD AUTO: 5.04 10*3/MM3 (ref 1.5–8.3)
NEUTROPHILS NFR BLD AUTO: 55.5 % (ref 41–71)
NITRITE UR-MCNC: NEGATIVE MG/ML
PH UR: 6 [PH] (ref 5–8)
PLATELET # BLD AUTO: 477 10*3/MM3 (ref 150–450)
POTASSIUM SERPL-SCNC: 4.1 MMOL/L (ref 3.5–5.5)
PROT SERPL-MCNC: 6.3 G/DL (ref 5.7–8.2)
PROT UR STRIP-MCNC: ABNORMAL MG/DL
PROTHROMBIN TIME: 13.3 SECONDS (ref 11.2–14.3)
RBC # BLD AUTO: 3.45 10*6/MM3 (ref 3.89–5.14)
RBC # UR STRIP: NEGATIVE /UL
SODIUM SERPL-SCNC: 135 MMOL/L (ref 132–146)
SP GR UR: 1.03 (ref 1–1.03)
UROBILINOGEN UR QL: NORMAL
WBC # BLD AUTO: 9.07 10*3/MM3 (ref 3.5–10.8)

## 2019-02-11 PROCEDURE — 71046 X-RAY EXAM CHEST 2 VIEWS: CPT

## 2019-02-11 PROCEDURE — 93000 ELECTROCARDIOGRAM COMPLETE: CPT | Performed by: FAMILY MEDICINE

## 2019-02-11 PROCEDURE — 99214 OFFICE O/P EST MOD 30 MIN: CPT | Performed by: FAMILY MEDICINE

## 2019-02-11 PROCEDURE — 81003 URINALYSIS AUTO W/O SCOPE: CPT | Performed by: FAMILY MEDICINE

## 2019-02-11 NOTE — PROGRESS NOTES
Subjective:      Keila Alberts is a 89 y.o. female who presents to the office today for a preoperative consultation at the request of surgeon Dr. Sim who plans on performing partial right knee replacement on February 26.  Planned anesthesia is general. The patient has the following known anesthesia issues: she has had multiple surgeroes without anesthesia issues. Patient has a bleeding risk of: no recent abnormal bleeding and no remote history of abnormal bleeding. Patient does not have objections to receiving blood products if needed.    She is active still but has been having pain in her right knee.  They want to do surgery to help with her ambulation and independence    She has a long history of constipation  miralax and stool softeners do not help    The following portions of the patient's history were reviewed and updated as appropriate: allergies, current medications, past family history, past medical history, past social history, past surgical history and problem list.    Past Medical History:   Diagnosis Date   • Arthritis    • Hyperlipidemia    • Hypertension      Past Surgical History:   Procedure Laterality Date   • APPENDECTOMY     • HALIMA HOLE Left 11/27/2016    Procedure: HALIMA HOLE;  Surgeon: Jos Christian MD;  Location:  [facility] OR;  Service:    • HIP PERCUTANEOUS PINNING Left 1/14/2017    Procedure: HIP PERCUTANEOUS PINNING;  Surgeon: Edgar Albert MD;  Location:  [facility] OR;  Service:    • JOINT REPLACEMENT     • TOTAL HIP ARTHROPLASTY Right    • TOTAL SHOULDER REPLACEMENT Left      Social History     Socioeconomic History   • Marital status:      Spouse name: Not on file   • Number of children: Not on file   • Years of education: Not on file   • Highest education level: Not on file   Tobacco Use   • Smoking status: Never Smoker   • Smokeless tobacco: Never Used   Substance and Sexual Activity   • Alcohol use: No   • Drug use: No   • Sexual activity: No   Social History  "Narrative    Lives with her daughter     Family History   Problem Relation Age of Onset   • Arthritis Other    • Cancer Other    • Heart attack Other    • Hypertension Other      Current Outpatient Medications on File Prior to Visit   Medication Sig Dispense Refill   • amLODIPine (NORVASC) 5 MG tablet Take 1 tablet by mouth Daily. 90 tablet 1   • aspirin 81 MG EC tablet Take 81 mg by mouth Daily.     • citalopram (CeleXA) 20 MG tablet Take 1 tablet by mouth Daily. 90 tablet 1   • digoxin (LANOXIN) 125 MCG tablet Take 1 tablet by mouth Daily. 90 tablet 1   • docusate sodium (COLACE) 100 MG capsule Take 100 mg by mouth 2 (Two) Times a Day.     • loratadine (CLARITIN) 10 MG tablet Take 1 tablet by mouth Daily. 90 tablet 1   • losartan (COZAAR) 50 MG tablet Take 1 tablet by mouth Daily. 90 tablet 1   • Multiple Vitamin (MULTI-VITAMIN DAILY PO) Take  by mouth Daily.     • Multiple Vitamins-Minerals (PRESERVISION AREDS 2) capsule Take 1 capsule by mouth Daily.     • polyethyl glycol-propyl glycol (SYSTANE) 0.4-0.3 % solution ophthalmic solution Every 1 (One) Hour As Needed.     • polyethylene glycol (MIRALAX) packet Take 17 g by mouth Daily.       No current facility-administered medications on file prior to visit.          Review of Systems  A comprehensive review of systems was negative.    She does have right knee pain as per HPI      Objective:      Physical Exam  /84   Pulse 72   Temp 98.6 °F (37 °C)   Resp 16   Ht 160 cm (62.99\")   Wt 54.9 kg (121 lb)   BMI 21.44 kg/m²   General appearance: alert, appears stated age and cooperative  Head: Normocephalic, without obvious abnormality, atraumatic  Ears: normal TM's and external ear canals both ears  Throat: OP clear  Lungs: clear to auscultation bilaterally  Heart: RRR, +2 M  Abdomen: soft, non-tender; bowel sounds normal; no masses,  no organomegaly  Extremities: walks with walker  Skin: no rash  Lymph nodes: no cervical LA      ECG 12 Lead  Date/Time: " 2/11/2019 3:15 PM  Performed by: Goran Barry MD  Authorized by: Goran Barry MD   Interpreted by ED physician  Comparison: not compared with previous ECG   Previous ECG: no previous ECG available  Rhythm: sinus rhythm  Conduction: conduction normal  ST Segments: ST segments normal  T Waves: T waves normal  Other: no other findings  Clinical impression: normal ECG                  Assessment:      89 y.o. female with planned surgery as above.    Known risk factors for perioperative complications: None  age    Difficulty with intubation is not anticipated.    Cardiac Risk Estimation: per the Revised Cardiac Risk Index (Circ. 100:1043, 1999), the patient's risk factors for cardiac complications include age, putting her in: RCI RISK CLASS II (1 risk factor, risk of major cardiac compl. appr. 1.3%)    SARINA Cardiac Risk Assessment:    1. 65 Years of age or older?        Yes (1 Point)  2. Risk factors for CAD 3 or greater?       No  3. Known CAD (> or = 50%)?        No  4. ASA used in past 7 days?        Yes (1 Point)  5. Severe Angina (> or = 2 episodes w/in 24 hours)?   No   6. ST changes > or = 0.5 mm?       No  7. Positive Cardiac Markers?        No      Total - 2 Points - 8.3% Risk    Pt cleared for surgery with overall normal risk for pt of her age.  No changes in regimen recommended at this time      Plan:      1. Preoperative workup as follows chest x-ray, ECG, hemoglobin, hematocrit, electrolytes, creatinine, glucose, liver function studies, urinalysis (urinary tract instrumentation planned).  2. Change in medication regimen before surgery: none, continue medication regimen including morning of surgery, with sip of water.  3. Prophylaxis for cardiac events with perioperative beta-blockers: not indicated.

## 2019-02-12 DIAGNOSIS — F41.9 ANXIETY: ICD-10-CM

## 2019-02-12 DIAGNOSIS — R00.0 TACHYCARDIA: ICD-10-CM

## 2019-02-12 DIAGNOSIS — I10 ESSENTIAL HYPERTENSION: ICD-10-CM

## 2019-02-12 RX ORDER — LOSARTAN POTASSIUM 50 MG/1
TABLET ORAL
Qty: 90 TABLET | Refills: 1 | Status: SHIPPED | OUTPATIENT
Start: 2019-02-12 | End: 2019-04-15 | Stop reason: SDUPTHER

## 2019-02-12 RX ORDER — DIGOXIN 125 MCG
TABLET ORAL
Qty: 90 TABLET | Refills: 1 | Status: SHIPPED | OUTPATIENT
Start: 2019-02-12 | End: 2019-09-18 | Stop reason: SDUPTHER

## 2019-02-12 RX ORDER — CITALOPRAM 20 MG/1
TABLET ORAL
Qty: 90 TABLET | Refills: 1 | Status: SHIPPED | OUTPATIENT
Start: 2019-02-12 | End: 2019-09-18 | Stop reason: SDUPTHER

## 2019-04-15 ENCOUNTER — TELEPHONE (OUTPATIENT)
Dept: FAMILY MEDICINE CLINIC | Facility: CLINIC | Age: 84
End: 2019-04-15

## 2019-04-15 DIAGNOSIS — I10 ESSENTIAL HYPERTENSION: ICD-10-CM

## 2019-04-15 RX ORDER — LOSARTAN POTASSIUM 50 MG/1
50 TABLET ORAL DAILY
Qty: 90 TABLET | Refills: 1 | Status: SHIPPED | OUTPATIENT
Start: 2019-04-15 | End: 2019-12-30 | Stop reason: SDUPTHER

## 2019-04-15 NOTE — TELEPHONE ENCOUNTER
Called patient her [employer] lot number was on recalled informed the numbers she gave me.     Needs new prescription sent to [facility] on [address]

## 2019-04-25 ENCOUNTER — OFFICE VISIT (OUTPATIENT)
Dept: FAMILY MEDICINE CLINIC | Facility: CLINIC | Age: 84
End: 2019-04-25

## 2019-04-25 VITALS
WEIGHT: 121 LBS | RESPIRATION RATE: 18 BRPM | DIASTOLIC BLOOD PRESSURE: 68 MMHG | OXYGEN SATURATION: 95 % | HEART RATE: 70 BPM | BODY MASS INDEX: 21.44 KG/M2 | SYSTOLIC BLOOD PRESSURE: 130 MMHG | TEMPERATURE: 97.5 F | HEIGHT: 63 IN

## 2019-04-25 DIAGNOSIS — K59.00 CONSTIPATION, UNSPECIFIED CONSTIPATION TYPE: ICD-10-CM

## 2019-04-25 DIAGNOSIS — M79.89 PAIN AND SWELLING OF RIGHT LOWER LEG: Primary | ICD-10-CM

## 2019-04-25 DIAGNOSIS — G44.89 OTHER HEADACHE SYNDROME: ICD-10-CM

## 2019-04-25 DIAGNOSIS — G25.2 RESTING TREMOR: ICD-10-CM

## 2019-04-25 DIAGNOSIS — M79.661 PAIN AND SWELLING OF RIGHT LOWER LEG: Primary | ICD-10-CM

## 2019-04-25 DIAGNOSIS — R26.89 BALANCE PROBLEM: ICD-10-CM

## 2019-04-25 PROCEDURE — 99214 OFFICE O/P EST MOD 30 MIN: CPT | Performed by: FAMILY MEDICINE

## 2019-04-25 RX ORDER — LACTULOSE 10 G/15ML
10-20 SOLUTION ORAL 2 TIMES DAILY PRN
Qty: 946 ML | Refills: 2 | Status: SHIPPED | OUTPATIENT
Start: 2019-04-25 | End: 2019-12-30 | Stop reason: SDUPTHER

## 2019-04-25 NOTE — PROGRESS NOTES
Assessment/Plan       Problems Addressed this Visit     None      Visit Diagnoses     Pain and swelling of right lower leg    -  Primary    Relevant Orders    US Nonvascular Extremity Limited    Balance problem        Resting tremor        Other headache syndrome        Constipation, unspecified constipation type        Relevant Medications    lactulose (CHRONULAC) 10 GM/15ML solution            Follow up: Return for follow up depends on review of labs and testing.     DISCUSSION  Pain and swelling of the right lower leg.  Appears to be possible hematoma on the anterior portion of the right lower leg.  No calf tenderness or Homans sign.  Most likely soft tissue swelling versus hematoma.  Recommend check ultrasound to evaluate for this.    Intermittent balance problem.  Continue therapy.    Resting tremor.  May have Parkinson's or parkinsonism type disorder.  Since mild at this time, do not recommend any further medication unless worsens.  She and daughter agree.    Intermittent headache.  Continue to monitor.  Call if not improving.    Intermittent constipation.  Will try lactulose since this helped previously.  Linzess has not been helpful.      MEDICATIONS PRESCRIBED  Requested Prescriptions     Signed Prescriptions Disp Refills   • lactulose (CHRONULAC) 10 GM/15ML solution 946 mL 2     Sig: Take 15-30 mL by mouth 2 (Two) Times a Day As Needed (constipaton).              -------------------------------------------    Subjective     Chief Complaint   Patient presents with   • Lower Extremity Issue     right leg, soft spot on front of leg    • Balance Issues   • Headache         History of Present Illness   Leg swelling  In Feb right partial knee replacement  Did well with surgery  Went to rehab for 2-3 weeks   D/c to home and had home health and P T and O T    Last monday got up, lower leg on left swollen anteriorly  Since then , a little bit better and hard to keep her off it  Has to walk and keep moving    Knee  "swelling has improved      Had gone to ortho for 6 week check up on 4/9 and all was ok    On ASA 81 mg     Balance issues  With P T , she has done well but has been working on balance  Uses a walker but does not like it and sometimes does not use all the time   Therapy is done now  Gave home exercises  Sees ortho next month again   No recent falls  She won't listen and does what she wants    Headaches  Off and on and in the pm  Right arm shakes at times  Has arthritis in the right arm  Tremor has gotten worse  Tremor is more at rest    Tylenol as needed for pain     Has stopped the mobic and gabapentin and had when in rehab      Constipation     takes lactulose for constipation and did help her  Linzess was not helpful.  Would like to try lactulose.  No reported rectal bleeding.  No abdominal pain.      Social History     Tobacco Use   Smoking Status Never Smoker   Smokeless Tobacco Never Used        Past Medical History,Medications, Allergies, and social history was reviewed.      Review of Systems   Constitutional: Negative.    HENT: Negative.    Respiratory: Negative.  Negative for shortness of breath.    Cardiovascular: Positive for leg swelling. Negative for chest pain.   Gastrointestinal: Positive for constipation. Negative for abdominal pain and blood in stool.   Genitourinary: Negative.    Musculoskeletal: Negative.    Neurological: Negative.    Psychiatric/Behavioral: Negative.        Objective     Vitals:    04/25/19 1102   BP: 130/68   Pulse: 70   Resp: 18   Temp: 97.5 °F (36.4 °C)   SpO2: 95%   Weight: 54.9 kg (121 lb)   Height: 160 cm (63\")          Physical Exam   Constitutional: She is oriented to person, place, and time. She appears well-developed and well-nourished.   HENT:   Head: Normocephalic and atraumatic.   Right Ear: Hearing and external ear normal.   Left Ear: Hearing and external ear normal.   Mouth/Throat: Oropharynx is clear and moist.   Eyes: Conjunctivae and EOM are normal. " Pupils are equal, round, and reactive to light.   Cardiovascular: Normal rate, regular rhythm and normal heart sounds. Exam reveals no friction rub.   No murmur heard.  Pulmonary/Chest: Effort normal and breath sounds normal. No respiratory distress. She has no wheezes. She has no rales.   Neurological: She is alert and oriented to person, place, and time.   Skin: Skin is warm.   Psychiatric: She has a normal mood and affect. Her behavior is normal.   Nursing note and vitals reviewed.    Right anterior lower leg above the foot, there is a swollen area with some possible mild bruising.  Possible hematoma or just soft tissue swelling.  Homans sign is negative.  There is no calf tenderness.  There is no significant swelling of the calf, no redness, no warmth.            Ronaldo Travis MD

## 2019-04-29 ENCOUNTER — HOSPITAL ENCOUNTER (OUTPATIENT)
Dept: ULTRASOUND IMAGING | Facility: HOSPITAL | Age: 84
End: 2019-04-29

## 2019-04-29 ENCOUNTER — HOSPITAL ENCOUNTER (OUTPATIENT)
Dept: ULTRASOUND IMAGING | Facility: HOSPITAL | Age: 84
Discharge: HOME | End: 2019-04-29
Admitting: FAMILY MEDICINE

## 2019-04-29 PROCEDURE — 76882 US LMTD JT/FCL EVL NVASC XTR: CPT

## 2019-05-01 ENCOUNTER — TELEPHONE (OUTPATIENT)
Dept: FAMILY MEDICINE CLINIC | Facility: CLINIC | Age: 84
End: 2019-05-01

## 2019-05-01 NOTE — TELEPHONE ENCOUNTER
----- Message from Alejandra Casillas sent at 5/1/2019  2:49 PM EDT -----  Contact: PT'S DAUGHTER.  DR WOODS    CALLING ABOUT RESULTS FOR ULTRASOUND ON PT'S LEG

## 2019-05-01 NOTE — TELEPHONE ENCOUNTER
Informed pt's daughter of results. You asked if she is having pain in her knee? Daughter denies this. She does not seem to have any pain but still some swelling down in her foot.     She wants to know does she need to keep elevating and icing it?

## 2019-05-02 NOTE — TELEPHONE ENCOUNTER
Called and spoke to patient's daughter, Aretha Informed that provider stated that would be ok to the home PT exercises from time to time as well as elevation and icing. Had no further questions and voiced understanding.

## 2019-05-02 NOTE — TELEPHONE ENCOUNTER
please call, continue ice and elevation and if persisting by next week, then let me know and may need to see ortho.

## 2019-05-02 NOTE — TELEPHONE ENCOUNTER
Informed pt's daughter and now she wants to know if she should be doing the home PT exercises from time to time as well as elevation and icing too?

## 2019-05-28 RX ORDER — AMLODIPINE BESYLATE 5 MG/1
TABLET ORAL
Qty: 90 TABLET | Refills: 0 | Status: SHIPPED | OUTPATIENT
Start: 2019-05-28 | End: 2019-09-18 | Stop reason: SDUPTHER

## 2019-09-18 DIAGNOSIS — R00.0 TACHYCARDIA: ICD-10-CM

## 2019-09-18 DIAGNOSIS — F41.9 ANXIETY: ICD-10-CM

## 2019-09-19 RX ORDER — AMLODIPINE BESYLATE 5 MG/1
TABLET ORAL
Qty: 90 TABLET | Refills: 0 | Status: SHIPPED | OUTPATIENT
Start: 2019-09-19 | End: 2019-12-30

## 2019-09-19 RX ORDER — DIGOXIN 125 MCG
TABLET ORAL
Qty: 90 TABLET | Refills: 0 | Status: SHIPPED | OUTPATIENT
Start: 2019-09-19 | End: 2019-09-26 | Stop reason: SDUPTHER

## 2019-09-19 RX ORDER — CITALOPRAM 20 MG/1
TABLET ORAL
Qty: 90 TABLET | Refills: 0 | Status: SHIPPED | OUTPATIENT
Start: 2019-09-19 | End: 2019-12-30

## 2019-09-26 ENCOUNTER — TELEPHONE (OUTPATIENT)
Dept: FAMILY MEDICINE CLINIC | Facility: CLINIC | Age: 84
End: 2019-09-26

## 2019-09-26 DIAGNOSIS — R00.0 TACHYCARDIA: ICD-10-CM

## 2019-09-26 RX ORDER — DIGOXIN 125 MCG
125 TABLET ORAL DAILY
Qty: 90 TABLET | Refills: 0 | Status: SHIPPED | OUTPATIENT
Start: 2019-09-26 | End: 2019-12-30

## 2019-09-26 NOTE — TELEPHONE ENCOUNTER
STATES THAT THE PHARMACY NEVER RECEIVED THE DIGOXIN 90 DAY SUPPLY.    CAN IT BE SENT AGAIN?    TO [facility] ON [address]

## 2019-10-10 ENCOUNTER — OFFICE VISIT (OUTPATIENT)
Dept: FAMILY MEDICINE CLINIC | Facility: CLINIC | Age: 84
End: 2019-10-10

## 2019-10-10 VITALS
HEIGHT: 63 IN | TEMPERATURE: 97.6 F | SYSTOLIC BLOOD PRESSURE: 128 MMHG | BODY MASS INDEX: 21.43 KG/M2 | RESPIRATION RATE: 18 BRPM | HEART RATE: 72 BPM | DIASTOLIC BLOOD PRESSURE: 60 MMHG

## 2019-10-10 DIAGNOSIS — F41.9 ANXIETY: ICD-10-CM

## 2019-10-10 DIAGNOSIS — Z79.899 HIGH RISK MEDICATION USE: ICD-10-CM

## 2019-10-10 DIAGNOSIS — Z23 NEED FOR IMMUNIZATION AGAINST INFLUENZA: ICD-10-CM

## 2019-10-10 DIAGNOSIS — I35.1 AORTIC VALVE INSUFFICIENCY, ETIOLOGY OF CARDIAC VALVE DISEASE UNSPECIFIED: ICD-10-CM

## 2019-10-10 DIAGNOSIS — R26.89 BALANCE PROBLEM: ICD-10-CM

## 2019-10-10 DIAGNOSIS — N30.01 ACUTE CYSTITIS WITH HEMATURIA: ICD-10-CM

## 2019-10-10 DIAGNOSIS — I10 ESSENTIAL HYPERTENSION: Primary | ICD-10-CM

## 2019-10-10 LAB
BILIRUB BLD-MCNC: NEGATIVE MG/DL
CLARITY, POC: CLEAR
COLOR UR: YELLOW
GLUCOSE UR STRIP-MCNC: NEGATIVE MG/DL
KETONES UR QL: NEGATIVE
LEUKOCYTE EST, POC: ABNORMAL
NITRITE UR-MCNC: NEGATIVE MG/ML
PH UR: 7.5 [PH] (ref 5–8)
PROT UR STRIP-MCNC: NEGATIVE MG/DL
RBC # UR STRIP: ABNORMAL /UL
SP GR UR: 1.01 (ref 1–1.03)
UROBILINOGEN UR QL: NORMAL

## 2019-10-10 PROCEDURE — 81003 URINALYSIS AUTO W/O SCOPE: CPT | Performed by: FAMILY MEDICINE

## 2019-10-10 PROCEDURE — 90653 IIV ADJUVANT VACCINE IM: CPT | Performed by: FAMILY MEDICINE

## 2019-10-10 PROCEDURE — 99214 OFFICE O/P EST MOD 30 MIN: CPT | Performed by: FAMILY MEDICINE

## 2019-10-10 PROCEDURE — G0008 ADMIN INFLUENZA VIRUS VAC: HCPCS | Performed by: FAMILY MEDICINE

## 2019-10-10 RX ORDER — SULFAMETHOXAZOLE AND TRIMETHOPRIM 800; 160 MG/1; MG/1
1 TABLET ORAL 2 TIMES DAILY
Qty: 14 TABLET | Refills: 0 | Status: SHIPPED | OUTPATIENT
Start: 2019-10-10 | End: 2019-11-20

## 2019-10-10 NOTE — PROGRESS NOTES
Assessment/Plan       Problems Addressed this Visit        Cardiovascular and Mediastinum    Essential hypertension - Primary    Relevant Orders    CBC & Differential    Comprehensive Metabolic Panel      Other Visit Diagnoses     Acute cystitis with hematuria        Relevant Medications    sulfamethoxazole-trimethoprim (BACTRIM DS) 800-160 MG per tablet    Other Relevant Orders    POC Urinalysis Dipstick, Automated (Completed)    Urine Culture - Urine, Urine, Clean Catch    Aortic valve insufficiency, etiology of cardiac valve disease unspecified        Balance problem        Relevant Orders    Ambulatory Referral to Home Health    Need for immunization against influenza        Relevant Orders    Fluzone High Dose =>65Years (Completed)    High risk medication use        Relevant Orders    Digoxin Level    Anxiety                Follow up: Return in about 6 months (around 4/10/2020) for follow up depends on review of labs and testing.     DISCUSSION  Chronic multiple problems.    Hypertension.  Blood pressures controlled.    Urinary tract infection with mild hematuria.  Check urine culture.  Start Bactrim as noted.    Aortic insufficiency.  Stable.    Balance problem.  Recent fall.  Recommend home physical therapy.  It is a taxing effort to get her out of the house.    Flu shot today.     check labs including digoxin level.    Cough may be due to allergies.  Call if not improving.  Continue Zyrtec.    Anxiety.  Stable.  Continue citalopram      MEDICATIONS PRESCRIBED  Requested Prescriptions     Signed Prescriptions Disp Refills   • sulfamethoxazole-trimethoprim (BACTRIM DS) 800-160 MG per tablet 14 tablet 0     Sig: Take 1 tablet by mouth 2 (Two) Times a Day.            -------------------------------------------    Subjective     Chief Complaint   Patient presents with   • Hypertension     f/u    • Urinary Tract Infection     burning   • Cough     at night          Hypertension   This is a chronic problem. The  current episode started more than 1 year ago. The problem is unchanged. Associated symptoms include shortness of breath (occ and not often). Pertinent negatives include no chest pain. There are no associated agents to hypertension. Current antihypertension treatment includes calcium channel blockers.   Urinary Tract Infection    This is a new problem. Episode onset: x 10 days. The problem occurs intermittently. The quality of the pain is described as burning. The pain is mild. There has been no fever. Pertinent negatives include no nausea or vomiting. She has tried nothing for the symptoms. The treatment provided no relief. takes zyrtec at night.    Cough   This is a new (worse when she goes to bed. MAyhave been on BP med in the past and had to stop) problem. The problem has been unchanged. Episode frequency: at night and occ in am  The cough is non-productive. Associated symptoms include shortness of breath (occ and not often). Pertinent negatives include no chest pain or fever. Nothing aggravates the symptoms. She has tried nothing for the symptoms. The treatment provided no relief. takes zyrtec at night.        Has been on losartan for awhile    Anxiety  On Celexa and has helped  Wants to walk w/o walker  Had partial knee replacement in Feb  Balance is off  Like falling back  Stayed one week at sons, 2 weeks ago and fell   Not clear why   Not all the time    Has to help take care of     Some memory loss    ++ hard of hearing          She has been eating ok  Sweet in the am  Dinner does ok    Occ boost  Had anemia in the past      Social History     Tobacco Use   Smoking Status Never Smoker   Smokeless Tobacco Never Used          Past Medical History,Medications, Allergies, and social history was reviewed.        Patient's Body mass index is 21.43 kg/m². BMI is within normal parameters. No follow-up required..         Review of Systems   Constitutional: Positive for fatigue. Negative for fever.   HENT:  "Negative.    Respiratory: Positive for cough and shortness of breath (occ and not often).    Cardiovascular: Negative.  Negative for chest pain.   Gastrointestinal: Negative.  Negative for nausea and vomiting.   Musculoskeletal: Positive for arthralgias.   Psychiatric/Behavioral: Positive for decreased concentration. The patient is nervous/anxious.        Objective     Vitals:    10/10/19 1109   BP: 128/60   Pulse: 72   Resp: 18   Temp: 97.6 °F (36.4 °C)   Height: 160 cm (63\")          Physical Exam   Constitutional: She appears well-developed and well-nourished.   HENT:   Head: Normocephalic and atraumatic.   Right Ear: External ear normal. Decreased hearing is noted.   Left Ear: External ear normal. Decreased hearing is noted.   Mouth/Throat: Oropharynx is clear and moist.   Eyes: Conjunctivae and EOM are normal. Pupils are equal, round, and reactive to light.   Cardiovascular: Normal rate, regular rhythm and normal heart sounds. Exam reveals no friction rub.   No murmur heard.  Pulmonary/Chest: Effort normal and breath sounds normal. No respiratory distress. She has no wheezes. She has no rales.   Abdominal: Soft. She exhibits no distension. There is no tenderness. There is no guarding.   Musculoskeletal: She exhibits no edema.   Neurological: She is alert.   Skin: Skin is warm.   Psychiatric: She has a normal mood and affect. Her behavior is normal.   Nursing note and vitals reviewed.    Uses a walker.            Ronaldo Travis MD    "

## 2019-10-11 LAB
ALBUMIN SERPL-MCNC: 4.3 G/DL (ref 3.5–5.2)
ALBUMIN/GLOB SERPL: 1.7 G/DL
ALP SERPL-CCNC: 69 U/L (ref 39–117)
ALT SERPL-CCNC: 11 U/L (ref 1–33)
AST SERPL-CCNC: 21 U/L (ref 1–32)
BASOPHILS # BLD AUTO: 0.08 10*3/MM3 (ref 0–0.2)
BASOPHILS NFR BLD AUTO: 0.9 % (ref 0–1.5)
BILIRUB SERPL-MCNC: 0.4 MG/DL (ref 0.2–1.2)
BUN SERPL-MCNC: 11 MG/DL (ref 8–23)
BUN/CREAT SERPL: 15.7 (ref 7–25)
CALCIUM SERPL-MCNC: 9.2 MG/DL (ref 8.2–9.6)
CHLORIDE SERPL-SCNC: 96 MMOL/L (ref 98–107)
CO2 SERPL-SCNC: 26.3 MMOL/L (ref 22–29)
CREAT SERPL-MCNC: 0.7 MG/DL (ref 0.57–1)
DIGOXIN SERPL-MCNC: 0.8 NG/ML (ref 0.6–1.2)
EOSINOPHIL # BLD AUTO: 1.96 10*3/MM3 (ref 0–0.4)
EOSINOPHIL NFR BLD AUTO: 20.9 % (ref 0.3–6.2)
ERYTHROCYTE [DISTWIDTH] IN BLOOD BY AUTOMATED COUNT: 13.6 % (ref 12.3–15.4)
GLOBULIN SER CALC-MCNC: 2.6 GM/DL
GLUCOSE SERPL-MCNC: 95 MG/DL (ref 65–99)
HCT VFR BLD AUTO: 31.7 % (ref 34–46.6)
HGB BLD-MCNC: 10.5 G/DL (ref 12–15.9)
IMM GRANULOCYTES # BLD AUTO: 0.03 10*3/MM3 (ref 0–0.05)
IMM GRANULOCYTES NFR BLD AUTO: 0.3 % (ref 0–0.5)
LYMPHOCYTES # BLD AUTO: 1.77 10*3/MM3 (ref 0.7–3.1)
LYMPHOCYTES NFR BLD AUTO: 18.9 % (ref 19.6–45.3)
MCH RBC QN AUTO: 32 PG (ref 26.6–33)
MCHC RBC AUTO-ENTMCNC: 33.1 G/DL (ref 31.5–35.7)
MCV RBC AUTO: 96.6 FL (ref 79–97)
MONOCYTES # BLD AUTO: 0.45 10*3/MM3 (ref 0.1–0.9)
MONOCYTES NFR BLD AUTO: 4.8 % (ref 5–12)
NEUTROPHILS # BLD AUTO: 5.09 10*3/MM3 (ref 1.7–7)
NEUTROPHILS NFR BLD AUTO: 54.2 % (ref 42.7–76)
NRBC BLD AUTO-RTO: 0.1 /100 WBC (ref 0–0.2)
PLATELET # BLD AUTO: 394 10*3/MM3 (ref 140–450)
POTASSIUM SERPL-SCNC: 4.9 MMOL/L (ref 3.5–5.2)
PROT SERPL-MCNC: 6.9 G/DL (ref 6–8.5)
RBC # BLD AUTO: 3.28 10*6/MM3 (ref 3.77–5.28)
SODIUM SERPL-SCNC: 136 MMOL/L (ref 136–145)
WBC # BLD AUTO: 9.38 10*3/MM3 (ref 3.4–10.8)

## 2019-10-13 LAB
BACTERIA UR CULT: ABNORMAL
BACTERIA UR CULT: ABNORMAL
OTHER ANTIBIOTIC SUSC ISLT: ABNORMAL

## 2019-10-31 ENCOUNTER — TELEPHONE (OUTPATIENT)
Dept: FAMILY MEDICINE CLINIC | Facility: CLINIC | Age: 84
End: 2019-10-31

## 2019-11-05 ENCOUNTER — TELEPHONE (OUTPATIENT)
Dept: FAMILY MEDICINE CLINIC | Facility: CLINIC | Age: 84
End: 2019-11-05

## 2019-11-05 NOTE — TELEPHONE ENCOUNTER
Graciela Called [phone] from [facility] Asked if she could do 3 follow up visit with Social work. Okayed verbal orders.

## 2019-11-08 ENCOUNTER — TELEPHONE (OUTPATIENT)
Dept: FAMILY MEDICINE CLINIC | Facility: CLINIC | Age: 84
End: 2019-11-08

## 2019-11-08 NOTE — TELEPHONE ENCOUNTER
PEGGY       PT IS HOLDING HER STOMACH, SHE IS CONSTIPATION, SHE HAS BACK PAIN, CHILLS AND A HEADACHE AS WELL. CRISTINA WITH [facility] CALLED DUE TO SHE IS CONCERNED BECAUSE THE PT IS NOT ONE TO COMPLAIN ABOUT ANYTHING.         PLEASE ADVISE ON WHAT TO DO ?       DAUGHTER - FLOYD     [phone]    OKAY TO LEAVE A VOICEMAIL

## 2019-11-08 NOTE — TELEPHONE ENCOUNTER
Please call, with symptoms reported, rec ER eval. If having chills and abd pain , may be infection somewhere.

## 2019-11-08 NOTE — TELEPHONE ENCOUNTER
VIANEY: Spoke w/ pt's daughter. She said, her mother is always called. She does not have a fever. Her backing was hurting but they were there to do physical therapy on her. Today her mother complains of no back pain. During this call the patient was sitting at the table eating lunch with her  with no complaints. The daughter stated, she knew what to watch for and if she see's any worrisome changes in her mother she will take her to the ER.     She stated, everything in this phone call was factual just not as alarming as it seems.

## 2019-11-20 ENCOUNTER — OFFICE VISIT (OUTPATIENT)
Dept: FAMILY MEDICINE CLINIC | Facility: CLINIC | Age: 84
End: 2019-11-20

## 2019-11-20 ENCOUNTER — RESULTS ENCOUNTER (OUTPATIENT)
Dept: FAMILY MEDICINE CLINIC | Facility: CLINIC | Age: 84
End: 2019-11-20

## 2019-11-20 VITALS
SYSTOLIC BLOOD PRESSURE: 108 MMHG | RESPIRATION RATE: 16 BRPM | BODY MASS INDEX: 22.18 KG/M2 | TEMPERATURE: 97.3 F | DIASTOLIC BLOOD PRESSURE: 60 MMHG | HEIGHT: 63 IN | HEART RATE: 64 BPM | WEIGHT: 125.2 LBS

## 2019-11-20 DIAGNOSIS — R30.0 DYSURIA: ICD-10-CM

## 2019-11-20 DIAGNOSIS — R82.998 URINE LEUKOCYTES: ICD-10-CM

## 2019-11-20 DIAGNOSIS — H61.23 BILATERAL HEARING LOSS DUE TO CERUMEN IMPACTION: Primary | ICD-10-CM

## 2019-11-20 DIAGNOSIS — N30.00 ACUTE CYSTITIS WITHOUT HEMATURIA: ICD-10-CM

## 2019-11-20 LAB
BILIRUB BLD-MCNC: NEGATIVE MG/DL
CLARITY, POC: ABNORMAL
COLOR UR: YELLOW
GLUCOSE UR STRIP-MCNC: NEGATIVE MG/DL
KETONES UR QL: NEGATIVE
LEUKOCYTE EST, POC: ABNORMAL
NITRITE UR-MCNC: POSITIVE MG/ML
PH UR: 6 [PH] (ref 5–8)
PROT UR STRIP-MCNC: ABNORMAL MG/DL
RBC # UR STRIP: ABNORMAL /UL
SP GR UR: 1.02 (ref 1–1.03)
UROBILINOGEN UR QL: NORMAL

## 2019-11-20 PROCEDURE — 99213 OFFICE O/P EST LOW 20 MIN: CPT | Performed by: NURSE PRACTITIONER

## 2019-11-20 PROCEDURE — 81003 URINALYSIS AUTO W/O SCOPE: CPT | Performed by: NURSE PRACTITIONER

## 2019-11-20 RX ORDER — CIPROFLOXACIN 250 MG/1
250 TABLET, FILM COATED ORAL 2 TIMES DAILY
Qty: 20 TABLET | Refills: 0 | Status: SHIPPED | OUTPATIENT
Start: 2019-11-20 | End: 2020-10-09

## 2019-11-20 NOTE — PROGRESS NOTES
Subjective   Keila Alberts is a 89 y.o. female.     History of Present Illness   Difficulty hearing form both ears  Accompanied by daughter who says she thinks she needs her ears cleared of ear wax.   Wants to have ears cleaned out.    She has had a recent UTI and her daughter brought a urine sample to have it rechecked after a recent treatment    The following portions of the patient's history were reviewed and updated as appropriate: allergies, current medications, past family history, past medical history, past social history, past surgical history and problem list.    Review of Systems   Constitutional: Negative for chills, fatigue and fever.   HENT: Positive for hearing loss. Negative for ear discharge and ear pain.    Genitourinary: Positive for urinary incontinence. Negative for difficulty urinating, dysuria, frequency, hematuria and urgency.   Musculoskeletal: Negative for back pain.       Objective   Physical Exam   Constitutional: She appears well-developed and well-nourished. No distress.   HENT:   Head: Normocephalic.   Right Ear: External ear normal. Decreased hearing is noted. cerumen impaction is present.  Left Ear: External ear normal. Decreased hearing is noted. An impacted cerumen is present.  Nose: Nose normal.   Neck: Trachea normal. Neck supple. No JVD present. No thyroid mass and no thyromegaly present.   Cardiovascular: Normal rate, regular rhythm, intact distal pulses and normal pulses.   Murmur heard.   Systolic murmur is present with a grade of 3/6.  Pulmonary/Chest: Effort normal and breath sounds normal.   Neurological: She is alert.   Skin: Skin is warm. No rash noted. She is not diaphoretic.   Nursing note and vitals reviewed.        Assessment/Plan   Keila was seen today for hearing loss and difficulty urinating.    Diagnoses and all orders for this visit:    Bilateral hearing loss due to cerumen impaction    Dysuria  -     POCT urinalysis dipstick, automated    Urine leukocytes  -      Urine Culture - Urine, Urine, Clean Catch; Future    Acute cystitis without hematuria  -     ciprofloxacin (CIPRO) 250 MG tablet; Take 1 tablet by mouth 2 (Two) Times a Day.    attempts made to clear ear wax from both ears with warm water/peroxide irrigation  Pt tolerated without issues. Both TMs visualized after the procedure and were intact and canals clear    UA positive for nitrites and leukocytes will send for culture and notify pt of results and start pt on Cipro.

## 2019-11-24 LAB
BACTERIA UR CULT: ABNORMAL
BACTERIA UR CULT: ABNORMAL
OTHER ANTIBIOTIC SUSC ISLT: ABNORMAL

## 2019-11-27 RX ORDER — AMOXICILLIN AND CLAVULANATE POTASSIUM 875; 125 MG/1; MG/1
1 TABLET, FILM COATED ORAL 2 TIMES DAILY
Qty: 20 TABLET | Refills: 0 | Status: SHIPPED | OUTPATIENT
Start: 2019-11-27 | End: 2020-10-09

## 2019-12-30 DIAGNOSIS — K59.00 CONSTIPATION, UNSPECIFIED CONSTIPATION TYPE: ICD-10-CM

## 2019-12-30 DIAGNOSIS — R00.0 TACHYCARDIA: ICD-10-CM

## 2019-12-30 DIAGNOSIS — F41.9 ANXIETY: ICD-10-CM

## 2019-12-30 DIAGNOSIS — I10 ESSENTIAL HYPERTENSION: ICD-10-CM

## 2019-12-30 RX ORDER — CITALOPRAM 20 MG/1
TABLET ORAL
Qty: 90 TABLET | Refills: 0 | Status: SHIPPED | OUTPATIENT
Start: 2019-12-30 | End: 2020-03-24

## 2019-12-30 RX ORDER — AMLODIPINE BESYLATE 5 MG/1
TABLET ORAL
Qty: 90 TABLET | Refills: 0 | Status: SHIPPED | OUTPATIENT
Start: 2019-12-30 | End: 2020-03-24

## 2019-12-30 RX ORDER — DIGOXIN 125 MCG
TABLET ORAL
Qty: 90 TABLET | Refills: 0 | Status: SHIPPED | OUTPATIENT
Start: 2019-12-30 | End: 2020-03-24

## 2019-12-30 RX ORDER — LOSARTAN POTASSIUM 50 MG/1
50 TABLET ORAL DAILY
Qty: 90 TABLET | Refills: 0 | Status: SHIPPED | OUTPATIENT
Start: 2019-12-30 | End: 2020-07-29

## 2019-12-30 RX ORDER — LACTULOSE 10 G/15ML
10-20 SOLUTION ORAL 2 TIMES DAILY PRN
Qty: 946 ML | Refills: 0 | Status: SHIPPED | OUTPATIENT
Start: 2019-12-30 | End: 2020-03-24

## 2020-03-24 DIAGNOSIS — F41.9 ANXIETY: ICD-10-CM

## 2020-03-24 DIAGNOSIS — K59.00 CONSTIPATION, UNSPECIFIED CONSTIPATION TYPE: ICD-10-CM

## 2020-03-24 DIAGNOSIS — R00.0 TACHYCARDIA: ICD-10-CM

## 2020-03-24 RX ORDER — AMLODIPINE BESYLATE 5 MG/1
TABLET ORAL
Qty: 90 TABLET | Refills: 0 | Status: SHIPPED | OUTPATIENT
Start: 2020-03-24 | End: 2020-06-22

## 2020-03-24 RX ORDER — LACTULOSE 10 G/15ML
SOLUTION ORAL
Qty: 946 ML | Refills: 0 | Status: SHIPPED | OUTPATIENT
Start: 2020-03-24 | End: 2021-01-01

## 2020-03-24 RX ORDER — CITALOPRAM 20 MG/1
TABLET ORAL
Qty: 90 TABLET | Refills: 0 | Status: SHIPPED | OUTPATIENT
Start: 2020-03-24 | End: 2020-06-22

## 2020-03-24 RX ORDER — DIGOXIN 125 MCG
TABLET ORAL
Qty: 90 TABLET | Refills: 0 | Status: SHIPPED | OUTPATIENT
Start: 2020-03-24 | End: 2020-06-22

## 2020-06-22 DIAGNOSIS — F41.9 ANXIETY: ICD-10-CM

## 2020-06-22 DIAGNOSIS — R00.0 TACHYCARDIA: ICD-10-CM

## 2020-06-22 RX ORDER — CITALOPRAM 20 MG/1
TABLET ORAL
Qty: 90 TABLET | Refills: 0 | Status: SHIPPED | OUTPATIENT
Start: 2020-06-22 | End: 2020-07-29

## 2020-06-22 RX ORDER — DIGOXIN 125 MCG
TABLET ORAL
Qty: 90 TABLET | Refills: 0 | Status: SHIPPED | OUTPATIENT
Start: 2020-06-22 | End: 2020-07-29

## 2020-06-22 RX ORDER — AMLODIPINE BESYLATE 5 MG/1
TABLET ORAL
Qty: 90 TABLET | Refills: 0 | Status: SHIPPED | OUTPATIENT
Start: 2020-06-22 | End: 2020-07-29

## 2020-07-29 DIAGNOSIS — I10 ESSENTIAL HYPERTENSION: ICD-10-CM

## 2020-07-29 DIAGNOSIS — F41.9 ANXIETY: ICD-10-CM

## 2020-07-29 DIAGNOSIS — R00.0 TACHYCARDIA: ICD-10-CM

## 2020-07-29 RX ORDER — AMLODIPINE BESYLATE 5 MG/1
TABLET ORAL
Qty: 90 TABLET | Refills: 0 | Status: SHIPPED | OUTPATIENT
Start: 2020-07-29 | End: 2020-12-09

## 2020-07-29 RX ORDER — CITALOPRAM 20 MG/1
TABLET ORAL
Qty: 90 TABLET | Refills: 0 | Status: SHIPPED | OUTPATIENT
Start: 2020-07-29 | End: 2020-12-09

## 2020-07-29 RX ORDER — LOSARTAN POTASSIUM 50 MG/1
TABLET ORAL
Qty: 90 TABLET | Refills: 0 | Status: SHIPPED | OUTPATIENT
Start: 2020-07-29 | End: 2020-08-27

## 2020-07-29 RX ORDER — DIGOXIN 125 MCG
TABLET ORAL
Qty: 90 TABLET | Refills: 0 | Status: SHIPPED | OUTPATIENT
Start: 2020-07-29 | End: 2020-12-09

## 2020-08-27 DIAGNOSIS — I10 ESSENTIAL HYPERTENSION: ICD-10-CM

## 2020-08-27 RX ORDER — LOSARTAN POTASSIUM 50 MG/1
TABLET ORAL
Qty: 90 TABLET | Refills: 0 | Status: SHIPPED | OUTPATIENT
Start: 2020-08-27 | End: 2020-12-09

## 2020-10-09 ENCOUNTER — OFFICE VISIT (OUTPATIENT)
Dept: FAMILY MEDICINE CLINIC | Facility: CLINIC | Age: 85
End: 2020-10-09

## 2020-10-09 VITALS
BODY MASS INDEX: 23.04 KG/M2 | SYSTOLIC BLOOD PRESSURE: 142 MMHG | RESPIRATION RATE: 16 BRPM | WEIGHT: 130 LBS | TEMPERATURE: 96.3 F | HEART RATE: 73 BPM | DIASTOLIC BLOOD PRESSURE: 62 MMHG | HEIGHT: 63 IN | OXYGEN SATURATION: 96 %

## 2020-10-09 DIAGNOSIS — I10 ESSENTIAL HYPERTENSION: ICD-10-CM

## 2020-10-09 DIAGNOSIS — Z00.00 MEDICARE ANNUAL WELLNESS VISIT, SUBSEQUENT: Primary | ICD-10-CM

## 2020-10-09 DIAGNOSIS — I35.1 AORTIC VALVE INSUFFICIENCY, ETIOLOGY OF CARDIAC VALVE DISEASE UNSPECIFIED: ICD-10-CM

## 2020-10-09 DIAGNOSIS — R00.0 TACHYCARDIA: ICD-10-CM

## 2020-10-09 DIAGNOSIS — Z79.899 HIGH RISK MEDICATION USE: ICD-10-CM

## 2020-10-09 DIAGNOSIS — R82.81 PYURIA: ICD-10-CM

## 2020-10-09 DIAGNOSIS — Z23 NEED FOR INFLUENZA VACCINATION: ICD-10-CM

## 2020-10-09 DIAGNOSIS — Z23 NEED FOR PNEUMOCOCCAL VACCINATION: ICD-10-CM

## 2020-10-09 LAB
BILIRUB BLD-MCNC: NEGATIVE MG/DL
CLARITY, POC: CLEAR
COLOR UR: YELLOW
GLUCOSE UR STRIP-MCNC: NEGATIVE MG/DL
KETONES UR QL: NEGATIVE
LEUKOCYTE EST, POC: ABNORMAL
NITRITE UR-MCNC: NEGATIVE MG/ML
PH UR: 6.5 [PH] (ref 5–8)
PROT UR STRIP-MCNC: NEGATIVE MG/DL
RBC # UR STRIP: NEGATIVE /UL
SP GR UR: 1.01 (ref 1–1.03)
UROBILINOGEN UR QL: NORMAL

## 2020-10-09 PROCEDURE — 81003 URINALYSIS AUTO W/O SCOPE: CPT | Performed by: FAMILY MEDICINE

## 2020-10-09 PROCEDURE — 90732 PPSV23 VACC 2 YRS+ SUBQ/IM: CPT | Performed by: FAMILY MEDICINE

## 2020-10-09 PROCEDURE — G0439 PPPS, SUBSEQ VISIT: HCPCS | Performed by: FAMILY MEDICINE

## 2020-10-09 PROCEDURE — 90472 IMMUNIZATION ADMIN EACH ADD: CPT | Performed by: FAMILY MEDICINE

## 2020-10-09 PROCEDURE — G0008 ADMIN INFLUENZA VIRUS VAC: HCPCS | Performed by: FAMILY MEDICINE

## 2020-10-09 PROCEDURE — 90694 VACC AIIV4 NO PRSRV 0.5ML IM: CPT | Performed by: FAMILY MEDICINE

## 2020-10-09 NOTE — PROGRESS NOTES
The ABCs of the Annual Wellness Visit  Subsequent Medicare Wellness Visit    Chief Complaint   Patient presents with   • Medicare Wellness-subsequent       Subjective   History of Present Illness:  Keila Alberts is a 89 y.o. female who presents for a Subsequent Medicare Wellness Visit.    Hypertension  Occ shortness of breath if active    Aortic insufficiency    Anxiety. On celexa./     Hearing loss  Significant hearing loss and does not use hearing aids.    Hard to understand  Hearing loss  Macular degeneration with vision issues  Hyper at times  Some shoulder pain , to see ortho next week and may get injection    Loses balance  Has fallen  Wants to go backward  history of head injury ( had to have surg)    nutrition  Snacks  Eats a good meal for dinner and then constipated. Occ does not want to take med      HEALTH RISK ASSESSMENT    Recent Hospitalizations:  No hospitalization(s) within the last year.    Current Medical Providers:  Patient Care Team:  Ronaldo Travis MD as PCP - General (Family Medicine)  Ronaldo Travis MD as PCP - Claims Attributed  Khanh Greene as Consulting Physician (Orthopedic Surgery)    Smoking Status:  Social History     Tobacco Use   Smoking Status Never Smoker   Smokeless Tobacco Never Used       Alcohol Consumption:  Social History     Substance and Sexual Activity   Alcohol Use No       Depression Screen:   PHQ-2/PHQ-9 Depression Screening 10/9/2020   Little interest or pleasure in doing things 0   Feeling down, depressed, or hopeless 0   Trouble falling or staying asleep, or sleeping too much -   Feeling tired or having little energy -   Poor appetite or overeating -   Feeling bad about yourself - or that you are a failure or have let yourself or your family down -   Trouble concentrating on things, such as reading the newspaper or watching television -   Moving or speaking so slowly that other people could have noticed. Or the opposite - being so fidgety or restless  that you have been moving around a lot more than usual -   Thoughts that you would be better off dead, or of hurting yourself in some way -   Total Score 0   If you checked off any problems, how difficult have these problems made it for you to do your work, take care of things at home, or get along with other people? -       Fall Risk Screen:  GARY Fall Risk Assessment has not been completed.    Health Habits and Functional and Cognitive Screening:  Functional & Cognitive Status 10/9/2020   Do you have difficulty preparing food and eating? Yes   Do you have difficulty bathing yourself, getting dressed or grooming yourself? Yes   Do you have difficulty using the toilet? Yes   Do you have difficulty moving around from place to place? Yes   Do you have trouble with steps or getting out of a bed or a chair? Yes   Current Diet Well Balanced Diet   Dental Exam Up to date   Eye Exam Up to date   Exercise (times per week) 0 times per week   Current Exercise Activities Include None   Do you need help using the phone?  Yes   Are you deaf or do you have serious difficulty hearing?  Yes   Do you need help with transportation? Yes   Do you need help shopping? Yes   Do you need help preparing meals?  Yes   Do you need help with housework?  Yes   Do you need help with laundry? Yes   Do you need help taking your medications? Yes   Do you need help managing money? Yes   Do you ever drive or ride in a car without wearing a seat belt? No   Have you felt unusual stress, anger or loneliness in the last month? No   Who do you live with? Child   If you need help, do you have trouble finding someone available to you? No   Have you been bothered in the last four weeks by sexual problems? No   Do you have difficulty concentrating, remembering or making decisions? Yes         Does the patient have evidence of cognitive impairment? No, But has hearing loss    Asprin use counseling:Does not need ASA (and currently is not on  it)    Age-appropriate Screening Schedule:  Refer to the list below for future screening recommendations based on patient's age, sex and/or medical conditions. Orders for these recommended tests are listed in the plan section. The patient has been provided with a written plan.    Health Maintenance   Topic Date Due   • ZOSTER VACCINE (1 of 2) 07/[birth year]/1978   • INFLUENZA VACCINE  08/01/2020   • TDAP/TD VACCINES (2 - Td) 10/22/2026   • LIPID PANEL  Discontinued          The following portions of the patient's history were reviewed and updated as appropriate: allergies, current medications, past family history, past medical history, past social history, past surgical history and problem list.    Outpatient Medications Prior to Visit   Medication Sig Dispense Refill   • amLODIPine (NORVASC) 5 MG tablet Take 1 tablet by mouth once daily 90 tablet 0   • citalopram (CeleXA) 20 MG tablet Take 1 tablet by mouth once daily 90 tablet 0   • CONSTULOSE 10 GM/15ML solution TAKE 15-30 ML BY MOUTH 2 TIMES DAILY AS NEEDED FOR CONSTIPATION 946 mL 0   • digoxin (LANOXIN) 125 MCG tablet Take 1 tablet by mouth once daily 90 tablet 0   • docusate sodium (COLACE) 100 MG capsule Take 100 mg by mouth 2 (Two) Times a Day.     • losartan (COZAAR) 50 MG tablet Take 1 tablet by mouth once daily 90 tablet 0   • Multiple Vitamin (MULTI-VITAMIN DAILY PO) Take  by mouth Daily.     • Multiple Vitamins-Minerals (PRESERVISION AREDS 2) capsule Take 1 capsule by mouth Daily.     • polyethylene glycol (MIRALAX) packet Take 17 g by mouth Daily.     • amoxicillin-clavulanate (AUGMENTIN) 875-125 MG per tablet Take 1 tablet by mouth 2 (Two) Times a Day. 20 tablet 0   • ciprofloxacin (CIPRO) 250 MG tablet Take 1 tablet by mouth 2 (Two) Times a Day. 20 tablet 0     No facility-administered medications prior to visit.        Patient Active Problem List   Diagnosis   • History of subdural hematoma (post traumatic)   • Essential hypertension   • Chronic idiopathic  "constipation   • Normocytic anemia   • Acute/Subacute Traumatic SDH (subdural hematoma)   • Leukocytosis, ? Etiology   • s/p Left Craniotomy for Evacuation of Left SDH 11/27/16    • Hyponatremia   • 6.9 x 5.3cm pelvic mass, seen on imaging prior to admission   • Closed left hip fracture, s/p left hip percutaneous pinning   • Macular degeneration   • Heart murmur       Advanced Care Planning:  ACP discussion was held with the patient during this visit. Daughter and son are power of .    Review of Systems   Respiratory: Positive for cough (mild , worse at night. takes zyrtec).    Gastrointestinal: Positive for constipation (med , some makes her sick, taking MOM).       Compared to one year ago, the patient feels her physical health is the same.  Compared to one year ago, the patient feels her mental health is the same.    Reviewed chart for potential of high risk medication in the elderly: yes  Reviewed chart for potential of harmful drug interactions in the elderly:yes    Objective         Vitals:    10/09/20 1000   BP: 142/62   Pulse: 73   Resp: 16   Temp: 96.3 °F (35.7 °C)   TempSrc: Temporal   SpO2: 96%   Weight: 59 kg (130 lb)   Height: 160 cm (63\")       Body mass index is 23.03 kg/m².  Discussed the patient's BMI with her. The BMI is in the acceptable range.    Physical Exam          Assessment/Plan   Medicare Risks and Personalized Health Plan  CMS Preventative Services Quick Reference  Advance Directive Discussion  Fall Risk  Hearing Problem  Immunizations Discussed/Encouraged (specific immunizations; Influenza and Pneumococcal 23 )  Inactivity/Sedentary  Advanced age    The above risks/problems have been discussed with the patient.  Pertinent information has been shared with the patient in the After Visit Summary.  Follow up plans and orders are seen below in the Assessment/Plan Section.    Diagnoses and all orders for this visit:    1. Medicare annual wellness visit, subsequent (Primary)  -     POC " Urinalysis Dipstick, Automated    2. Essential hypertension  -     CBC & Differential  -     Comprehensive Metabolic Panel  -     TSH  -     Digoxin Level    3. Aortic valve insufficiency, etiology of cardiac valve disease unspecified  -     CBC & Differential  -     Comprehensive Metabolic Panel  -     Digoxin Level    4. Need for pneumococcal vaccination  -     Pneumococcal polysaccharide vaccine (PNEUMOVAX)    5. Need for influenza vaccination  -     Fluad Quad >65 years    6. Tachycardia  -     Digoxin Level    7. High risk medication use  -     Digoxin Level    8. Pyuria  -     Urine Culture - Urine, Urine, Clean Catch      Follow Up:  Return for follow up depends on review of labs and testing.     Continue routine health maintenance, safety and seatbelt use.  Proper nutrition.    Hypertension.  Stable on current medications.    Aortic valve insufficiency.  Not surgical candidate.  Recheck digoxin level given history of tachycardia.    Flu shot and pneumococcal 23 vaccine today.    Urinalysis showed leukocyte Estrace.  Check culture since no definite symptoms.  If positive, then will treat.  Daughter agrees.    An After Visit Summary and PPPS were given to the patient.       Ronaldo Travis MD

## 2020-10-10 LAB
ALBUMIN SERPL-MCNC: 4.5 G/DL (ref 3.5–5.2)
ALBUMIN/GLOB SERPL: 2.4 G/DL
ALP SERPL-CCNC: 65 U/L (ref 39–117)
ALT SERPL-CCNC: 14 U/L (ref 1–33)
AST SERPL-CCNC: 18 U/L (ref 1–32)
BASOPHILS # BLD AUTO: 0.08 10*3/MM3 (ref 0–0.2)
BASOPHILS NFR BLD AUTO: 0.9 % (ref 0–1.5)
BILIRUB SERPL-MCNC: 0.5 MG/DL (ref 0–1.2)
BUN SERPL-MCNC: 15 MG/DL (ref 8–23)
BUN/CREAT SERPL: 25 (ref 7–25)
CALCIUM SERPL-MCNC: 9.1 MG/DL (ref 8.2–9.6)
CHLORIDE SERPL-SCNC: 99 MMOL/L (ref 98–107)
CO2 SERPL-SCNC: 27 MMOL/L (ref 22–29)
CREAT SERPL-MCNC: 0.6 MG/DL (ref 0.57–1)
DIGOXIN SERPL-MCNC: 1.2 NG/ML (ref 0.6–1.2)
EOSINOPHIL # BLD AUTO: 1.62 10*3/MM3 (ref 0–0.4)
EOSINOPHIL NFR BLD AUTO: 18.1 % (ref 0.3–6.2)
ERYTHROCYTE [DISTWIDTH] IN BLOOD BY AUTOMATED COUNT: 14.1 % (ref 12.3–15.4)
GLOBULIN SER CALC-MCNC: 1.9 GM/DL
GLUCOSE SERPL-MCNC: 102 MG/DL (ref 65–99)
HCT VFR BLD AUTO: 31.7 % (ref 34–46.6)
HGB BLD-MCNC: 10.5 G/DL (ref 12–15.9)
IMM GRANULOCYTES # BLD AUTO: 0.02 10*3/MM3 (ref 0–0.05)
IMM GRANULOCYTES NFR BLD AUTO: 0.2 % (ref 0–0.5)
LYMPHOCYTES # BLD AUTO: 1.63 10*3/MM3 (ref 0.7–3.1)
LYMPHOCYTES NFR BLD AUTO: 18.2 % (ref 19.6–45.3)
MCH RBC QN AUTO: 32.1 PG (ref 26.6–33)
MCHC RBC AUTO-ENTMCNC: 33.1 G/DL (ref 31.5–35.7)
MCV RBC AUTO: 96.9 FL (ref 79–97)
MONOCYTES # BLD AUTO: 0.51 10*3/MM3 (ref 0.1–0.9)
MONOCYTES NFR BLD AUTO: 5.7 % (ref 5–12)
NEUTROPHILS # BLD AUTO: 5.09 10*3/MM3 (ref 1.7–7)
NEUTROPHILS NFR BLD AUTO: 56.9 % (ref 42.7–76)
NRBC BLD AUTO-RTO: 0.1 /100 WBC (ref 0–0.2)
PLATELET # BLD AUTO: 348 10*3/MM3 (ref 140–450)
POTASSIUM SERPL-SCNC: 4.1 MMOL/L (ref 3.5–5.2)
PROT SERPL-MCNC: 6.4 G/DL (ref 6–8.5)
RBC # BLD AUTO: 3.27 10*6/MM3 (ref 3.77–5.28)
SODIUM SERPL-SCNC: 136 MMOL/L (ref 136–145)
TSH SERPL DL<=0.005 MIU/L-ACNC: 2.71 UIU/ML (ref 0.27–4.2)
WBC # BLD AUTO: 8.95 10*3/MM3 (ref 3.4–10.8)

## 2020-10-11 LAB
BACTERIA UR CULT: NORMAL
BACTERIA UR CULT: NORMAL

## 2020-12-09 DIAGNOSIS — R00.0 TACHYCARDIA: ICD-10-CM

## 2020-12-09 DIAGNOSIS — I10 ESSENTIAL HYPERTENSION: ICD-10-CM

## 2020-12-09 DIAGNOSIS — F41.9 ANXIETY: ICD-10-CM

## 2020-12-09 RX ORDER — AMLODIPINE BESYLATE 5 MG/1
TABLET ORAL
Qty: 90 TABLET | Refills: 1 | Status: SHIPPED | OUTPATIENT
Start: 2020-12-09 | End: 2021-01-01

## 2020-12-09 RX ORDER — CITALOPRAM 20 MG/1
TABLET ORAL
Qty: 90 TABLET | Refills: 1 | Status: SHIPPED | OUTPATIENT
Start: 2020-12-09 | End: 2021-01-01

## 2020-12-09 RX ORDER — DIGOXIN 125 MCG
TABLET ORAL
Qty: 90 TABLET | Refills: 1 | Status: SHIPPED | OUTPATIENT
Start: 2020-12-09 | End: 2021-01-01

## 2020-12-09 RX ORDER — LOSARTAN POTASSIUM 50 MG/1
TABLET ORAL
Qty: 90 TABLET | Refills: 1 | Status: SHIPPED | OUTPATIENT
Start: 2020-12-09 | End: 2021-01-01

## 2021-01-01 ENCOUNTER — HOSPITAL ENCOUNTER (OUTPATIENT)
Dept: CT IMAGING | Facility: HOSPITAL | Age: 86
Discharge: HOME | End: 2021-11-05
Admitting: INTERNAL MEDICINE

## 2021-01-01 ENCOUNTER — APPOINTMENT (OUTPATIENT)
Dept: CT IMAGING | Facility: HOSPITAL | Age: 86
End: 2021-01-01

## 2021-01-01 ENCOUNTER — OFFICE VISIT (OUTPATIENT)
Dept: NEUROLOGY | Facility: CLINIC | Age: 86
End: 2021-01-01

## 2021-01-01 ENCOUNTER — TELEPHONE (OUTPATIENT)
Dept: NEUROLOGY | Facility: CLINIC | Age: 86
End: 2021-01-01

## 2021-01-01 ENCOUNTER — TRANSCRIBE ORDERS (OUTPATIENT)
Dept: ADMINISTRATIVE | Facility: HOSPITAL | Age: 86
End: 2021-01-01

## 2021-01-01 ENCOUNTER — APPOINTMENT (OUTPATIENT)
Dept: MRI IMAGING | Facility: HOSPITAL | Age: 86
End: 2021-01-01

## 2021-01-01 ENCOUNTER — TELEPHONE (OUTPATIENT)
Dept: FAMILY MEDICINE CLINIC | Facility: CLINIC | Age: 86
End: 2021-01-01

## 2021-01-01 ENCOUNTER — HOSPITAL ENCOUNTER (INPATIENT)
Facility: HOSPITAL | Age: 86
LOS: 11 days | Discharge: NURSING, REHAB OR LONG-TERM CARE | End: 2021-10-18
Attending: EMERGENCY MEDICINE | Admitting: STUDENT IN AN ORGANIZED HEALTH CARE EDUCATION/TRAINING PROGRAM

## 2021-01-01 ENCOUNTER — HOSPITAL ENCOUNTER (OUTPATIENT)
Dept: CT IMAGING | Facility: HOSPITAL | Age: 86
Discharge: HOME | End: 2021-09-28
Admitting: FAMILY MEDICINE

## 2021-01-01 ENCOUNTER — APPOINTMENT (OUTPATIENT)
Dept: GENERAL RADIOLOGY | Facility: HOSPITAL | Age: 86
End: 2021-01-01

## 2021-01-01 ENCOUNTER — TELEPHONE (OUTPATIENT)
Dept: NEUROLOGY | Facility: OTHER | Age: 86
End: 2021-01-01

## 2021-01-01 ENCOUNTER — HOSPITAL ENCOUNTER (EMERGENCY)
Facility: HOSPITAL | Age: 86
Discharge: NURSING, REHAB OR LONG-TERM CARE | End: 2021-12-24
Attending: EMERGENCY MEDICINE | Admitting: EMERGENCY MEDICINE

## 2021-01-01 ENCOUNTER — NURSE TRIAGE (OUTPATIENT)
Dept: CALL CENTER | Facility: HOSPITAL | Age: 86
End: 2021-01-01

## 2021-01-01 ENCOUNTER — HOSPITAL ENCOUNTER (EMERGENCY)
Facility: HOSPITAL | Age: 86
Discharge: HOME | End: 2021-07-17
Attending: EMERGENCY MEDICINE | Admitting: EMERGENCY MEDICINE

## 2021-01-01 ENCOUNTER — HOSPITAL ENCOUNTER (EMERGENCY)
Facility: HOSPITAL | Age: 86
Discharge: HOME | End: 2021-11-13
Attending: EMERGENCY MEDICINE | Admitting: EMERGENCY MEDICINE

## 2021-01-01 ENCOUNTER — HOME HEALTH ADMISSION (OUTPATIENT)
Dept: HOME HEALTH SERVICES | Facility: HOME HEALTHCARE | Age: 86
End: 2021-01-01

## 2021-01-01 ENCOUNTER — OFFICE VISIT (OUTPATIENT)
Dept: FAMILY MEDICINE CLINIC | Facility: CLINIC | Age: 86
End: 2021-01-01

## 2021-01-01 ENCOUNTER — HOSPITAL ENCOUNTER (EMERGENCY)
Facility: HOSPITAL | Age: 86
Discharge: HOME | End: 2021-09-29
Attending: EMERGENCY MEDICINE | Admitting: EMERGENCY MEDICINE

## 2021-01-01 ENCOUNTER — APPOINTMENT (OUTPATIENT)
Dept: NEUROLOGY | Facility: HOSPITAL | Age: 86
End: 2021-01-01

## 2021-01-01 VITALS
BODY MASS INDEX: 19.99 KG/M2 | HEART RATE: 101 BPM | WEIGHT: 120 LBS | DIASTOLIC BLOOD PRESSURE: 93 MMHG | HEIGHT: 65 IN | OXYGEN SATURATION: 95 % | RESPIRATION RATE: 20 BRPM | TEMPERATURE: 98.5 F | SYSTOLIC BLOOD PRESSURE: 185 MMHG

## 2021-01-01 VITALS
RESPIRATION RATE: 18 BRPM | SYSTOLIC BLOOD PRESSURE: 158 MMHG | HEIGHT: 63 IN | OXYGEN SATURATION: 93 % | TEMPERATURE: 98.2 F | HEART RATE: 67 BPM | DIASTOLIC BLOOD PRESSURE: 68 MMHG | BODY MASS INDEX: 23.03 KG/M2

## 2021-01-01 VITALS
HEIGHT: 65 IN | TEMPERATURE: 98 F | WEIGHT: 120 LBS | HEART RATE: 92 BPM | RESPIRATION RATE: 16 BRPM | OXYGEN SATURATION: 97 % | BODY MASS INDEX: 19.99 KG/M2 | SYSTOLIC BLOOD PRESSURE: 155 MMHG | DIASTOLIC BLOOD PRESSURE: 83 MMHG

## 2021-01-01 VITALS
HEART RATE: 75 BPM | HEIGHT: 63 IN | TEMPERATURE: 97.5 F | WEIGHT: 126.2 LBS | DIASTOLIC BLOOD PRESSURE: 79 MMHG | BODY MASS INDEX: 22.36 KG/M2 | SYSTOLIC BLOOD PRESSURE: 159 MMHG | RESPIRATION RATE: 17 BRPM | OXYGEN SATURATION: 93 %

## 2021-01-01 VITALS
RESPIRATION RATE: 16 BRPM | HEIGHT: 64 IN | DIASTOLIC BLOOD PRESSURE: 103 MMHG | HEART RATE: 75 BPM | OXYGEN SATURATION: 97 % | TEMPERATURE: 98.7 F | BODY MASS INDEX: 23.05 KG/M2 | WEIGHT: 135 LBS | SYSTOLIC BLOOD PRESSURE: 184 MMHG

## 2021-01-01 VITALS
OXYGEN SATURATION: 95 % | WEIGHT: 130 LBS | DIASTOLIC BLOOD PRESSURE: 99 MMHG | TEMPERATURE: 98.7 F | SYSTOLIC BLOOD PRESSURE: 175 MMHG | HEART RATE: 69 BPM | HEIGHT: 63 IN | RESPIRATION RATE: 16 BRPM | BODY MASS INDEX: 23.04 KG/M2

## 2021-01-01 VITALS
WEIGHT: 135 LBS | HEIGHT: 64 IN | SYSTOLIC BLOOD PRESSURE: 138 MMHG | HEART RATE: 70 BPM | OXYGEN SATURATION: 96 % | DIASTOLIC BLOOD PRESSURE: 84 MMHG | BODY MASS INDEX: 23.05 KG/M2

## 2021-01-01 DIAGNOSIS — R41.0 CONFUSION: Primary | ICD-10-CM

## 2021-01-01 DIAGNOSIS — Z86.018 HISTORY OF UTERINE FIBROID: Primary | ICD-10-CM

## 2021-01-01 DIAGNOSIS — S09.90XA INJURY OF HEAD, INITIAL ENCOUNTER: Primary | ICD-10-CM

## 2021-01-01 DIAGNOSIS — S20.221A CONTUSION OF RIGHT BACK WALL OF THORAX, INITIAL ENCOUNTER: ICD-10-CM

## 2021-01-01 DIAGNOSIS — H54.8 LEGALLY BLIND: ICD-10-CM

## 2021-01-01 DIAGNOSIS — F01.50 VASCULAR DEMENTIA WITHOUT BEHAVIORAL DISTURBANCE (HCC): ICD-10-CM

## 2021-01-01 DIAGNOSIS — I10 ESSENTIAL HYPERTENSION: ICD-10-CM

## 2021-01-01 DIAGNOSIS — H91.93 BILATERAL HEARING LOSS, UNSPECIFIED HEARING LOSS TYPE: ICD-10-CM

## 2021-01-01 DIAGNOSIS — M19.90 ARTHRITIS: ICD-10-CM

## 2021-01-01 DIAGNOSIS — Z74.09 IMPAIRED FUNCTIONAL MOBILITY, BALANCE, GAIT, AND ENDURANCE: ICD-10-CM

## 2021-01-01 DIAGNOSIS — R35.0 FREQUENCY OF URINATION: Primary | ICD-10-CM

## 2021-01-01 DIAGNOSIS — S00.03XA CONTUSION OF SCALP, INITIAL ENCOUNTER: ICD-10-CM

## 2021-01-01 DIAGNOSIS — R41.0 CONFUSION: ICD-10-CM

## 2021-01-01 DIAGNOSIS — D64.9 CHRONIC ANEMIA: ICD-10-CM

## 2021-01-01 DIAGNOSIS — Z87.828 HISTORY OF SUBDURAL HEMATOMA (POST TRAUMATIC): ICD-10-CM

## 2021-01-01 DIAGNOSIS — R41.0 DELIRIUM: ICD-10-CM

## 2021-01-01 DIAGNOSIS — R41.82 ACUTE ON CHRONIC ALTERATION IN MENTAL STATUS: Primary | ICD-10-CM

## 2021-01-01 DIAGNOSIS — G89.29 OTHER CHRONIC PAIN: ICD-10-CM

## 2021-01-01 DIAGNOSIS — R00.0 TACHYCARDIA: ICD-10-CM

## 2021-01-01 DIAGNOSIS — F03.90 DEMENTIA WITHOUT BEHAVIORAL DISTURBANCE, UNSPECIFIED DEMENTIA TYPE: ICD-10-CM

## 2021-01-01 DIAGNOSIS — M54.50 ACUTE MIDLINE LOW BACK PAIN WITHOUT SCIATICA: ICD-10-CM

## 2021-01-01 DIAGNOSIS — R42 DIZZINESS: ICD-10-CM

## 2021-01-01 DIAGNOSIS — F03.91 DEMENTIA WITH BEHAVIORAL DISTURBANCE, UNSPECIFIED DEMENTIA TYPE: Primary | ICD-10-CM

## 2021-01-01 DIAGNOSIS — F41.9 ANXIETY: ICD-10-CM

## 2021-01-01 DIAGNOSIS — R29.6 RECURRENT FALLS: Primary | ICD-10-CM

## 2021-01-01 DIAGNOSIS — I10 ELEVATED BLOOD PRESSURE READING WITH DIAGNOSIS OF HYPERTENSION: ICD-10-CM

## 2021-01-01 DIAGNOSIS — R93.5 ABNORMAL ABDOMINAL ULTRASOUND: Primary | ICD-10-CM

## 2021-01-01 DIAGNOSIS — R93.5 ABNORMAL ABDOMINAL ULTRASOUND: ICD-10-CM

## 2021-01-01 DIAGNOSIS — R41.0 DELIRIUM: Primary | ICD-10-CM

## 2021-01-01 DIAGNOSIS — E87.1 CHRONIC HYPONATREMIA: ICD-10-CM

## 2021-01-01 DIAGNOSIS — K59.04 CHRONIC IDIOPATHIC CONSTIPATION: ICD-10-CM
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
## 2021-01-01 LAB
ALBUMIN SERPL-MCNC: 3.4 G/DL (ref 3.5–5.2)
ALBUMIN SERPL-MCNC: 3.4 G/DL (ref 3.5–5.2)
ALBUMIN SERPL-MCNC: 3.9 G/DL (ref 3.5–4.6)
ALBUMIN SERPL-MCNC: 4.1 G/DL (ref 3.5–5.2)
ALBUMIN SERPL-MCNC: 4.2 G/DL (ref 3.5–5.2)
ALBUMIN/GLOB SERPL: 1.2 G/DL
ALBUMIN/GLOB SERPL: 1.3 G/DL
ALBUMIN/GLOB SERPL: 1.4 G/DL
ALBUMIN/GLOB SERPL: 1.7 G/DL
ALBUMIN/GLOB SERPL: 1.8 {RATIO} (ref 1.2–2.2)
ALP SERPL-CCNC: 59 IU/L (ref 44–121)
ALP SERPL-CCNC: 61 U/L (ref 39–117)
ALP SERPL-CCNC: 73 U/L (ref 39–117)
ALP SERPL-CCNC: 75 U/L (ref 39–117)
ALP SERPL-CCNC: 80 U/L (ref 39–117)
ALP SERPL-CCNC: 84 U/L (ref 39–117)
ALP SERPL-CCNC: 90 U/L (ref 39–117)
ALT SERPL W P-5'-P-CCNC: 12 U/L (ref 1–33)
ALT SERPL W P-5'-P-CCNC: 12 U/L (ref 1–33)
ALT SERPL W P-5'-P-CCNC: 13 U/L (ref 1–33)
ALT SERPL W P-5'-P-CCNC: 17 U/L (ref 1–33)
ALT SERPL W P-5'-P-CCNC: 18 U/L (ref 1–33)
ALT SERPL W P-5'-P-CCNC: 32 U/L (ref 1–33)
ALT SERPL-CCNC: 9 IU/L (ref 0–32)
ANION GAP SERPL CALCULATED.3IONS-SCNC: 10 MMOL/L (ref 5–15)
ANION GAP SERPL CALCULATED.3IONS-SCNC: 11 MMOL/L (ref 5–15)
ANION GAP SERPL CALCULATED.3IONS-SCNC: 12 MMOL/L (ref 5–15)
ANION GAP SERPL CALCULATED.3IONS-SCNC: 13 MMOL/L (ref 5–15)
ANION GAP SERPL CALCULATED.3IONS-SCNC: 9 MMOL/L (ref 5–15)
AST SERPL-CCNC: 15 U/L (ref 1–32)
AST SERPL-CCNC: 16 IU/L (ref 0–40)
AST SERPL-CCNC: 16 U/L (ref 1–32)
AST SERPL-CCNC: 16 U/L (ref 1–32)
AST SERPL-CCNC: 17 U/L (ref 1–32)
AST SERPL-CCNC: 18 U/L (ref 1–32)
AST SERPL-CCNC: 18 U/L (ref 1–32)
BACTERIA SPEC AEROBE CULT: NORMAL
BACTERIA UR CULT: NORMAL
BACTERIA UR CULT: NORMAL
BACTERIA UR QL AUTO: ABNORMAL /HPF
BACTERIA UR QL AUTO: NORMAL /HPF
BACTERIA UR QL AUTO: NORMAL /HPF
BASOPHILS # BLD AUTO: 0.05 10*3/MM3 (ref 0–0.2)
BASOPHILS # BLD AUTO: 0.06 10*3/MM3 (ref 0–0.2)
BASOPHILS # BLD AUTO: 0.08 10*3/MM3 (ref 0–0.2)
BASOPHILS # BLD AUTO: 0.1 10*3/MM3 (ref 0–0.2)
BASOPHILS # BLD AUTO: 0.1 X10E3/UL (ref 0–0.2)
BASOPHILS # BLD AUTO: 0.11 10*3/MM3 (ref 0–0.2)
BASOPHILS NFR BLD AUTO: 0.3 % (ref 0–1.5)
BASOPHILS NFR BLD AUTO: 0.4 % (ref 0–1.5)
BASOPHILS NFR BLD AUTO: 0.6 % (ref 0–1.5)
BASOPHILS NFR BLD AUTO: 0.7 % (ref 0–1.5)
BASOPHILS NFR BLD AUTO: 0.7 % (ref 0–1.5)
BASOPHILS NFR BLD AUTO: 0.9 % (ref 0–1.5)
BASOPHILS NFR BLD AUTO: 1 %
BASOPHILS NFR BLD AUTO: 1 % (ref 0–1.5)
BILIRUB BLD-MCNC: NEGATIVE MG/DL
BILIRUB SERPL-MCNC: 0.3 MG/DL (ref 0–1.2)
BILIRUB SERPL-MCNC: 0.3 MG/DL (ref 0–1.2)
BILIRUB SERPL-MCNC: 0.4 MG/DL (ref 0–1.2)
BILIRUB SERPL-MCNC: 0.5 MG/DL (ref 0–1.2)
BILIRUB UR QL STRIP: NEGATIVE
BUN SERPL-MCNC: 11 MG/DL (ref 8–23)
BUN SERPL-MCNC: 13 MG/DL (ref 8–23)
BUN SERPL-MCNC: 14 MG/DL (ref 8–23)
BUN SERPL-MCNC: 14 MG/DL (ref 8–23)
BUN SERPL-MCNC: 17 MG/DL (ref 8–23)
BUN SERPL-MCNC: 17 MG/DL (ref 8–23)
BUN SERPL-MCNC: 18 MG/DL (ref 10–36)
BUN SERPL-MCNC: 18 MG/DL (ref 8–23)
BUN SERPL-MCNC: 19 MG/DL (ref 8–23)
BUN SERPL-MCNC: 21 MG/DL (ref 8–23)
BUN SERPL-MCNC: 28 MG/DL (ref 8–23)
BUN/CREAT SERPL: 15.3 (ref 7–25)
BUN/CREAT SERPL: 16.5 (ref 7–25)
BUN/CREAT SERPL: 16.9 (ref 7–25)
BUN/CREAT SERPL: 17.5 (ref 7–25)
BUN/CREAT SERPL: 17.5 (ref 7–25)
BUN/CREAT SERPL: 18.1 (ref 7–25)
BUN/CREAT SERPL: 19.3 (ref 7–25)
BUN/CREAT SERPL: 19.8 (ref 7–25)
BUN/CREAT SERPL: 23 (ref 12–28)
BUN/CREAT SERPL: 23.1 (ref 7–25)
BUN/CREAT SERPL: 32.2 (ref 7–25)
CALCIUM SERPL-MCNC: 9.3 MG/DL (ref 8.7–10.3)
CALCIUM SPEC-SCNC: 8.2 MG/DL (ref 8.2–9.6)
CALCIUM SPEC-SCNC: 8.4 MG/DL (ref 8.2–9.6)
CALCIUM SPEC-SCNC: 8.5 MG/DL (ref 8.2–9.6)
CALCIUM SPEC-SCNC: 8.5 MG/DL (ref 8.2–9.6)
CALCIUM SPEC-SCNC: 8.6 MG/DL (ref 8.2–9.6)
CALCIUM SPEC-SCNC: 9 MG/DL (ref 8.2–9.6)
CALCIUM SPEC-SCNC: 9 MG/DL (ref 8.2–9.6)
CALCIUM SPEC-SCNC: 9.2 MG/DL (ref 8.2–9.6)
CALCIUM SPEC-SCNC: 9.4 MG/DL (ref 8.2–9.6)
CALCIUM SPEC-SCNC: 9.4 MG/DL (ref 8.2–9.6)
CHLORIDE SERPL-SCNC: 100 MMOL/L (ref 96–106)
CHLORIDE SERPL-SCNC: 101 MMOL/L (ref 98–107)
CHLORIDE SERPL-SCNC: 102 MMOL/L (ref 98–107)
CHLORIDE SERPL-SCNC: 103 MMOL/L (ref 98–107)
CHLORIDE SERPL-SCNC: 91 MMOL/L (ref 98–107)
CHLORIDE SERPL-SCNC: 93 MMOL/L (ref 98–107)
CHLORIDE SERPL-SCNC: 94 MMOL/L (ref 98–107)
CHLORIDE SERPL-SCNC: 98 MMOL/L (ref 98–107)
CHLORIDE SERPL-SCNC: 99 MMOL/L (ref 98–107)
CLARITY UR: ABNORMAL
CLARITY UR: CLEAR
CLARITY, POC: CLEAR
CO2 SERPL-SCNC: 21 MMOL/L (ref 22–29)
CO2 SERPL-SCNC: 23 MMOL/L (ref 20–29)
CO2 SERPL-SCNC: 23 MMOL/L (ref 22–29)
CO2 SERPL-SCNC: 24 MMOL/L (ref 22–29)
CO2 SERPL-SCNC: 25 MMOL/L (ref 22–29)
CO2 SERPL-SCNC: 25 MMOL/L (ref 22–29)
COLOR UR: YELLOW
CREAT SERPL-MCNC: 0.72 MG/DL (ref 0.57–1)
CREAT SERPL-MCNC: 0.77 MG/DL (ref 0.57–1)
CREAT SERPL-MCNC: 0.78 MG/DL (ref 0.57–1)
CREAT SERPL-MCNC: 0.79 MG/DL (ref 0.57–1)
CREAT SERPL-MCNC: 0.8 MG/DL (ref 0.57–1)
CREAT SERPL-MCNC: 0.85 MG/DL (ref 0.57–1)
CREAT SERPL-MCNC: 0.87 MG/DL (ref 0.57–1)
CREAT SERPL-MCNC: 0.88 MG/DL (ref 0.57–1)
CREAT SERPL-MCNC: 0.96 MG/DL (ref 0.57–1)
CREAT SERPL-MCNC: 0.97 MG/DL (ref 0.57–1)
CREAT SERPL-MCNC: 1.16 MG/DL (ref 0.57–1)
CRP SERPL-MCNC: 2.16 MG/DL (ref 0–0.5)
CRP SERPL-MCNC: 3 MG/L (ref 0–10)
D-LACTATE SERPL-SCNC: 0.7 MMOL/L (ref 0.5–2)
DEPRECATED RDW RBC AUTO: 46.8 FL (ref 37–54)
DEPRECATED RDW RBC AUTO: 49.2 FL (ref 37–54)
DEPRECATED RDW RBC AUTO: 50.6 FL (ref 37–54)
DEPRECATED RDW RBC AUTO: 50.7 FL (ref 37–54)
DEPRECATED RDW RBC AUTO: 50.7 FL (ref 37–54)
DEPRECATED RDW RBC AUTO: 51.8 FL (ref 37–54)
DEPRECATED RDW RBC AUTO: 54.7 FL (ref 37–54)
DIGOXIN SERPL-MCNC: 0.56 NG/ML (ref 0.6–1.2)
EOSINOPHIL # BLD AUTO: 0.44 10*3/MM3 (ref 0–0.4)
EOSINOPHIL # BLD AUTO: 0.9 10*3/MM3 (ref 0–0.4)
EOSINOPHIL # BLD AUTO: 1.22 10*3/MM3 (ref 0–0.4)
EOSINOPHIL # BLD AUTO: 1.31 10*3/MM3 (ref 0–0.4)
EOSINOPHIL # BLD AUTO: 1.98 10*3/MM3 (ref 0–0.4)
EOSINOPHIL # BLD AUTO: 2.12 10*3/MM3 (ref 0–0.4)
EOSINOPHIL # BLD AUTO: 2.58 10*3/MM3 (ref 0–0.4)
EOSINOPHIL # BLD AUTO: 2.8 X10E3/UL (ref 0–0.4)
EOSINOPHIL NFR BLD AUTO: 10.9 % (ref 0.3–6.2)
EOSINOPHIL NFR BLD AUTO: 12.4 % (ref 0.3–6.2)
EOSINOPHIL NFR BLD AUTO: 15.3 % (ref 0.3–6.2)
EOSINOPHIL NFR BLD AUTO: 2.5 % (ref 0.3–6.2)
EOSINOPHIL NFR BLD AUTO: 20.2 % (ref 0.3–6.2)
EOSINOPHIL NFR BLD AUTO: 20.6 % (ref 0.3–6.2)
EOSINOPHIL NFR BLD AUTO: 26 %
EOSINOPHIL NFR BLD AUTO: 8.4 % (ref 0.3–6.2)
ERYTHROCYTE [DISTWIDTH] IN BLOOD BY AUTOMATED COUNT: 13.5 % (ref 12.3–15.4)
ERYTHROCYTE [DISTWIDTH] IN BLOOD BY AUTOMATED COUNT: 13.8 % (ref 12.3–15.4)
ERYTHROCYTE [DISTWIDTH] IN BLOOD BY AUTOMATED COUNT: 13.9 % (ref 11.7–15.4)
ERYTHROCYTE [DISTWIDTH] IN BLOOD BY AUTOMATED COUNT: 13.9 % (ref 12.3–15.4)
ERYTHROCYTE [DISTWIDTH] IN BLOOD BY AUTOMATED COUNT: 14 % (ref 12.3–15.4)
ERYTHROCYTE [DISTWIDTH] IN BLOOD BY AUTOMATED COUNT: 14.3 % (ref 12.3–15.4)
ERYTHROCYTE [DISTWIDTH] IN BLOOD BY AUTOMATED COUNT: 14.5 % (ref 12.3–15.4)
ERYTHROCYTE [DISTWIDTH] IN BLOOD BY AUTOMATED COUNT: 15.4 % (ref 12.3–15.4)
ERYTHROCYTE [SEDIMENTATION RATE] IN BLOOD BY WESTERGREN METHOD: 15 MM/HR (ref 0–40)
GFR SERPL CREATININE-BSD FRML MDRD: 44 ML/MIN/1.73
GFR SERPL CREATININE-BSD FRML MDRD: 54 ML/MIN/1.73
GFR SERPL CREATININE-BSD FRML MDRD: 54 ML/MIN/1.73
GFR SERPL CREATININE-BSD FRML MDRD: 60 ML/MIN/1.73
GFR SERPL CREATININE-BSD FRML MDRD: 61 ML/MIN/1.73
GFR SERPL CREATININE-BSD FRML MDRD: 62 ML/MIN/1.73
GFR SERPL CREATININE-BSD FRML MDRD: 67 ML/MIN/1.73
GFR SERPL CREATININE-BSD FRML MDRD: 69 ML/MIN/1.73
GFR SERPL CREATININE-BSD FRML MDRD: 70 ML/MIN/1.73
GFR SERPL CREATININE-BSD FRML MDRD: 76 ML/MIN/1.73
GLOBULIN SER CALC-MCNC: 2.2 G/DL (ref 1.5–4.5)
GLOBULIN UR ELPH-MCNC: 2.5 GM/DL
GLOBULIN UR ELPH-MCNC: 2.6 GM/DL
GLOBULIN UR ELPH-MCNC: 2.8 GM/DL
GLOBULIN UR ELPH-MCNC: 2.9 GM/DL
GLOBULIN UR ELPH-MCNC: 2.9 GM/DL
GLOBULIN UR ELPH-MCNC: 3 GM/DL
GLUCOSE SERPL-MCNC: 100 MG/DL (ref 65–99)
GLUCOSE SERPL-MCNC: 100 MG/DL (ref 65–99)
GLUCOSE SERPL-MCNC: 104 MG/DL (ref 65–99)
GLUCOSE SERPL-MCNC: 104 MG/DL (ref 65–99)
GLUCOSE SERPL-MCNC: 108 MG/DL (ref 65–99)
GLUCOSE SERPL-MCNC: 109 MG/DL (ref 65–99)
GLUCOSE SERPL-MCNC: 133 MG/DL (ref 65–99)
GLUCOSE SERPL-MCNC: 141 MG/DL (ref 65–99)
GLUCOSE SERPL-MCNC: 97 MG/DL (ref 65–99)
GLUCOSE SERPL-MCNC: 99 MG/DL (ref 65–99)
GLUCOSE SERPL-MCNC: 99 MG/DL (ref 65–99)
GLUCOSE UR STRIP-MCNC: NEGATIVE MG/DL
HCT VFR BLD AUTO: 25.1 % (ref 34–46.6)
HCT VFR BLD AUTO: 25.5 % (ref 34–46.6)
HCT VFR BLD AUTO: 28.1 % (ref 34–46.6)
HCT VFR BLD AUTO: 28.5 % (ref 34–46.6)
HCT VFR BLD AUTO: 29.4 % (ref 34–46.6)
HCT VFR BLD AUTO: 30.3 % (ref 34–46.6)
HCT VFR BLD AUTO: 31.2 % (ref 34–46.6)
HCT VFR BLD AUTO: 31.4 % (ref 34–46.6)
HGB BLD-MCNC: 10.1 G/DL (ref 11.1–15.9)
HGB BLD-MCNC: 10.5 G/DL (ref 12–15.9)
HGB BLD-MCNC: 10.6 G/DL (ref 12–15.9)
HGB BLD-MCNC: 8.6 G/DL (ref 12–15.9)
HGB BLD-MCNC: 8.8 G/DL (ref 12–15.9)
HGB BLD-MCNC: 9.5 G/DL (ref 12–15.9)
HGB BLD-MCNC: 9.5 G/DL (ref 12–15.9)
HGB BLD-MCNC: 9.8 G/DL (ref 12–15.9)
HGB UR QL STRIP.AUTO: NEGATIVE
HOLD SPECIMEN: NORMAL
HYALINE CASTS UR QL AUTO: ABNORMAL /LPF
HYALINE CASTS UR QL AUTO: NORMAL /LPF
HYALINE CASTS UR QL AUTO: NORMAL /LPF
IMM GRANULOCYTES # BLD AUTO: 0 X10E3/UL (ref 0–0.1)
IMM GRANULOCYTES # BLD AUTO: 0.03 10*3/MM3 (ref 0–0.05)
IMM GRANULOCYTES # BLD AUTO: 0.05 10*3/MM3 (ref 0–0.05)
IMM GRANULOCYTES # BLD AUTO: 0.05 10*3/MM3 (ref 0–0.05)
IMM GRANULOCYTES # BLD AUTO: 0.06 10*3/MM3 (ref 0–0.05)
IMM GRANULOCYTES # BLD AUTO: 0.07 10*3/MM3 (ref 0–0.05)
IMM GRANULOCYTES # BLD AUTO: 0.09 10*3/MM3 (ref 0–0.05)
IMM GRANULOCYTES # BLD AUTO: 0.12 10*3/MM3 (ref 0–0.05)
IMM GRANULOCYTES NFR BLD AUTO: 0 %
IMM GRANULOCYTES NFR BLD AUTO: 0.2 % (ref 0–0.5)
IMM GRANULOCYTES NFR BLD AUTO: 0.4 % (ref 0–0.5)
IMM GRANULOCYTES NFR BLD AUTO: 0.5 % (ref 0–0.5)
IMM GRANULOCYTES NFR BLD AUTO: 0.6 % (ref 0–0.5)
IMM GRANULOCYTES NFR BLD AUTO: 0.7 % (ref 0–0.5)
IMM GRANULOCYTES NFR BLD AUTO: 0.7 % (ref 0–0.5)
IMM GRANULOCYTES NFR BLD AUTO: 0.8 % (ref 0–0.5)
KETONES UR QL STRIP: ABNORMAL
KETONES UR QL STRIP: ABNORMAL
KETONES UR QL STRIP: NEGATIVE
KETONES UR QL STRIP: NEGATIVE
KETONES UR QL: NEGATIVE
LEUKOCYTE EST, POC: NEGATIVE
LEUKOCYTE ESTERASE UR QL STRIP.AUTO: ABNORMAL
LEUKOCYTE ESTERASE UR QL STRIP.AUTO: NEGATIVE
LIPASE SERPL-CCNC: 8 U/L (ref 13–60)
LYMPHOCYTES # BLD AUTO: 1.34 10*3/MM3 (ref 0.7–3.1)
LYMPHOCYTES # BLD AUTO: 1.56 10*3/MM3 (ref 0.7–3.1)
LYMPHOCYTES # BLD AUTO: 1.6 X10E3/UL (ref 0.7–3.1)
LYMPHOCYTES # BLD AUTO: 1.65 10*3/MM3 (ref 0.7–3.1)
LYMPHOCYTES # BLD AUTO: 1.78 10*3/MM3 (ref 0.7–3.1)
LYMPHOCYTES # BLD AUTO: 1.94 10*3/MM3 (ref 0.7–3.1)
LYMPHOCYTES # BLD AUTO: 2.28 10*3/MM3 (ref 0.7–3.1)
LYMPHOCYTES # BLD AUTO: 2.3 10*3/MM3 (ref 0.7–3.1)
LYMPHOCYTES NFR BLD AUTO: 12.5 % (ref 19.6–45.3)
LYMPHOCYTES NFR BLD AUTO: 14.8 % (ref 19.6–45.3)
LYMPHOCYTES NFR BLD AUTO: 15 %
LYMPHOCYTES NFR BLD AUTO: 16.8 % (ref 19.6–45.3)
LYMPHOCYTES NFR BLD AUTO: 17.6 % (ref 19.6–45.3)
LYMPHOCYTES NFR BLD AUTO: 18.4 % (ref 19.6–45.3)
LYMPHOCYTES NFR BLD AUTO: 18.5 % (ref 19.6–45.3)
LYMPHOCYTES NFR BLD AUTO: 8.8 % (ref 19.6–45.3)
MAGNESIUM SERPL-MCNC: 1.9 MG/DL (ref 1.7–2.3)
MAGNESIUM SERPL-MCNC: 2.1 MG/DL (ref 1.7–2.3)
MAGNESIUM SERPL-MCNC: 2.1 MG/DL (ref 1.7–2.3)
MAGNESIUM SERPL-MCNC: 2.2 MG/DL (ref 1.7–2.3)
MCH RBC QN AUTO: 32.7 PG (ref 26.6–33)
MCH RBC QN AUTO: 32.8 PG (ref 26.6–33)
MCH RBC QN AUTO: 33 PG (ref 26.6–33)
MCH RBC QN AUTO: 33 PG (ref 26.6–33)
MCH RBC QN AUTO: 33.1 PG (ref 26.6–33)
MCH RBC QN AUTO: 33.2 PG (ref 26.6–33)
MCH RBC QN AUTO: 33.3 PG (ref 26.6–33)
MCH RBC QN AUTO: 33.5 PG (ref 26.6–33)
MCHC RBC AUTO-ENTMCNC: 33.3 G/DL (ref 31.5–35.7)
MCHC RBC AUTO-ENTMCNC: 33.7 G/DL (ref 31.5–35.7)
MCHC RBC AUTO-ENTMCNC: 33.8 G/DL (ref 31.5–35.7)
MCHC RBC AUTO-ENTMCNC: 33.8 G/DL (ref 31.5–35.7)
MCHC RBC AUTO-ENTMCNC: 34.3 G/DL (ref 31.5–35.7)
MCHC RBC AUTO-ENTMCNC: 34.5 G/DL (ref 31.5–35.7)
MCV RBC AUTO: 100 FL (ref 79–97)
MCV RBC AUTO: 100.4 FL (ref 79–97)
MCV RBC AUTO: 95.9 FL (ref 79–97)
MCV RBC AUTO: 96.2 FL (ref 79–97)
MCV RBC AUTO: 96.9 FL (ref 79–97)
MCV RBC AUTO: 98.1 FL (ref 79–97)
MCV RBC AUTO: 98.3 FL (ref 79–97)
MCV RBC AUTO: 98.6 FL (ref 79–97)
MONOCYTES # BLD AUTO: 0.6 X10E3/UL (ref 0.1–0.9)
MONOCYTES # BLD AUTO: 0.62 10*3/MM3 (ref 0.1–0.9)
MONOCYTES # BLD AUTO: 0.65 10*3/MM3 (ref 0.1–0.9)
MONOCYTES # BLD AUTO: 0.78 10*3/MM3 (ref 0.1–0.9)
MONOCYTES # BLD AUTO: 0.79 10*3/MM3 (ref 0.1–0.9)
MONOCYTES # BLD AUTO: 0.94 10*3/MM3 (ref 0.1–0.9)
MONOCYTES # BLD AUTO: 0.96 10*3/MM3 (ref 0.1–0.9)
MONOCYTES # BLD AUTO: 0.97 10*3/MM3 (ref 0.1–0.9)
MONOCYTES NFR BLD AUTO: 4.5 % (ref 5–12)
MONOCYTES NFR BLD AUTO: 5 % (ref 5–12)
MONOCYTES NFR BLD AUTO: 5.8 % (ref 5–12)
MONOCYTES NFR BLD AUTO: 6 %
MONOCYTES NFR BLD AUTO: 7.3 % (ref 5–12)
MONOCYTES NFR BLD AUTO: 7.4 % (ref 5–12)
MONOCYTES NFR BLD AUTO: 8.9 % (ref 5–12)
MONOCYTES NFR BLD AUTO: 9.2 % (ref 5–12)
MORPHOLOGY BLD-IMP: ABNORMAL
NEUTROPHILS # BLD AUTO: 5.5 X10E3/UL (ref 1.4–7)
NEUTROPHILS NFR BLD AUTO: 14.73 10*3/MM3 (ref 1.7–7)
NEUTROPHILS NFR BLD AUTO: 5.3 10*3/MM3 (ref 1.7–7)
NEUTROPHILS NFR BLD AUTO: 50.5 % (ref 42.7–76)
NEUTROPHILS NFR BLD AUTO: 52 %
NEUTROPHILS NFR BLD AUTO: 55 % (ref 42.7–76)
NEUTROPHILS NFR BLD AUTO: 59.1 % (ref 42.7–76)
NEUTROPHILS NFR BLD AUTO: 6.39 10*3/MM3 (ref 1.7–7)
NEUTROPHILS NFR BLD AUTO: 6.87 10*3/MM3 (ref 1.7–7)
NEUTROPHILS NFR BLD AUTO: 60.5 % (ref 42.7–76)
NEUTROPHILS NFR BLD AUTO: 67 % (ref 42.7–76)
NEUTROPHILS NFR BLD AUTO: 7.46 10*3/MM3 (ref 1.7–7)
NEUTROPHILS NFR BLD AUTO: 7.55 10*3/MM3 (ref 1.7–7)
NEUTROPHILS NFR BLD AUTO: 7.68 10*3/MM3 (ref 1.7–7)
NEUTROPHILS NFR BLD AUTO: 70.4 % (ref 42.7–76)
NEUTROPHILS NFR BLD AUTO: 83.2 % (ref 42.7–76)
NITRITE UR QL STRIP: NEGATIVE
NITRITE UR-MCNC: NEGATIVE MG/ML
NRBC BLD AUTO-RTO: 0 /100 WBC (ref 0–0.2)
NT-PROBNP SERPL-MCNC: 460 PG/ML (ref 0–1800)
PH UR STRIP.AUTO: 5.5 [PH] (ref 5–8)
PH UR STRIP.AUTO: 6 [PH] (ref 5–8)
PH UR STRIP.AUTO: 6 [PH] (ref 5–8)
PH UR STRIP.AUTO: 8 [PH] (ref 5–8)
PH UR: 6 [PH] (ref 5–8)
PHOSPHATE SERPL-MCNC: 3.3 MG/DL (ref 2.5–4.5)
PLATELET # BLD AUTO: 321 10*3/MM3 (ref 140–450)
PLATELET # BLD AUTO: 334 10*3/MM3 (ref 140–450)
PLATELET # BLD AUTO: 352 10*3/MM3 (ref 140–450)
PLATELET # BLD AUTO: 371 10*3/MM3 (ref 140–450)
PLATELET # BLD AUTO: 374 10*3/MM3 (ref 140–450)
PLATELET # BLD AUTO: 380 X10E3/UL (ref 150–450)
PLATELET # BLD AUTO: 413 10*3/MM3 (ref 140–450)
PLATELET # BLD AUTO: 417 10*3/MM3 (ref 140–450)
PMV BLD AUTO: 8.7 FL (ref 6–12)
PMV BLD AUTO: 8.8 FL (ref 6–12)
PMV BLD AUTO: 8.8 FL (ref 6–12)
PMV BLD AUTO: 8.9 FL (ref 6–12)
PMV BLD AUTO: 8.9 FL (ref 6–12)
POTASSIUM SERPL-SCNC: 3.4 MMOL/L (ref 3.5–5.2)
POTASSIUM SERPL-SCNC: 3.7 MMOL/L (ref 3.5–5.2)
POTASSIUM SERPL-SCNC: 3.8 MMOL/L (ref 3.5–5.2)
POTASSIUM SERPL-SCNC: 3.9 MMOL/L (ref 3.5–5.2)
POTASSIUM SERPL-SCNC: 4 MMOL/L (ref 3.5–5.2)
POTASSIUM SERPL-SCNC: 4.3 MMOL/L (ref 3.5–5.2)
POTASSIUM SERPL-SCNC: 4.3 MMOL/L (ref 3.5–5.2)
POTASSIUM SERPL-SCNC: 4.4 MMOL/L (ref 3.5–5.2)
POTASSIUM SERPL-SCNC: 4.7 MMOL/L (ref 3.5–5.2)
PROCALCITONIN SERPL-MCNC: 0.06 NG/ML (ref 0–0.25)
PROCALCITONIN SERPL-MCNC: 0.06 NG/ML (ref 0–0.25)
PROCALCITONIN SERPL-MCNC: 0.21 NG/ML (ref 0–0.25)
PROT SERPL-MCNC: 6 G/DL (ref 6–8.5)
PROT SERPL-MCNC: 6.1 G/DL (ref 6–8.5)
PROT SERPL-MCNC: 6.2 G/DL (ref 6–8.5)
PROT SERPL-MCNC: 6.7 G/DL (ref 6–8.5)
PROT SERPL-MCNC: 7 G/DL (ref 6–8.5)
PROT SERPL-MCNC: 7.1 G/DL (ref 6–8.5)
PROT SERPL-MCNC: 7.2 G/DL (ref 6–8.5)
PROT UR QL STRIP: ABNORMAL
PROT UR QL STRIP: ABNORMAL
PROT UR QL STRIP: NEGATIVE
PROT UR QL STRIP: NEGATIVE
PROT UR STRIP-MCNC: NEGATIVE MG/DL
QT INTERVAL: 390 MS
QT INTERVAL: 390 MS
QTC INTERVAL: 414 MS
QTC INTERVAL: 438 MS
RBC # BLD AUTO: 2.61 10*6/MM3 (ref 3.77–5.28)
RBC # BLD AUTO: 2.66 10*6/MM3 (ref 3.77–5.28)
RBC # BLD AUTO: 2.84 10*6/MM3 (ref 3.77–5.28)
RBC # BLD AUTO: 2.85 10*6/MM3 (ref 3.77–5.28)
RBC # BLD AUTO: 2.99 10*6/MM3 (ref 3.77–5.28)
RBC # BLD AUTO: 3.04 X10E6/UL (ref 3.77–5.28)
RBC # BLD AUTO: 3.18 10*6/MM3 (ref 3.77–5.28)
RBC # BLD AUTO: 3.24 10*6/MM3 (ref 3.77–5.28)
RBC # UR STRIP: NEGATIVE /UL
RBC # UR STRIP: NORMAL /HPF
RBC # UR: ABNORMAL /HPF
RBC # UR: NORMAL /HPF
REF LAB TEST METHOD: ABNORMAL
REF LAB TEST METHOD: NORMAL
REF LAB TEST METHOD: NORMAL
SARS-COV-2 RDRP RESP QL NAA+PROBE: NORMAL
SARS-COV-2 RDRP RESP QL NAA+PROBE: NORMAL
SODIUM SERPL-SCNC: 127 MMOL/L (ref 136–145)
SODIUM SERPL-SCNC: 129 MMOL/L (ref 136–145)
SODIUM SERPL-SCNC: 130 MMOL/L (ref 136–145)
SODIUM SERPL-SCNC: 130 MMOL/L (ref 136–145)
SODIUM SERPL-SCNC: 133 MMOL/L (ref 136–145)
SODIUM SERPL-SCNC: 136 MMOL/L (ref 134–144)
SODIUM SERPL-SCNC: 136 MMOL/L (ref 136–145)
SODIUM SERPL-SCNC: 137 MMOL/L (ref 136–145)
SP GR UR STRIP: 1.01 (ref 1–1.03)
SP GR UR STRIP: 1.02 (ref 1–1.03)
SP GR UR STRIP: 1.02 (ref 1–1.03)
SP GR UR STRIP: <=1.005 (ref 1–1.03)
SP GR UR: 1.01 (ref 1–1.03)
SQUAMOUS #/AREA URNS HPF: ABNORMAL /HPF
SQUAMOUS #/AREA URNS HPF: NORMAL /HPF
SQUAMOUS #/AREA URNS HPF: NORMAL /HPF
TROPONIN T SERPL-MCNC: <0.01 NG/ML (ref 0–0.03)
TSH SERPL DL<=0.005 MIU/L-ACNC: 2.59 UIU/ML (ref 0.45–4.5)
UROBILINOGEN UR QL STRIP: ABNORMAL
UROBILINOGEN UR QL STRIP: NORMAL
UROBILINOGEN UR QL: NORMAL
VIT B12 SERPL-MCNC: 528 PG/ML (ref 232–1245)
WBC # BLD AUTO: 10.5 10*3/MM3 (ref 3.4–10.8)
WBC # BLD AUTO: 10.57 10*3/MM3 (ref 3.4–10.8)
WBC # BLD AUTO: 10.6 X10E3/UL (ref 3.4–10.8)
WBC # BLD AUTO: 10.72 10*3/MM3 (ref 3.4–10.8)
WBC # BLD AUTO: 11.15 10*3/MM3 (ref 3.4–10.8)
WBC # BLD AUTO: 12.5 10*3/MM3 (ref 3.4–10.8)
WBC # BLD AUTO: 17.7 10*3/MM3 (ref 3.4–10.8)
WBC # UR STRIP: NORMAL /HPF
WBC NRBC COR # BLD: 12.98 10*3/MM3 (ref 3.4–10.8)
WBC UR QL AUTO: ABNORMAL /HPF
WBC UR QL AUTO: NORMAL /HPF
WHOLE BLOOD HOLD SPECIMEN: NORMAL

## 2021-01-01 PROCEDURE — 85025 COMPLETE CBC W/AUTO DIFF WBC: CPT | Performed by: EMERGENCY MEDICINE

## 2021-01-01 PROCEDURE — 83735 ASSAY OF MAGNESIUM: CPT | Performed by: INTERNAL MEDICINE

## 2021-01-01 PROCEDURE — 97110 THERAPEUTIC EXERCISES: CPT

## 2021-01-01 PROCEDURE — 81001 URINALYSIS AUTO W/SCOPE: CPT | Performed by: EMERGENCY MEDICINE

## 2021-01-01 PROCEDURE — 25010000002 HYDRALAZINE PER 20 MG: Performed by: INTERNAL MEDICINE

## 2021-01-01 PROCEDURE — 97161 PT EVAL LOW COMPLEX 20 MIN: CPT

## 2021-01-01 PROCEDURE — 36415 COLL VENOUS BLD VENIPUNCTURE: CPT

## 2021-01-01 PROCEDURE — 87635 SARS-COV-2 COVID-19 AMP PRB: CPT | Performed by: NURSE PRACTITIONER

## 2021-01-01 PROCEDURE — 99238 HOSP IP/OBS DSCHRG MGMT 30/<: CPT | Performed by: STUDENT IN AN ORGANIZED HEALTH CARE EDUCATION/TRAINING PROGRAM

## 2021-01-01 PROCEDURE — 80048 BASIC METABOLIC PNL TOTAL CA: CPT | Performed by: INTERNAL MEDICINE

## 2021-01-01 PROCEDURE — G0378 HOSPITAL OBSERVATION PER HR: HCPCS

## 2021-01-01 PROCEDURE — 25010000002 LORAZEPAM PER 2 MG: Performed by: FAMILY MEDICINE

## 2021-01-01 PROCEDURE — P9612 CATHETERIZE FOR URINE SPEC: HCPCS

## 2021-01-01 PROCEDURE — 99232 SBSQ HOSP IP/OBS MODERATE 35: CPT | Performed by: NURSE PRACTITIONER

## 2021-01-01 PROCEDURE — 85025 COMPLETE CBC W/AUTO DIFF WBC: CPT | Performed by: NURSE PRACTITIONER

## 2021-01-01 PROCEDURE — 73030 X-RAY EXAM OF SHOULDER: CPT

## 2021-01-01 PROCEDURE — 74176 CT ABD & PELVIS W/O CONTRAST: CPT

## 2021-01-01 PROCEDURE — 70450 CT HEAD/BRAIN W/O DYE: CPT

## 2021-01-01 PROCEDURE — 83735 ASSAY OF MAGNESIUM: CPT

## 2021-01-01 PROCEDURE — 99232 SBSQ HOSP IP/OBS MODERATE 35: CPT | Performed by: PSYCHIATRY & NEUROLOGY

## 2021-01-01 PROCEDURE — 97530 THERAPEUTIC ACTIVITIES: CPT | Performed by: OCCUPATIONAL THERAPIST

## 2021-01-01 PROCEDURE — 83880 ASSAY OF NATRIURETIC PEPTIDE: CPT | Performed by: EMERGENCY MEDICINE

## 2021-01-01 PROCEDURE — 84145 PROCALCITONIN (PCT): CPT | Performed by: INTERNAL MEDICINE

## 2021-01-01 PROCEDURE — 70551 MRI BRAIN STEM W/O DYE: CPT

## 2021-01-01 PROCEDURE — 84145 PROCALCITONIN (PCT): CPT | Performed by: EMERGENCY MEDICINE

## 2021-01-01 PROCEDURE — 81003 URINALYSIS AUTO W/O SCOPE: CPT | Performed by: EMERGENCY MEDICINE

## 2021-01-01 PROCEDURE — 99232 SBSQ HOSP IP/OBS MODERATE 35: CPT | Performed by: INTERNAL MEDICINE

## 2021-01-01 PROCEDURE — 97116 GAIT TRAINING THERAPY: CPT

## 2021-01-01 PROCEDURE — 99231 SBSQ HOSP IP/OBS SF/LOW 25: CPT | Performed by: PSYCHIATRY & NEUROLOGY

## 2021-01-01 PROCEDURE — 99283 EMERGENCY DEPT VISIT LOW MDM: CPT

## 2021-01-01 PROCEDURE — 99222 1ST HOSP IP/OBS MODERATE 55: CPT | Performed by: PSYCHIATRY & NEUROLOGY

## 2021-01-01 PROCEDURE — 80053 COMPREHEN METABOLIC PANEL: CPT | Performed by: EMERGENCY MEDICINE

## 2021-01-01 PROCEDURE — 80048 BASIC METABOLIC PNL TOTAL CA: CPT | Performed by: PHYSICIAN ASSISTANT

## 2021-01-01 PROCEDURE — 93005 ELECTROCARDIOGRAM TRACING: CPT

## 2021-01-01 PROCEDURE — 84484 ASSAY OF TROPONIN QUANT: CPT

## 2021-01-01 PROCEDURE — 99215 OFFICE O/P EST HI 40 MIN: CPT | Performed by: PHYSICIAN ASSISTANT

## 2021-01-01 PROCEDURE — 81003 URINALYSIS AUTO W/O SCOPE: CPT | Performed by: FAMILY MEDICINE

## 2021-01-01 PROCEDURE — 99214 OFFICE O/P EST MOD 30 MIN: CPT | Performed by: FAMILY MEDICINE

## 2021-01-01 PROCEDURE — 90686 IIV4 VACC NO PRSV 0.5 ML IM: CPT | Performed by: INTERNAL MEDICINE

## 2021-01-01 PROCEDURE — 97535 SELF CARE MNGMENT TRAINING: CPT | Performed by: OCCUPATIONAL THERAPIST

## 2021-01-01 PROCEDURE — 99232 SBSQ HOSP IP/OBS MODERATE 35: CPT | Performed by: STUDENT IN AN ORGANIZED HEALTH CARE EDUCATION/TRAINING PROGRAM

## 2021-01-01 PROCEDURE — 86140 C-REACTIVE PROTEIN: CPT | Performed by: INTERNAL MEDICINE

## 2021-01-01 PROCEDURE — 81001 URINALYSIS AUTO W/SCOPE: CPT | Performed by: PHYSICIAN ASSISTANT

## 2021-01-01 PROCEDURE — 85025 COMPLETE CBC W/AUTO DIFF WBC: CPT

## 2021-01-01 PROCEDURE — 99223 1ST HOSP IP/OBS HIGH 75: CPT | Performed by: INTERNAL MEDICINE

## 2021-01-01 PROCEDURE — 80048 BASIC METABOLIC PNL TOTAL CA: CPT | Performed by: NURSE PRACTITIONER

## 2021-01-01 PROCEDURE — 85025 COMPLETE CBC W/AUTO DIFF WBC: CPT | Performed by: INTERNAL MEDICINE

## 2021-01-01 PROCEDURE — 97535 SELF CARE MNGMENT TRAINING: CPT

## 2021-01-01 PROCEDURE — 84100 ASSAY OF PHOSPHORUS: CPT | Performed by: INTERNAL MEDICINE

## 2021-01-01 PROCEDURE — 99284 EMERGENCY DEPT VISIT MOD MDM: CPT

## 2021-01-01 PROCEDURE — 99285 EMERGENCY DEPT VISIT HI MDM: CPT

## 2021-01-01 PROCEDURE — 80162 ASSAY OF DIGOXIN TOTAL: CPT | Performed by: EMERGENCY MEDICINE

## 2021-01-01 PROCEDURE — 97530 THERAPEUTIC ACTIVITIES: CPT

## 2021-01-01 PROCEDURE — G0008 ADMIN INFLUENZA VIRUS VAC: HCPCS | Performed by: INTERNAL MEDICINE

## 2021-01-01 PROCEDURE — 72125 CT NECK SPINE W/O DYE: CPT

## 2021-01-01 PROCEDURE — 25010000002 INFLUENZA VAC SPLIT QUAD 0.5 ML SUSPENSION PREFILLED SYRINGE: Performed by: INTERNAL MEDICINE

## 2021-01-01 PROCEDURE — 97165 OT EVAL LOW COMPLEX 30 MIN: CPT

## 2021-01-01 PROCEDURE — 84484 ASSAY OF TROPONIN QUANT: CPT | Performed by: EMERGENCY MEDICINE

## 2021-01-01 PROCEDURE — 93005 ELECTROCARDIOGRAM TRACING: CPT | Performed by: EMERGENCY MEDICINE

## 2021-01-01 PROCEDURE — 95819 EEG AWAKE AND ASLEEP: CPT

## 2021-01-01 PROCEDURE — 71045 X-RAY EXAM CHEST 1 VIEW: CPT

## 2021-01-01 PROCEDURE — 83690 ASSAY OF LIPASE: CPT | Performed by: EMERGENCY MEDICINE

## 2021-01-01 PROCEDURE — 99231 SBSQ HOSP IP/OBS SF/LOW 25: CPT | Performed by: INTERNAL MEDICINE

## 2021-01-01 PROCEDURE — 72131 CT LUMBAR SPINE W/O DYE: CPT

## 2021-01-01 PROCEDURE — 0 INFLUENZA VAC SPLIT QUAD 0.5 ML SUSPENSION PREFILLED SYRINGE: Performed by: INTERNAL MEDICINE

## 2021-01-01 PROCEDURE — 80053 COMPREHEN METABOLIC PANEL: CPT | Performed by: INTERNAL MEDICINE

## 2021-01-01 PROCEDURE — 71250 CT THORAX DX C-: CPT

## 2021-01-01 PROCEDURE — 80053 COMPREHEN METABOLIC PANEL: CPT

## 2021-01-01 PROCEDURE — 83605 ASSAY OF LACTIC ACID: CPT | Performed by: EMERGENCY MEDICINE

## 2021-01-01 PROCEDURE — 87086 URINE CULTURE/COLONY COUNT: CPT | Performed by: NURSE PRACTITIONER

## 2021-01-01 PROCEDURE — 99232 SBSQ HOSP IP/OBS MODERATE 35: CPT | Performed by: PHYSICIAN ASSISTANT

## 2021-01-01 PROCEDURE — 83735 ASSAY OF MAGNESIUM: CPT | Performed by: EMERGENCY MEDICINE

## 2021-01-01 PROCEDURE — 25010000002 SODIUM CHLORIDE 0.9 % WITH KCL 20 MEQ 20-0.9 MEQ/L-% SOLUTION: Performed by: NURSE PRACTITIONER

## 2021-01-01 RX ORDER — BISACODYL 10 MG
10 SUPPOSITORY, RECTAL RECTAL DAILY PRN
Status: DISCONTINUED | OUTPATIENT
Start: 2021-01-01 | End: 2021-01-01

## 2021-01-01 RX ORDER — HYDROCODONE BITARTRATE AND ACETAMINOPHEN 5; 325 MG/1; MG/1
1 TABLET ORAL EVERY 6 HOURS PRN
Qty: 30 TABLET | Refills: 0 | Status: SHIPPED | OUTPATIENT
Start: 2021-01-01

## 2021-01-01 RX ORDER — CITALOPRAM 40 MG/1
40 TABLET ORAL DAILY
Status: DISCONTINUED | OUTPATIENT
Start: 2021-01-01 | End: 2021-01-01 | Stop reason: HOSPADM

## 2021-01-01 RX ORDER — POTASSIUM CHLORIDE 750 MG/1
40 CAPSULE, EXTENDED RELEASE ORAL ONCE
Status: COMPLETED | OUTPATIENT
Start: 2021-01-01 | End: 2021-01-01

## 2021-01-01 RX ORDER — LORAZEPAM 2 MG/ML
0.25 INJECTION INTRAMUSCULAR ONCE
Status: DISCONTINUED | OUTPATIENT
Start: 2021-01-01 | End: 2021-01-01

## 2021-01-01 RX ORDER — SODIUM CHLORIDE 9 MG/ML
50 INJECTION, SOLUTION INTRAVENOUS CONTINUOUS
Status: DISCONTINUED | OUTPATIENT
Start: 2021-01-01 | End: 2021-01-01

## 2021-01-01 RX ORDER — QUETIAPINE FUMARATE 25 MG/1
25 TABLET, FILM COATED ORAL NIGHTLY
Status: DISCONTINUED | OUTPATIENT
Start: 2021-01-01 | End: 2021-01-01

## 2021-01-01 RX ORDER — MAGNESIUM SULFATE 1 G/100ML
1 INJECTION INTRAVENOUS AS NEEDED
Status: DISCONTINUED | OUTPATIENT
Start: 2021-01-01 | End: 2021-01-01 | Stop reason: HOSPADM

## 2021-01-01 RX ORDER — POLYETHYLENE GLYCOL 3350 17 G/17G
17 POWDER, FOR SOLUTION ORAL DAILY PRN
Status: DISCONTINUED | OUTPATIENT
Start: 2021-01-01 | End: 2021-01-01

## 2021-01-01 RX ORDER — SODIUM CHLORIDE 0.9 % (FLUSH) 0.9 %
10 SYRINGE (ML) INJECTION AS NEEDED
Status: DISCONTINUED | OUTPATIENT
Start: 2021-01-01 | End: 2021-01-01 | Stop reason: HOSPADM

## 2021-01-01 RX ORDER — BUSPIRONE HYDROCHLORIDE 7.5 MG/1
7.5 TABLET ORAL 2 TIMES DAILY
COMMUNITY

## 2021-01-01 RX ORDER — SODIUM CHLORIDE 0.9 % (FLUSH) 0.9 %
10 SYRINGE (ML) INJECTION EVERY 12 HOURS SCHEDULED
Status: DISCONTINUED | OUTPATIENT
Start: 2021-01-01 | End: 2021-01-01 | Stop reason: HOSPADM

## 2021-01-01 RX ORDER — BISACODYL 5 MG/1
5 TABLET, DELAYED RELEASE ORAL DAILY PRN
Status: DISCONTINUED | OUTPATIENT
Start: 2021-01-01 | End: 2021-01-01 | Stop reason: HOSPADM

## 2021-01-01 RX ORDER — BISACODYL 10 MG
10 SUPPOSITORY, RECTAL RECTAL DAILY PRN
Qty: 12 EACH | Refills: 0 | Status: SHIPPED | OUTPATIENT
Start: 2021-01-01

## 2021-01-01 RX ORDER — BISACODYL 10 MG
10 SUPPOSITORY, RECTAL RECTAL ONCE
Status: COMPLETED | OUTPATIENT
Start: 2021-01-01 | End: 2021-01-01

## 2021-01-01 RX ORDER — AMLODIPINE BESYLATE 5 MG/1
5 TABLET ORAL DAILY
Qty: 30 TABLET | Refills: 3 | Status: SHIPPED | OUTPATIENT
Start: 2021-01-01 | End: 2021-01-01 | Stop reason: HOSPADM

## 2021-01-01 RX ORDER — QUETIAPINE FUMARATE 25 MG/1
50 TABLET, FILM COATED ORAL ONCE
Status: COMPLETED | OUTPATIENT
Start: 2021-01-01 | End: 2021-01-01

## 2021-01-01 RX ORDER — DIGOXIN 125 MCG
125 TABLET ORAL DAILY
Status: DISCONTINUED | OUTPATIENT
Start: 2021-01-01 | End: 2021-01-01

## 2021-01-01 RX ORDER — MIRTAZAPINE 15 MG/1
15 TABLET, ORALLY DISINTEGRATING ORAL NIGHTLY
Status: DISCONTINUED | OUTPATIENT
Start: 2021-01-01 | End: 2021-01-01 | Stop reason: HOSPADM

## 2021-01-01 RX ORDER — AMLODIPINE BESYLATE 5 MG/1
TABLET ORAL
Qty: 90 TABLET | Refills: 0 | OUTPATIENT
Start: 2021-01-01

## 2021-01-01 RX ORDER — CETIRIZINE HYDROCHLORIDE 10 MG/1
10 TABLET ORAL DAILY
COMMUNITY
End: 2021-01-01 | Stop reason: HOSPADM

## 2021-01-01 RX ORDER — LOSARTAN POTASSIUM 50 MG/1
TABLET ORAL
Qty: 90 TABLET | Refills: 0 | OUTPATIENT
Start: 2021-01-01

## 2021-01-01 RX ORDER — BISACODYL 10 MG
10 SUPPOSITORY, RECTAL RECTAL DAILY PRN
Status: DISCONTINUED | OUTPATIENT
Start: 2021-01-01 | End: 2021-01-01 | Stop reason: HOSPADM

## 2021-01-01 RX ORDER — ACETAMINOPHEN 500 MG
1000 TABLET ORAL DAILY
COMMUNITY
End: 2021-01-01 | Stop reason: HOSPADM

## 2021-01-01 RX ORDER — QUETIAPINE FUMARATE 25 MG/1
25 TABLET, FILM COATED ORAL NIGHTLY PRN
Status: DISCONTINUED | OUTPATIENT
Start: 2021-01-01 | End: 2021-01-01

## 2021-01-01 RX ORDER — LORAZEPAM 0.5 MG/1
0.5 TABLET ORAL ONCE
Status: DISCONTINUED | OUTPATIENT
Start: 2021-01-01 | End: 2021-01-01 | Stop reason: HOSPADM

## 2021-01-01 RX ORDER — CITALOPRAM 20 MG/1
40 TABLET ORAL DAILY
Qty: 30 TABLET | Refills: 3 | Status: SHIPPED | OUTPATIENT
Start: 2021-01-01

## 2021-01-01 RX ORDER — QUETIAPINE FUMARATE 25 MG/1
50 TABLET, FILM COATED ORAL NIGHTLY
Status: DISCONTINUED | OUTPATIENT
Start: 2021-01-01 | End: 2021-01-01 | Stop reason: HOSPADM

## 2021-01-01 RX ORDER — AMOXICILLIN 250 MG
2 CAPSULE ORAL 2 TIMES DAILY PRN
Status: DISCONTINUED | OUTPATIENT
Start: 2021-01-01 | End: 2021-01-01 | Stop reason: HOSPADM

## 2021-01-01 RX ORDER — POLYETHYLENE GLYCOL 3350 17 G/17G
17 POWDER, FOR SOLUTION ORAL DAILY
Status: DISCONTINUED | OUTPATIENT
Start: 2021-01-01 | End: 2021-01-01

## 2021-01-01 RX ORDER — HYDROXYZINE 50 MG/1
50 TABLET, FILM COATED ORAL DAILY
COMMUNITY

## 2021-01-01 RX ORDER — LOSARTAN POTASSIUM 50 MG/1
50 TABLET ORAL DAILY
Status: DISCONTINUED | OUTPATIENT
Start: 2021-01-01 | End: 2021-01-01

## 2021-01-01 RX ORDER — QUETIAPINE FUMARATE 25 MG/1
25 TABLET, FILM COATED ORAL NIGHTLY
Qty: 20 TABLET | Refills: 0 | Status: SHIPPED | OUTPATIENT
Start: 2021-01-01

## 2021-01-01 RX ORDER — MAGNESIUM SULFATE HEPTAHYDRATE 40 MG/ML
4 INJECTION, SOLUTION INTRAVENOUS AS NEEDED
Status: DISCONTINUED | OUTPATIENT
Start: 2021-01-01 | End: 2021-01-01 | Stop reason: HOSPADM

## 2021-01-01 RX ORDER — AMOXICILLIN 250 MG
2 CAPSULE ORAL 2 TIMES DAILY
Status: DISCONTINUED | OUTPATIENT
Start: 2021-01-01 | End: 2021-01-01

## 2021-01-01 RX ORDER — HYDROCODONE BITARTRATE AND ACETAMINOPHEN 5; 325 MG/1; MG/1
1 TABLET ORAL EVERY 6 HOURS PRN
Status: ON HOLD | COMMUNITY
End: 2021-01-01 | Stop reason: SDUPTHER

## 2021-01-01 RX ORDER — QUETIAPINE FUMARATE 25 MG/1
25 TABLET, FILM COATED ORAL 2 TIMES DAILY
Status: DISCONTINUED | OUTPATIENT
Start: 2021-01-01 | End: 2021-01-01

## 2021-01-01 RX ORDER — HYDROCODONE BITARTRATE AND ACETAMINOPHEN 5; 325 MG/1; MG/1
1 TABLET ORAL EVERY 6 HOURS PRN
Qty: 30 TABLET | Refills: 0 | Status: SHIPPED | OUTPATIENT
Start: 2021-01-01 | End: 2021-01-01 | Stop reason: SDUPTHER

## 2021-01-01 RX ORDER — BUSPIRONE HYDROCHLORIDE 15 MG/1
15 TABLET ORAL DAILY
COMMUNITY

## 2021-01-01 RX ORDER — AMLODIPINE BESYLATE 5 MG/1
TABLET ORAL
Qty: 90 TABLET | Refills: 0 | Status: SHIPPED | OUTPATIENT
Start: 2021-01-01 | End: 2021-01-01 | Stop reason: HOSPADM

## 2021-01-01 RX ORDER — QUETIAPINE FUMARATE 25 MG/1
12.5 TABLET, FILM COATED ORAL ONCE
Status: COMPLETED | OUTPATIENT
Start: 2021-01-01 | End: 2021-01-01

## 2021-01-01 RX ORDER — CLONIDINE HYDROCHLORIDE 0.1 MG/1
0.1 TABLET ORAL ONCE
Status: COMPLETED | OUTPATIENT
Start: 2021-01-01 | End: 2021-01-01

## 2021-01-01 RX ORDER — RISPERIDONE 0.25 MG/1
.25-.5 TABLET ORAL 2 TIMES DAILY
Qty: 60 TABLET | Refills: 1 | Status: SHIPPED | OUTPATIENT
Start: 2021-01-01 | End: 2021-01-01

## 2021-01-01 RX ORDER — SODIUM CHLORIDE AND POTASSIUM CHLORIDE 150; 900 MG/100ML; MG/100ML
75 INJECTION, SOLUTION INTRAVENOUS CONTINUOUS
Status: ACTIVE | OUTPATIENT
Start: 2021-01-01 | End: 2021-01-01

## 2021-01-01 RX ORDER — MIRTAZAPINE 15 MG/1
15 TABLET, FILM COATED ORAL NIGHTLY
Qty: 30 TABLET | Refills: 11 | Status: ON HOLD | OUTPATIENT
Start: 2021-01-01 | End: 2021-01-01 | Stop reason: SDUPTHER

## 2021-01-01 RX ORDER — QUETIAPINE FUMARATE 25 MG/1
50 TABLET, FILM COATED ORAL NIGHTLY
Status: DISCONTINUED | OUTPATIENT
Start: 2021-01-01 | End: 2021-01-01

## 2021-01-01 RX ORDER — BISACODYL 5 MG/1
5 TABLET, DELAYED RELEASE ORAL DAILY PRN
Status: DISCONTINUED | OUTPATIENT
Start: 2021-01-01 | End: 2021-01-01

## 2021-01-01 RX ORDER — DOCUSATE SODIUM 100 MG/1
200 CAPSULE, LIQUID FILLED ORAL DAILY
Qty: 100 CAPSULE | Refills: 2 | Status: SHIPPED | OUTPATIENT
Start: 2021-01-01

## 2021-01-01 RX ORDER — QUETIAPINE FUMARATE 50 MG/1
50 TABLET, FILM COATED ORAL NIGHTLY
Qty: 30 TABLET | Refills: 2 | Status: SHIPPED | OUTPATIENT
Start: 2021-01-01

## 2021-01-01 RX ORDER — CETIRIZINE HYDROCHLORIDE 10 MG/1
5 TABLET ORAL DAILY
Status: DISCONTINUED | OUTPATIENT
Start: 2021-01-01 | End: 2021-01-01 | Stop reason: HOSPADM

## 2021-01-01 RX ORDER — CETIRIZINE HYDROCHLORIDE 10 MG/1
5 TABLET ORAL DAILY
Qty: 15 TABLET | Refills: 1 | Status: SHIPPED | OUTPATIENT
Start: 2021-01-01

## 2021-01-01 RX ORDER — QUETIAPINE FUMARATE 25 MG/1
25 TABLET, FILM COATED ORAL 2 TIMES DAILY
COMMUNITY
End: 2021-01-01 | Stop reason: HOSPADM

## 2021-01-01 RX ORDER — CITALOPRAM 20 MG/1
TABLET ORAL
Qty: 90 TABLET | Refills: 0 | Status: SHIPPED | OUTPATIENT
Start: 2021-01-01 | End: 2021-01-01

## 2021-01-01 RX ORDER — QUETIAPINE FUMARATE 25 MG/1
12.5 TABLET, FILM COATED ORAL NIGHTLY PRN
Status: DISCONTINUED | OUTPATIENT
Start: 2021-01-01 | End: 2021-01-01

## 2021-01-01 RX ORDER — LORAZEPAM 2 MG/ML
0.25 INJECTION INTRAMUSCULAR ONCE
Status: COMPLETED | OUTPATIENT
Start: 2021-01-01 | End: 2021-01-01

## 2021-01-01 RX ORDER — LOSARTAN POTASSIUM 100 MG/1
100 TABLET ORAL DAILY
Qty: 30 TABLET | Refills: 1 | Status: SHIPPED | OUTPATIENT
Start: 2021-01-01

## 2021-01-01 RX ORDER — DIGOXIN 125 MCG
TABLET ORAL
Qty: 90 TABLET | Refills: 0 | OUTPATIENT
Start: 2021-01-01

## 2021-01-01 RX ORDER — LOSARTAN POTASSIUM 50 MG/1
100 TABLET ORAL DAILY
Status: DISCONTINUED | OUTPATIENT
Start: 2021-01-01 | End: 2021-01-01 | Stop reason: HOSPADM

## 2021-01-01 RX ORDER — DOCUSATE SODIUM 100 MG/1
200 CAPSULE, LIQUID FILLED ORAL DAILY
Status: DISCONTINUED | OUTPATIENT
Start: 2021-01-01 | End: 2021-01-01 | Stop reason: HOSPADM

## 2021-01-01 RX ORDER — MIRTAZAPINE 15 MG/1
15 TABLET, FILM COATED ORAL NIGHTLY
Qty: 30 TABLET | Refills: 11 | Status: SHIPPED | OUTPATIENT
Start: 2021-01-01

## 2021-01-01 RX ORDER — BISACODYL 5 MG/1
5 TABLET, DELAYED RELEASE ORAL DAILY PRN
Qty: 30 TABLET | Refills: 2 | Status: SHIPPED | OUTPATIENT
Start: 2021-01-01

## 2021-01-01 RX ORDER — QUETIAPINE FUMARATE 25 MG/1
12.5 TABLET, FILM COATED ORAL 2 TIMES DAILY PRN
Status: DISCONTINUED | OUTPATIENT
Start: 2021-01-01 | End: 2021-01-01 | Stop reason: HOSPADM

## 2021-01-01 RX ORDER — HYDRALAZINE HYDROCHLORIDE 20 MG/ML
10 INJECTION INTRAMUSCULAR; INTRAVENOUS EVERY 4 HOURS PRN
Status: DISCONTINUED | OUTPATIENT
Start: 2021-01-01 | End: 2021-01-01 | Stop reason: HOSPADM

## 2021-01-01 RX ORDER — CITALOPRAM 20 MG/1
40 TABLET ORAL DAILY
Status: ON HOLD | COMMUNITY
End: 2021-01-01 | Stop reason: SDUPTHER

## 2021-01-01 RX ORDER — HYDROCODONE BITARTRATE AND ACETAMINOPHEN 5; 325 MG/1; MG/1
1 TABLET ORAL ONCE
Status: COMPLETED | OUTPATIENT
Start: 2021-01-01 | End: 2021-01-01

## 2021-01-01 RX ORDER — AMLODIPINE BESYLATE 5 MG/1
5 TABLET ORAL DAILY
Status: DISCONTINUED | OUTPATIENT
Start: 2021-01-01 | End: 2021-01-01 | Stop reason: HOSPADM

## 2021-01-01 RX ORDER — QUETIAPINE FUMARATE 25 MG/1
25 TABLET, FILM COATED ORAL EVERY EVENING
Status: DISCONTINUED | OUTPATIENT
Start: 2021-01-01 | End: 2021-01-01

## 2021-01-01 RX ORDER — MAGNESIUM SULFATE HEPTAHYDRATE 40 MG/ML
2 INJECTION, SOLUTION INTRAVENOUS AS NEEDED
Status: DISCONTINUED | OUTPATIENT
Start: 2021-01-01 | End: 2021-01-01 | Stop reason: HOSPADM

## 2021-01-01 RX ORDER — SODIUM CHLORIDE 9 MG/ML
100 INJECTION, SOLUTION INTRAVENOUS CONTINUOUS
Status: DISCONTINUED | OUTPATIENT
Start: 2021-01-01 | End: 2021-01-01

## 2021-01-01 RX ORDER — QUETIAPINE FUMARATE 25 MG/1
12.5 TABLET, FILM COATED ORAL 2 TIMES DAILY PRN
Qty: 30 TABLET | Refills: 3 | Status: SHIPPED | OUTPATIENT
Start: 2021-01-01

## 2021-01-01 RX ORDER — ACETAMINOPHEN 325 MG/1
650 TABLET ORAL EVERY 4 HOURS PRN
Status: DISCONTINUED | OUTPATIENT
Start: 2021-01-01 | End: 2021-01-01 | Stop reason: HOSPADM

## 2021-01-01 RX ORDER — LORAZEPAM 0.5 MG/1
0.5 TABLET ORAL ONCE
Status: COMPLETED | OUTPATIENT
Start: 2021-01-01 | End: 2021-01-01

## 2021-01-01 RX ORDER — AMOXICILLIN 250 MG
2 CAPSULE ORAL 2 TIMES DAILY PRN
Qty: 30 TABLET | Refills: 2 | Status: SHIPPED | OUTPATIENT
Start: 2021-01-01

## 2021-01-01 RX ORDER — CITALOPRAM 20 MG/1
TABLET ORAL
Qty: 90 TABLET | Refills: 0 | OUTPATIENT
Start: 2021-01-01

## 2021-01-01 RX ORDER — MIRTAZAPINE 15 MG/1
15 TABLET, FILM COATED ORAL NIGHTLY
Status: DISCONTINUED | OUTPATIENT
Start: 2021-01-01 | End: 2021-01-01 | Stop reason: HOSPADM

## 2021-01-01 RX ORDER — LOSARTAN POTASSIUM 50 MG/1
TABLET ORAL
Qty: 90 TABLET | Refills: 0 | Status: SHIPPED | OUTPATIENT
Start: 2021-01-01 | End: 2021-01-01 | Stop reason: HOSPADM

## 2021-01-01 RX ORDER — DIGOXIN 125 MCG
TABLET ORAL
Qty: 90 TABLET | Refills: 0 | Status: SHIPPED | OUTPATIENT
Start: 2021-01-01 | End: 2021-01-01 | Stop reason: HOSPADM

## 2021-01-01 RX ORDER — HYDROCODONE BITARTRATE AND ACETAMINOPHEN 5; 325 MG/1; MG/1
1 TABLET ORAL EVERY 6 HOURS PRN
Status: DISCONTINUED | OUTPATIENT
Start: 2021-01-01 | End: 2021-01-01 | Stop reason: HOSPADM

## 2021-01-01 RX ORDER — QUETIAPINE FUMARATE 25 MG/1
25 TABLET, FILM COATED ORAL NIGHTLY
Qty: 30 TABLET | Refills: 1 | Status: SHIPPED | OUTPATIENT
Start: 2021-01-01 | End: 2021-01-01

## 2021-01-01 RX ADMIN — DOCUSATE SODIUM 200 MG: 100 CAPSULE, LIQUID FILLED ORAL at 09:41

## 2021-01-01 RX ADMIN — MIRTAZAPINE 15 MG: 15 TABLET, FILM COATED ORAL at 21:42

## 2021-01-01 RX ADMIN — SODIUM CHLORIDE, PRESERVATIVE FREE 10 ML: 5 INJECTION INTRAVENOUS at 21:10

## 2021-01-01 RX ADMIN — SODIUM CHLORIDE 50 ML/HR: 9 INJECTION, SOLUTION INTRAVENOUS at 21:08

## 2021-01-01 RX ADMIN — DOCUSATE SODIUM 50 MG AND SENNOSIDES 8.6 MG 2 TABLET: 8.6; 5 TABLET, FILM COATED ORAL at 17:18

## 2021-01-01 RX ADMIN — AMLODIPINE BESYLATE 5 MG: 5 TABLET ORAL at 09:47

## 2021-01-01 RX ADMIN — CITALOPRAM 40 MG: 40 TABLET ORAL at 09:06

## 2021-01-01 RX ADMIN — SODIUM CHLORIDE, PRESERVATIVE FREE 10 ML: 5 INJECTION INTRAVENOUS at 20:11

## 2021-01-01 RX ADMIN — CETIRIZINE HYDROCHLORIDE 5 MG: 10 TABLET, FILM COATED ORAL at 08:44

## 2021-01-01 RX ADMIN — DOCUSATE SODIUM 200 MG: 100 CAPSULE, LIQUID FILLED ORAL at 08:49

## 2021-01-01 RX ADMIN — SODIUM CHLORIDE, PRESERVATIVE FREE 10 ML: 5 INJECTION INTRAVENOUS at 08:20

## 2021-01-01 RX ADMIN — SODIUM CHLORIDE, PRESERVATIVE FREE 10 ML: 5 INJECTION INTRAVENOUS at 20:44

## 2021-01-01 RX ADMIN — SODIUM CHLORIDE 500 ML: 9 INJECTION, SOLUTION INTRAVENOUS at 15:53

## 2021-01-01 RX ADMIN — HYDROCODONE BITARTRATE AND ACETAMINOPHEN 1 TABLET: 5; 325 TABLET ORAL at 10:33

## 2021-01-01 RX ADMIN — DOCUSATE SODIUM 50 MG AND SENNOSIDES 8.6 MG 2 TABLET: 8.6; 5 TABLET, FILM COATED ORAL at 21:24

## 2021-01-01 RX ADMIN — AMLODIPINE BESYLATE 5 MG: 5 TABLET ORAL at 08:44

## 2021-01-01 RX ADMIN — DOCUSATE SODIUM 50 MG AND SENNOSIDES 8.6 MG 2 TABLET: 8.6; 5 TABLET, FILM COATED ORAL at 08:05

## 2021-01-01 RX ADMIN — LOSARTAN POTASSIUM 100 MG: 50 TABLET, FILM COATED ORAL at 09:05

## 2021-01-01 RX ADMIN — QUETIAPINE FUMARATE 50 MG: 25 TABLET ORAL at 20:45

## 2021-01-01 RX ADMIN — CITALOPRAM 40 MG: 40 TABLET ORAL at 09:01

## 2021-01-01 RX ADMIN — MIRTAZAPINE 15 MG: 15 TABLET, ORALLY DISINTEGRATING ORAL at 21:19

## 2021-01-01 RX ADMIN — HYDROCODONE BITARTRATE AND ACETAMINOPHEN 1 TABLET: 5; 325 TABLET ORAL at 08:13

## 2021-01-01 RX ADMIN — DOCUSATE SODIUM 200 MG: 100 CAPSULE, LIQUID FILLED ORAL at 08:13

## 2021-01-01 RX ADMIN — SODIUM CHLORIDE, PRESERVATIVE FREE 10 ML: 5 INJECTION INTRAVENOUS at 08:44

## 2021-01-01 RX ADMIN — CITALOPRAM 40 MG: 40 TABLET ORAL at 09:42

## 2021-01-01 RX ADMIN — QUETIAPINE FUMARATE 25 MG: 25 TABLET ORAL at 16:49

## 2021-01-01 RX ADMIN — QUETIAPINE FUMARATE 25 MG: 25 TABLET ORAL at 21:24

## 2021-01-01 RX ADMIN — DOCUSATE SODIUM 50 MG AND SENNOSIDES 8.6 MG 2 TABLET: 8.6; 5 TABLET, FILM COATED ORAL at 12:46

## 2021-01-01 RX ADMIN — QUETIAPINE FUMARATE 25 MG: 25 TABLET ORAL at 08:23

## 2021-01-01 RX ADMIN — DOCUSATE SODIUM 50 MG AND SENNOSIDES 8.6 MG 2 TABLET: 8.6; 5 TABLET, FILM COATED ORAL at 08:12

## 2021-01-01 RX ADMIN — QUETIAPINE FUMARATE 25 MG: 25 TABLET ORAL at 20:02

## 2021-01-01 RX ADMIN — POTASSIUM CHLORIDE 40 MEQ: 750 CAPSULE, EXTENDED RELEASE ORAL at 12:51

## 2021-01-01 RX ADMIN — QUETIAPINE FUMARATE 12.5 MG: 25 TABLET ORAL at 12:38

## 2021-01-01 RX ADMIN — LOSARTAN POTASSIUM 100 MG: 50 TABLET, FILM COATED ORAL at 09:01

## 2021-01-01 RX ADMIN — SODIUM CHLORIDE, PRESERVATIVE FREE 10 ML: 5 INJECTION INTRAVENOUS at 20:42

## 2021-01-01 RX ADMIN — QUETIAPINE FUMARATE 25 MG: 25 TABLET ORAL at 20:41

## 2021-01-01 RX ADMIN — HYDROCODONE BITARTRATE AND ACETAMINOPHEN 1 TABLET: 5; 325 TABLET ORAL at 18:34

## 2021-01-01 RX ADMIN — LOSARTAN POTASSIUM 100 MG: 50 TABLET, FILM COATED ORAL at 09:46

## 2021-01-01 RX ADMIN — LOSARTAN POTASSIUM 100 MG: 50 TABLET, FILM COATED ORAL at 08:49

## 2021-01-01 RX ADMIN — LOSARTAN POTASSIUM 100 MG: 50 TABLET, FILM COATED ORAL at 08:06

## 2021-01-01 RX ADMIN — LOSARTAN POTASSIUM 100 MG: 50 TABLET, FILM COATED ORAL at 08:20

## 2021-01-01 RX ADMIN — DOCUSATE SODIUM 200 MG: 100 CAPSULE, LIQUID FILLED ORAL at 09:47

## 2021-01-01 RX ADMIN — CETIRIZINE HYDROCHLORIDE 5 MG: 10 TABLET, FILM COATED ORAL at 09:01

## 2021-01-01 RX ADMIN — CETIRIZINE HYDROCHLORIDE 5 MG: 10 TABLET, FILM COATED ORAL at 09:41

## 2021-01-01 RX ADMIN — SODIUM CHLORIDE, PRESERVATIVE FREE 10 ML: 5 INJECTION INTRAVENOUS at 21:36

## 2021-01-01 RX ADMIN — AMLODIPINE BESYLATE 5 MG: 5 TABLET ORAL at 08:12

## 2021-01-01 RX ADMIN — QUETIAPINE FUMARATE 12.5 MG: 25 TABLET ORAL at 08:12

## 2021-01-01 RX ADMIN — LOSARTAN POTASSIUM 50 MG: 50 TABLET, FILM COATED ORAL at 12:47

## 2021-01-01 RX ADMIN — CETIRIZINE HYDROCHLORIDE 5 MG: 10 TABLET, FILM COATED ORAL at 08:49

## 2021-01-01 RX ADMIN — AMLODIPINE BESYLATE 5 MG: 5 TABLET ORAL at 08:05

## 2021-01-01 RX ADMIN — HYDROCODONE BITARTRATE AND ACETAMINOPHEN 1 TABLET: 5; 325 TABLET ORAL at 20:02

## 2021-01-01 RX ADMIN — MIRTAZAPINE 15 MG: 15 TABLET, FILM COATED ORAL at 21:24

## 2021-01-01 RX ADMIN — DOCUSATE SODIUM 50 MG AND SENNOSIDES 8.6 MG 2 TABLET: 8.6; 5 TABLET, FILM COATED ORAL at 21:29

## 2021-01-01 RX ADMIN — QUETIAPINE FUMARATE 25 MG: 25 TABLET ORAL at 20:43

## 2021-01-01 RX ADMIN — QUETIAPINE FUMARATE 25 MG: 25 TABLET ORAL at 21:42

## 2021-01-01 RX ADMIN — LOSARTAN POTASSIUM 100 MG: 50 TABLET, FILM COATED ORAL at 09:42

## 2021-01-01 RX ADMIN — QUETIAPINE FUMARATE 50 MG: 25 TABLET ORAL at 20:10

## 2021-01-01 RX ADMIN — CITALOPRAM 40 MG: 40 TABLET ORAL at 08:44

## 2021-01-01 RX ADMIN — MIRTAZAPINE 15 MG: 15 TABLET, FILM COATED ORAL at 21:29

## 2021-01-01 RX ADMIN — SODIUM CHLORIDE, PRESERVATIVE FREE 10 ML: 5 INJECTION INTRAVENOUS at 21:00

## 2021-01-01 RX ADMIN — HYDROCODONE BITARTRATE AND ACETAMINOPHEN 1 TABLET: 5; 325 TABLET ORAL at 04:48

## 2021-01-01 RX ADMIN — CETIRIZINE HYDROCHLORIDE 5 MG: 10 TABLET, FILM COATED ORAL at 08:05

## 2021-01-01 RX ADMIN — HYDROCODONE BITARTRATE AND ACETAMINOPHEN 1 TABLET: 5; 325 TABLET ORAL at 17:48

## 2021-01-01 RX ADMIN — HYDROCODONE BITARTRATE AND ACETAMINOPHEN 1 TABLET: 5; 325 TABLET ORAL at 21:35

## 2021-01-01 RX ADMIN — HYDROCODONE BITARTRATE AND ACETAMINOPHEN 1 TABLET: 5; 325 TABLET ORAL at 21:41

## 2021-01-01 RX ADMIN — SODIUM CHLORIDE, PRESERVATIVE FREE 10 ML: 5 INJECTION INTRAVENOUS at 21:24

## 2021-01-01 RX ADMIN — DOCUSATE SODIUM 50 MG AND SENNOSIDES 8.6 MG 2 TABLET: 8.6; 5 TABLET, FILM COATED ORAL at 20:02

## 2021-01-01 RX ADMIN — CITALOPRAM 40 MG: 40 TABLET ORAL at 08:23

## 2021-01-01 RX ADMIN — QUETIAPINE FUMARATE 12.5 MG: 25 TABLET ORAL at 01:45

## 2021-01-01 RX ADMIN — SODIUM CHLORIDE 100 ML/HR: 9 INJECTION, SOLUTION INTRAVENOUS at 18:26

## 2021-01-01 RX ADMIN — CLONIDINE HYDROCHLORIDE 0.1 MG: 0.1 TABLET ORAL at 14:25

## 2021-01-01 RX ADMIN — MIRTAZAPINE 15 MG: 15 TABLET, FILM COATED ORAL at 21:07

## 2021-01-01 RX ADMIN — DOCUSATE SODIUM 50 MG AND SENNOSIDES 8.6 MG 2 TABLET: 8.6; 5 TABLET, FILM COATED ORAL at 20:41

## 2021-01-01 RX ADMIN — SODIUM CHLORIDE, PRESERVATIVE FREE 10 ML: 5 INJECTION INTRAVENOUS at 09:06

## 2021-01-01 RX ADMIN — INFLUENZA VIRUS VACCINE 0.5 ML: 15; 15; 15; 15 SUSPENSION INTRAMUSCULAR at 12:02

## 2021-01-01 RX ADMIN — MIRTAZAPINE 15 MG: 15 TABLET, FILM COATED ORAL at 20:01

## 2021-01-01 RX ADMIN — AMLODIPINE BESYLATE 5 MG: 5 TABLET ORAL at 08:20

## 2021-01-01 RX ADMIN — MIRTAZAPINE 15 MG: 15 TABLET, FILM COATED ORAL at 20:10

## 2021-01-01 RX ADMIN — HYDRALAZINE HYDROCHLORIDE 10 MG: 20 INJECTION INTRAMUSCULAR; INTRAVENOUS at 04:01

## 2021-01-01 RX ADMIN — SODIUM CHLORIDE, PRESERVATIVE FREE 10 ML: 5 INJECTION INTRAVENOUS at 21:19

## 2021-01-01 RX ADMIN — LORAZEPAM 0.25 MG: 2 INJECTION INTRAMUSCULAR; INTRAVENOUS at 01:31

## 2021-01-01 RX ADMIN — HYDROCODONE BITARTRATE AND ACETAMINOPHEN 1 TABLET: 5; 325 TABLET ORAL at 03:27

## 2021-01-01 RX ADMIN — HYDRALAZINE HYDROCHLORIDE 10 MG: 20 INJECTION INTRAMUSCULAR; INTRAVENOUS at 04:40

## 2021-01-01 RX ADMIN — CETIRIZINE HYDROCHLORIDE 5 MG: 10 TABLET, FILM COATED ORAL at 09:47

## 2021-01-01 RX ADMIN — DOCUSATE SODIUM 50 MG AND SENNOSIDES 8.6 MG 2 TABLET: 8.6; 5 TABLET, FILM COATED ORAL at 08:23

## 2021-01-01 RX ADMIN — HYDROCODONE BITARTRATE AND ACETAMINOPHEN 1 TABLET: 5; 325 TABLET ORAL at 08:05

## 2021-01-01 RX ADMIN — LOSARTAN POTASSIUM 100 MG: 50 TABLET, FILM COATED ORAL at 08:13

## 2021-01-01 RX ADMIN — MIRTAZAPINE 15 MG: 15 TABLET, FILM COATED ORAL at 20:11

## 2021-01-01 RX ADMIN — QUETIAPINE FUMARATE 25 MG: 25 TABLET ORAL at 21:07

## 2021-01-01 RX ADMIN — MIRTAZAPINE 15 MG: 15 TABLET, FILM COATED ORAL at 20:02

## 2021-01-01 RX ADMIN — AMLODIPINE BESYLATE 5 MG: 5 TABLET ORAL at 08:23

## 2021-01-01 RX ADMIN — MIRTAZAPINE 15 MG: 15 TABLET, FILM COATED ORAL at 21:19

## 2021-01-01 RX ADMIN — HYDRALAZINE HYDROCHLORIDE 10 MG: 20 INJECTION INTRAMUSCULAR; INTRAVENOUS at 20:08

## 2021-01-01 RX ADMIN — QUETIAPINE FUMARATE 25 MG: 25 TABLET ORAL at 21:35

## 2021-01-01 RX ADMIN — BISACODYL 10 MG: 10 SUPPOSITORY RECTAL at 10:15

## 2021-01-01 RX ADMIN — DOCUSATE SODIUM 200 MG: 100 CAPSULE, LIQUID FILLED ORAL at 08:20

## 2021-01-01 RX ADMIN — SODIUM CHLORIDE, PRESERVATIVE FREE 10 ML: 5 INJECTION INTRAVENOUS at 09:50

## 2021-01-01 RX ADMIN — CITALOPRAM 40 MG: 40 TABLET ORAL at 09:47

## 2021-01-01 RX ADMIN — CITALOPRAM 40 MG: 40 TABLET ORAL at 08:20

## 2021-01-01 RX ADMIN — QUETIAPINE FUMARATE 50 MG: 25 TABLET ORAL at 17:19

## 2021-01-01 RX ADMIN — CITALOPRAM 40 MG: 40 TABLET ORAL at 09:46

## 2021-01-01 RX ADMIN — QUETIAPINE FUMARATE 25 MG: 25 TABLET ORAL at 08:05

## 2021-01-01 RX ADMIN — SODIUM CHLORIDE 100 ML/HR: 9 INJECTION, SOLUTION INTRAVENOUS at 01:00

## 2021-01-01 RX ADMIN — BISACODYL 10 MG: 10 SUPPOSITORY RECTAL at 16:55

## 2021-01-01 RX ADMIN — CITALOPRAM 40 MG: 40 TABLET ORAL at 08:13

## 2021-01-01 RX ADMIN — MIRTAZAPINE 15 MG: 15 TABLET, FILM COATED ORAL at 20:43

## 2021-01-01 RX ADMIN — LOSARTAN POTASSIUM 100 MG: 50 TABLET, FILM COATED ORAL at 08:43

## 2021-01-01 RX ADMIN — DOCUSATE SODIUM 50 MG AND SENNOSIDES 8.6 MG 2 TABLET: 8.6; 5 TABLET, FILM COATED ORAL at 21:07

## 2021-01-01 RX ADMIN — QUETIAPINE FUMARATE 25 MG: 25 TABLET ORAL at 21:29

## 2021-01-01 RX ADMIN — POTASSIUM CHLORIDE AND SODIUM CHLORIDE 75 ML/HR: 900; 150 INJECTION, SOLUTION INTRAVENOUS at 12:30

## 2021-01-01 RX ADMIN — CITALOPRAM 40 MG: 40 TABLET ORAL at 12:46

## 2021-01-01 RX ADMIN — HYDROCODONE BITARTRATE AND ACETAMINOPHEN 1 TABLET: 5; 325 TABLET ORAL at 01:45

## 2021-01-01 RX ADMIN — QUETIAPINE FUMARATE 12.5 MG: 25 TABLET ORAL at 22:34

## 2021-01-01 RX ADMIN — AMLODIPINE BESYLATE 5 MG: 5 TABLET ORAL at 09:41

## 2021-01-01 RX ADMIN — SODIUM CHLORIDE, PRESERVATIVE FREE 10 ML: 5 INJECTION INTRAVENOUS at 20:09

## 2021-01-01 RX ADMIN — SODIUM CHLORIDE, PRESERVATIVE FREE 10 ML: 5 INJECTION INTRAVENOUS at 21:46

## 2021-01-01 RX ADMIN — CETIRIZINE HYDROCHLORIDE 5 MG: 10 TABLET, FILM COATED ORAL at 12:46

## 2021-01-01 RX ADMIN — QUETIAPINE FUMARATE 25 MG: 25 TABLET ORAL at 09:47

## 2021-01-01 RX ADMIN — QUETIAPINE FUMARATE 12.5 MG: 25 TABLET ORAL at 15:11

## 2021-01-01 RX ADMIN — LORAZEPAM 0.5 MG: 0.5 TABLET ORAL at 15:33

## 2021-01-01 RX ADMIN — AMLODIPINE BESYLATE 5 MG: 5 TABLET ORAL at 09:01

## 2021-01-01 RX ADMIN — AMLODIPINE BESYLATE 5 MG: 5 TABLET ORAL at 12:47

## 2021-01-01 RX ADMIN — CETIRIZINE HYDROCHLORIDE 5 MG: 10 TABLET, FILM COATED ORAL at 09:06

## 2021-01-01 RX ADMIN — LOSARTAN POTASSIUM 50 MG: 50 TABLET, FILM COATED ORAL at 08:23

## 2021-01-01 RX ADMIN — BISACODYL 10 MG: 10 SUPPOSITORY RECTAL at 21:07

## 2021-01-01 RX ADMIN — MIRTAZAPINE 15 MG: 15 TABLET, FILM COATED ORAL at 20:41

## 2021-01-01 RX ADMIN — DOCUSATE SODIUM 200 MG: 100 CAPSULE, LIQUID FILLED ORAL at 08:44

## 2021-01-01 RX ADMIN — CETIRIZINE HYDROCHLORIDE 5 MG: 10 TABLET, FILM COATED ORAL at 08:20

## 2021-01-01 RX ADMIN — BISACODYL 10 MG: 10 SUPPOSITORY RECTAL at 13:56

## 2021-01-01 RX ADMIN — ACETAMINOPHEN 650 MG: 325 TABLET, FILM COATED ORAL at 20:41

## 2021-01-01 RX ADMIN — HYDRALAZINE HYDROCHLORIDE 10 MG: 20 INJECTION INTRAMUSCULAR; INTRAVENOUS at 23:34

## 2021-01-01 RX ADMIN — CITALOPRAM 40 MG: 40 TABLET ORAL at 08:49

## 2021-01-01 RX ADMIN — DOCUSATE SODIUM 200 MG: 100 CAPSULE, LIQUID FILLED ORAL at 09:01

## 2021-01-01 RX ADMIN — CETIRIZINE HYDROCHLORIDE 5 MG: 10 TABLET, FILM COATED ORAL at 08:23

## 2021-01-01 RX ADMIN — HYDRALAZINE HYDROCHLORIDE 10 MG: 20 INJECTION INTRAMUSCULAR; INTRAVENOUS at 21:34

## 2021-01-01 RX ADMIN — AMLODIPINE BESYLATE 5 MG: 5 TABLET ORAL at 08:49

## 2021-01-01 RX ADMIN — QUETIAPINE FUMARATE 12.5 MG: 25 TABLET ORAL at 20:02

## 2021-01-01 RX ADMIN — HYDROCODONE BITARTRATE AND ACETAMINOPHEN 1 TABLET: 5; 325 TABLET ORAL at 10:57

## 2021-01-01 RX ADMIN — HYDROCODONE BITARTRATE AND ACETAMINOPHEN 1 TABLET: 5; 325 TABLET ORAL at 13:38

## 2021-01-01 RX ADMIN — SODIUM CHLORIDE, PRESERVATIVE FREE 10 ML: 5 INJECTION INTRAVENOUS at 12:47

## 2021-01-01 RX ADMIN — AMLODIPINE BESYLATE 5 MG: 5 TABLET ORAL at 09:05

## 2021-01-01 RX ADMIN — MIRTAZAPINE 15 MG: 15 TABLET, FILM COATED ORAL at 21:36

## 2021-01-01 RX ADMIN — CITALOPRAM 40 MG: 40 TABLET ORAL at 08:05

## 2021-01-01 RX ADMIN — QUETIAPINE FUMARATE 50 MG: 25 TABLET ORAL at 15:53

## 2021-01-01 RX ADMIN — QUETIAPINE FUMARATE 25 MG: 25 TABLET ORAL at 20:10

## 2021-01-01 RX ADMIN — HYDROCODONE BITARTRATE AND ACETAMINOPHEN 1 TABLET: 5; 325 TABLET ORAL at 22:34

## 2021-01-01 RX ADMIN — QUETIAPINE FUMARATE 12.5 MG: 25 TABLET ORAL at 10:07

## 2021-01-01 RX ADMIN — DOCUSATE SODIUM 200 MG: 100 CAPSULE, LIQUID FILLED ORAL at 09:05

## 2021-01-01 RX ADMIN — HYDROCODONE BITARTRATE AND ACETAMINOPHEN 1 TABLET: 5; 325 TABLET ORAL at 21:07

## 2021-01-01 RX ADMIN — CETIRIZINE HYDROCHLORIDE 5 MG: 10 TABLET, FILM COATED ORAL at 08:12

## 2021-01-01 RX ADMIN — SODIUM CHLORIDE, PRESERVATIVE FREE 10 ML: 5 INJECTION INTRAVENOUS at 09:02

## 2021-01-01 RX ADMIN — SODIUM CHLORIDE 500 ML: 9 INJECTION, SOLUTION INTRAVENOUS at 17:15

## 2021-04-01 NOTE — TELEPHONE ENCOUNTER
Please call.  Recommend increase the citalopram to 1-1/2 tablets daily.  Do they have enough to do that for a week or 2 and see how she responds?

## 2021-04-01 NOTE — TELEPHONE ENCOUNTER
Spoke with the patient's daughter Aretha and recommended this dose change, She stated that she believes she has enough to get her through to see if changes occur.

## 2021-07-17 NOTE — ED PROVIDER NOTES
[facility]    EMERGENCY DEPARTMENT ENCOUNTER      Pt Name: Keila Alberts  MRN: [id number]  YOB: 1928  Date of evaluation: 7/16/2021  Provider: Luis Travis MD    CHIEF COMPLAINT       Chief Complaint   Patient presents with   • Dizziness   • Fall         HISTORY OF PRESENT ILLNESS  (Location/Symptom, Timing/Onset, Context/Setting, Quality, Duration, Modifying Factors, Severity.)   Keila Alberts is a 89 y.o. female who presents to the emergency department with fall in which she struck the back of her head and right side of her upper back on a chair.  Patient has history of dementia and is very unstable on her feet.  She tends to fall backwards especially if she stands up rapidly.  She stood up from a chair today, lost her balance, and fell backwards causing injuries to the listed areas.  Additional history is limited secondary to dementia.      Nursing notes were reviewed.    REVIEW OF SYSTEMS    (2-9 systems for level 4, 10 or more for level 5)   ROS:  General:  No fevers, no chills, no weakness  Cardiovascular:  No chest pain, no palpitations  Respiratory:  No shortness of breath, no cough, no wheezing  Gastrointestinal:  No pain, no nausea, no vomiting, no diarrhea  Musculoskeletal: Back pain  Skin:  No rash  Neurologic: Headache  Psychiatric:  No anxiety  Genitourinary:  No dysuria, no hematuria    Except as noted above the remainder of the review of systems was reviewed and negative.       PAST MEDICAL HISTORY     Past Medical History:   Diagnosis Date   • Arthritis    • Hyperlipidemia    • Hypertension          SURGICAL HISTORY       Past Surgical History:   Procedure Laterality Date   • APPENDECTOMY     • HALIMA HOLE Left 11/27/2016    Procedure: HALIMA HOLE;  Surgeon: Jos Christian MD;  Location: [facility] OR;  Service:    • HIP PERCUTANEOUS PINNING Left 1/14/2017    Procedure: HIP PERCUTANEOUS PINNING;  Surgeon: Edgar Albert MD;  Location: [facility] OR;  Service:    • JOINT REPLACEMENT      • TOTAL HIP ARTHROPLASTY Right    • TOTAL SHOULDER REPLACEMENT Left          CURRENT MEDICATIONS     No current facility-administered medications for this encounter.    Current Outpatient Medications:   •  amLODIPine (NORVASC) 5 MG tablet, Take 1 tablet by mouth once daily, Disp: 90 tablet, Rfl: 1  •  citalopram (CeleXA) 20 MG tablet, Take 1 tablet by mouth once daily, Disp: 90 tablet, Rfl: 1  •  digoxin (LANOXIN) 125 MCG tablet, Take 1 tablet by mouth once daily, Disp: 90 tablet, Rfl: 1  •  losartan (COZAAR) 50 MG tablet, Take 1 tablet by mouth once daily, Disp: 90 tablet, Rfl: 1  •  CONSTULOSE 10 GM/15ML solution, TAKE 15-30 ML BY MOUTH 2 TIMES DAILY AS NEEDED FOR CONSTIPATION, Disp: 946 mL, Rfl: 0  •  docusate sodium (COLACE) 100 MG capsule, Take 100 mg by mouth 2 (Two) Times a Day., Disp: , Rfl:   •  Multiple Vitamin (MULTI-VITAMIN DAILY PO), Take  by mouth Daily., Disp: , Rfl:   •  Multiple Vitamins-Minerals (PRESERVISION AREDS 2) capsule, Take 1 capsule by mouth Daily., Disp: , Rfl:   •  polyethylene glycol (MIRALAX) packet, Take 17 g by mouth Daily., Disp: , Rfl:     ALLERGIES     Erythromycin    FAMILY HISTORY       Family History   Problem Relation Age of Onset   • Arthritis Other    • Cancer Other    • Heart attack Other    • Hypertension Other    • No Known Problems Mother    • No Known Problems Father           SOCIAL HISTORY       Social History     Socioeconomic History   • Marital status:      Spouse name: Not on file   • Number of children: Not on file   • Years of education: Not on file   • Highest education level: Not on file   Tobacco Use   • Smoking status: Never Smoker   • Smokeless tobacco: Never Used   Substance and Sexual Activity   • Alcohol use: No   • Drug use: No   • Sexual activity: Never     Comment:          PHYSICAL EXAM    (up to 7 for level 4, 8 or more for level 5)     Vitals:    07/17/21 0030 07/17/21 0100 07/17/21 0115 07/17/21 0230   BP: 178/89 179/89  175/99    BP Location:       Patient Position:       Pulse: 71  69    Resp:       Temp:       TempSrc:       SpO2: 94%  94% 95%   Weight:       Height:           Physical Exam  General: Awake, alert, no acute distress.  HEENT: Pupils are equally round and reactive to light, EOMI, conjunctivae clear, sclerae white, there is no injection no icterus.  Oral mucosa is moist, no exudate. Uvula is midline. No malocclusion or tenderness over the mandible. There is no hemotympanum, chapman sign, or raccoon eyes.  Neck: Neck is supple, full range of motion, trachea midline. No midline tenderness.  Cardiac: Heart regular rate, rhythm, no murmurs, rubs, or gallops. Peripheral pulses are 2+ throughout.  Lungs: Lungs are clear to auscultation, there is no wheezing, rhonchi, or rales. There is no use of accessory muscles.  Chest wall: There is no tenderness to palpation over the chest wall or over ribs. There are no chest wall ecchymoses.  Abdomen: Abdomen is soft, nontender, nondistended. There are no firm or pulsatile masses, no rebound rigidity or guarding. No abdominal wall ecchymoses.  Musculoskeletal: No deformity or focal bone tenderness.  Mild tenderness to the right upper back.  Neuro: Motor intact, sensory intact, level of consciousness is normal.  Dermatology: Skin is warm and dry  Psych: Mentation is grossly normal, cognition is grossly normal. Affect is appropriate.        DIAGNOSTIC RESULTS     EKG: All EKG's are interpreted by the Emergency Department Physician who either signs or Co-signs this chart in the absence of a cardiologist.    Sinus rhythm rate of 76, no acute ST segment or T wave change, normal intervals, no ectopy    RADIOLOGY:   Non-plain film images such as CT, Ultrasound and MRI are read by the radiologist. Plain radiographic images are visualized and preliminarily interpreted by the emergency physician with the below findings:      [x] Radiologist's Report Reviewed:  CT Chest Without Contrast Diagnostic    Final Result      1. Ectasia of ascending thoracic aorta, measuring 4 cm in diameter.   2. No acute pulmonary process.   3. Large right glenohumeral effusion, nonspecific.   4. Uncomplicated colonic diverticulosis. Moderate stool burden.   5. Stable chronic partially calcified lesion in the pelvis, measuring at least 6.8 cm in size. This is unchanged since 2016. Calcified uterine fibroid remains a differential consideration.               Signer Name: Frank Mcgee MD    Signed: 7/17/2021 2:11 AM    Workstation Name: [facility]     Radiology [facility]      CT Abdomen Pelvis Without Contrast   Final Result      1. Ectasia of ascending thoracic aorta, measuring 4 cm in diameter.   2. No acute pulmonary process.   3. Large right glenohumeral effusion, nonspecific.   4. Uncomplicated colonic diverticulosis. Moderate stool burden.   5. Stable chronic partially calcified lesion in the pelvis, measuring at least 6.8 cm in size. This is unchanged since 2016. Calcified uterine fibroid remains a differential consideration.               Signer Name: Frank Mcgee MD    Signed: 7/17/2021 2:11 AM    Workstation Name: [facility]      CT Head Without Contrast   Final Result      1. No acute intracranial abnormality. Stable age-related senescent changes.   2. Postsurgical changes from prior left frontotemporal craniotomy with small, chronic left subdural hygroma detailed above.   3. Mucosal thickening in the bilateral maxillary sinuses and left ethmoid air cells.               Signer Name: Frank Mcgee MD    Signed: 7/16/2021 10:59 PM    Workstation Name: [facility]      CT Cervical Spine Without Contrast   Final Result   No acute fracture or subluxation. There is multilevel degenerative change.      Signer Name: Desirae Saldana MD    Signed: 7/16/2021 10:57 PM    Workstation Name: [id number]     [facility]   [place]      XR Chest 1 View   Final Result   No acute cardiopulmonary findings.      Signer Name: Desirae Saldana MD    Signed: 7/16/2021 10:09 PM    Workstation Name: [id number]     [facility] [place]            LABS:    I have reviewed and interpreted all of the currently available lab results from this visit (if applicable):  Results for orders placed or performed during the hospital encounter of 07/16/21   Comprehensive Metabolic Panel    Specimen: Blood   Result Value Ref Range    Glucose 109 (H) 65 - 99 mg/dL    BUN 28 (H) 8 - 23 mg/dL    Creatinine 0.87 0.57 - 1.00 mg/dL    Sodium 133 (L) 136 - 145 mmol/L    Potassium 4.4 3.5 - 5.2 mmol/L    Chloride 98 98 - 107 mmol/L    CO2 25.0 22.0 - 29.0 mmol/L    Calcium 9.4 8.2 - 9.6 mg/dL    Total Protein 7.1 6.0 - 8.5 g/dL    Albumin 4.20 3.50 - 5.20 g/dL    ALT (SGPT) 12 1 - 33 U/L    AST (SGOT) 15 1 - 32 U/L    Alkaline Phosphatase 75 39 - 117 U/L    Total Bilirubin 0.3 0.0 - 1.2 mg/dL    eGFR Non African Amer 61 >60 mL/min/1.73    Globulin 2.9 gm/dL    A/G Ratio 1.4 g/dL    BUN/Creatinine Ratio 32.2 (H) 7.0 - 25.0    Anion Gap 10.0 5.0 - 15.0 mmol/L   Troponin    Specimen: Blood   Result Value Ref Range    Troponin T <0.010 0.000 - 0.030 ng/mL   Magnesium    Specimen: Blood   Result Value Ref Range    Magnesium 2.2 1.7 - 2.3 mg/dL   Urinalysis With Microscopic If Indicated (No Culture) - Urine, Clean Catch    Specimen: Urine, Clean Catch   Result Value Ref Range    Color, UA Yellow Yellow, Straw    Appearance, UA Clear Clear    pH, UA 8.0 5.0 - 8.0    Specific Gravity, UA 1.008 1.001 - 1.030    Glucose, UA Negative Negative    Ketones, UA Negative Negative    Bilirubin, UA Negative Negative    Blood, UA Negative Negative    Protein, UA Negative Negative    Leuk Esterase, UA Trace (A) Negative    Nitrite, UA Negative Negative    Urobilinogen, UA 0.2 E.U./dL 0.2 - 1.0 E.U./dL   CBC Auto Differential    Specimen: Blood   Result Value Ref Range    WBC  12.50 (H) 3.40 - 10.80 10*3/mm3    RBC 3.24 (L) 3.77 - 5.28 10*6/mm3    Hemoglobin 10.6 (L) 12.0 - 15.9 g/dL    Hematocrit 31.4 (L) 34.0 - 46.6 %    MCV 96.9 79.0 - 97.0 fL    MCH 32.7 26.6 - 33.0 pg    MCHC 33.8 31.5 - 35.7 g/dL    RDW 14.3 12.3 - 15.4 %    RDW-SD 50.6 37.0 - 54.0 fl    MPV 8.7 6.0 - 12.0 fL    Platelets 374 140 - 450 10*3/mm3    Neutrophil % 55.0 42.7 - 76.0 %    Lymphocyte % 18.4 (L) 19.6 - 45.3 %    Monocyte % 5.0 5.0 - 12.0 %    Eosinophil % 20.6 (H) 0.3 - 6.2 %    Basophil % 0.6 0.0 - 1.5 %    Immature Grans % 0.4 0.0 - 0.5 %    Neutrophils, Absolute 6.87 1.70 - 7.00 10*3/mm3    Lymphocytes, Absolute 2.30 0.70 - 3.10 10*3/mm3    Monocytes, Absolute 0.62 0.10 - 0.90 10*3/mm3    Eosinophils, Absolute 2.58 (H) 0.00 - 0.40 10*3/mm3    Basophils, Absolute 0.08 0.00 - 0.20 10*3/mm3    Immature Grans, Absolute 0.05 0.00 - 0.05 10*3/mm3    nRBC 0.0 0.0 - 0.2 /100 WBC   Urinalysis, Microscopic Only - Urine, Clean Catch    Specimen: Urine, Clean Catch   Result Value Ref Range    RBC, UA 0-2 None Seen, 0-2 /HPF    WBC, UA 0-2 None Seen, 0-2 /HPF    Bacteria, UA None Seen None Seen, Trace /HPF    Squamous Epithelial Cells, UA None Seen None Seen, 0-2 /HPF    Hyaline Casts, UA None Seen 0 - 6 /LPF    Methodology Automated Microscopy    ECG 12 Lead   Result Value Ref Range    QT Interval 390 ms    QTC Interval 438 ms   Green Top (Gel)   Result Value Ref Range    Extra Tube Hold for add-ons.    Lavender Top   Result Value Ref Range    Extra Tube hold for add-on    Gold Top - SST   Result Value Ref Range    Extra Tube Hold for add-ons.    Gray Top   Result Value Ref Range    Extra Tube Hold for add-ons.         All other labs were within normal range or not returned as of this dictation.      EMERGENCY DEPARTMENT COURSE and DIFFERENTIAL DIAGNOSIS/MDM:   Vitals:    Vitals:    07/17/21 0030 07/17/21 0100 07/17/21 0115 07/17/21 0230   BP: 178/89 179/89  175/99   BP Location:       Patient Position:       Pulse:  71  69    Resp:       Temp:       TempSrc:       SpO2: 94%  94% 95%   Weight:       Height:           ED Course as of Jul 19 0316   Fri Jul 16, 2021 2204 CXR personally reviewed and negative for acute process.        [NS]   Sat Jul 17, 2021   0224 Patient was very well-appearing on reevaluation.  No evidence of additional traumatic injury at this time and she has no further complaints.  I discussed the results with the patient's daughter as well as close close follow-up.    [NS]      ED Course User Index  [NS] Luis Travis MD       CT imaging demonstrates no evidence of acute traumatic injury including no evidence in the head, C-spine, back, or abdomen/pelvis.  Remainder of evaluation is negative.  Based on complete history and physical examination, no evidence of additional traumatic injury.    I had a discussion with the patient/family regarding diagnosis, diagnostic results, treatment plan, and medications.  The patient/family indicated understanding of these instructions.  I spent adequate time at the bedside preceding discharge necessary to personally discuss the aftercare instructions, giving patient education, providing explanations of the results of our evaluations/findings, and my decision making to assure that the patient/family understand the plan of care.  Time was allotted to answer questions at that time and throughout the ED course.  Emphasis was placed on timely follow-up after discharge.  I also discussed the potential for the development of an acute emergent condition requiring further evaluation, admission, or even surgical intervention. I discussed that we found nothing during the visit today indicating the need for further workup, admission, or the presence of an unstable medical condition.  I encouraged the patient to return to the emergency department immediately for ANY concerns, worsening, new complaints, or if symptoms persist and unable to seek follow-up in a timely fashion.  The  patient/family expressed understanding and agreement with this plan.  The patient will follow-up with their PCP in 1-2 days for reevaluation.           FINAL IMPRESSION      1. Injury of head, initial encounter    2. Contusion of right back wall of thorax, initial encounter    3. Dizziness          DISPOSITION/PLAN     ED Disposition     ED Disposition Condition Comment    Discharge Stable           PATIENT REFERRED TO:  Ronaldo Travis MD  [address]  [place] [facility] [id number] [phone]    Schedule an appointment as soon as possible for a visit in 2 days      [facility] Emergency Department  [address] [facility] [address] [phone]    If symptoms worsen      DISCHARGE MEDICATIONS:     Medication List      CONTINUE taking these medications    amLODIPine 5 MG tablet  Commonly known as: NORVASC  Take 1 tablet by mouth once daily     citalopram 20 MG tablet  Commonly known as: CeleXA  Take 1 tablet by mouth once daily     Constulose 10 GM/15ML solution  Generic drug: lactulose  TAKE 15-30 ML BY MOUTH 2 TIMES DAILY AS NEEDED FOR CONSTIPATION     digoxin 125 MCG tablet  Commonly known as: LANOXIN  Take 1 tablet by mouth once daily     docusate sodium 100 MG capsule  Commonly known as: COLACE     losartan 50 MG tablet  Commonly known as: COZAAR  Take 1 tablet by mouth once daily     multivitamin tablet tablet  Commonly known as: THERAGRAN     multivitamins-minerals capsule capsule     polyethylene glycol packet  Commonly known as: MIRALAX                Comment: Please note this report has been produced using speech recognition software.      Luis Travis MD  Attending Emergency Physician               Luis Travis MD  07/19/21 1619

## 2021-08-05 NOTE — TELEPHONE ENCOUNTER
Rx Refill Note  Requested Prescriptions     Pending Prescriptions Disp Refills   • losartan (COZAAR) 50 MG tablet [Pharmacy Med Name: Losartan Potassium 50 MG Oral Tablet] 90 tablet 0     Sig: Take 1 tablet by mouth once daily      Last office visit with prescribing clinician: 10/9/2020      Next office visit with prescribing clinician: Visit date not found            Suzan Aranda  08/05/21, 07:28 EDT

## 2021-08-05 NOTE — TELEPHONE ENCOUNTER
Please call family, I refilled requested meds and due for follow up visit and labs.     Was recently seen in the ER but did have some mild abnormality of the blood work there as well.  Would need to be rechecked within the next month or so.

## 2021-09-18 NOTE — TELEPHONE ENCOUNTER
She is getting more confused, saying daughter is mean to her, wanting to take her  out of bed and go home, and they are home have lived with daughter for 6 years, worse yesterday and today. Told her to take to ER. Told her could be UTI,may need fluids,not drinking much either.     Reason for Disposition  • [1] Longstanding confusion (e.g., dementia, stroke) AND [2] worsening    Additional Information  • Negative: [1] Difficult to awaken or acting confused (e.g., disoriented, slurred speech) AND [2] present now AND [3] diabetes mellitus  • Negative: [1] Difficult to awaken or acting confused (e.g., disoriented, slurred speech) AND [2] present now AND [3] new-onset  • Negative: [1] Weakness of the face, arm, or leg on one side of the body AND [2] new-onset  • Negative: [1] Numbness of the face, arm, or leg on one side of the body AND [2] new-onset  • Negative: [1] Loss of speech or garbled speech AND [2] new-onset  • Negative: Difficulty breathing or bluish lips  • Negative: Shock suspected (e.g., cold/pale/clammy skin, too weak to stand, low BP, rapid pulse)  • Negative: Seeing, hearing, or feeling things that are not there (i.e., visual, auditory, or tactile hallucinations)  • Negative: Followed a head injury  • Negative: Drug overdose suspected  • Negative: Sounds like a life-threatening emergency to the triager  • Negative: Questions or concerns about alcohol use, unhealthy alcohol use, binge drinking, intoxication, or withdrawal  • Negative: Questions or concerns about substance use (drug use), unhealthy drug use, intoxication, or withdrawal  • Negative: [1] Diabetes mellitus AND [2] confusion from low blood sugar (i.e., < 60 mg/dl or 3.5 mmol/l)  • Negative: Headache or vomiting  • Negative: Stiff neck (can't touch chin to chest)  • Negative: Very strange or paranoid behavior  • Negative: Fever > 100.4 F (38.0 C)  • Negative: Patient sounds very sick or weak to the triager  • Negative: [1] Acting confused  "(e.g., disoriented, slurred speech) AND [2] brief (now gone)    Answer Assessment - Initial Assessment Questions  1. LEVEL OF CONSCIOUSNESS: \"How is he (she, the patient) acting right now?\" (e.g., alert-oriented, confused, lethargic, stuporous, comatose)      Confused trying to leave home and take her  with her, this is new actions for her, not acting right all week worse today, saying  I ammean to her and wanting to go home,  2. ONSET: \"When did the confusion start?\"  (minutes, hours, days)     Started getting worse yesterday  3. PATTERN \"Does this come and go, or has it been constant since it started?\"  \"Is it present now?\"      Comes and goes but worse today  4. ALCOHOL or DRUGS: \"Has he been drinking alcohol or taking any drugs?\"       no  5. NARCOTIC MEDICATIONS: \"Has he been receiving any narcotic medications?\" (e.g., morphine, Vicodin)      no  6. CAUSE: \"What do you think is causing the confusion?\"       Maybe dementia, not sure  7. OTHER SYMPTOMS: \"Are there any other symptoms?\" (e.g., difficulty breathing, headache, fever, weakness)      no    Protocols used: CONFUSION - DELIRIUM-ADULT-AH      "

## 2021-09-20 NOTE — TELEPHONE ENCOUNTER
Caller: Aretha Lockhart    Relationship: Emergency Contact    Best call back number:      What is the best time to reach you: ANYTIME    Who are you requesting to speak with (clinical staff, provider,  specific staff member): CLINICAL STAFF    Do you know the name of the person who called: ARETHA PERKINS    What was the call regarding: WOULD LIKE TO BRING IN SAMPLE FOR POSSIBLE INFECTION; PATIENT IS EXPERIENCING FREQUENCY AND CONFUSION    Do you require a callback: YES

## 2021-09-20 NOTE — TELEPHONE ENCOUNTER
Please call.  The urine was normal looking.  No abnormality seen.    We will send for culture.  Order is already in chart.

## 2021-09-21 NOTE — TELEPHONE ENCOUNTER
PT'S DAUGHTER CALLED WANTING TO MAKE AN APPT WITH DR. KINNEY.  INFORMED HER THAT DR. KINNEY WAS NO LONGER WITH PRACTICE.  SHE INQUIRED WHAT DOCTORS WE HAVE IN OUR NETWORK AND STATED SHE WILL BE TAKING HER MOTHER TO PCP TO OBTAIN REFERRAL FOR NEURO AND WANTED TO EXPLORE OPTIONS FOR TREATMENT.

## 2021-09-23 NOTE — TELEPHONE ENCOUNTER
Caller: Aretha Lockhart    Relationship: Emergency Contact    Best call back number: [phone]    What was the call regarding: PATIENTS DAUGHTER STATES THAT SHE WOULD LIKE A CALL ABOUT HER MOTHERS WELL BEING. PATINETS DAUGHTER STATES THAT SHE THINKS HER MOTHER HAS DEMENTIA.    Do you require a callback: YES

## 2021-09-23 NOTE — TELEPHONE ENCOUNTER
Yes, she can bring her in but she say's, she tried to get an appt earlier and they told her you do not have any openings.

## 2021-09-23 NOTE — TELEPHONE ENCOUNTER
"Daughter is very concerned and does not know what she needs to do for her mom. For the past week or so in the late afternoons to evening pt becomes very agitated, wants to leave, insists she is going to her own home (and she lives with daughter for the past 6 yrs). She tries to get her father up, who has dementia and take him out the door with her. At night time she is up \"roaming\" constantly until she is exhausted. She is getting lost in the house.  Urine culture came back negative.   "

## 2021-09-28 NOTE — PROGRESS NOTES
"Chief Complaint  Hypertension (f/u would like to talk to [facility] without patient. ), Anxiety (med not working ), Hernia (upper stomach ), Memory Loss (x 2 weeks worse ), and Hearing Problem (hearing is bad. )    Subjective          Keila Alberts presents to [facility]  History of Present Illness    Memory loss  Worse in last 2 weeks  Worse in the evening  Can be combative     has dementia    2 weeks ago - sudden occurrence with this    In last yr, some slow memory but this is a definite change    Has been with her dtr for 6 yrs  She tries to go home and pulls her  to take him home    Has had 3 falls recently. May fall back    May call for her \" Mommy\"     Hard to wake up in the am     Eating better in last 2 weeks  Had 2 bowls of spag last night    Will go til 3-4 am every night    Last fall 3 weeks ago  Hit the mouth  No LOC        Review of Systems   Constitutional: Positive for fatigue.   HENT: Negative.    Respiratory: Negative.    Cardiovascular: Negative.    Gastrointestinal: Negative.    Genitourinary: Negative.    Neurological: Positive for memory problem.   Psychiatric/Behavioral: Positive for agitation and behavioral problems. Negative for hallucinations.        Objective       Vital Signs:   /68   Pulse 67   Temp 98.2 °F (36.8 °C)   Resp 18   Ht 160 cm (63\")   SpO2 93%   BMI 23.03 kg/m²     Physical Exam  Vitals and nursing note reviewed.   Constitutional:       General: She is not in acute distress.     Appearance: She is well-developed. She is not ill-appearing.   HENT:      Head: Normocephalic and atraumatic.      Right Ear: Hearing, tympanic membrane, ear canal and external ear normal.      Left Ear: Hearing, tympanic membrane, ear canal and external ear normal.      Ears:      Comments: Decreased hearing     Nose: Nose normal. No congestion or rhinorrhea.      Mouth/Throat:      Mouth: Mucous membranes are moist.      Pharynx: No oropharyngeal " exudate or posterior oropharyngeal erythema.   Eyes:      General:         Right eye: No discharge.         Left eye: No discharge.      Conjunctiva/sclera: Conjunctivae normal.      Pupils: Pupils are equal, round, and reactive to light.   Neck:      Thyroid: No thyromegaly.   Cardiovascular:      Rate and Rhythm: Normal rate and regular rhythm.      Heart sounds: Normal heart sounds. No murmur heard.   No friction rub. No gallop.    Pulmonary:      Effort: Pulmonary effort is normal. No respiratory distress.      Breath sounds: Normal breath sounds. No wheezing or rales.   Abdominal:      General: Bowel sounds are normal. There is no distension.      Palpations: Abdomen is soft. There is no mass.      Tenderness: There is no abdominal tenderness. There is no guarding or rebound.   Musculoskeletal:      Cervical back: Normal range of motion and neck supple.      Right lower leg: No edema.      Left lower leg: No edema.   Lymphadenopathy:      Cervical: No cervical adenopathy.   Skin:     General: Skin is warm and dry.      Coloration: Skin is not jaundiced or pale.   Neurological:      Mental Status: She is alert. She is disoriented.          Result Review :                     Assessment and Plan    Diagnoses and all orders for this visit:    1. Delirium (Primary)  -     QUEtiapine (SEROquel) 25 MG tablet; Take 1 tablet by mouth Every Night.  Dispense: 30 tablet; Refill: 1  -     CBC & Differential  -     Comprehensive Metabolic Panel  -     TSH  -     Vitamin B12  -     C-reactive Protein  -     Sedimentation Rate  -     CT head wo contrast; Future          DISCUSSION    Since acute onset 2 weeks ago, suspect more delirium than dementia.  Check labs as noted.  Recent urine culture was negative.  Check CT scan of the head.  CT of the head was normal without new bleed.  Showed chronic changes.  Further plan once labs back.  In the meantime start Seroquel 25 mg 1 at bedtime.  Discussed side effects with daughter.   She agrees.  Call sooner if worsening.  Further plan once labs back.        Follow Up   No follow-ups on file.    Patient was given instructions and counseling regarding her condition or for health maintenance advice. Please see specific information pulled into the AVS if appropriate.       Ronaldo Travis MD

## 2021-10-01 NOTE — TELEPHONE ENCOUNTER
Please call.  We will send in Risperdal instead.  In addition, let her know that I did place referral to neurology.  And also we are having some issue with home health due to the diagnosis.    Okay to stop the Seroquel.

## 2021-10-01 NOTE — TELEPHONE ENCOUNTER
Aretha called back, I was unable to reach a MA to speak with her. Her phone is back on now it got cut yesterday by a fencing company. She would like a call back.

## 2021-10-01 NOTE — TELEPHONE ENCOUNTER
Patient took 2 last night she thinks it made her worse. She was trying to get out of the house and was very aggravated. She finally started to calm down around 130 and feel asleep on the couch around 2.  Daughter wants to try another medication. Last night patient was very hard to handle to were they didn't know what to do.       Referral for HH (We are unable to accept this patient for Home Health Services due to there not being a Medicare accepted Diagnosis. It would have to be a medical reason for Delirium / Confusion. Thank you)

## 2021-10-01 NOTE — TELEPHONE ENCOUNTER
I can place a referral to neurology as well.  Please let her know.  See other messages in this thread.

## 2021-10-06 PROBLEM — M54.9 BACK PAIN: Status: ACTIVE | Noted: 2021-01-01

## 2021-10-06 PROBLEM — K59.00 ACUTE CONSTIPATION: Status: ACTIVE | Noted: 2021-01-01

## 2021-10-06 PROBLEM — H91.90 HOH (HARD OF HEARING): Status: ACTIVE | Noted: 2021-01-01

## 2021-10-06 PROBLEM — F03.90 DEMENTIA (HCC): Status: ACTIVE | Noted: 2021-01-01

## 2021-10-06 PROBLEM — H54.8 LEGALLY BLIND: Status: ACTIVE | Noted: 2021-01-01

## 2021-10-06 NOTE — PROGRESS NOTES
"Subjective       Chief Complaint: memory loss      History of Present Illness   Keila Alberts is a 89 y.o. female who comes to clinic today for evaluation of cognitive impairment. Her family has noted symptoms since at least 11/16 when she fell and suffered a left SDH, undergoing craniotomy to evacuate marked marked initially by forgetfulness and word-finding difficulties. This has worsened  over time, particularly since 9/21. Additional symptoms have included impairments in essentially all spheres of cognition. There have been associated  symptoms of anxiety, restlessness, agitation and delusions. Her famil  manages her medications and finances. She is currently residing with her daughter and son-in-law as well as her  who is in hospice care for advanced dementia.    She has had balance impairment for several years, which has also worsened over time. She has had numerous recent falls. She was most recently evaluated at [facility] ED after a fall on 9/29/21, where a head CT showed stable poster operative changes, but was otherwise unremarkable.     Recent screening blood work has been unremarkable. She is currently taking Seroquel 25mg BID. Her daughter has given her hydrocodone the last two nights, which she feels has been beneficial.       I have reviewed and confirmed the past family, social and medical history as accurate on 10/6/21.     Review of Systems   Unable to perform ROS: Dementia       Objective     /84   Pulse 70   Ht 162.6 cm (64\")   Wt 61.2 kg (135 lb)   SpO2 96%   BMI 23.17 kg/m²     General appearance today is normal.   Peripheral pulses were present and symmetric.  The ophthalmoscopic exam today is unremarkable. The discs and posterior elements are unremarkable.      Physical Exam  Neurological:      Deep Tendon Reflexes: Strength normal.   Psychiatric:         Speech: Speech normal.          Neurologic Exam     Mental Status   Oriented to person.   Disoriented to place.   Disoriented " to time.   Follows 1 step commands.   Attention: decreased.   Speech: speech is normal   Level of consciousness: drowsy  Unable to name object. Unable to read. Unable to repeat. Unable to write. Abnormal comprehension.     Cranial Nerves   Cranial nerves II through XII intact.     Motor Exam   Muscle bulk: normal  Overall muscle tone: normal    Strength   Strength 5/5 throughout.     Gait, Coordination, and Reflexes     Gait  Gait: (in wheelchair)    Tremor   Resting tremor: absent        Results  MMSE=untestable       Assessment/Plan   Diagnoses and all orders for this visit:    1. Dementia with behavioral disturbance, unspecified dementia type (HCC) (Primary)  -     Ambulatory Referral to Home Health    Other orders  -     mirtazapine (REMERON) 15 MG tablet; Take 1 tablet by mouth Every Night.  Dispense: 30 tablet; Refill: 11          Discussion/Summary   Keila Alberts comes to clinic today for evaluation of cognitive impairment. Her history and examination today is concerning for an underlying dementia potentially complicated by a post concussive syndrome as well as her other medical co-morbidities. This was discussed in detail. Her workup has been complete and appropriate. Therefore, I do not have any further recommendations concerning this.  After discussing potential treatment options, it was elected to add  mirtazapine and continue on Seroquel 25mg po BID. I have also asked Akiko Junior,  to contact the patient's family to discuss potential resources. Additionally, I have made a referral to home health for PT, OT, and SLP. She will then follow up in 6 months , or sooner if needed.   Total time of visit today: 60 minutes. As part of this visit I reviewed prior lab results, reviewed radiology results, reviewed radiology images and obtained additional history from the family which is incorporated in the HPI. I also discussed diagnosis, prognosis, diagnostic testing, evaluation, current status,  treatment options and management as discussed above.           Sheila Martinez PA-C

## 2021-10-06 NOTE — TELEPHONE ENCOUNTER
Called spoke with daughter. She stated HH has not got a hold of her yet. They seen neurology this morning and is now at [facility] checking out her leg. Unsure what is going on with it.

## 2021-10-06 NOTE — ED PROVIDER NOTES
Subjective   Ms. Alberts is a 89-year-old female who lives at home with her daughter.  According to her daughter, she has had several falls recently.  Her most recent fall was about 4 days ago.  Since the fall, she has been complaining of low back pain and also right shoulder pain.  She did not strike her head.  She denies any headache or neck pain.  The patient denies chest pain, rib pain, shortness of breath, abdominal pain, nausea, vomiting or diarrhea.  No urinary symptoms.  The patient has a history of dementia, hypertension, heart murmur, bilateral hip replacements, previous knee and shoulder replacement (laterality unknown).  Her PCP is Dr. Ronaldo Travis in [facility].  She is a non-smoker.  No alcohol use.          Review of Systems   Constitutional: Negative for appetite change, chills and fever.   HENT: Negative for sore throat.    Respiratory: Negative for cough and shortness of breath.    Cardiovascular: Negative for chest pain.   Gastrointestinal: Negative for abdominal pain, diarrhea, nausea and vomiting.   Genitourinary: Negative for dysuria.   Musculoskeletal: Positive for arthralgias (Right shoulder pain) and back pain (Low back pain). Negative for neck pain.   Skin: Negative for wound.   Neurological: Positive for weakness (Generalized). Negative for dizziness, light-headedness and headaches.   Hematological: Does not bruise/bleed easily.   Psychiatric/Behavioral: Positive for confusion (Chronic, secondary to dementia).       Past Medical History:   Diagnosis Date   • Arthritis    • Hyperlipidemia    • Hypertension    • Memory loss        Allergies   Allergen Reactions   • Penicillin G Unknown - Low Severity   • Erythromycin Rash       Past Surgical History:   Procedure Laterality Date   • APPENDECTOMY     • HALIMA HOLE Left 11/27/2016    Procedure: HALIMA HOLE;  Surgeon: Jos Christian MD;  [facility];  Service:    • HIP PERCUTANEOUS PINNING Left 1/14/2017    Procedure: HIP  PERCUTANEOUS PINNING;  Surgeon: Edgar Albert MD;  Location: [facility];  Service:    • JOINT REPLACEMENT     • TOTAL HIP ARTHROPLASTY Right    • TOTAL SHOULDER REPLACEMENT Left        Family History   Problem Relation Age of Onset   • Arthritis Other    • Cancer Other    • Heart attack Other    • Hypertension Other    • No Known Problems Mother    • No Known Problems Father        Social History     Socioeconomic History   • Marital status:      Spouse name: Not on file   • Number of children: Not on file   • Years of education: Not on file   • Highest education level: Not on file   Tobacco Use   • Smoking status: Never Smoker   • Smokeless tobacco: Never Used   Vaping Use   • Vaping Use: Never used   Substance and Sexual Activity   • Alcohol use: No   • Drug use: No   • Sexual activity: Never     Comment:            Objective   Physical Exam  Constitutional:       General: She is not in acute distress.     Appearance: Normal appearance. She is not toxic-appearing.   HENT:      Head: Normocephalic.      Right Ear: External ear normal.      Left Ear: External ear normal.      Nose: Nose normal.      Mouth/Throat:      Mouth: Mucous membranes are moist.   Eyes:      General: No scleral icterus.     Extraocular Movements: Extraocular movements intact.      Conjunctiva/sclera: Conjunctivae normal.      Pupils: Pupils are equal, round, and reactive to light.   Cardiovascular:      Rate and Rhythm: Normal rate and regular rhythm.      Pulses: Normal pulses.      Heart sounds: Normal heart sounds. No murmur heard.     Pulmonary:      Effort: Pulmonary effort is normal. No respiratory distress.      Breath sounds: Normal breath sounds. No wheezing or rhonchi.   Abdominal:      General: Bowel sounds are normal.      Tenderness: There is no abdominal tenderness. There is no guarding.   Musculoskeletal:      Cervical back: Normal range of motion and neck supple. No tenderness.      Comments: Some pain  and crepitus on range of motion of the right shoulder.  No obvious deformity.  Tenderness on palpation of the mid lumbar spine.  Increased pain with movement of the trunk.   Skin:     General: Skin is warm and dry.      Findings: No bruising, lesion or rash.   Neurological:      General: No focal deficit present.      Mental Status: She is alert.      Comments: Moderate dementia.  Normal speech.  Equal  bilaterally.  Normal leg strength.   Psychiatric:         Mood and Affect: Mood normal.         Procedures           ED Course      The patient appears in no acute distress but at times will moan when she bends at the trunk.      CT lumbar spine:    Multilevel degenerative changes identified diffusely  throughout the lumbar spine with convexity to the left with no evidence  of fracture or dislocation. Some mild irregularity identified along the  anterior rightward aspect of the L2 vertebral body level chronic in  appearance. No acute fracture or dislocation.    Right shoulder x-ray shows degenerative changes but no evidence of fracture or dislocation.  Chest x-ray shows atelectasis and a tiny right pleural effusion.  She has a moderate hyponatremia at 127.  No other concerning labs.  I reviewed her prior records and see that she was seen here after a fall last month and also after a fall back in July.  I spoke with her daughter who takes care of her.  Her daughter states that she is becoming increasingly difficult to manage at home.  She states that she saw Sheila Martinez at neurology for the first time this morning but that no changes were made.  She states her PCP has talked about home health being set up but no one has contacted her about it yet.  She would like to talk with the  to discuss options.  They have been notified and will see the patient and daughter shortly.    16:46 EDT  Case management has been in to talk with the pt and daughter.  They feel that admission for placement is the best  course of action given her recurrent falls.  I will speak with the hospitalist for admission.                                       MDM    Final diagnoses:   Recurrent falls   Acute midline low back pain without sciatica   Dementia without behavioral disturbance, unspecified dementia type (HCC)       ED Disposition  ED Disposition     ED Disposition Condition Comment    Decision to Admit            No follow-up provider specified.       Medication List      No changes were made to your prescriptions during this visit.          Mikal Fraser, PA  10/06/21 [id number]

## 2021-10-06 NOTE — H&P
"    [facility] Medicine Services  HISTORY AND PHYSICAL    Patient Name: Keila Alberts  : 7/15/1928  MRN: [id number]  Primary Care Physician: Ronaldo Travis MD  Date of admission: 10/6/2021    Subjective   Subjective     Chief Complaint:  Back pain     HPI:  Keila Alberts is a 89 y.o. female w/ a hx of HTN, HLD, osteoarthritis, macular degeneration (pt legally blind), [place], subdural hematoma s/p craniotomy (2016), heart murmur, chronic constipation, dementia who presented to the ED w/ c/o back pain.   Pt lives w/ her daughter (x 6 yrs). Pt's  also lives w/ the daughter and has dementia and is in Hospice. HPI per daughter and pt unable to provide history 2/2 dementia.   Pt has had an overall functional decline w/ worsening dementia x 1 year. Prior to 3 wks ago pt was \"forgetful\" and required assistance w/ basic ADLs and could ambulate w/ a walker. Over the past 3 week pt's behavior and confusion has gotten progressively worse. This past weekend she fell 2 times and has been very confused. Pt has attempted to get her sick  out of bed and has not recognized some of the family that she usually would. Pt seen by Neurologist office today ([facility]?) and prescribed Remeron which she did not start. Seen last week by PCP and started on Seroquel once daily which has not helped. On Friday the PCP increased the dose to twice daily. Today on the drive back home from the Neurologist office, the pt started c/o pain in her lower back prompting the ED visit. Pt's daughter is concerned that she can no longer care for her at home and is interested in possible long-term placement for her mother.  Pt w/ chronic constipation; last BM was >1 week ago.   Pt w/ no recent fever/chills, chest pain, dyspnea, cough, N/V/D, dysuria, edema, syncope.   Pt evaluated in the ED. Labs unremarkable other than a low sodium of 127. CT lumbar spine w/ chronic findings. Pt admitted to the hospital medicine service for " further evaluation.     COVID Details:    Symptoms:    [x] NONE [] Fever []  Cough [] Shortness of breath [] Change in taste/smell      The patient qualifies to receive the vaccine, but they have not yet received it.    Review of Systems   Constitutional: Negative.  Negative for appetite change, chills, diaphoresis, fatigue and fever.        ROS per pt's daughter. Pt w/ dementia.    HENT: Negative.  Negative for congestion, rhinorrhea, sinus pressure, sinus pain, sneezing, sore throat and trouble swallowing.    Eyes: Negative.  Negative for visual disturbance.   Respiratory: Negative.  Negative for cough, shortness of breath and wheezing.    Cardiovascular: Negative.  Negative for chest pain, palpitations and leg swelling.   Gastrointestinal: Positive for constipation. Negative for abdominal distention, abdominal pain, blood in stool, diarrhea, nausea and vomiting.   Endocrine: Negative.    Genitourinary: Negative.  Negative for decreased urine volume, difficulty urinating, dysuria, flank pain, frequency, hematuria, pelvic pain and urgency.   Musculoskeletal: Positive for back pain. Negative for arthralgias, myalgias, neck pain and neck stiffness.   Skin: Negative.  Negative for wound.   Allergic/Immunologic: Negative.  Negative for immunocompromised state.   Neurological: Negative.  Negative for dizziness, tremors, seizures, syncope, facial asymmetry, speech difficulty, weakness, light-headedness, numbness and headaches.   Hematological: Negative.  Does not bruise/bleed easily.   Psychiatric/Behavioral: Negative.  Negative for confusion.   All other systems reviewed and are negative.     Personal History     Past Medical History:   Diagnosis Date   • Acute/Subacute Traumatic SDH (subdural hematoma) 11/27/2016 Nov 2016   • Arthritis    • Dementia (HCC)    • Hyperlipidemia    • Hypertension    • Legally blind     2/2 macular degeneration    • Macular degeneration    • Memory loss      Past Surgical History:    Procedure Laterality Date   • APPENDECTOMY     • HALIMA HOLE Left 11/27/2016    Procedure: HALIMA HOLE;  Surgeon: Jos Christian MD;  Location: [facility] OR;  Service:    • CRANIOTOMY  2016   • HIP PERCUTANEOUS PINNING Left 1/14/2017    Procedure: HIP PERCUTANEOUS PINNING;  Surgeon: Edgar Albert MD;  Location:  [facility] OR;  Service:    • JOINT REPLACEMENT     • KNEE ARTHROPLASTY, PARTIAL REPLACEMENT     • SHOULDER ARTHROSCOPY     • TOTAL HIP ARTHROPLASTY Bilateral    • TOTAL SHOULDER REPLACEMENT Left      Family History: family history includes Arthritis in an other family member; Cancer in an other family member; Heart attack in an other family member; Heart disease in her father and mother; Hypertension in an other family member. Otherwise pertinent FHx was reviewed and unremarkable.     Social History:  reports that she has never smoked. She has never used smokeless tobacco. She reports that she does not drink alcohol and does not use drugs.  Social History     Social History Narrative    Lives with her daughter (x 6 years). Pt's  also lives w/ the daughter and is in Hospice w/ dementia.      Medications:  HYDROcodone-acetaminophen, QUEtiapine, acetaminophen, amLODIPine, cetirizine, citalopram, digoxin, docusate sodium, losartan, magnesium hydroxide, mirtazapine, multivitamin, and multivitamins-minerals    Allergies   Allergen Reactions   • Penicillin G Unknown - Low Severity   • Erythromycin Rash     Objective   Objective     Vital Signs:   Temp:  [98.4 °F (36.9 °C)] 98.4 °F (36.9 °C)  Heart Rate:  [63-71] 66  Resp:  [16] 16  BP: (138-200)/() 157/91    Physical Exam     Constitutional: Awake, alert; non-toxic appearing; pt very [place]   Eyes: PERRLA, sclerae anicteric, no conjunctival injection  HENT: NCAT, mucous membranes moist  Neck: Supple, no thyromegaly, no lymphadenopathy, trachea midline  Respiratory: Clear to auscultation bilaterally, nonlabored respirations   Cardiovascular: RRR, no  murmurs, rubs, or gallops, no peripheral edema   Gastrointestinal: Positive bowel sounds, soft, non-distended; generalized TTP   Musculoskeletal: Normal ROM bilaterally   Psychiatric: cooperative, demented   Neurologic: Oriented to name and  only, strength symmetric in all extremities, Cranial Nerves grossly intact to confrontation, speech clear  Skin: No rashes, lesions or wounds     Results Reviewed:  I have personally reviewed most recent indicated data and agree with findings including:  [x]  Laboratory  [x]  Radiology  [x]  EKG/Telemetry  []  Pathology  []  Cardiac/Vascular Studies  []  Old records  []  Other:    LAB RESULTS:      Lab 10/06/21  1217   WBC 10.72   HEMOGLOBIN 10.5*   HEMATOCRIT 31.2*   PLATELETS 417   NEUTROS ABS 7.55*   IMMATURE GRANS (ABS) 0.07*   LYMPHS ABS 1.34   MONOS ABS 0.78   EOS ABS 0.90*   MCV 98.1*         Lab 10/06/21  1217   SODIUM 127*   POTASSIUM 3.9   CHLORIDE 91*   CO2 23.0   ANION GAP 13.0   BUN 17   CREATININE 0.97   GLUCOSE 133*   CALCIUM 9.4   MAGNESIUM 2.1         Lab 10/06/21  1217   TOTAL PROTEIN 7.2   ALBUMIN 4.20   GLOBULIN 3.0   ALT (SGPT) 32   AST (SGOT) 17   BILIRUBIN 0.5   ALK PHOS 80         Lab 10/06/21  1217   TROPONIN T <0.010                 Brief Urine Lab Results  (Last result in the past 365 days)      Color   Clarity   Blood   Leuk Est   Nitrite   Protein   CREAT   Urine HCG        10/06/21 1324 Yellow Cloudy Negative Small (1+) Negative Trace             Microbiology Results (last 10 days)     ** No results found for the last 240 hours. **        XR Shoulder 2+ View Right    Result Date: 10/6/2021  EXAMINATION: XR SHOULDER 2+ VW RIGHT- 10/06/2021  INDICATION: right shoulder pain s/p fall  COMPARISON: NONE  FINDINGS: 2 views of the right shoulder reveal no evidence of fracture or dislocation. The cortex is intact. Joint spaces are preserved. There is spurring identified of the humeral head.        Impression: Degenerative changes seen within the shoulder  with no evidence of fracture or dislocation.  D: 10/06/2021 E: 10/06/2021       CT Lumbar Spine Without Contrast    Result Date: 10/6/2021  EXAMINATION: CT LUMBAR SPINE WO CONTRAST - 10/06/2021  INDICATION: Fall, low back pain, trauma.  TECHNIQUE: Multiple axial CT imaging is obtained lumbar spine without the administration of intravenous contrast. Coronal and sagittal reformatted images were submitted to further facilitate diagnostic accuracy and treatment planning.  The radiation dose reduction device was turned on for each scan per the ALARA (As Low as Reasonably Achievable) protocol.  COMPARISON: None  FINDINGS: There is some anterior wedging identified along the rightward aspect of the superior endplate of L2 representing chronic deformity. There is no evidence of lucency to suggest evidence of an acute fracture line. Mild curvature identified of the spine with convexity to the left. Sclerosis identified of the endplates at the L4/L5 and L5/S1 level. Degenerative changes seen within the posterior facets. There is vascular calcification seen within the abdominal aorta and iliac vessels. Degenerative changes seen within the SA joints bilaterally.      Impression: Multilevel degenerative changes identified diffusely throughout the lumbar spine with convexity to the left with no evidence of fracture or dislocation. Some mild irregularity identified along the anterior rightward aspect of the L2 vertebral body level chronic in appearance. No acute fracture or dislocation.  DICTATED:   10/06/2021 EDITED/ls :   10/06/2021       XR Chest 1 View    Result Date: 10/6/2021  EXAMINATION: XR CHEST 1 VW- 10/06/2021  INDICATION: Weak/Dizzy/AMS triage protocol  COMPARISON: 07/16/2021  FINDINGS: Portable chest reveals cardiac and mediastinal silhouettes within normal limits. Mild increased markings seen within the right lung base. Heart is borderline enlarged. Chronic changes seen throughout the lung fields bilaterally with  degenerative changes seen within the spine.        Impression: Minimal increased markings seen within the right lung base suggesting possible atelectatic change. Clinical correlation is needed. Possible tiny right pleural effusion. Continued follow up recommended as indicated.  D: 10/06/2021 E: 10/06/2021        Results for orders placed during the hospital encounter of 03/16/18    Adult Transthoracic Echo Complete W/ Cont if Necessary Per Protocol    Interpretation Summary  · Left ventricular systolic function is hyperdynamic (EF > 70).  · Left ventricular diastolic dysfunction (grade I) consistent with impaired relaxation.  · Moderate to severe aortic valve regurgitation is present.  · Mild aortic valve stenosis is present.      Assessment/Plan   Assessment & Plan       Dementia (HCC)    History of subdural hematoma (post traumatic)    Essential hypertension    Chronic idiopathic constipation    Normocytic anemia    Hyponatremia    Macular degeneration    Heart murmur    [place] (hard of hearing)    Legally blind    Acute constipation    Back pain    Keila Alberts is a 89 y.o. female w/ a hx of HTN, HLD, osteoarthritis, macular degeneration (pt legally blind), [place], subdural hematoma s/p craniotomy (2016), heart murmur, chronic constipation, dementia who presented to the ED w/ c/o back pain after a recent fall, and worsening dementia with delusions/agitation. Pt's daughter brought her to a neurologist on 10/6 who had no suggestions for improving patient's clinical condition.  Dtr brings patient to ED out of concern that she can no longer care for her safely at home.     Dementia w/ worsening behavior   - w/ worsening behavior over the last 3 wks; started on Seroquel last week w/ no improvement, dose increased to BID on Friday   -seen by Neurology outpt today and prescribed Remeron but has not started yet  -previously prescribed Risperidone but never started   -CT Head stable on 9/29  - recent TSH and B12 wnl   - r/o  infectious etiology; afebrile, UA w/ pyuria- urine cx pending, CXR w/ no acute findings, WBC WNL .  Check COVID - pending   - light IVF; NS @50ml/hour x 12 hours   -fall precautions and bed alarm   -continue Celexa, Seroquel BID and start Remeron tonight   -pt/ot and case mgt consult in am     Hyponatremia   -chronically low; previous Na 133-136 over past year  -currently 127  -light IVF overnight; NS @ 50ml/hour x 12 hours  -repeat Na @ midnight an BMP in am     Acute/chronic constipation   -w/ abdominal tenderness on exam   -last BM over 1 week ago   -bowel regimen w/ suppository now   - gentle IV fluids     Back pain   - could be due to recent falls or constipation   -bowel regimen as above  -CT Lumbar spine obtained in ED; chronic findings seen   -continue PRN Norco for pain mgt but minimize    HTN  -continue routine Norvasc and Losartan w/ hold parameters  -pt also on Digoxin- unclear why (daughter reports hx of murmur only, no other cardiac issues)  - stop dig   -EKG c/w NSR     Chronic anemia   -previous H/H 9.5/28.1 on 9/29; currently 10.5/31.2  -monitor     Hx of Subdural Hematoma  -2016, s/p craniotomy     DVT prophylaxis:  Mechanical     CODE STATUS:  DNR  Level Of Support Discussed With: Health Care Surrogate  Code Status: No CPR  Medical Interventions (Level of Support Prior to Arrest): Full  Comments: DNR/ DNI    This note has been completed as part of a split-shared workflow.     Signature: Electronically signed by KATARZYNA Zhong, 10/06/21, 8:25 PM EDT.      Attending   Admission Attestation       I have seen and examined the patient, performing an independent face-to-face diagnostic evaluation with plan of care reviewed and developed with the advanced practice clinician (APC).      Brief Summary Statement:   Keila Alberts is a 89 y.o. female with dementia who lives at home in the care of her daughter.  Recently she has had several falls. She was seen by neurology clinic today, who noted  progressive worsening of dementia along with agitation and delusions.  Her functional decline and cognitive impairment have advanced to where her daughter - who also cares for a father on home hospice - is having trouble caring for her.  After seeing the neurologist, whose note reflects that she has nothing to offer but social work referral, the patient's daughter reflected on her options and brought the patient to ED.      Remainder of detailed HPI is as noted by APC and has been reviewed and/or edited by me for completeness.    Attending Physical Exam:  Constitutional: Asleep and calm.  I did not wake her.   HENT: NCAT,   Neck: Supple,  no lymphadenopathy, trachea midline  Respiratory: Clear to auscultation bilaterally, nonlabored respirations on RA while asleep, no wheeze.   Cardiovascular: RRR, holosystolic murmur best heard at LUSB 2/6.   Gastrointestinal: Positive bowel sounds, soft, nontender, nondistended  Musculoskeletal: No bilateral ankle edema, no clubbing or cyanosis to extremities  Neurologic: Asleep, no facial droop, moves all four limbs spontaneously during exam  Skin: No rashes, skine warm and dry     Brief Assessment/Plan :  See detailed assessment and plan developed with APC which I have reviewed and/or edited for completeness.        Admission Status: I believe that this patient meets OBSERVATION status, however if further evaluation or treatment plans warrant, status may change.  Based upon current information, I predict patient's care encounter to be less than or equal to 2 midnights.        Karen Franks MD  10/06/21

## 2021-10-06 NOTE — CASE MANAGEMENT/SOCIAL WORK
Discharge Planning Assessment  [facility]     Patient Name: Keila Alberts  MRN: [id number]  Today's Date: 10/6/2021    Admit Date: 10/6/2021    Discharge Needs Assessment     Row Name 10/06/21 0044       Living Environment    Lives With  spouse;child(rose), adult    Name(s) of Who Lives With Patient  Aretha Lockhart Relation: Daughter Home: [phone] and Spouse    Unique Family Situation  Pt lives with spouse and daughter, at daughters home. Pt's spouse currently is on hospice.    Current Living Arrangements  home/apartment/condo    Primary Care Provided by  child(rose)    Provides Primary Care For  no one, unable/limited ability to care for self    Family Caregiver if Needed  child(rose), adult    Quality of Family Relationships  involved;supportive    Able to Return to Prior Arrangements  yes       Resource/Environmental Concerns    Transportation Concerns  car, none       Transition Planning    Patient/Family Anticipates Transition to  inpatient rehabilitation facility    Patient/Family Anticipated Services at Transition  skilled nursing    Transportation Anticipated  family or friend will provide       Discharge Needs Assessment    Readmission Within the Last 30 Days  no previous admission in last 30 days    Concerns to be Addressed  discharge planning    Equipment Needed After Discharge  other (see comments) TBD    Outpatient/Agency/Support Group Needs  skilled nursing facility    Discharge Facility/Level of Care Needs  nursing facility, skilled;nursing facility, intermediate    Current Discharge Risk  dependent with mobility/activities of daily living        Discharge Plan     Row Name 10/06/21 7209       Plan    Plan  IDP    Patient/Family in Agreement with Plan  yes    Plan Comments  SW met with pt and pt’s daughter, Aretha, at bedside. SW spoke with daughter. Daughter states pt lives with spouse and daughter in [facility], at daughters home. Pt’s spouse is currently on hospice. Pt does have a PCP. Pt has  Medicare A/B and [facility] medical insurance. Daughter is pts fulltime care giver. Pt is dependent on daughter with ADL’s. Daughter expressed concern to SW regarding care at home for pt. SW discussed discharge planning options with daughter. SW also mentioned Medicaid [place] services and SNF to LTC. Daughter believes pt might benefit from SNF to LTC facility. Daughter mentioned her top 3 placements being [facility], [facility] [place] and [facility]. Daughter also mentioned [facility] for possible placement. SW provided daughter with Medicaid waiver information and a resource book. CM will continue to follow.    Final Discharge Disposition Code  30 - still a patient        Continued Care and Services - Admitted Since 10/6/2021    Coordination has not been started for this encounter.         Demographic Summary     Row Name 10/06/21 1653       General Information    Arrived From  home    Referral Source  emergency department    Reason for Consult  discharge planning;facility placement    Preferred Language  English     Used During This Interaction  no       Contact Information    Contact Information Comments  Yolanda Lockhartmaggie Relation: Daughter Home: [phone]        Functional Status     Row Name 10/06/21 1654       Functional Status, IADL    Medications  completely dependent    Meal Preparation  completely dependent    Housekeeping  completely dependent    Laundry  completely dependent    Shopping  completely dependent       Employment/    Employment Status  retired        Psychosocial    No documentation.       Abuse/Neglect    No documentation.       Legal    No documentation.       Substance Abuse    No documentation.       Patient Forms    No documentation.           KIKI Clemente

## 2021-10-07 NOTE — PROGRESS NOTES
[facility] Medicine Services  PROGRESS NOTE    Patient Name: Keila Alberts  : 7/15/1928  MRN: [id number]    Date of Admission: 10/6/2021  Primary Care Physician: Ronaldo Travis MD    Subjective   Subjective     CC:  Dementia    HPI:  I have seen and evaluated the patient this morning.  Sleepy, completely disoriented and minimally conversant.  Unable to contribute to HPI    ROS:   unable to obtain because of the patient current medical condition    Objective   Objective     Vital Signs:   Temp:  [97.4 °F (36.3 °C)-98.4 °F (36.9 °C)] 97.7 °F (36.5 °C)  Heart Rate:  [64-73] 65  Resp:  [16-19] 19  BP: (131-191)/(70-99) 172/77     Physical Exam:  General: Sleepy, minimally conversant  Head: Atraumatic and normocephalic, without obvious abnormality  Eyes:   Conjunctivae and sclerae normal, no Icterus. No pallor  Ears:  Ears appear intact with no abnormalities noted  Throat: No oral lesions, no thrush, oral mucosa moist  Neck: Supple, trachea midline, no thyromegaly  Back:   No kyphoscoliosis present. No tenderness to palpation,   no sacral edema  Lungs: Clear to auscultation bilaterally, equal air entry, no wheezing or crackles  Heart:  Normal S1 and S2, no murmur, no gallop, No JVD, no lower extremity swelling  Abdomen:  Soft, no tenderness, no organomegaly, normal bowel sounds  Normal bowel sounds, no masses, no organomegaly. Soft, nontender, nondistended, no guarding, no rebound tenderness.  Extremities: No gross abnormalities, no clubbing, pulses palpable and equal bilaterally  Skin: No bleeding, bruising or rash, normal skin turgor and elasticity  Neurologic: Nonfocal  Psych: Completely disoriented     Results Reviewed:  LAB RESULTS:      Lab 10/07/21  0434 10/06/21  1217   WBC 10.57 10.72   HEMOGLOBIN 8.8* 10.5*   HEMATOCRIT 25.5* 31.2*   PLATELETS 334 417   NEUTROS ABS 6.39 7.55*   IMMATURE GRANS (ABS) 0.05 0.07*   LYMPHS ABS 1.78 1.34   MONOS ABS 0.94* 0.78   EOS ABS 1.31* 0.90*    MCV 95.9 98.1*         Lab 10/07/21  0434 10/06/21  1217   SODIUM 130* 127*   POTASSIUM 3.4* 3.9   CHLORIDE 94* 91*   CO2 25.0 23.0   ANION GAP 11.0 13.0   BUN 13 17   CREATININE 0.77 0.97   GLUCOSE 104* 133*   CALCIUM 8.4 9.4   MAGNESIUM  --  2.1         Lab 10/06/21  1217   TOTAL PROTEIN 7.2   ALBUMIN 4.20   GLOBULIN 3.0   ALT (SGPT) 32   AST (SGOT) 17   BILIRUBIN 0.5   ALK PHOS 80         Lab 10/06/21  1217   TROPONIN T <0.010                 Brief Urine Lab Results  (Last result in the past 365 days)      Color   Clarity   Blood   Leuk Est   Nitrite   Protein   CREAT   Urine HCG        10/06/21 1324 Yellow Cloudy Negative Small (1+) Negative Trace               Microbiology Results Abnormal     Procedure Component Value - Date/Time    Urine Culture - Urine, Urine, Catheter [[id number]] Collected: 10/06/21 1324    Lab Status: Final result Specimen: Urine, Catheter Updated: 10/07/21 1050     Urine Culture <10,000 CFU/mL Mixed Tony Isolated    Narrative:      Specimen contains mixed organisms of questionable pathogenicity which indicates contamination with commensal tony.  Further identification is unlikely to provide clinically useful information.  Suggest recollection.    COVID PRE-OP / PRE-PROCEDURE SCREENING ORDER (NO ISOLATION) - Swab, Nasopharynx [[id number]]  (Normal) Collected: 10/06/21 2137    Lab Status: Final result Specimen: Swab from Nasopharynx Updated: 10/06/21 2214    Narrative:      The following orders were created for panel order COVID PRE-OP / PRE-PROCEDURE SCREENING ORDER (NO ISOLATION) - Swab, Nasopharynx.  Procedure                               Abnormality         Status                     ---------                               -----------         ------                     COVID-19, ABBOTT IN-HOUS...[[id number]]  Normal              Final result                 Please view results for these tests on the individual orders.    COVID-19, ABBOTT IN-HOUSE,NASAL Swab (NO TRANSPORT MEDIA) 2 HR  TAT - Swab, Nasopharynx [[id number]]  (Normal) Collected: 10/06/21 2137    Lab Status: Final result Specimen: Swab from Nasopharynx Updated: 10/06/21 2214     COVID19 Presumptive Negative    Narrative:      Fact sheet for providers: https://www.fda.gov/media/972440/download     Fact sheet for patients: https://www.fda.gov/media/267316/download    Test performed by PCR.  If inconsistent with clinical signs and symptoms patient should be tested with different authorized molecular test.          XR Shoulder 2+ View Right    Result Date: 10/6/2021  EXAMINATION: XR SHOULDER 2+ VW RIGHT- 10/06/2021  INDICATION: right shoulder pain s/p fall  COMPARISON: NONE  FINDINGS: 2 views of the right shoulder reveal no evidence of fracture or dislocation. The cortex is intact. Joint spaces are preserved. There is spurring identified of the humeral head.        Impression: Degenerative changes seen within the shoulder with no evidence of fracture or dislocation.  D: 10/06/2021 E: 10/06/2021       CT Lumbar Spine Without Contrast    Result Date: 10/6/2021  EXAMINATION: CT LUMBAR SPINE WO CONTRAST - 10/06/2021  INDICATION: Fall, low back pain, trauma.  TECHNIQUE: Multiple axial CT imaging is obtained lumbar spine without the administration of intravenous contrast. Coronal and sagittal reformatted images were submitted to further facilitate diagnostic accuracy and treatment planning.  The radiation dose reduction device was turned on for each scan per the ALARA (As Low as Reasonably Achievable) protocol.  COMPARISON: None  FINDINGS: There is some anterior wedging identified along the rightward aspect of the superior endplate of L2 representing chronic deformity. There is no evidence of lucency to suggest evidence of an acute fracture line. Mild curvature identified of the spine with convexity to the left. Sclerosis identified of the endplates at the L4/L5 and L5/S1 level. Degenerative changes seen within the posterior facets. There is  vascular calcification seen within the abdominal aorta and iliac vessels. Degenerative changes seen within the SA joints bilaterally.      Impression: Multilevel degenerative changes identified diffusely throughout the lumbar spine with convexity to the left with no evidence of fracture or dislocation. Some mild irregularity identified along the anterior rightward aspect of the L2 vertebral body level chronic in appearance. No acute fracture or dislocation.  DICTATED:   10/06/2021 EDITED/ls :   10/06/2021       XR Chest 1 View    Result Date: 10/6/2021  EXAMINATION: XR CHEST 1 VW- 10/06/2021  INDICATION: Weak/Dizzy/AMS triage protocol  COMPARISON: 07/16/2021  FINDINGS: Portable chest reveals cardiac and mediastinal silhouettes within normal limits. Mild increased markings seen within the right lung base. Heart is borderline enlarged. Chronic changes seen throughout the lung fields bilaterally with degenerative changes seen within the spine.        Impression: Minimal increased markings seen within the right lung base suggesting possible atelectatic change. Clinical correlation is needed. Possible tiny right pleural effusion. Continued follow up recommended as indicated.  D: 10/06/2021 E: 10/06/2021        Results for orders placed during the hospital encounter of 03/16/18    Adult Transthoracic Echo Complete W/ Cont if Necessary Per Protocol    Interpretation Summary  · Left ventricular systolic function is hyperdynamic (EF > 70).  · Left ventricular diastolic dysfunction (grade I) consistent with impaired relaxation.  · Moderate to severe aortic valve regurgitation is present.  · Mild aortic valve stenosis is present.      I have reviewed the medications:  Scheduled Meds:amLODIPine, 5 mg, Oral, Daily  cetirizine, 5 mg, Oral, Daily  citalopram, 40 mg, Oral, Daily  losartan, 50 mg, Oral, Daily  mirtazapine, 15 mg, Oral, Nightly  QUEtiapine, 25 mg, Oral, BID  senna-docusate sodium, 2 tablet, Oral, BID  sodium  chloride, 10 mL, Intravenous, Q12H      Continuous Infusions:   PRN Meds:.•  acetaminophen  •  senna-docusate sodium **AND** polyethylene glycol **AND** bisacodyl **AND** bisacodyl  •  HYDROcodone-acetaminophen  •  influenza vaccine  •  sodium chloride  •  sodium chloride    Assessment/Plan   Assessment & Plan     Active Hospital Problems    Diagnosis  POA   • **Dementia (HCC) [F03.90]  Yes   • [place] (hard of hearing) [H91.90]  Yes   • Legally blind [H54.8]  Yes   • Acute constipation [K59.00]  Yes   • Back pain [M54.9]  Yes   • Heart murmur [R01.1]  Yes   • Macular degeneration [H35.30]  Yes   • Hyponatremia [E87.1]  Yes   • Chronic idiopathic constipation [K59.04]  Yes   • Essential hypertension [I10]  Yes   • History of subdural hematoma (post traumatic) [Z87.828]  Not Applicable   • Normocytic anemia [D64.9]  Yes      Resolved Hospital Problems   No resolved problems to display.        Brief Hospital Course to date:  Keila Alberts is a 89 y.o. female patient with past medical history of essential hypertension, dyslipidemia, arthritis, legally blind with macular degeneration, subdural hematoma status post craniotomy in 2016, and dementia who presented to the hospital with worsening dementia, recent fall with worsening back pain.    Assessment and plan:  Worsening dementia with behavioral changes  · Dementia has been getting worse over the last 3 weeks  · CT head from 09/29/2021 is on remarkable for any acute changes  · Thyroid panel and B12 is within normal limits  · No infectious etiology identified  · It seems like it has normal progression of her dementia  · She has been seen by neurology service on outpatient basis and was started on Seroquel  · Also started on Remeron by her neurologist  · Family interested in placement    Hyponatremia  · Mild and chronic  · Continue IV fluids    Constipation  · Bowel regimen    Back pain  Recent fall  · CT lumbar spine done in ED showed chronic changes with no acute  fracture  · As needed Norco  · PT and OT    Essential hypertension  · Continue losartan and Norvasc    Anemia of chronic disease  · Stable hemoglobin  · Monitor    DVT prophylaxis:  Mechanical DVT prophylaxis orders are present.       AM-PAC 6 Clicks Score (PT): 18 (10/06/21 2000)    Disposition: I expect the patient to be discharged to be determined      CODE STATUS:   Code Status and Medical Interventions:   Ordered at: 10/06/21 1958     Level Of Support Discussed With:    Health Care Surrogate     Code Status:    No CPR     Medical Interventions (Level of Support Prior to Arrest):    Full     Comments:    DNR/ DNI       Jeanine Nguyen MD  10/07/21

## 2021-10-07 NOTE — CONSULTS
Referring Provider: Dr. Nguyen  Reason for Consultation: Dementia    Patient Care Team:  Ronaldo Travis MD as PCP - General (Family Medicine)  Khanh Greene as Consulting Physician (Orthopedic Surgery)    Chief complaint dementia    Subjective .     History of present illness: 89-year-old woman with PMH notable for macular degeneration, [place], SDH s/p craniotomy 2016 and progressive cognitive impairment evaluated in memory care clinic yesterday, admitted yesterday evening after she presented to the ED with complaints of back pain that patient complained of on her way home from the neurology office.  Daughter reports she noticed patient seemed even more off balance than usual, may be dragging her right leg, and in the ED reported she did not feel she could care for her any longer and was interested in possible long-term placement.  Patient underwent CT of the lumbar spine consistent with chronic changes and was admitted.    Daughter notes that patient has had cognitive impairment starting at least since 2016 when she fell and had the left SDH.  She initially had forgetfulness and word finding difficulties which has worsened over time, with further cognitive problems and functional decline over the past year, requiring assistance with ADLs.  Family manage her medicines and finances, and never leave her and her  with dementia alone.  Over the past 3 weeks her behavior and confusion has worsened with 2 falls over the past weekend.  Family have noted she has anxiety, restlessness, agitation and delusions.  She lives with her daughter and son-in-law as well as her  who is in hospice for advanced dementia.  Recently patient has been more agitated, and talks about trying to go home and to get her  ready to take with her.  Patient was seen in the ED on 9/29/2021 after fall and head CT at that time showed stable postoperative changes.  Patient has recently been prescribed Seroquel which  daughter feels was unhelpful.  She was prescribed Remeron yesterday but had not started it.  She has had 3 head CTs in the past few months, 2 in the past week, all stable.  No history obtainable from patient.  Daughter notes no recent fevers, but does report patient has terrible trouble with constipation, and is currently constipated.  She also notes that patient has been sleeping all day today and has not been able to eat as a result.      Review of Systems  Review of systems could not be obtained due to   patient confusion.     History  Past Medical History:   Diagnosis Date   • Acute/Subacute Traumatic SDH (subdural hematoma) 11/27/2016 Nov 2016   • Arthritis    • Dementia (HCC)    • Hyperlipidemia    • Hypertension    • Legally blind     2/2 macular degeneration    • Macular degeneration    • Memory loss    ,   Past Surgical History:   Procedure Laterality Date   • APPENDECTOMY     • HALIMA HOLE Left 11/27/2016    Procedure: HALIMA HOLE;  Surgeon: Jos Christian MD;  Location:  [facility];  Service:    • CRANIOTOMY  2016   • HIP PERCUTANEOUS PINNING Left 1/14/2017    Procedure: HIP PERCUTANEOUS PINNING;  Surgeon: Edgar Albert MD;  Location:  [facility];  Service:    • JOINT REPLACEMENT     • KNEE ARTHROPLASTY, PARTIAL REPLACEMENT     • SHOULDER ARTHROSCOPY     • TOTAL HIP ARTHROPLASTY Bilateral    • TOTAL SHOULDER REPLACEMENT Left    ,   Family History   Problem Relation Age of Onset   • Arthritis Other    • Cancer Other    • Heart attack Other    • Hypertension Other    • Heart disease Mother    • Heart disease Father    ,   Social History     Socioeconomic History   • Marital status:      Spouse name: Not on file   • Number of children: Not on file   • Years of education: Not on file   • Highest education level: Not on file   Tobacco Use   • Smoking status: Never Smoker   • Smokeless tobacco: Never Used   Vaping Use   • Vaping Use: Never used   Substance and Sexual Activity   • Alcohol use:  Never   • Drug use: Never   • Sexual activity: Never     Comment:      E-cigarette/Vaping   • E-cigarette/Vaping Use Never User      E-cigarette/Vaping Substances   • Nicotine No    • THC No    • CBD No    • Flavoring No      E-cigarette/Vaping Devices   • Disposable No    • Pre-filled or Refillable Cartridge No    • Refillable Tank No    • Pre-filled Pod No        ,   Medications Prior to Admission   Medication Sig Dispense Refill Last Dose   • acetaminophen (TYLENOL) 500 MG tablet Take 1,000 mg by mouth Daily.   10/5/2021 at Unknown time   • amLODIPine (NORVASC) 5 MG tablet Take 1 tablet by mouth once daily 90 tablet 0 10/5/2021 at Unknown time   • cetirizine (zyrTEC) 10 MG tablet Take 10 mg by mouth Daily.   10/5/2021 at Unknown time   • citalopram (CeleXA) 20 MG tablet Take 40 mg by mouth Daily.   10/5/2021 at Unknown time   • digoxin (LANOXIN) 125 MCG tablet Take 1 tablet by mouth once daily 90 tablet 0 10/5/2021 at Unknown time   • docusate sodium (COLACE) 100 MG capsule Take 100 mg by mouth 2 (Two) Times a Day.   10/5/2021 at Unknown time   • HYDROcodone-acetaminophen (NORCO) 5-325 MG per tablet Take 1 tablet by mouth Every 6 (Six) Hours As Needed.   10/5/2021 at Unknown time   • losartan (COZAAR) 50 MG tablet Take 1 tablet by mouth once daily 90 tablet 0 10/5/2021 at Unknown time   • magnesium hydroxide (MILK OF MAGNESIA) 400 MG/5ML suspension Take  by mouth Daily As Needed for Constipation.   10/5/2021 at Unknown time   • Multiple Vitamin (MULTI-VITAMIN DAILY PO) Take  by mouth Daily.   10/5/2021 at Unknown time   • Multiple Vitamins-Minerals (PRESERVISION AREDS 2) capsule Take 1 capsule by mouth Daily.   10/5/2021 at Unknown time   • QUEtiapine (SEROquel) 25 MG tablet Take 25 mg by mouth 2 (Two) Times a Day.   10/5/2021 at Unknown time   • mirtazapine (REMERON) 15 MG tablet Take 1 tablet by mouth Every Night. 30 tablet 11    , Scheduled Meds:  amLODIPine, 5 mg, Oral, Daily  cetirizine, 5 mg, Oral,  "Daily  citalopram, 40 mg, Oral, Daily  losartan, 50 mg, Oral, Daily  mirtazapine, 15 mg, Oral, Nightly  QUEtiapine, 25 mg, Oral, BID  senna-docusate sodium, 2 tablet, Oral, BID  sodium chloride, 10 mL, Intravenous, Q12H    , Continuous Infusions:   , PRN Meds:  •  acetaminophen  •  senna-docusate sodium **AND** polyethylene glycol **AND** bisacodyl **AND** bisacodyl  •  HYDROcodone-acetaminophen  •  influenza vaccine  •  sodium chloride  •  sodium chloride and Allergies:  Penicillin g and Erythromycin    Objective     Vital Signs   Blood pressure 172/77, pulse 65, temperature 97.7 °F (36.5 °C), temperature source Axillary, resp. rate 19, height 160 cm (63\"), weight 57.2 kg (126 lb 3.2 oz), SpO2 93 %.    Physical Exam:   Elderly white woman lying in the hospital bed asleep, although awakens to voice and tactile stim.  She is inattentive but will intermittently regard.  Very [place], and legally blind, making examination difficult.  She does speak, rambling on various topics, some of which is intelligible.  She speaks about her her  and her mother.  She does not follow commands or answer questions for the most part, although probably partly due to difficulty hearing.  Unable to assess memory or fund of knowledge.  Not aphasic as far as can be assessed.  Pupils 2.5 mm and reactive, unable to assess visual fields, but gaze conjugate and crosses midline bilaterally.  No evident facial asymmetry.  Unable to assess palate or shoulder elevation.  Motor: Some spontaneous antigravity movement in upper extremities, and wiggles feet when they are touched, but unable to participate in MMT  Unable to assess coordination.  Muscle tone normal; no abnormal movement observed  Responds to light touch bilaterally  Reflexes 2+ in the right upper extremity, 1+ in the left, hypoactive in lower extremities; withdraws from plantar stim.  HEENT: NCAT, moist mucous membranes  Neck supple, no carotid bruit appreciated  Heart RRR no murmur " appreciated  Lungs clear to auscultation, normal respiratory effort  Abdomen soft, NT, ND with positive bowel sounds  Extremities without cyanosis or edema  Skin with no rashes, warm and dry  Psych: Unable to assess fully, but not agitated, and appears to attempt to cooperate with exam.        Results Review:   I reviewed the patient's new clinical results.  I reviewed the patient's new imaging results and agree with the interpretation.  I reviewed the patient's other test results and agree with the interpretation     Most recent Head CT from 9/29/2021 images reviewed, agree atrophy, chronic deep white matter ischemic gliotic change and postoperative changes but no acute abnormality.    Labs reviewed  BMP with glucose 104 sodium 130 potassium 3.4 chloride 94 otherwise normal  CBC white count 10.57 H&H 8.8 and 25.5 platelets 334  UA with trace ketones trace protein small leukocyte esterase 3-6 red cells 6-12 white cells no bacteria 7-12 epithelial cells  Urine culture with mixed tony  B12 normal at 528 on 9/28  TSH, crp, sed rate also normal  Urine culture 9/20/2021 also showed mixed tony      Assessment/Plan       Dementia (HCC)    History of subdural hematoma (post traumatic)    Essential hypertension    Chronic idiopathic constipation    Normocytic anemia    Hyponatremia    Macular degeneration    Heart murmur    [place] (hard of hearing)    Legally blind    Acute constipation    Back pain      89-year-old woman with history of left SDH, progressive cognitive decline consistent with dementia, with recent worsening approximately 3 weeks ago.  Etiology for abrupt decline unclear but may simply be due to progression of disease.  No evidence for infection.  Daughter notes patient is very constipated which may be contributing to decompensation.    Given history of SDH, she is at some risk of seizure and will obtain EEG although exam and history less suggestive of that.  Given daughter's report of possibly dragging left  leg will attempt MRI, but I do not feel that benefit of obtaining the study would counterbalance risk of sedation if that were required.  Discussed all this with daughter at the bedside and answered all questions.    I discussed the patient's findings and my recommendations with family    Akiko Carey MD  10/07/21  13:58 EDT

## 2021-10-07 NOTE — CASE MANAGEMENT/SOCIAL WORK
Discharge Planning Assessment  [facility]     Patient Name: Keila Alberts  MRN: [id number]  Today's Date: 10/7/2021    Admit Date: 10/6/2021    Discharge Needs Assessment    No documentation.       Discharge Plan     Row Name 10/07/21 0938       Plan    Plan  SNF to LTC    Patient/Family in Agreement with Plan  yes    Plan Comments  Referrals called to [facility] [place] [facility]. PT/OT consults have been placed and waiting for them to eval/treat. Mrs Alberts also needs to have a BM, but is otherwise medically ready. Goal is SNF to LTC.    Final Discharge Disposition Code  30 - still a patient        Continued Care and Services - Admitted Since 10/6/2021     Destination     Service Provider Request Status Selected Services Address Phone Fax Patient Preferred    [facility]  Pending - No Request Sent N/A [address] [facility] [address] [phone] --       Internal Comment last updated by Nishi Robles, RN 10/7/2021 0856    10/7 referral called to [facility]  Pending - No Request Sent N/A [address] [facility] [address] [phone] --       Internal Comment last updated by Nishi Robles, RN 10/7/2021 0926    10/7 referral called to [facility]  Pending - No [facility] Sent N/A [address] [facility] [address] [phone] --       Internal Comment last updated by Nishi Robles, RN 10/7/2021 0936    10/7 referral called to [facility]  Pending - No Request Sent N/A [address] [address] [phone] --                Demographic Summary    No documentation.       Functional Status    No documentation.       Psychosocial    No documentation.       Abuse/Neglect    No documentation.       Legal    No documentation.       Substance Abuse    No documentation.       Patient Forms    No  documentation.           Nishi Robles RN

## 2021-10-07 NOTE — PLAN OF CARE
Goal Outcome Evaluation:  Plan of Care Reviewed With: patient        Progress: no change  Outcome Summary: VSS, BP elevated xs 1, pain med given and pt bp went down and went to sleep, get restless/aggitated/increased confusion at night throughout shift, here for placement, unsure of last bm/supp given with no results so far, will cont to monitor.

## 2021-10-08 NOTE — CASE MANAGEMENT/SOCIAL WORK
Discharge Planning Assessment  [facility]     Patient Name: Keila Alberts  MRN: [id number]  Today's Date: 10/8/2021    Admit Date: 10/6/2021    Discharge Needs Assessment    No documentation.       Discharge Plan     Row Name 10/08/21 1617       Plan    Plan  SNF to LTC    Patient/Family in Agreement with Plan  yes    Plan Comments  Received a call from [facility], with [facility] facilities. [facility] are on hold for staffing and Covid. Manually faxed a referral to [facility] so admissions can review the information on Monday. Called and spoke with liaBonnie agudelo, to let her know that therapy notes are in Epic and the facility can review on Monday.    Final Discharge Disposition Code  30 - still a patient        Continued Care and Services - Admitted Since 10/6/2021     Destination     Service Provider Request Status Selected Services Address Phone Fax Patient Preferred    [facility]  Pending - No Request Sent N/A [address] [facility] [address] [phone] --       Internal Comment last updated by Nishi Robles, RN 10/7/2021 0856    10/7 referral called to [facility]  Pending - No Request Sent N/A [address] [place] [address] [phone] --       Internal Comment last updated by Nishi Robles, RN 10/7/2021 0926    10/7 referral called to [facility]               [facility]  P[facility] - No [facility] N[facility] [address] [address] [phone] --       Internal Comment last updated by Nishi Robles, RN 10/7/2021 0936    10/7 referral called to [facility]  Pending - No [facility] Sent N/A [address] [facility] [address] [phone] --                Demographic Summary    No documentation.       Functional Status    No documentation.       Psychosocial    No documentation.       Abuse/Neglect    No  documentation.       Legal    No documentation.       Substance Abuse    No documentation.       Patient Forms    No documentation.           Nishi Robles RN

## 2021-10-08 NOTE — PLAN OF CARE
Goal Outcome Evaluation:  Plan of Care Reviewed With: patient           Outcome Summary: OT eval complete. Pt presents w/ generalized weakness and significant confusion w/ poor command following. Pt is Dep for all ADLs and mobility. Recommend cont skilled IPOT POC, however will monitor progress closely. Recommend pt DC to SNF.

## 2021-10-08 NOTE — PLAN OF CARE
Problem: Fall Injury Risk  Goal: Absence of Fall and Fall-Related Injury  Outcome: Ongoing, Progressing  Intervention: Promote Injury-Free Environment  Recent Flowsheet Documentation  Taken 10/8/2021 1200 by Le Montiel RN  Safety Promotion/Fall Prevention:   safety round/check completed   toileting scheduled  Taken 10/8/2021 1000 by Le Montiel RN  Safety Promotion/Fall Prevention:   safety round/check completed   toileting scheduled  Taken 10/8/2021 0800 by Le Montiel RN  Safety Promotion/Fall Prevention:   safety round/check completed   toileting scheduled     Problem: Behavior Regulation Impairment (Dementia Signs/Symptoms)  Goal: Improved Behavioral Control (Dementia Signs/Symptoms)  Outcome: Ongoing, Progressing   Goal Outcome Evaluation:      Pt mostly sleeping. Trying to get out of bed when awake. Took am meds crushed in applesauce. Refused MOM, suppository given for constipation. Daughter at bedside.

## 2021-10-08 NOTE — THERAPY EVALUATION
Patient Name: Keila Alberts  : 7/15/1928    MRN: [id number]                              Today's Date: 10/8/2021       Admit Date: 10/6/2021    Visit Dx:     ICD-10-CM ICD-9-CM   1. Recurrent falls  R29.6 V15.88   2. Acute midline low back pain without sciatica  M54.50 724.2   3. Dementia without behavioral disturbance, unspecified dementia type (HCC)  F03.90 294.20     Patient Active Problem List   Diagnosis   • History of subdural hematoma (post traumatic)   • Essential hypertension   • Chronic idiopathic constipation   • Normocytic anemia   • s/p Left Craniotomy for Evacuation of Left SDH 16    • Hyponatremia   • 6.9 x 5.3cm pelvic mass, seen on imaging prior to admission   • Closed left hip fracture, s/p left hip percutaneous pinning   • Macular degeneration   • Heart murmur   • [place] (hard of hearing)   • Legally blind   • Acute constipation   • Back pain   • Dementia (HCC)     Past Medical History:   Diagnosis Date   • Acute/Subacute Traumatic SDH (subdural hematoma) 2016   • Arthritis    • Dementia (HCC)    • Hyperlipidemia    • Hypertension    • Legally blind     2/2 macular degeneration    • Macular degeneration    • Memory loss      Past Surgical History:   Procedure Laterality Date   • APPENDECTOMY     • HALIMA HOLE Left 2016    Procedure: HALIMA HOLE;  Surgeon: Jos Christian MD;  Location:  [facility];  Service:    • CRANIOTOMY  2016   • HIP PERCUTANEOUS PINNING Left 2017    Procedure: HIP PERCUTANEOUS PINNING;  Surgeon: Edgar Albert MD;  Location: [facility] OR;  Service:    • JOINT REPLACEMENT     • KNEE ARTHROPLASTY, PARTIAL REPLACEMENT     • SHOULDER ARTHROSCOPY     • TOTAL HIP ARTHROPLASTY Bilateral    • TOTAL SHOULDER REPLACEMENT Left      General Information     Row Name 10/08/21 1341          OT Time and Intention    Document Type  evaluation  -CS     Mode of Treatment  occupational therapy  -CS     Row Name 10/08/21 1341          General Information     Patient Profile Reviewed  yes  -CS     Prior Level of Function  -- pt poor historian, TBA further  -CS     Existing Precautions/Restrictions  fall;other (see comments) dementia, poor command following  -CS     Barriers to Rehab  medically complex;previous functional deficit;cognitive status  -CS     Row Name 10/08/21 1341          Living Environment    Lives With  -- pt poor historian, TBA further  -CS     Row Name 10/08/21 1341          Cognition    Orientation Status (Cognition)  disoriented to;person;place;situation;time  -CS     Row Name 10/08/21 1341          Safety Issues, Functional Mobility    Safety Issues Affecting Function (Mobility)  ability to follow commands;awareness of need for assistance;insight into deficits/self-awareness  -CS     Impairments Affecting Function (Mobility)  balance;cognition;endurance/activity tolerance;postural/trunk control;strength  -CS     Cognitive Impairments, Mobility Safety/Performance  attention;awareness, need for assistance;insight into deficits/self-awareness;safety precaution awareness  -CS       User Key  (r) = Recorded By, (t) = Taken By, (c) = Cosigned By    Initials Name Provider Type    CS Ness Travis OT Occupational Therapist          Mobility/ADL's     Row Name 10/08/21 5726          Bed Mobility    Bed Mobility  supine-sit;sit-supine;rolling left;rolling right  -CS     Rolling Left [place] (Bed Mobility)  dependent (less than 25% patient effort);2 person assist;verbal cues  -CS     Rolling Right [place] (Bed Mobility)  dependent (less than 25% patient effort);2 person assist;verbal cues  -CS     Supine-Sit [place] (Bed Mobility)  dependent (less than 25% patient effort);2 person assist;verbal cues  -CS     Sit-Supine [place] (Bed Mobility)  dependent (less than 25% patient effort);2 person assist;verbal cues  -CS     Assistive Device (Bed Mobility)  bed rails;head of bed elevated;draw sheet  -CS     Row Name 10/08/21 5329           Transfers    Comment (Transfers)  Deferred d/t poor alertness  -CS     Row Name 10/08/21 1342          Activities of Daily Living    BADL Assessment/Intervention  toileting;grooming;lower body dressing  -CS     Row Name 10/08/21 1342          Toileting Assessment/Training    [place] (Toileting)  adjust/manage clothing;perform perineal hygiene;dependent (less than 25% patient effort);change pad/brief  -CS     Position (Toileting)  supine  -CS     Row Name 10/08/21 1342          Grooming Assessment/Training    [place] (Grooming)  wash face, hands;dependent (less than 25% patient effort)  -CS     Position (Grooming)  supine  -CS     Row Name 10/08/21 1342          Lower Body Dressing Assessment/Training    [place] (Lower Body Dressing)  don;socks;dependent (less than 25% patient effort)  -CS     Position (Lower Body Dressing)  supine  -CS       User Key  (r) = Recorded By, (t) = Taken By, (c) = Cosigned By    Initials Name Provider Type    CS Ness Travis OT Occupational Therapist        Obj/Interventions     Row Name 10/08/21 1343          Sensory Assessment (Somatosensory)    Sensory Assessment (Somatosensory)  unable/difficult to assess  -     Row Name 10/08/21 1343          Vision Assessment/Intervention    Visual Impairment/Limitations  unable/difficult to assess  -     Row Name 10/08/21 1343          Range of Motion Comprehensive    General Range of Motion  bilateral upper extremity ROM WFL  -     Row Name 10/08/21 1343          Strength Comprehensive (MMT)    Comment, General Manual Muscle Testing (MMT) Assessment  Unable to formally assess d/t cognitive status, observed grossly 3+/5  -     Row Name 10/08/21 1343          Balance    Balance Assessment  sitting static balance;sitting dynamic balance  -CS     Static Sitting Balance  moderate impairment;sitting, edge of bed  -CS     Dynamic Sitting Balance  severe impairment;sitting, edge of bed  -CS       User Key  (r) =  Recorded By, (t) = Taken By, (c) = Cosigned By    Initials Name Provider Type    CS Ness Travis OT Occupational Therapist        Goals/Plan     Row Name 10/08/21 1349          Transfer Goal 1 (OT)    Activity/Assistive Device (Transfer Goal 1, OT)  sit-to-stand/stand-to-sit;toilet  -CS     [place] Level/Cues Needed (Transfer Goal 1, OT)  moderate assist (50-74% patient effort)  -CS     Time Frame (Transfer Goal 1, OT)  long term goal (LTG);10 days  -CS     Progress/Outcome (Transfer Goal 1, OT)  goal ongoing  -CS     Row Name 10/08/21 1349          Grooming Goal 1 (OT)    Activity/Device (Grooming Goal 1, OT)  hair care;wash face, hands  -CS     [place] (Grooming Goal 1, OT)  minimum assist (75% or more patient effort)  -CS     Time Frame (Grooming Goal 1, OT)  long term goal (LTG);10 days  -CS     Progress/Outcome (Grooming Goal 1, OT)  goal ongoing  -CS     Row Name 10/08/21 1349          Self-Feeding Goal 1 (OT)    Activity/Device (Self-Feeding Goal 1, OT)  liquids to mouth;scoop food and bring to mouth  -CS     [place] Level/Cues Needed (Self-Feeding Goal 1, OT)  minimum assist (75% or more patient effort)  -CS     Time Frame (Self-Feeding Goal 1, OT)  long term goal (LTG);10 days  -CS     Progress/Outcomes (Self-Feeding Goal 1, OT)  goal ongoing  -CS       User Key  (r) = Recorded By, (t) = Taken By, (c) = Cosigned By    Initials Name Provider Type    Ness Hunter OT Occupational Therapist        Clinical Impression     Row Name 10/08/21 1346          Pain Assessment    Additional Documentation  Pain Scale: FACES Pre/Post-Treatment (Group)  -CS     Row Name 10/08/21 1346          Pain Scale: FACES Pre/Post-Treatment    Pain: FACES Scale, Pretreatment  0-->no hurt  -CS     Posttreatment Pain Rating  0-->no hurt  -CS     Row Name 10/08/21 1346          Plan of Care Review    Plan of Care Reviewed With  patient  -CS     Outcome Summary  OT eval complete. Pt presents w/ generalized weakness  and significant confusion w/ poor command following. Pt is Dep for all ADLs and mobility. Recommend cont skilled IPOT POC, however will monitor progress closely. Recommend pt DC to SNF.  -CS     Row Name 10/08/21 1346          Therapy Assessment/Plan (OT)    Patient/Family Therapy Goal Statement (OT)  Return to PLOF  -CS     Rehab Potential (OT)  good, to achieve stated therapy goals  -CS     Criteria for Skilled Therapeutic Interventions Met (OT)  yes;skilled treatment is necessary  -CS     Therapy Frequency (OT)  daily  -CS     Row Name 10/08/21 1346          Therapy Plan Review/Discharge Plan (OT)    Anticipated Discharge Disposition (OT)  [facility]  -CS     Row Name 10/08/21 1346          Vital Signs    Pre Patient Position  Supine  -CS     Intra Patient Position  Sitting  -CS     Post Patient Position  Supine  -CS     Row Name 10/08/21 1346          Positioning and Restraints    Pre-Treatment Position  in bed  -CS     Post Treatment Position  bed  -CS     In Bed  notified nsg;fowlers;call light within reach;with nsg;side rails up x3;side lying right;pillow between legs;exit alarm on;encouraged to call for assist  -CS       User Key  (r) = Recorded By, (t) = Taken By, (c) = Cosigned By    Initials Name Provider Type    CS Ness Travis, OT Occupational Therapist        Outcome Measures     Row Name 10/08/21 1350          How much help from another is currently needed...    Putting on and taking off regular lower body clothing?  1  -CS     Bathing (including washing, rinsing, and drying)  1  -CS     Toileting (which includes using toilet bed pan or urinal)  1  -CS     Putting on and taking off regular upper body clothing  1  -CS     Taking care of personal grooming (such as brushing teeth)  2  -CS     Eating meals  1  -CS     AM-PAC 6 Clicks Score (OT)  7  -CS     Row Name 10/08/21 1350          Functional Assessment    Outcome Measure Options  AM-PAC 6 Clicks Daily Activity (OT)  -CS       User  Key  (r) = Recorded By, (t) = Taken By, (c) = Cosigned By    Initials Name Provider Type    Ness Hunter OT Occupational Therapist          Occupational Therapy Education                 Title: PT OT SLP Therapies (In Progress)     Topic: Occupational Therapy (In Progress)     Point: ADL training (In Progress)     Description:   Instruct learner(s) on proper safety adaptation and remediation techniques during self care or transfers.   Instruct in proper use of assistive devices.              Learning Progress Summary           Patient Acceptance, E, NR by  at 10/8/2021 1350    Acceptance, E,TB, VU,NR by  at 10/8/2021 0135    Acceptance, E,TB, VU by SD at 10/6/2021 1847   Family Acceptance, E,TB, VU by SD at 10/6/2021 1847                   Point: Home exercise program (Done)     Description:   Instruct learner(s) on appropriate technique for monitoring, assisting and/or progressing therapeutic exercises/activities.              Learning Progress Summary           Patient Acceptance, E,TB, VU,NR by  at 10/8/2021 0135    Acceptance, E,TB, VU by SD at 10/6/2021 1847   Family Acceptance, E,TB, VU by SD at 10/6/2021 1847                   Point: Precautions (In Progress)     Description:   Instruct learner(s) on prescribed precautions during self-care and functional transfers.              Learning Progress Summary           Patient Acceptance, E, NR by  at 10/8/2021 1350    Acceptance, E,TB, VU,NR by  at 10/8/2021 0135    Acceptance, E,TB, VU by SD at 10/6/2021 1847   Family Acceptance, E,TB, VU by SD at 10/6/2021 1847                   Point: Body mechanics (In Progress)     Description:   Instruct learner(s) on proper positioning and spine alignment during self-care, functional mobility activities and/or exercises.              Learning Progress Summary           Patient Acceptance, E, NR by  at 10/8/2021 1350    Acceptance, E,TB, VU,NR by  at 10/8/2021 0135    Acceptance, E,TB, VU by SD at  10/6/2021 1847   Family Acceptance, E,TB, VU by SD at 10/6/2021 1847                               User Key     Initials Effective Dates Name Provider Type Discipline    CS 09/02/21 -  Ness Travis OT Occupational Therapist OT    SD 06/16/21 -  Alma Rosa Chen, RN Registered Nurse Nurse     08/12/20 -  Vi Ga RN Registered Nurse Nurse              OT Recommendation and Plan  Therapy Frequency (OT): daily  Plan of Care Review  Plan of Care Reviewed With: patient  Outcome Summary: OT eval complete. Pt presents w/ generalized weakness and significant confusion w/ poor command following. Pt is Dep for all ADLs and mobility. Recommend cont skilled IPOT POC, however will monitor progress closely. Recommend pt DC to SNF.     Time Calculation:   Time Calculation- OT     Row Name 10/08/21 1351             Time Calculation- OT    OT Start Time  1123  -CS      OT Received On  10/08/21  -CS      OT Goal Re-Cert Due Date  10/18/21  -CS         Timed Charges    56289 - OT Therapeutic Activity Minutes  5  -CS      71854 - OT Self Care/Mgmt Minutes  5  -CS         Untimed Charges    OT Eval/Re-eval Minutes  31  -CS         Total Minutes    Timed Charges Total Minutes  10  -CS      Untimed Charges Total Minutes  31  -CS       Total Minutes  41  -CS        User Key  (r) = Recorded By, (t) = Taken By, (c) = Cosigned By    Initials Name Provider Type    CS Ness Travis OT Occupational Therapist        Therapy Charges for Today     Code Description Service Date Service Provider Modifiers Qty    56242274129 HC OT THERAPEUTIC ACT EA 15 MIN 10/8/2021 Ness Travis OT GO 1    90186835972 HC OT EVAL LOW COMPLEXITY 3 10/8/2021 Ness Travis OT GO 1    21646091294 HC OT THER SUPP EA 15 MIN 10/8/2021 Ness Travis OT GO 1               Ness Travis OT  10/8/2021

## 2021-10-08 NOTE — PROGRESS NOTES
[facility] Medicine Services  PROGRESS NOTE    Patient Name: Keila Alberts  : 7/15/1928  MRN: [id number]    Date of Admission: 10/6/2021  Primary Care Physician: Ronaldo Travis MD    Subjective   Subjective     CC:  Dementia    HPI:  Patient seen resting up in bed sleeping.  Very drowsy and awakens to stimuli very briefly.  Completely confused and mumbles.  No new issues per staff.  No visitors at bedside.    ROS:   unable to obtain because of the patient current medical condition    Objective   Objective     Vital Signs:   Temp:  [97.4 °F (36.3 °C)-98.6 °F (37 °C)] 98.6 °F (37 °C)  Heart Rate:  [67-73] 73  Resp:  [16-18] 18  BP: (150-183)/(70-75) 150/70     Physical Exam:  General: Sleepy, minimally conversant.  No acute distress.  Head: Without obvious abnormality  Eyes:   Conjunctivae and sclerae normal, no Icterus.   Neck: Supple, trachea midline  Lungs: Clear to auscultation bilaterally, respirations even and unlabored, no wheezing or crackles  Heart:  Normal S1S2, + 2/6 murmur, no gallop, no lower extremity swelling  Abdomen: Normal bowel sounds, Soft, nontender, nondistended, no guarding, no rebound tenderness.  Extremities: No gross abnormalities, no clubbing, pulses palpable and equal bilaterally  Skin: No bleeding, bruising or rash, normal skin turgor and elasticity  Neurologic: Mostly sleeping, awakens briefly to stimuli.  Nonfocal.  Minimally conversant.  Completely confused and disoriented.  Will not follow commands.  Psych: Completely disoriented     Results Reviewed:  LAB RESULTS:      Lab 10/08/21  0707 10/07/21  0434 10/06/21  1217   WBC 10.50 10.57 10.72   HEMOGLOBIN 9.5* 8.8* 10.5*   HEMATOCRIT 28.5* 25.5* 31.2*   PLATELETS 352 334 417   NEUTROS ABS 5.30 6.39 7.55*   IMMATURE GRANS (ABS) 0.06* 0.05 0.07*   LYMPHS ABS 1.94 1.78 1.34   MONOS ABS 0.97* 0.94* 0.78   EOS ABS 2.12* 1.31* 0.90*   .4* 95.9 98.1*   CRP 2.16*  --   --    PROCALCITONIN 0.06  --   --           Lab 10/08/21  0707 10/07/21  0434 10/06/21  1217   SODIUM 130* 130* 127*   POTASSIUM 4.0 3.4* 3.9   CHLORIDE 98 94* 91*   CO2 21.0* 25.0 23.0   ANION GAP 11.0 11.0 13.0   BUN 17 13 17   CREATININE 0.88 0.77 0.97   GLUCOSE 99 104* 133*   CALCIUM 8.5 8.4 9.4   MAGNESIUM 1.9  --  2.1   PHOSPHORUS 3.3  --   --          Lab 10/08/21  0707 10/06/21  1217   TOTAL PROTEIN 6.0 7.2   ALBUMIN 3.40* 4.20   GLOBULIN 2.6 3.0   ALT (SGPT) 17 32   AST (SGOT) 16 17   BILIRUBIN 0.3 0.5   ALK PHOS 73 80         Lab 10/06/21  1217   TROPONIN T <0.010                 Brief Urine Lab Results  (Last result in the past 365 days)      Color   Clarity   Blood   Leuk Est   Nitrite   Protein   CREAT   Urine HCG        10/06/21 1324 Yellow Cloudy Negative Small (1+) Negative Trace               Microbiology Results Abnormal     Procedure Component Value - Date/Time    Urine Culture - Urine, Urine, Catheter [[id number]] Collected: 10/06/21 1324    Lab Status: Final result Specimen: Urine, Catheter Updated: 10/07/21 1050     Urine Culture <10,000 CFU/mL Mixed Tony Isolated    Narrative:      Specimen contains mixed organisms of questionable pathogenicity which indicates contamination with commensal tony.  Further identification is unlikely to provide clinically useful information.  Suggest recollection.    COVID PRE-OP / PRE-PROCEDURE SCREENING ORDER (NO ISOLATION) - Swab, Nasopharynx [[id number]]  (Normal) Collected: 10/06/21 2137    Lab Status: Final result Specimen: Swab from Nasopharynx Updated: 10/06/21 2214    Narrative:      The following orders were created for panel order COVID PRE-OP / PRE-PROCEDURE SCREENING ORDER (NO ISOLATION) - Swab, Nasopharynx.  Procedure                               Abnormality         Status                     ---------                               -----------         ------                     COVID-19, ABBOTT IN-HOUS...[[id number]]  Normal              Final result                 Please view results  for these tests on the individual orders.    COVID-19, ABBOTT IN-HOUSE,NASAL Swab (NO TRANSPORT MEDIA) 2 HR TAT - Swab, Nasopharynx [[id number]]  (Normal) Collected: 10/06/21 2137    Lab Status: Final result Specimen: Swab from Nasopharynx Updated: 10/06/21 2214     COVID19 Presumptive Negative    Narrative:      Fact sheet for providers: https://www.fda.gov/media/500796/download     Fact sheet for patients: https://www.fda.gov/media/356695/download    Test performed by PCR.  If inconsistent with clinical signs and symptoms patient should be tested with different authorized molecular test.          EEG    Result Date: 10/8/2021  Reason for referral: 89 y.o.female with altered mental status, prior left frontal craniotomy for subdural hematoma Technical Summary:  A 19 channel digital EEG was performed using the international 10-20 placement system, including eye leads and EKG leads. Duration: 20 minutes Video: On Findings: The patient is drowsy during the study.  Diffuse low to medium amplitude 3-5 Hz intermixed delta and theta activity are seen over both hemispheres.  Faster frequencies in the theta and beta range or more prominent over the left frontal temporal leads, which may represent in part a breach refractive from prior craniotomy.  Photic stimulation does not change the background.  Toward the end of the study, when the patient is roused, there is an increase in faster theta frequencies, more prominent over the left than the right. 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      CT Lumbar Spine Without  Contrast    Result Date: 10/6/2021  EXAMINATION: CT LUMBAR SPINE WO CONTRAST - 10/06/2021  INDICATION: Fall, low back pain, trauma.  TECHNIQUE: Multiple axial CT imaging is obtained lumbar spine without the administration of intravenous contrast. Coronal and sagittal reformatted images were submitted to further facilitate diagnostic accuracy and treatment planning.  The radiation dose reduction device was turned on for each scan per the ALARA (As Low as Reasonably Achievable) protocol.  COMPARISON: None  FINDINGS: There is some anterior wedging identified along the rightward aspect of the superior endplate of L2 representing chronic deformity. There is no evidence of lucency to suggest evidence of an acute fracture line. Mild curvature identified of the spine with convexity to the left. Sclerosis identified of the endplates at the L4/L5 and L5/S1 level. Degenerative changes seen within the posterior facets. There is vascular calcification seen within the abdominal aorta and iliac vessels. Degenerative changes seen within the SA joints bilaterally.      Impression: Multilevel degenerative changes identified diffusely throughout the lumbar spine with convexity to the left with no evidence of fracture or dislocation. Some mild irregularity identified along the anterior rightward aspect of the L2 vertebral body level chronic in appearance. No acute fracture or dislocation.  DICTATED:   10/06/2021 EDITED/ls :   10/06/2021       MRI Brain Without Contrast    Result Date: 10/8/2021  EXAMINATION: MRI BRAIN WO CONTRAST-  INDICATION: Mental status change, unknown cause; R29.6-Repeated falls; M54.50-Low back pain, unspecified; F03.90-Unspecified dementia without behavioral disturbance.  TECHNIQUE: Multiplanar MRI of the brain with and without intravenous contrast administration.  COMPARISON: CT head 09/29/2021.  FINDINGS: No restriction on diffusion-weighted sequences to suggest acute ischemia. Midline structures are symmetric  without evidence of mass, mass effect or midline shift. Ventricles and sulci demonstrate prominence of the ventricles and mild prominence of the sulci towards the vertex with severe increased T2 and FLAIR signal findings in the periventricular and deep white matter suggesting severely advanced chronic small vessel ischemic disease. Globes and orbits retain normal T2 characteristics. Paranasal sinuses with moderate air-fluid level in the right maxillary sinus along with minimal inspissated fluid and mild circumferential mucosal thickening of the left maxillary sinus. Mastoid air cells are grossly clear and well pneumatized. No cerebellopontine angle mass lesion with normal signal flow voids to the distal internal carotid and vertebrobasilar arteries. Pituitary and sella within normal limits. Cervicomedullary junction is widely patent.      Impression: 1. No acute infarction. 2. Prominence of the lateral ventricles may represent central volume loss, however, normal pressure hydrocephalus could have a similar appearance due to prominence. 3. Severe chronic small vessel ischemic disease within the supratentorial white matter along with mild cerebral atrophy or age-related volume loss. 4. Air-fluid level and mucosal thickening within the maxillary sinuses right greater left with the right maxillary sinus moderate air-fluid level concerning for sinusitis.  D:  10/07/2021 E:  10/08/2021  This report was finalized on 10/8/2021 11:16 AM by Dr. Mike Lucas.        Results for orders placed during the hospital encounter of 03/16/18    Adult Transthoracic Echo Complete W/ Cont if Necessary Per Protocol    Interpretation Summary  · Left ventricular systolic function is hyperdynamic (EF > 70).  · Left ventricular diastolic dysfunction (grade I) consistent with impaired relaxation.  · Moderate to severe aortic valve regurgitation is present.  · Mild aortic valve stenosis is present.      I have reviewed the  medications:  Scheduled Meds:amLODIPine, 5 mg, Oral, Daily  cetirizine, 5 mg, Oral, Daily  citalopram, 40 mg, Oral, Daily  losartan, 50 mg, Oral, Daily  mirtazapine, 15 mg, Oral, Nightly  QUEtiapine, 25 mg, Oral, BID  senna-docusate sodium, 2 tablet, Oral, BID  sodium chloride, 10 mL, Intravenous, Q12H      Continuous Infusions:sodium chloride, 100 mL/hr, Last Rate: 100 mL/hr (10/07/21 1826)      PRN Meds:.•  acetaminophen  •  senna-docusate sodium **AND** polyethylene glycol **AND** bisacodyl **AND** bisacodyl  •  hydrALAZINE  •  HYDROcodone-acetaminophen  •  influenza vaccine  •  sodium chloride  •  sodium chloride    Assessment/Plan   Assessment & Plan     Active Hospital Problems    Diagnosis  POA   • **Dementia (HCC) [F03.90]  Yes   • [place] (hard of hearing) [H91.90]  Yes   • Legally blind [H54.8]  Yes   • Acute constipation [K59.00]  Yes   • Back pain [M54.9]  Yes   • Heart murmur [R01.1]  Yes   • Macular degeneration [H35.30]  Yes   • Hyponatremia [E87.1]  Yes   • Chronic idiopathic constipation [K59.04]  Yes   • Essential hypertension [I10]  Yes   • History of subdural hematoma (post traumatic) [Z87.828]  Not Applicable   • Normocytic anemia [D64.9]  Yes      Resolved Hospital Problems   No resolved problems to display.        Brief Hospital Course to date:  Keila Alberts is a 89 y.o. female patient with past medical history of essential hypertension, dyslipidemia, arthritis, legally blind with macular degeneration, subdural hematoma status post craniotomy in 2016, and dementia who presented to the hospital with worsening dementia, recent fall with worsening back pain.    This patient's problems and plans were partially entered by my partner and updated as appropriate by me 10/08/21.    Assessment and plan:  Patient is new to me today    Worsening dementia with behavioral changes  · Dementia has been getting worse over the last 3 weeks  · CT head from 09/29/2021 is on remarkable for any acute  changes  · Thyroid panel and B12 is within normal limits  · No infectious etiology identified  · It seems like it has normal progression of her dementia  · She has been seen by neurology service on outpatient basis and was started on Seroquel  · Also started on Remeron by her neurologist  · Family interested in placement.  CM following for disposition.    Hyponatremia  · Mild and chronic, stable to her baseline  · Continue IV fluids and monitor labs    Constipation  · Increase Bowel regimen    Back pain  Recent fall  · CT lumbar spine done in ED showed chronic changes with no acute fracture  · As needed Norco  · PT and OT consulted     Essential hypertension  · Continue losartan and Norvasc, stable     Anemia of chronic disease  · Stable hemoglobin, today up to 9.5  · Monitor labs    DVT prophylaxis:  Mechanical DVT prophylaxis orders are present.       Daily Care Communication  Due to current limited visitation policies, an attempt will be made daily to update patient's identified best point-of-contact(s)   Contact: Aretha Lockhart    Relation: daughter    Time of communication: 1610 pm   Notes (if applicable): Attempted to call patient's daughter at her request at phone number on file [phone].  No answer.       AM-PAC 6 Clicks Score (PT): 14 (10/07/21 2022)    Disposition: I expect the patient to be discharged to rehab/snf, TBD.  CM following.     CODE STATUS:   Code Status and Medical Interventions:   Ordered at: 10/06/21 1958     Level Of Support Discussed With:    Health Care Surrogate     Code Status:    No CPR     Medical Interventions (Level of Support Prior to Arrest):    Full     Comments:    DNR/ DNI       Luh Sheridan, APRN  10/08/21

## 2021-10-08 NOTE — PROGRESS NOTES
"Neurology       Patient Care Team:  Ronaldo Travis MD as PCP - General (Family Medicine)  Khanh Greene as Consulting Physician (Orthopedic Surgery)    Chief complaint AMS, acute on chronic cognitive decline      Subjective .     History: No history obtainable from patient.  Daughter at bedside reports patient still sleepy today and not eating.  At home has tended to eat well.    ROS: Not obtainable    Objective     Vital Signs   Blood pressure 150/70, pulse 73, temperature 98.6 °F (37 °C), temperature source Axillary, resp. rate 18, height 160 cm (63\"), weight 57.2 kg (126 lb 3.2 oz), SpO2 93 %.    Physical Exam:              Neuro: Elderly white woman lying sleeping in the hospital bed, does wake with repeated verbal and tactile stim and regards.  Mumbles but does not intelligibly answer any questions or follow any commands.  Looks at daughter but does not name her when asked.  Pupils 3 mm and reactive EOMI face symmetric  Normal muscle tone with no abnormal movement, slight spontaneous movements in upper extremities to tactile stim    Results Review:              Brain MRI images reviewed, agree no acute abnormality, but severe chronic white matter change and atrophy    EEG showed moderate generalized slowing breach rhythm, but no epileptiform discharges    Assessment/Plan        89-year-old woman with history of left SDH, progressive cognitive decline consistent with dementia, with progressive worsening over the past year, more acutely starting approximately 3 weeks ago.  Etiology for abrupt decline unclear but may simply be due to progression of disease.  No evidence for infection, stroke or seizure on work-up including brain MRI and EEG.  Discussed with daughter that I am concerned she may have entered her last illness.  Discussed that chances of \"rallying\" decrease with age, and that at 89, it is less likely that she will return to recent baseline, and is instead likely to decline further.  " Discussed that if she does not become more mobile and start eating soon, then her chances of recovery will diminish even further.    I discussed the patients findings and my recommendations with family    Akiko Carey MD  10/08/21  15:00 EDT

## 2021-10-08 NOTE — PLAN OF CARE
Goal Outcome Evaluation:  Plan of Care Reviewed With: patient        Progress: no change  Outcome Summary: VSS.  Patient admitted on 10/6/2021 she is on day 2 of this hospitalization.  She has an IV in place and patent after she pulled out previous IV.  She has a purewick in place.  Tolerated reg diet without difficulty. Have administered scheduled and prn medication as indicated.  Will continue to monitor throughout the rest of this shift.

## 2021-10-08 NOTE — PLAN OF CARE
Goal Outcome Evaluation:  Plan of Care Reviewed With: patient, daughter           Outcome Summary: PT eval completed. Limited assessment due to pt did not wake during session. Pt jose PROM to BLE's in all planes, and for BUE's to tolerance due to hx of L shoulder replacement. Will see 3x/wk until able to actively participate more. PT rec d/c SNF.

## 2021-10-08 NOTE — THERAPY EVALUATION
Patient Name: Keila Alberts  : 7/15/1928    MRN: [id number]                              Today's Date: 10/8/2021       Admit Date: 10/6/2021    Visit Dx:     ICD-10-CM ICD-9-CM   1. Recurrent falls  R29.6 V15.88   2. Acute midline low back pain without sciatica  M54.50 724.2   3. Dementia without behavioral disturbance, unspecified dementia type (HCC)  F03.90 294.20   4. Impaired functional mobility, balance, gait, and endurance  Z74.09 V49.89     Patient Active Problem List   Diagnosis   • History of subdural hematoma (post traumatic)   • Essential hypertension   • Chronic idiopathic constipation   • Normocytic anemia   • s/p Left Craniotomy for Evacuation of Left SDH 16    • Hyponatremia   • 6.9 x 5.3cm pelvic mass, seen on imaging prior to admission   • Closed left hip fracture, s/p left hip percutaneous pinning   • Macular degeneration   • Heart murmur   • [place] (hard of hearing)   • Legally blind   • Acute constipation   • Back pain   • Dementia (HCC)     Past Medical History:   Diagnosis Date   • Acute/Subacute Traumatic SDH (subdural hematoma) 2016   • Arthritis    • Dementia (HCC)    • Hyperlipidemia    • Hypertension    • Legally blind     2/2 macular degeneration    • Macular degeneration    • Memory loss      Past Surgical History:   Procedure Laterality Date   • APPENDECTOMY     • HALIMA HOLE Left 2016    Procedure: HALIMA HOLE;  Surgeon: Jos Christian MD;  Location:  [facility];  Service:    • CRANIOTOMY     • HIP PERCUTANEOUS PINNING Left 2017    Procedure: HIP PERCUTANEOUS PINNING;  Surgeon: Edgar Albert MD;  Location:  [facility];  Service:    • JOINT REPLACEMENT     • KNEE ARTHROPLASTY, PARTIAL REPLACEMENT     • SHOULDER ARTHROSCOPY     • TOTAL HIP ARTHROPLASTY Bilateral    • TOTAL SHOULDER REPLACEMENT Left      General Information     Row Name 10/08/21 1380          Physical Therapy Time and Intention    Document Type  evaluation  -SJ     Mode of  Treatment  physical therapy  -     Row Name 10/08/21 1340          General Information    Patient Profile Reviewed  yes  -     Prior Level of Function  independent:;all household mobility;gait;transfer;bed mobility daughter states pt independent at baseline, hx of falls  -     Existing Precautions/Restrictions  fall  -     Barriers to Rehab  cognitive status;previous functional deficit  -     Row Name 10/08/21 1340          Living Environment    Lives With  child(rose), adult;spouse Lives with her  and daughter -  is on hospice  -     Row Name 10/08/21 1340          Cognition    Orientation Status (Cognition)  unable/difficult to assess pt did not wake or rouse during session  -     Row Name 10/08/21 1340          Safety Issues, Functional Mobility    Safety Issues Affecting Function (Mobility)  ability to follow commands;insight into deficits/self-awareness  -     Impairments Affecting Function (Mobility)  cognition;strength  -     Comment, Safety Issues/Impairments (Mobility)  difficult to assess - pt did not wake or rouse during session  -       User Key  (r) = Recorded By, (t) = Taken By, (c) = Cosigned By    Initials Name Provider Type    Akiko Vang PT Physical Therapist        Mobility     Row Name 10/08/21 1431          Bed Mobility    Comment (Bed Mobility)  not assessed  -       User Key  (r) = Recorded By, (t) = Taken By, (c) = Cosigned By    Initials Name Provider Type    Akiko Vang PT Physical Therapist        Obj/Interventions     Row Name 10/08/21 1432          Range of Motion Comprehensive    General Range of Motion  bilateral lower extremity ROM WFL  -     Row Name 10/08/21 1432          Strength Comprehensive (MMT)    General Manual Muscle Testing (MMT) Assessment  lower extremity strength deficits identified  -     Comment, General Manual Muscle Testing (MMT) Assessment  difficult to formally assess due to cognitive status  -     Row Name  10/08/21 1432          Motor Skills    Therapeutic Exercise  shoulder;hip;knee;ankle  -     Row Name 10/08/21 1432          Shoulder (Therapeutic Exercise)    Shoulder PROM (Therapeutic Exercise)  bilateral;flexion;aBduction;aDduction;5 repetitions;supine hx of L shoulder replacement  -     Row Name 10/08/21 1432          Hip (Therapeutic Exercise)    Hip (Therapeutic Exercise)  PROM (passive range of motion)  -     Hip PROM (Therapeutic Exercise)  bilateral;flexion;extension;aBduction;aDduction;external rotation;internal rotation;supine;10 repetitions  -     Row Name 10/08/21 1432          Knee (Therapeutic Exercise)    Knee (Therapeutic Exercise)  PROM (passive range of motion)  -     Knee PROM (Therapeutic Exercise)  bilateral;flexion;extension;supine;10 repetitions  -     Row Name 10/08/21 1432          Ankle (Therapeutic Exercise)    Ankle (Therapeutic Exercise)  PROM (passive range of motion)  -     Ankle PROM (Therapeutic Exercise)  bilateral;dorsiflexion;plantarflexion;inversion;eversion;supine;10 repetitions  -       User Key  (r) = Recorded By, (t) = Taken By, (c) = Cosigned By    Initials Name Provider Type     Akiko Douglas, PT Physical Therapist        Goals/Plan     Row Name 10/08/21 1439          Bed Mobility Goal 1 (PT)    Activity/Assistive Device (Bed Mobility Goal 1, PT)  supine to sit;sit to supine  -     [place] Level/Cues Needed (Bed Mobility Goal 1, PT)  minimum assist (75% or more patient effort);1 person assist  -     Time Frame (Bed Mobility Goal 1, PT)  long term goal (LTG);2 weeks  -     Row Name 10/08/21 1439          Transfer Goal 1 (PT)    Activity/Assistive Device (Transfer Goal 1, PT)  sit-to-stand/stand-to-sit;bed-to-chair/chair-to-bed  -     [place] Level/Cues Needed (Transfer Goal 1, PT)  minimum assist (75% or more patient effort);1 person assist  -     Time Frame (Transfer Goal 1, PT)  long term goal (LTG);2 weeks  -     Row Name  10/08/21 1439          Gait Training Goal 1 (PT)    Activity/Assistive Device (Gait Training Goal 1, PT)  gait (walking locomotion);walker, rolling  -SJ     [place] Level (Gait Training Goal 1, PT)  minimum assist (75% or more patient effort)  -SJ     Distance (Gait Training Goal 1, PT)  50  -SJ     Time Frame (Gait Training Goal 1, PT)  long term goal (LTG);2 weeks  -       User Key  (r) = Recorded By, (t) = Taken By, (c) = Cosigned By    Initials Name Provider Type    SJ Akiko Douglas, PT Physical Therapist        Clinical Impression     Row Name 10/08/21 1434          Pain    Additional Documentation  Pain Scale: FACES Pre/Post-Treatment (Group)  -     Row Name 10/08/21 1434          Pain Scale: FACES Pre/Post-Treatment    Pain: FACES Scale, Pretreatment  0-->no hurt  -     Posttreatment Pain Rating  0-->no hurt  -SJ     Row Name 10/08/21 1434          Plan of Care Review    Plan of Care Reviewed With  patient;daughter  -     Outcome Summary  PT eval completed. Limited assessment due to pt did not wake during session. Pt jose PROM to BLE's in all planes, and for BUE's to tolerance due to hx of L shoulder replacement. Will see 3x/wk until able to actively participate more. PT rec d/c SNF.  -     Row Name 10/08/21 1434          Therapy Assessment/Plan (PT)    Patient/Family Therapy Goals Statement (PT)  family wishing for nursing home placement  -     Rehab Potential (PT)  fair, will monitor progress closely  -     Criteria for Skilled Interventions Met (PT)  yes;skilled treatment is necessary  -     Predicted Duration of Therapy Intervention (PT)  2wks  -     Row Name 10/08/21 1434          Positioning and Restraints    Pre-Treatment Position  in bed  -SJ     Post Treatment Position  bed  -SJ     In Bed  fowlers;call light within reach;encouraged to call for assist;exit alarm on;with family/caregiver  -       User Key  (r) = Recorded By, (t) = Taken By, (c) = Cosigned By    Initials Name  Provider Type    Akiko Vnag PT Physical Therapist        Outcome Measures     Row Name 10/08/21 1440          How much help from another person do you currently need...    Turning from your back to your side while in flat bed without using bedrails?  1  -SJ     Moving from lying on back to sitting on the side of a flat bed without bedrails?  1  -SJ     Moving to and from a bed to a chair (including a wheelchair)?  1  -SJ     Standing up from a chair using your arms (e.g., wheelchair, bedside chair)?  1  -SJ     Climbing 3-5 steps with a railing?  1  -SJ     To walk in hospital room?  1  -SJ     AM-PAC 6 Clicks Score (PT)  6  -SJ     Row Name 10/08/21 1440 10/08/21 1350       Functional Assessment    Outcome Measure Options  AM-PAC 6 Clicks Basic Mobility (PT)  -SJ  AM-PAC 6 Clicks Daily Activity (OT)  -CS      User Key  (r) = Recorded By, (t) = Taken By, (c) = Cosigned By    Initials Name Provider Type    Akiko Vang PT Physical Therapist    Ness Hunter OT Occupational Therapist                       Physical Therapy Education                 Title: PT OT SLP Therapies (In Progress)     Topic: Physical Therapy (In Progress)     Point: Mobility training (In Progress)     Learning Progress Summary           Patient Acceptance, E,TB, VU,NR by  at 10/8/2021 0135    Acceptance, E,TB, VU by SD at 10/6/2021 1847   Family Acceptance, E, NR by  at 10/8/2021 1441    Acceptance, E,TB, VU by SD at 10/6/2021 1847                   Point: Home exercise program (In Progress)     Learning Progress Summary           Patient Acceptance, E,TB, VU,NR by  at 10/8/2021 0135    Acceptance, E,TB, VU by SD at 10/6/2021 1847   Family Acceptance, E, NR by  at 10/8/2021 1441    Acceptance, E,TB, VU by SD at 10/6/2021 1847                   Point: Body mechanics (In Progress)     Learning Progress Summary           Patient Acceptance, E,TB, VU,NR by  at 10/8/2021 0135    Acceptance, E,TB, VU by SD at  10/6/2021 1847   Family Acceptance, E, NR by  at 10/8/2021 1441    Acceptance, E,TB, VU by SD at 10/6/2021 1847                   Point: Precautions (In Progress)     Learning Progress Summary           Patient Acceptance, E,TB, VU,NR by  at 10/8/2021 0135    Acceptance, E,TB, VU by SD at 10/6/2021 1847   Family Acceptance, E, NR by  at 10/8/2021 1441    Acceptance, E,TB, VU by SD at 10/6/2021 1847                               User Key     Initials Effective Dates Name Provider Type Discipline     06/16/21 -  Akiko Douglas, OLIVER Physical Therapist PT    SD 06/16/21 -  Alma Rosa Chen, RN Registered Nurse Nurse     08/12/20 -  Vi Ga RN Registered Nurse Nurse              PT Recommendation and Plan  Planned Therapy Interventions (PT): balance training, bed mobility training, gait training, home exercise program, stretching, strengthening, ROM (range of motion), patient/family education, transfer training  Plan of Care Reviewed With: patient, daughter  Outcome Summary: PT eval completed. Limited assessment due to pt did not wake during session. Pt jose PROM to BLE's in all planes, and for BUE's to tolerance due to hx of L shoulder replacement. Will see 3x/wk until able to actively participate more. PT rec d/c SNF.     Time Calculation:   PT Charges     Row Name 10/08/21 1441             Time Calculation    Start Time  1340 chart review [id number]  -      PT Non-Billable Time (min)  46 min  -      PT Received On  10/08/21  [facility]      PT Goal Re-Cert Due Date  10/18/21  [facility]        User Key  (r) = Recorded By, (t) = Taken By, (c) = Cosigned By    Initials Name Provider Type     Akiko Douglas PT Physical Therapist        Therapy Charges for Today     Code Description Service Date Service Provider Modifiers Qty    [id number] HC PT EVAL LOW COMPLEXITY 4 10/8/2021 Akiko Douglas PT GP 1          PT G-Codes  Outcome Measure Options: AM-PAC 6 Clicks Basic Mobility (PT)  AM-PAC 6 Clicks Score  (PT): 6  AM-PAC 6 Clicks Score (OT): 7    Akiko Douglas, PT  10/8/2021

## 2021-10-09 NOTE — PLAN OF CARE
Goal Outcome Evaluation:  Plan of Care Reviewed With: patient        Progress: no change  Outcome Summary: VSS.  89 year old white female that was admitted on 10/6/2021 for frequent falls and forgetfulness at home.  She has an IV in place and patent now to saline lock.  She has a brief in place.  Have administered scheduled medications as indicated.  Patient is confused and needs to be reminded and refocused to stay in bed and that she does not get up without assistance.  Increased frequency of patient rounding to keep her safe from a fall.  will continue to monitor patient throught the rest of this shift.

## 2021-10-09 NOTE — NURSING NOTE
Patient awake and attempting to get up out of bed.  Repositioned patient in bed.  CNA stayed at BS until patient went back to sleep.

## 2021-10-09 NOTE — PROGRESS NOTES
[facility] Medicine Services  PROGRESS NOTE    Patient Name: Keila Alberts  : 7/15/1928  MRN: [id number]    Date of Admission: 10/6/2021  Primary Care Physician: Ronaldo Travis MD    Subjective     CC: f/u worsening dementia    HPI:  Multiple attempts to get out of bed over past 24 hours, was found sitting on the floor at one point. Patient asleep during my assessment. She opened her eyes briefly but did not converse. Daughter at bedside, spoke with her at length.     Per daughter, patient was quite functional up until a few weeks ago. However, I can see from chart review that daughter was concerned about patient becoming increasingly forgetful and difficult to manage as far back as 2021.     ROS:  Unable to obtain complete or reliable ROS due to mental status    Objective     Vital Signs:   Temp:  [97.6 °F (36.4 °C)-98 °F (36.7 °C)] 98 °F (36.7 °C)  Heart Rate:  [69-77] 77  Resp:  [17-18] 17  BP: (174-184)/(78-89) 184/78     Physical Exam:  Constitutional: No acute distress. Asleep. She woke briefly but did not converse   HENT: NCAT, mucous membranes moist  Respiratory: Clear to auscultation bilaterally, respiratory effort normal. 94% on room air   Cardiovascular: RRR, (+) murmur without rubs or gallops  Gastrointestinal: Positive bowel sounds, soft, nondistended  Musculoskeletal: No bilateral ankle edema  Psychiatric: Calm   Neurologic: No focal deficits     Results Reviewed:  LAB RESULTS:      Lab 10/08/21  0707 10/07/21  0434 10/06/21  1217   WBC 10.50 10.57 10.72   HEMOGLOBIN 9.5* 8.8* 10.5*   HEMATOCRIT 28.5* 25.5* 31.2*   PLATELETS 352 334 417   NEUTROS ABS 5.30 6.39 7.55*   IMMATURE GRANS (ABS) 0.06* 0.05 0.07*   LYMPHS ABS 1.94 1.78 1.34   MONOS ABS 0.97* 0.94* 0.78   EOS ABS 2.12* 1.31* 0.90*   .4* 95.9 98.1*   CRP 2.16*  --   --    PROCALCITONIN 0.06  --   --          Lab 10/09/21  0850 10/08/21  0707 10/07/21  0434 10/06/21  1217   SODIUM 133* 130* 130* 127*    POTASSIUM 3.7 4.0 3.4* 3.9   CHLORIDE 101 98 94* 91*   CO2 23.0 21.0* 25.0 23.0   ANION GAP 9.0 11.0 11.0 13.0   BUN 11 17 13 17   CREATININE 0.72 0.88 0.77 0.97   GLUCOSE 99 99 104* 133*   CALCIUM 8.2 8.5 8.4 9.4   MAGNESIUM  --  1.9  --  2.1   PHOSPHORUS  --  3.3  --   --          Lab 10/08/21  0707 10/06/21  1217   TOTAL PROTEIN 6.0 7.2   ALBUMIN 3.40* 4.20   GLOBULIN 2.6 3.0   ALT (SGPT) 17 32   AST (SGOT) 16 17   BILIRUBIN 0.3 0.5   ALK PHOS 73 80         Lab 10/06/21  1217   TROPONIN T <0.010     Brief Urine Lab Results  (Last result in the past 365 days)      Color   Clarity   Blood   Leuk Est   Nitrite   Protein   CREAT   Urine HCG        10/06/21 1324 Yellow   Cloudy   Negative   Small (1+)   Negative   Trace               Microbiology Results Abnormal     Procedure Component Value - Date/Time    Urine Culture - Urine, Urine, Catheter [[id number]] Collected: 10/06/21 1324    Lab Status: Final result Specimen: Urine, Catheter Updated: 10/07/21 1050     Urine Culture <10,000 CFU/mL Mixed Tony Isolated    Narrative:      Specimen contains mixed organisms of questionable pathogenicity which indicates contamination with commensal tony.  Further identification is unlikely to provide clinically useful information.  Suggest recollection.    COVID PRE-OP / PRE-PROCEDURE SCREENING ORDER (NO ISOLATION) - Swab, Nasopharynx [[id number]]  (Normal) Collected: 10/06/21 2137    Lab Status: Final result Specimen: Swab from Nasopharynx Updated: 10/06/21 2214    Narrative:      The following orders were created for panel order COVID PRE-OP / PRE-PROCEDURE SCREENING ORDER (NO ISOLATION) - Swab, Nasopharynx.  Procedure                               Abnormality         Status                     ---------                               -----------         ------                     COVID-19, ABBOTT IN-HOUS...[[id number]]  Normal              Final result                 Please view results for these tests on the individual  orders.    COVID-19, ABBOTT IN-HOUSE,NASAL Swab (NO TRANSPORT MEDIA) 2 HR TAT - Swab, Nasopharynx [[id number]]  (Normal) Collected: 10/06/21 2137    Lab Status: Final result Specimen: Swab from Nasopharynx Updated: 10/06/21 2214     COVID19 Presumptive Negative    Narrative:      Fact sheet for providers: https://www.fda.gov/media/200256/download     Fact sheet for patients: https://www.fda.gov/media/000196/download    Test performed by PCR.  If inconsistent with clinical signs and symptoms patient should be tested with different authorized molecular test.        EEG    Result Date: 10/8/2021  Reason for referral: 89 y.o.female with altered mental status, prior left frontal craniotomy for subdural hematoma Technical Summary:  A 19 channel digital EEG was performed using the international 10-20 placement system, including eye leads and EKG leads. Duration: 20 minutes Video: On Findings: The patient is drowsy during the study.  Diffuse low to medium amplitude 3-5 Hz intermixed delta and theta activity are seen over both hemispheres.  Faster frequencies in the theta and beta range or more prominent over the left frontal temporal leads, which may represent in part a breach refractive from prior craniotomy.  Photic stimulation does not change the background.  Toward the end of the study, when the patient is roused, there is an increase in faster theta frequencies, more prominent over the left than the right. 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      MRI Brain Without Contrast    Result Date:  10/8/2021  EXAMINATION: MRI BRAIN WO CONTRAST-  INDICATION: Mental status change, unknown cause; R29.6-Repeated falls; M54.50-Low back pain, unspecified; F03.90-Unspecified dementia without behavioral disturbance.  TECHNIQUE: Multiplanar MRI of the brain with and without intravenous contrast administration.  COMPARISON: CT head 09/29/2021.  FINDINGS: No restriction on diffusion-weighted sequences to suggest acute ischemia. Midline structures are symmetric without evidence of mass, mass effect or midline shift. Ventricles and sulci demonstrate prominence of the ventricles and mild prominence of the sulci towards the vertex with severe increased T2 and FLAIR signal findings in the periventricular and deep white matter suggesting severely advanced chronic small vessel ischemic disease. Globes and orbits retain normal T2 characteristics. Paranasal sinuses with moderate air-fluid level in the right maxillary sinus along with minimal inspissated fluid and mild circumferential mucosal thickening of the left maxillary sinus. Mastoid air cells are grossly clear and well pneumatized. No cerebellopontine angle mass lesion with normal signal flow voids to the distal internal carotid and vertebrobasilar arteries. Pituitary and sella within normal limits. Cervicomedullary junction is widely patent.      Impression: 1. No acute infarction. 2. Prominence of the lateral ventricles may represent central volume loss, however, normal pressure hydrocephalus could have a similar appearance due to prominence. 3. Severe chronic small vessel ischemic disease within the supratentorial white matter along with mild cerebral atrophy or age-related volume loss. 4. Air-fluid level and mucosal thickening within the maxillary sinuses right greater left with the right maxillary sinus moderate air-fluid level concerning for sinusitis.  D:  10/07/2021 E:  10/08/2021  This report was finalized on 10/8/2021 11:16 AM by Dr. Mike Lucas.      Results for  orders placed during the hospital encounter of 03/16/18    Adult Transthoracic Echo Complete W/ Cont if Necessary Per Protocol    Interpretation Summary  · Left ventricular systolic function is hyperdynamic (EF > 70).  · Left ventricular diastolic dysfunction (grade I) consistent with impaired relaxation.  · Moderate to severe aortic valve regurgitation is present.  · Mild aortic valve stenosis is present.    I have reviewed the medications:  Scheduled Meds:amLODIPine, 5 mg, Oral, Daily  cetirizine, 5 mg, Oral, Daily  citalopram, 40 mg, Oral, Daily  [START ON 10/10/2021] docusate sodium, 200 mg, Oral, Daily  losartan, 100 mg, Oral, Daily  mirtazapine, 15 mg, Oral, Nightly  QUEtiapine, 25 mg, Oral, BID  sodium chloride, 10 mL, Intravenous, Q12H      Continuous Infusions:   PRN Meds:.•  acetaminophen  •  senna-docusate sodium **AND** [DISCONTINUED] polyethylene glycol **AND** bisacodyl **AND** bisacodyl  •  hydrALAZINE  •  HYDROcodone-acetaminophen  •  influenza vaccine  •  magnesium hydroxide  •  QUEtiapine  •  sodium chloride  •  sodium chloride    Assessment & Plan     Active Hospital Problems    Diagnosis  POA   • **Dementia (HCC) [F03.90]  Yes   • [place] (hard of hearing) [H91.90]  Yes   • Legally blind [H54.8]  Yes   • Acute constipation [K59.00]  Yes   • Back pain [M54.9]  Yes   • Heart murmur [R01.1]  Yes   • Macular degeneration [H35.30]  Yes   • Hyponatremia [E87.1]  Yes   • Chronic idiopathic constipation [K59.04]  Yes   • Essential hypertension [I10]  Yes   • History of subdural hematoma (post traumatic) [Z87.828]  Not Applicable   • Normocytic anemia [D64.9]  Yes      Resolved Hospital Problems   No resolved problems to display.     Brief Hospital Course to date:  Keila Alberts is a 89 y.o. female with PMH significant for HTN, HLD, OA, subdural hematoma (s/p craniotomy 2016), macular degeneration (legally blind) and dementia. She was brought to [facility] ED on 10/6/21 with worsening dementia  symptoms, falls and worsening back pain related to a recent fall    Worsening dementia with behavioral changes  · Per daughter, dementia has been getting worse over the last 3 weeks  · CT head from 09/29/2021 is on remarkable for any acute changes  · MRI brain showed severe, chronic small-vessel ischemic disease with mild cerebral atrophy / age-related volume loss   · Thyroid panel and B12 within normal limits  · No infectious etiology identified  · Appreciate neurology evaluation - neurology feels this her worsening is normal progression of her dementia  · EEG WNL  · She has been seen by neurology service on outpatient basis and was started on Seroquel  · Continue Remeron 15mg QHS and Seroquel 25mg BID   · Family interested in placement.  CM following for disposition.    Hyponatremia  · Mild and chronic  · Stop IV fluids. Encourage PO intake  · Lab holiday tomorrow    Chronic constipation  · Continue bowel regimen    Back pain  Recent fall  · CT lumbar spine done in ED showed chronic changes with no acute fracture  · As needed Norco  · PT and OT consulted     Essential hypertension  · Increase Losartan to 100mg daily, continue Amlodipine 5mg daily  · Favor some permissive hypertension given her age and frequent falls     Anemia of chronic disease  · Hgb stable. Monitor periodically     DVT prophylaxis:Mechanical DVT prophylaxis orders are present.     AM-PAC 6 Clicks Score (PT): 6 (10/08/21 1916)    Disposition: I expect the patient to be discharged to SNF TBD. CM following   CODE STATUS:   Code Status and Medical Interventions:   Ordered at: 10/06/21 1958     Level Of Support Discussed With:    Health Care Surrogate     Code Status:    No CPR     Medical Interventions (Level of Support Prior to Arrest):    Full     Comments:    DNR/ DNI     Miri Clayton PA-C  10/09/21

## 2021-10-09 NOTE — NURSING NOTE
Patient found on the floor with back up against bed.  She stated that she wanted to get up to the bathroom.  Patient returned to bed.  Head to toe assessment completed with no break in skin, bruising or abrasions noted.  Will make frequent rounds to assess patient's needs.

## 2021-10-09 NOTE — PROGRESS NOTES
"Neurology       Patient Care Team:  Ronaldo Travis MD as PCP - General (Family Medicine)  Khanh Greene as Consulting Physician (Orthopedic Surgery)    Chief complaint AMS, dementia      Subjective .     History: Patient \"woke up\" yesterday and tried multiple times to get out of bed.  At one point found sitting up next to the bed on the floor, stated she wanted to get to the bathroom.  Patient acknowledges she is tired, but minimal history obtainable from patient.    ROS: Not obtainable    Objective     Vital Signs   Blood pressure (!) 184/78, pulse 77, temperature 98 °F (36.7 °C), temperature source Oral, resp. rate 17, height 160 cm (63\"), weight 57.2 kg (126 lb 3.2 oz), SpO2 91 %.    Physical Exam:              Neuro: Well-developed elderly white woman lying sleeping in the hospital bed in no acute distress, wakes to name called repeatedly and light tactile stim.  Appears relatively alert, states \"I can't hear you so good,\" and briefly attempts to get up but then lays back in the bed and falls asleep with diminished stimulation. Very [place] and does not follow commands. Speech clear.  Pupils 3mm reactive, EOMI, face symm  Symmetric mvmt extremities    Results Review:              Bmp with Na133, o/w normal    Assessment/Plan     89-yo woman with history of left SDH, progressive cognitive decline consistent with dementia, with progressive worsening over the past year, more acutely starting approximately 3 weeks ago.  Etiology for abrupt decline unclear but may simply be due to progression of disease.  No evidence for infection, stroke or seizure on work-up including brain MRI and EEG.  Have discussed with daughter that I am concerned she may have entered her last illness, and that at 89, it is less likely that she will return to recent baseline, and is instead likely to decline further. If she does improve, it is likely that she will have recurrent episodes of decline, and recommend family consider " placement if they cannot manage when patient is agitated.  Agitation is likely to worsen here in the hospital and will use additional Seroquel if needed.  If she does improve, also at high risk for prolonged hospital stay if she cannot be discharged to home or nursing facility soon.      Akiko Carey MD  10/09/21  10:41 EDT

## 2021-10-09 NOTE — PLAN OF CARE
Problem: Fall Injury Risk  Goal: Absence of Fall and Fall-Related Injury  Outcome: Ongoing, Progressing  Intervention: Promote Injury-Free Environment  Recent Flowsheet Documentation  Taken 10/9/2021 0800 by Le Montiel RN  Safety Promotion/Fall Prevention:   safety round/check completed   toileting scheduled     Problem: Skin Injury Risk Increased  Goal: Skin Health and Integrity  Outcome: Ongoing, Progressing  Intervention: Optimize Skin Protection  Recent Flowsheet Documentation  Taken 10/9/2021 0800 by Le Montiel RN  Pressure Reduction Techniques:   frequent weight shift encouraged   weight shift assistance provided  Pressure Reduction Devices: pressure-redistributing mattress utilized  Skin Protection:   adhesive use limited   incontinence pads utilized   Goal Outcome Evaluation:      Pt continues to be confused, trying to get out of bed. Alarm on, will move closer to nurses station when room becomes available.

## 2021-10-10 NOTE — PLAN OF CARE
Goal Outcome Evaluation:  Plan of Care Reviewed With: patient        Progress: no change  Outcome Summary: VSS.  89 year old white female that was admitted on 10/6/2021 for frequent falls and forgetfulness at home.  She is on day 4 of this hospitalization.    She has an IV in place and patent now to saline lock.  She has a brief in place.  Have administered scheduled and medications as indicated.  Patient is confused and needs to be reminded and refocused to stay in bed and that she does not get up without assistance.  Increased frequency of patient rounding to keep her safe from a fall.  Will continue to monitor patient throught the rest of this shift.

## 2021-10-10 NOTE — PROGRESS NOTES
[facility]  PROGRESS NOTE    Patient Name: Keila Alberts  : 7/15/1928  MRN: [id number]    Date of Admission: 10/6/2021  Primary Care Physician: Ronaldo Travis MD    Subjective   Subjective     CC:  Dementia    HPI:  I have seen and evaluated the patient this morning.  Sleeping comfortably.  Completely disoriented.  Cannot contribute to HPI.  No acute issues per nursing staff    ROS:   unable to obtain because of the patient current medical condition    Objective   Objective     Vital Signs:   Temp:  [97.6 °F (36.4 °C)-98 °F (36.7 °C)] 97.9 °F (36.6 °C)  Heart Rate:  [64-78] 78  Resp:  [16-18] 18  BP: (152-185)/(71-89) 183/89     Physical Exam:  General: Lethargic, disoriented and minimally conversant  Head: Atraumatic and normocephalic, without obvious abnormality  Eyes:   Conjunctivae and sclerae normal, no Icterus. No pallor  Ears:  Ears appear intact with no abnormalities noted  Throat: No oral lesions, no thrush, oral mucosa moist  Neck: Supple, trachea midline, no thyromegaly  Back:   No kyphoscoliosis present. No tenderness to palpation,   no sacral edema  Lungs: Clear to auscultation bilaterally, equal air entry, no wheezing or crackles  Heart:  Normal S1 and S2, no murmur, no gallop, No JVD, no lower extremity swelling  Abdomen:  Soft, no tenderness, no organomegaly, normal bowel sounds  Normal bowel sounds, no masses, no organomegaly. Soft, nontender, nondistended, no guarding, no rebound tenderness.  Extremities: No gross abnormalities, no clubbing, pulses palpable and equal bilaterally  Skin: No bleeding, bruising or rash, normal skin turgor and elasticity  Neurologic: Nonfocal  Psych: Completely disoriented     Results Reviewed:  LAB RESULTS:      Lab 10/08/21  0707 10/07/21  0434 10/06/21  1217   WBC 10.50 10.57 10.72   HEMOGLOBIN 9.5* 8.8* 10.5*   HEMATOCRIT 28.5* 25.5* 31.2*   PLATELETS 352 334 417   NEUTROS ABS 5.30 6.39 7.55*   IMMATURE GRANS (ABS)  0.06* 0.05 0.07*   LYMPHS ABS 1.94 1.78 1.34   MONOS ABS 0.97* 0.94* 0.78   EOS ABS 2.12* 1.31* 0.90*   .4* 95.9 98.1*   CRP 2.16*  --   --    PROCALCITONIN 0.06  --   --          Lab 10/09/21  0850 10/08/21  0707 10/07/21  0434 10/06/21  1217   SODIUM 133* 130* 130* 127*   POTASSIUM 3.7 4.0 3.4* 3.9   CHLORIDE 101 98 94* 91*   CO2 23.0 21.0* 25.0 23.0   ANION GAP 9.0 11.0 11.0 13.0   BUN 11 17 13 17   CREATININE 0.72 0.88 0.77 0.97   GLUCOSE 99 99 104* 133*   CALCIUM 8.2 8.5 8.4 9.4   MAGNESIUM  --  1.9  --  2.1   PHOSPHORUS  --  3.3  --   --          Lab 10/08/21  0707 10/06/21  1217   TOTAL PROTEIN 6.0 7.2   ALBUMIN 3.40* 4.20   GLOBULIN 2.6 3.0   ALT (SGPT) 17 32   AST (SGOT) 16 17   BILIRUBIN 0.3 0.5   ALK PHOS 73 80         Lab 10/06/21  1217   TROPONIN T <0.010                 Brief Urine Lab Results  (Last result in the past 365 days)      Color   Clarity   Blood   Leuk Est   Nitrite   Protein   CREAT   Urine HCG        10/06/21 1324 Yellow   Cloudy   Negative   Small (1+)   Negative   Trace                 Microbiology Results Abnormal     Procedure Component Value - Date/Time    Urine Culture - Urine, Urine, Catheter [[id number]] Collected: 10/06/21 1324    Lab Status: Final result Specimen: Urine, Catheter Updated: 10/07/21 1050     Urine Culture <10,000 CFU/mL Mixed Tony Isolated    Narrative:      Specimen contains mixed organisms of questionable pathogenicity which indicates contamination with commensal tony.  Further identification is unlikely to provide clinically useful information.  Suggest recollection.    COVID PRE-OP / PRE-PROCEDURE SCREENING ORDER (NO ISOLATION) - Swab, Nasopharynx [[id number]]  (Normal) Collected: 10/06/21 2137    Lab Status: Final result Specimen: Swab from Nasopharynx Updated: 10/06/21 2214    Narrative:      The following orders were created for panel order COVID PRE-OP / PRE-PROCEDURE SCREENING ORDER (NO ISOLATION) - Swab, Nasopharynx.  Procedure                                Abnormality         Status                     ---------                               -----------         ------                     COVID-19, ABBOTT IN-HOUS...[[id number]]  Normal              Final result                 Please view results for these tests on the individual orders.    COVID-19, ABBOTT IN-HOUSE,NASAL Swab (NO TRANSPORT MEDIA) 2 HR TAT - Swab, Nasopharynx [[id number]]  (Normal) Collected: 10/06/21 2137    Lab Status: Final result Specimen: Swab from Nasopharynx Updated: 10/06/21 2214     COVID19 Presumptive Negative    Narrative:      Fact sheet for providers: https://www.fda.gov/media/377672/download     Fact sheet for patients: https://www.fda.gov/media/434637/download    Test performed by PCR.  If inconsistent with clinical signs and symptoms patient should be tested with different authorized molecular test.          No radiology results from the last 24 hrs    Results for orders placed during the hospital encounter of 03/16/18    Adult Transthoracic Echo Complete W/ Cont if Necessary Per Protocol    Interpretation Summary  · Left ventricular systolic function is hyperdynamic (EF > 70).  · Left ventricular diastolic dysfunction (grade I) consistent with impaired relaxation.  · Moderate to severe aortic valve regurgitation is present.  · Mild aortic valve stenosis is present.      I have reviewed the medications:  Scheduled Meds:amLODIPine, 5 mg, Oral, Daily  cetirizine, 5 mg, Oral, Daily  citalopram, 40 mg, Oral, Daily  docusate sodium, 200 mg, Oral, Daily  losartan, 100 mg, Oral, Daily  mirtazapine, 15 mg, Oral, Nightly  QUEtiapine, 25 mg, Oral, BID  QUEtiapine, 25 mg, Oral, Q PM  sodium chloride, 10 mL, Intravenous, Q12H      Continuous Infusions:   PRN Meds:.•  acetaminophen  •  senna-docusate sodium **AND** [DISCONTINUED] polyethylene glycol **AND** bisacodyl **AND** bisacodyl  •  hydrALAZINE  •  HYDROcodone-acetaminophen  •  influenza vaccine  •  magnesium hydroxide  •   QUEtiapine  •  sodium chloride  •  sodium chloride    Assessment/Plan   Assessment & Plan     Active Hospital Problems    Diagnosis  POA   • **Dementia (HCC) [F03.90]  Yes   • [place] (hard of hearing) [H91.90]  Yes   • Legally blind [H54.8]  Yes   • Acute constipation [K59.00]  Yes   • Back pain [M54.9]  Yes   • Heart murmur [R01.1]  Yes   • Macular degeneration [H35.30]  Yes   • Hyponatremia [E87.1]  Yes   • Chronic idiopathic constipation [K59.04]  Yes   • Essential hypertension [I10]  Yes   • History of subdural hematoma (post traumatic) [Z87.828]  Not Applicable   • Normocytic anemia [D64.9]  Yes      Resolved Hospital Problems   No resolved problems to display.        Brief Hospital Course to date:  Keila Alberts is a 89 y.o. female patient with past medical history of essential hypertension, dyslipidemia, arthritis, legally blind with macular degeneration, subdural hematoma status post craniotomy in 2016, and dementia who presented to the hospital with worsening dementia, recent fall with worsening back pain.    Assessment and plan:  Worsening dementia with behavioral changes  · Dementia has been getting worse over the last 3 weeks  · CT head from 09/29/2021 is on remarkable for any acute changes. MRI brain showed severe, chronic small-vessel ischemic disease with mild cerebral atrophy / age-related volume loss   · Thyroid panel and B12 is within normal limits  · No infectious etiology identified  · Appreciate neurology evaluation - neurology feels this her worsening is normal progression of her dementia  · She has been seen by neurology service on outpatient basis and was started on Seroquel  · Continue Remeron 15mg QHS and Seroquel 25mg BID   · Family interested in placement.  CM is working on disposition    Hyponatremia  · Mild and chronic  · Asymptomatic, continue to encourage p.o. intake    Constipation  · Bowel regimen    Back pain  Recent fall  · CT lumbar spine done in ED showed chronic changes with no  acute fracture  · As needed Norco  · Continue PT and OT    Essential hypertension  · Continue losartan and Norvasc    Anemia of chronic disease  · Stable hemoglobin  · Continue to monitor intermittently     DVT prophylaxis:  Mechanical DVT prophylaxis orders are present.       AM-PAC 6 Clicks Score (PT): 6 (10/09/21 2010)    Disposition: I expect the patient to be discharged to be determined    CODE STATUS:   Code Status and Medical Interventions:   Ordered at: 10/06/21 1958     Level Of Support Discussed With:    Health Care Surrogate     Code Status:    No CPR     Medical Interventions (Level of Support Prior to Arrest):    Full     Comments:    DNR/ DNI     Addendum:  The patient nurse/ Mrs. Bella voiced her concern that the patient has been sleeping whole day long till 4 PM and unable to wake up to eat or drink.  Will decrease night dose Seroquel from 50 mg to 25 mg    Jeanine Nguyen MD  10/10/21

## 2021-10-10 NOTE — PROGRESS NOTES
"Neurology       Patient Care Team:  Ronaldo Travis MD as PCP - General (Family Medicine)  Khanh Greene as Consulting Physician (Orthopedic Surgery)    Chief complaint AMS, dementia      Subjective .     History: Received a call 25 mg x 2 yesterday afternoon and evening.  Sleeping this morning.  Does wake briefly and say she feels \"okay.\"    ROS: Not obtainable    Objective     Vital Signs   Blood pressure 168/76, pulse 83, temperature 97.9 °F (36.6 °C), temperature source Oral, resp. rate 18, height 160 cm (63\"), weight 57.2 kg (126 lb 3.2 oz), SpO2 91 %.    Physical Exam:              Neuro: Thin elderly white woman lying sleeping hospital bed, briefly at least partially wakes enough to regard and say \"okay\" once but does not otherwise answer questions.  Immediately falls back asleep without stimulation.  Pupils 2.5 mm and reactive, gaze conjugate, no facial asymmetry appreciated  No abnormal movement  Muscle tone normal      Assessment/Plan     89-yo woman with history of left SDH, progressive cognitive decline consistent with dementia, with progressive worsening over the past year, more acutely starting approximately 3 weeks ago.  Etiology for abrupt decline unclear but may simply be due to progression of disease.  No evidence for infection, stroke or seizure on work-up including brain MRI and EEG.  It is unlikely that she will return to recent baseline. If she does improve, it is likely that she will have recurrent episodes of decline, and recommend family consider placement if they cannot manage when patient is agitated.  Agitation responsive to Seroquel, although sleepy this morning.  Has follow-up scheduled in memory care clinic.  Neurology will sign off, please call if needed further this admission.    Akiko Carey MD  10/10/21  13:12 EDT    "

## 2021-10-11 NOTE — THERAPY TREATMENT NOTE
Patient Name: Keila Alberts  : 7/15/1928    MRN: [id number]                              Today's Date: 10/11/2021       Admit Date: 10/6/2021    Visit Dx:     ICD-10-CM ICD-9-CM   1. Recurrent falls  R29.6 V15.88   2. Acute midline low back pain without sciatica  M54.50 724.2   3. Dementia without behavioral disturbance, unspecified dementia type (HCC)  F03.90 294.20   4. Impaired functional mobility, balance, gait, and endurance  Z74.09 V49.89     Patient Active Problem List   Diagnosis   • History of subdural hematoma (post traumatic)   • Essential hypertension   • Chronic idiopathic constipation   • Normocytic anemia   • s/p Left Craniotomy for Evacuation of Left SDH 16    • Hyponatremia   • 6.9 x 5.3cm pelvic mass, seen on imaging prior to admission   • Closed left hip fracture, s/p left hip percutaneous pinning   • Macular degeneration   • Heart murmur   • [place] (hard of hearing)   • Legally blind   • Acute constipation   • Back pain   • Dementia (HCC)     Past Medical History:   Diagnosis Date   • Acute/Subacute Traumatic SDH (subdural hematoma) 2016   • Arthritis    • Dementia (HCC)    • Hyperlipidemia    • Hypertension    • Legally blind     2/2 macular degeneration    • Macular degeneration    • Memory loss      Past Surgical History:   Procedure Laterality Date   • APPENDECTOMY     • HALIMA HOLE Left 2016    Procedure: HALIMA HOLE;  Surgeon: Jos Christian MD;  Location:  [facility] OR;  Service:    • CRANIOTOMY     • HIP PERCUTANEOUS PINNING Left 2017    Procedure: HIP PERCUTANEOUS PINNING;  Surgeon: Edgar Albert MD;  Location:  [facility] OR;  Service:    • JOINT REPLACEMENT     • KNEE ARTHROPLASTY, PARTIAL REPLACEMENT     • SHOULDER ARTHROSCOPY     • TOTAL HIP ARTHROPLASTY Bilateral    • TOTAL SHOULDER REPLACEMENT Left       General Information     Row Name 10/11/21 1315          OT Time and Intention    Mode of Treatment occupational therapy  -SD     Row Name  10/11/21 1315          General Information    Patient Profile Reviewed yes  -SD     Existing Precautions/Restrictions fall; other (see comments)  dementia  -SD     Barriers to Rehab medically complex; previous functional deficit; cognitive status  -SD     Row Name 10/11/21 1315          Cognition    Orientation Status (Cognition) oriented to; person; disoriented to; place; situation; time  -SD     Row Name 10/11/21 1315          Safety Issues, Functional Mobility    Safety Issues Affecting Function (Mobility) awareness of need for assistance; insight into deficits/self-awareness; safety precaution awareness; safety precautions follow-through/compliance  -SD     Impairments Affecting Function (Mobility) balance; cognition; endurance/activity tolerance; strength; postural/trunk control  -SD     Cognitive Impairments, Mobility Safety/Performance attention; awareness, need for assistance; insight into deficits/self-awareness; safety precaution awareness; safety precaution follow-through  -SD           User Key  (r) = Recorded By, (t) = Taken By, (c) = Cosigned By    Initials Name Provider Type    SD Augusta Meyer OT Occupational Therapist                 Mobility/ADL's     Row Name 10/11/21 1341          Bed Mobility    Bed Mobility rolling right; scooting/bridging; supine-sit  -SD     Rolling Right [place] (Bed Mobility) contact guard; verbal cues; 1 person assist  -SD     Scooting/Bridging [place] (Bed Mobility) minimum assist (75% patient effort); 1 person assist; verbal cues; nonverbal cues (demo/gesture)  -SD     Supine-Sit [place] (Bed Mobility) minimum assist (75% patient effort); 1 person assist; verbal cues; nonverbal cues (demo/gesture)  -SD     Bed Mobility, Safety Issues decreased use of arms for pushing/pulling; other (see comments)  very [place]  -SD     Assistive Device (Bed Mobility) bed rails; draw sheet; head of bed elevated  -SD     Row Name 10/11/21 1341          Transfers     Transfers sit-stand transfer; bed-chair transfer  -SD     Bed-Chair [place] (Transfers) verbal cues; nonverbal cues (demo/gesture); 1 person assist; minimum assist (75% patient effort)  -SD     Assistive Device (Bed-Chair Transfers) walker, front-wheeled  -SD     Sit-Stand [place] (Transfers) contact guard; 1 person assist; verbal cues; nonverbal cues (demo/gesture)  -SD     Row Name 10/11/21 1341          Sit-Stand Transfer    Assistive Device (Sit-Stand Transfers) walker, front-wheeled  -SD     Row Name 10/11/21 1341          Activities of Daily Living    BADL Assessment/Intervention grooming; upper body dressing; feeding; lower body dressing  -SD     Row Name 10/11/21 1341          Grooming Assessment/Training    [place] Level (Grooming) hair care, combing/brushing; set up  -SD     Position (Grooming) supported sitting  -SD     Row Name 10/11/21 1341          Lower Body Dressing Assessment/Training    [place] Level (Lower Body Dressing) don; socks; dependent (less than 25% patient effort)  -SD     Position (Lower Body Dressing) supine  -SD     Row Name 10/11/21 134          Upper Body Dressing Assessment/Training    [place] Level (Upper Body Dressing) don; pajama/robe; minimum assist (75% patient effort); verbal cues; nonverbal cues (demo/gesture)  -SD     Position (Upper Body Dressing) edge of bed sitting  -SD     Row Name 10/11/21 1341          Self-Feeding Assessment/Training    [place] Level (Feeding) liquids to mouth; set up  -SD     Position (Self-Feeding) supported sitting  -SD           User Key  (r) = Recorded By, (t) = Taken By, (c) = Cosigned By    Initials Name Provider Type    Augusta Marroquin OT Occupational Therapist               Obj/Interventions     Row Name 10/11/21 1343          Vision Assessment/Intervention    Visual Impairment/Limitations legally blind  -SD     Row Name 10/11/21 1343          Balance    Balance Assessment standing static balance;  standing dynamic balance  -SD     Static Sitting Balance WFL; sitting, edge of bed  -SD     Dynamic Sitting Balance mild impairment; sitting, edge of bed  -SD     Static Standing Balance WFL; supported; standing  -SD     Dynamic Standing Balance mild impairment; supported; standing  -SD           User Key  (r) = Recorded By, (t) = Taken By, (c) = Cosigned By    Initials Name Provider Type    Augusta Marroquin, OT Occupational Therapist               Goals/Plan    No documentation.                Clinical Impression     Row Name 10/11/21 1315          Pain Scale: Numbers Pre/Post-Treatment    Pain Intervention(s) Emotional support; Repositioned; Ambulation/increased activity  -SD     Row Name 10/11/21 1315          Pain Scale: FACES Pre/Post-Treatment    Pain: FACES Scale, Pretreatment 0-->no hurt  -SD     Posttreatment Pain Rating 2-->hurts little bit  -SD     Pain Location - Side Right  -SD     Pain Location shoulder  -SD     Row Name 10/11/21 1315          Plan of Care Review    Plan of Care Reviewed With patient; daughter  -SD     Progress improving  -SD     Outcome Summary Pt. alert & following 1-step commands. Therapist spoke loudly into L ear, d/t pt. extremely [place]. Bed mobility: min A, STS: CGA, bed to chair t/f: min A using RWx. Pt. required set up for grooming tasks & drinking through straw. Pt. very cooperative & motivated to get OOB. Pt.'s dtr. stated that pt. sits very little at home. Will cont to progress pt. as able per POC. Recommend home with 24/7 care & HHOT upon d/c.  -SD     Row Name 10/11/21 1315          Therapy Assessment/Plan (OT)    Rehab Potential (OT) good, to achieve stated therapy goals  -SD     Criteria for Skilled Therapeutic Interventions Met (OT) yes; meets criteria; skilled treatment is necessary  -SD     Therapy Frequency (OT) daily  -SD     Row Name 10/11/21 1315          Therapy Plan Review/Discharge Plan (OT)    Anticipated Discharge Disposition (OT) home with 24/7 care;  home with home health  -SD     Row Name 10/11/21 1315          Vital Signs    O2 Delivery Pre Treatment room air  -SD     O2 Delivery Intra Treatment room air  -SD     O2 Delivery Post Treatment room air  -SD     Pre Patient Position Supine  -SD     Intra Patient Position Standing  -SD     Post Patient Position Sitting  -SD     Row Name 10/11/21 1315          Positioning and Restraints    Pre-Treatment Position in bed  -SD     Post Treatment Position chair  -SD     In Chair notified nsg; reclined; call light within reach; encouraged to call for assist; exit alarm on; waffle cushion; legs elevated; RUE elevated  -SD           User Key  (r) = Recorded By, (t) = Taken By, (c) = Cosigned By    Initials Name Provider Type    Augusta Marroquin, OT Occupational Therapist               Outcome Measures     Row Name 10/11/21 1315          How much help from another is currently needed...    Putting on and taking off regular lower body clothing? 2  -SD     Bathing (including washing, rinsing, and drying) 2  -SD     Toileting (which includes using toilet bed pan or urinal) 2  -SD     Putting on and taking off regular upper body clothing 3  -SD     Taking care of personal grooming (such as brushing teeth) 3  -SD     Eating meals 3  -SD     AM-PAC 6 Clicks Score (OT) 15  -SD     Row Name 10/11/21 0800          How much help from another person do you currently need...    Turning from your back to your side while in flat bed without using bedrails? 2  -SDA     Moving from lying on back to sitting on the side of a flat bed without bedrails? 2  -SDA     Moving to and from a bed to a chair (including a wheelchair)? 2  -SDA     Standing up from a chair using your arms (e.g., wheelchair, bedside chair)? 1  -SDA     Climbing 3-5 steps with a railing? 1  -SDA     To walk in hospital room? 1  -SDA     AM-PAC 6 Clicks Score (PT) 9  -SDA     Row Name 10/11/21 1315          Functional Assessment    Outcome Measure Options AM-PAC 6  Clicks Daily Activity (OT)  -SD           User Key  (r) = Recorded By, (t) = Taken By, (c) = Cosigned By    Initials Name Provider Type    Augusta Marroquin OT Occupational Therapist    Alma Rosa Otero, RN Registered Nurse                Occupational Therapy Education                 Title: PT OT SLP Therapies (In Progress)     Topic: Occupational Therapy (In Progress)     Point: ADL training (In Progress)     Description:   Instruct learner(s) on proper safety adaptation and remediation techniques during self care or transfers.   Instruct in proper use of assistive devices.              Learning Progress Summary           Patient Acceptance, E, NR by  at 10/8/2021 1350    Acceptance, E,TB, VU,NR by  at 10/8/2021 0135    Acceptance, E,TB, VU by SD at 10/6/2021 1847   Family Acceptance, E,TB, VU by SD at 10/6/2021 1847                   Point: Home exercise program (Done)     Description:   Instruct learner(s) on appropriate technique for monitoring, assisting and/or progressing therapeutic exercises/activities.              Learning Progress Summary           Patient Acceptance, E,TB, VU,NR by  at 10/10/2021 0100    Acceptance, E,TB, VU,NR by  at 10/9/2021 0751    Acceptance, E,TB, VU,NR by  at 10/8/2021 0135    Acceptance, E,TB, VU by SD at 10/6/2021 1847   Family Acceptance, E,TB, VU,NR by  at 10/9/2021 0751    Acceptance, E,TB, VU by SD at 10/6/2021 1847                   Point: Precautions (In Progress)     Description:   Instruct learner(s) on prescribed precautions during self-care and functional transfers.              Learning Progress Summary           Patient Acceptance, E, NR by  at 10/8/2021 1350    Acceptance, E,TB, VU,NR by  at 10/8/2021 0135    Acceptance, E,TB, VU by SD at 10/6/2021 1847   Family Acceptance, E,TB, VU by SD at 10/6/2021 1847                   Point: Body mechanics (In Progress)     Description:   Instruct learner(s) on proper positioning and spine alignment  during self-care, functional mobility activities and/or exercises.              Learning Progress Summary           Patient Acceptance, E, NR by  at 10/8/2021 1350    Acceptance, E,TB, VU,NR by  at 10/8/2021 0135    Acceptance, E,TB, VU by SD at 10/6/2021 1847   Family Acceptance, E,TB, VU by SD at 10/6/2021 1847                               User Key     Initials Effective Dates Name Provider Type Discipline     09/02/21 -  Ness Travis OT Occupational Therapist OT    SD 06/16/21 -  Alma Rosa Chen, RN Registered Nurse Nurse     08/12/20 -  Vi Ga RN Registered Nurse Nurse              OT Recommendation and Plan  Therapy Frequency (OT): daily  Plan of Care Review  Plan of Care Reviewed With: patient, daughter  Progress: improving  Outcome Summary: Pt. alert & following 1-step commands. Therapist spoke loudly into L ear, d/t pt. extremely [place]. Bed mobility: min A, STS: CGA, bed to chair t/f: min A using RWx. Pt. required set up for grooming tasks & drinking through straw. Pt. very cooperative & motivated to get OOB. Pt.'s dtr. stated that pt. sits very little at home. Will cont to progress pt. as able per POC. Recommend home with 24/7 care & HHOT upon d/c.     Time Calculation:    Time Calculation- OT     Row Name 10/11/21 1348             Time Calculation- OT    OT Received On 10/11/21  -SD      OT Goal Re-Cert Due Date 10/18/21  -SD              Timed Charges    41500 - OT Therapeutic Activity Minutes 14  -SD      99068 - OT Self Care/Mgmt Minutes 10  -SD              Total Minutes    Timed Charges Total Minutes 24  -SD       Total Minutes 24  -SD            User Key  (r) = Recorded By, (t) = Taken By, (c) = Cosigned By    Initials Name Provider Type    Augusta Marroquin OT Occupational Therapist              Therapy Charges for Today     Code Description Service Date Service Provider Modifiers Qty    24120879112  OT THERAPEUTIC ACT EA 15 MIN 10/11/2021 Augusta Meyer OT GO  1    09701806015 HC OT SELF CARE/MGMT/TRAIN EA 15 MIN 10/11/2021 Augusta Meyer, OT GO 1               Augusta Meyer, OT  10/11/2021

## 2021-10-11 NOTE — PROGRESS NOTES
[facility] Medicine Services  PROGRESS NOTE    Patient Name: Keila Alberts  : 7/15/1928  MRN: [id number]    Date of Admission: 10/6/2021  Primary Care Physician: Ronaldo Travis MD    Subjective   Subjective     CC:  Dementia    HPI:  Spoke w daughter at bedside, she is more sleepy today. Seroquel is apparently a new home medication    ROS:   unable to obtain because of the patient current medical condition    Objective   Objective     Vital Signs:   Temp:  [98 °F (36.7 °C)-98.3 °F (36.8 °C)] 98.1 °F (36.7 °C)  Heart Rate:  [72-78] 72  Resp:  [16-18] 18  BP: (134-187)/(58-90) 187/84     Physical Exam:  General: Lethargic, disoriented and minimally conversant  Head: Atraumatic and normocephalic, without obvious abnormality  Eyes:   Conjunctivae and sclerae normal, no Icterus. No pallor  Ears:  Ears appear intact with no abnormalities noted  Throat: No oral lesions, no thrush, oral mucosa moist  Neck: Supple, trachea midline, no thyromegaly  Back:   No kyphoscoliosis present. No tenderness to palpation,   no sacral edema  Lungs: Clear to auscultation bilaterally, equal air entry, no wheezing or crackles  Heart:  Normal S1 and S2, no murmur, no gallop, No JVD, no lower extremity swelling  Abdomen:  Soft, no tenderness, no organomegaly, normal bowel sounds  Normal bowel sounds, no masses, no organomegaly. Soft, nontender, nondistended, no guarding, no rebound tenderness.  Extremities: No gross abnormalities, no clubbing, pulses palpable and equal bilaterally  Skin: No bleeding, bruising or rash, normal skin turgor and elasticity  Neurologic: Nonfocal  Psych: Completely disoriented     Results Reviewed:  LAB RESULTS:      Lab 10/08/21  0707 10/07/21  0434 10/06/21  1217   WBC 10.50 10.57 10.72   HEMOGLOBIN 9.5* 8.8* 10.5*   HEMATOCRIT 28.5* 25.5* 31.2*   PLATELETS 352 334 417   NEUTROS ABS 5.30 6.39 7.55*   IMMATURE GRANS (ABS) 0.06* 0.05 0.07*   LYMPHS ABS 1.94 1.78 1.34   MONOS ABS 0.97*  0.94* 0.78   EOS ABS 2.12* 1.31* 0.90*   .4* 95.9 98.1*   CRP 2.16*  --   --    PROCALCITONIN 0.06  --   --          Lab 10/09/21  0850 10/08/21  0707 10/07/21  0434 10/06/21  1217   SODIUM 133* 130* 130* 127*   POTASSIUM 3.7 4.0 3.4* 3.9   CHLORIDE 101 98 94* 91*   CO2 23.0 21.0* 25.0 23.0   ANION GAP 9.0 11.0 11.0 13.0   BUN 11 17 13 17   CREATININE 0.72 0.88 0.77 0.97   GLUCOSE 99 99 104* 133*   CALCIUM 8.2 8.5 8.4 9.4   MAGNESIUM  --  1.9  --  2.1   PHOSPHORUS  --  3.3  --   --          Lab 10/08/21  0707 10/06/21  1217   TOTAL PROTEIN 6.0 7.2   ALBUMIN 3.40* 4.20   GLOBULIN 2.6 3.0   ALT (SGPT) 17 32   AST (SGOT) 16 17   BILIRUBIN 0.3 0.5   ALK PHOS 73 80         Lab 10/06/21  1217   TROPONIN T <0.010                 Brief Urine Lab Results  (Last result in the past 365 days)      Color   Clarity   Blood   Leuk Est   Nitrite   Protein   CREAT   Urine HCG        10/06/21 1324 Yellow   Cloudy   Negative   Small (1+)   Negative   Trace                 Microbiology Results Abnormal     Procedure Component Value - Date/Time    Urine Culture - Urine, Urine, Catheter [[id number]] Collected: 10/06/21 1324    Lab Status: Final result Specimen: Urine, Catheter Updated: 10/07/21 1050     Urine Culture <10,000 CFU/mL Mixed Tony Isolated    Narrative:      Specimen contains mixed organisms of questionable pathogenicity which indicates contamination with commensal tony.  Further identification is unlikely to provide clinically useful information.  Suggest recollection.    COVID PRE-OP / PRE-PROCEDURE SCREENING ORDER (NO ISOLATION) - Swab, Nasopharynx [[id number]]  (Normal) Collected: 10/06/21 2137    Lab Status: Final result Specimen: Swab from Nasopharynx Updated: 10/06/21 2214    Narrative:      The following orders were created for panel order COVID PRE-OP / PRE-PROCEDURE SCREENING ORDER (NO ISOLATION) - Swab, Nasopharynx.  Procedure                               Abnormality         Status                      ---------                               -----------         ------                     COVID-19, ABBOTT IN-HOUS...[[id number]]  Normal              Final result                 Please view results for these tests on the individual orders.    COVID-19, ABBOTT IN-HOUSE,NASAL Swab (NO TRANSPORT MEDIA) 2 HR TAT - Swab, Nasopharynx [[id number]]  (Normal) Collected: 10/06/21 2137    Lab Status: Final result Specimen: Swab from Nasopharynx Updated: 10/06/21 2214     COVID19 Presumptive Negative    Narrative:      Fact sheet for providers: https://www.fda.gov/media/600881/download     Fact sheet for patients: https://www.fda.gov/media/977347/download    Test performed by PCR.  If inconsistent with clinical signs and symptoms patient should be tested with different authorized molecular test.          No radiology results from the last 24 hrs    Results for orders placed during the hospital encounter of 03/16/18    Adult Transthoracic Echo Complete W/ Cont if Necessary Per Protocol    Interpretation Summary  · Left ventricular systolic function is hyperdynamic (EF > 70).  · Left ventricular diastolic dysfunction (grade I) consistent with impaired relaxation.  · Moderate to severe aortic valve regurgitation is present.  · Mild aortic valve stenosis is present.      I have reviewed the medications:  Scheduled Meds:amLODIPine, 5 mg, Oral, Daily  cetirizine, 5 mg, Oral, Daily  citalopram, 40 mg, Oral, Daily  docusate sodium, 200 mg, Oral, Daily  losartan, 100 mg, Oral, Daily  mirtazapine, 15 mg, Oral, Nightly  QUEtiapine, 25 mg, Oral, Nightly  sodium chloride, 10 mL, Intravenous, Q12H      Continuous Infusions:   PRN Meds:.•  acetaminophen  •  senna-docusate sodium **AND** [DISCONTINUED] polyethylene glycol **AND** bisacodyl **AND** bisacodyl  •  hydrALAZINE  •  HYDROcodone-acetaminophen  •  influenza vaccine  •  magnesium hydroxide  •  QUEtiapine  •  sodium chloride  •  sodium chloride    Assessment/Plan   Assessment & Plan      Active Hospital Problems    Diagnosis  POA   • **Dementia (HCC) [F03.90]  Yes   • [place] (hard of hearing) [H91.90]  Yes   • Legally blind [H54.8]  Yes   • Acute constipation [K59.00]  Yes   • Back pain [M54.9]  Yes   • Heart murmur [R01.1]  Yes   • Macular degeneration [H35.30]  Yes   • Hyponatremia [E87.1]  Yes   • Chronic idiopathic constipation [K59.04]  Yes   • Essential hypertension [I10]  Yes   • History of subdural hematoma (post traumatic) [Z87.828]  Not Applicable   • Normocytic anemia [D64.9]  Yes      Resolved Hospital Problems   No resolved problems to display.        Brief Hospital Course to date:  Keila Alberts is a 89 y.o. female patient with past medical history of essential hypertension, dyslipidemia, arthritis, legally blind with macular degeneration, subdural hematoma status post craniotomy in 2016, and dementia who presented to the hospital with worsening dementia, recent fall with worsening back pain.    Assessment and plan:  Worsening dementia with behavioral changes  · Dementia has been getting worse over the last 3 weeks  · CT head from 09/29/2021 is on remarkable for any acute changes. MRI brain showed severe, chronic small-vessel ischemic disease with mild cerebral atrophy / age-related volume loss   · Thyroid panel and B12 is within normal limits  · No infectious etiology identified  · Appreciate neurology evaluation - neurology feels this her worsening is normal progression of her dementia  · She has been seen by neurology service on outpatient basis and was started on Seroquel  · Continue Remeron 15mg QHS   · Will change seroquel to QHS given increased daytime drowsiness  · Family interested in placement.  CM is working on disposition    Hyponatremia  · Mild and chronic  · Asymptomatic, continue to encourage p.o. intake    Constipation  · Bowel regimen    Back pain  Recent fall  · CT lumbar spine done in ED showed chronic changes with no acute fracture  · As needed Norco  · Continue PT  and OT    Essential hypertension  · Continue losartan and Norvasc    Anemia of chronic disease  · Stable hemoglobin  · Continue to monitor intermittently     DVT prophylaxis:  Mechanical DVT prophylaxis orders are present.       AM-PAC 6 Clicks Score (PT): 9 (10/11/21 0800)    Disposition: I expect the patient to be discharged to be determined    CODE STATUS:   Code Status and Medical Interventions:   Ordered at: 10/06/21 1958     Level Of Support Discussed With:    Health Care Surrogate     Code Status:    No CPR     Medical Interventions (Level of Support Prior to Arrest):    Full     Comments:    DNR/ DNI     Addendum:  The patient nurse/ Mrs. Bella voiced her concern that the patient has been sleeping whole day long till 4 PM and unable to wake up to eat or drink.  Will decrease night dose Seroquel from 50 mg to 25 mg    Summer Lozada MD  10/11/21

## 2021-10-11 NOTE — PLAN OF CARE
Problem: Adult Inpatient Plan of Care  Goal: Plan of Care Review  Recent Flowsheet Documentation  Taken 10/11/2021 1315 by Augusta Meyer OT  Progress: improving  Plan of Care Reviewed With:   patient   daughter  Outcome Summary: Pt. alert & following 1-step commands. Therapist spoke loudly into L ear, d/t pt. extremely [place]. Bed mobility: min A, STS: CGA, bed to chair t/f: min A using RWx. Pt. required set up for grooming tasks & drinking through straw. Pt. very cooperative & motivated to get OOB. Pt.'s dtr. stated that pt. sits very little at home. Will cont to progress pt. as able per POC. Recommend home with 24/7 care & HHOT upon d/c.   Goal Outcome Evaluation:  Plan of Care Reviewed With: patient, daughter        Progress: improving  Outcome Summary: Pt. alert & following 1-step commands. Therapist spoke loudly into L ear, d/t pt. extremely [place]. Bed mobility: min A, STS: CGA, bed to chair t/f: min A using RWx. Pt. required set up for grooming tasks & drinking through straw. Pt. very cooperative & motivated to get OOB. Pt.'s dtr. stated that pt. sits very little at home. Will cont to progress pt. as able per POC. Recommend home with 24/7 care & HHOT upon d/c.

## 2021-10-11 NOTE — PLAN OF CARE
Problem: Adult Inpatient Plan of Care  Goal: Plan of Care Review  Outcome: Ongoing, Progressing  Flowsheets (Taken 10/11/2021 0336)  Outcome Summary: Pt continues to be confused, trying to get out of bed. Alarm on.  Slept poorly throughout night.  Continue to monitor.   Goal Outcome Evaluation:              Outcome Summary: Pt continues to be confused, trying to get out of bed. Alarm on.  Slept poorly throughout night.  Continue to monitor.

## 2021-10-11 NOTE — CASE MANAGEMENT/SOCIAL WORK
Discharge Planning Assessment  [facility]     Patient Name: Keila Alberts  MRN: [id number]  Today's Date: 10/11/2021    Admit Date: 10/6/2021     Discharge Needs Assessment    No documentation.                Discharge Plan     Row Name 10/11/21 1314       Plan    Plan SNF to LTC    Patient/Family in Agreement with Plan yes    Plan Comments Called and spoke with liaison, Grace, with [facility]. Added a referral for [facility]. They are no longer on hold for admissions. PT/OT following and still rec SNF. Last BM 10/10. Waiting now to hear from referrals.    Final Discharge Disposition Code 30 - still a patient              Continued Care and Services - Admitted Since 10/6/2021     Destination     Service Provider Request Status Selected Services Address Phone Fax Patient Preferred    [facility]  Pending - No Request Sent N/A [address], [facility] [address] [phone] --       Internal Comment last updated by Nishi Robles, RN 10/7/2021 0856    10/7 referral called to [facility]  Pending - No Request Sent N/A [address] [facility] [address] [phone] --       Internal Comment last updated by Nishi Robles, RN 10/7/2021 0926    10/7 [place] [facility] [place]  Pending - No Request Sent N/A [address] [facility] [address] [phone] --       Internal Comment last updated by Nishi Robles, RN 10/7/2021 0936    10/7 referral called to [facility] [place] [facility]  Pending - No Request Sent N/A [address] [facility] [address] [phone] --       Internal Comment last updated by Nishi Robles, RN 10/11/2021 1313    10/11 referral called to [facility]                            Demographic Summary    No documentation.                Functional Status    No  documentation.                Psychosocial    No documentation.                Abuse/Neglect    No documentation.                Legal    No documentation.                Substance Abuse    No documentation.                Patient Forms    No documentation.                   Nishi Robles RN

## 2021-10-12 NOTE — PROGRESS NOTES
Clinical Nutrition   Reason For Visit: [facility]    Patient Name: Keila Alberts  YOB: 1928  MRN: [id number]  Date of Encounter: 10/12/21 14:44 EDT  Admission date: 10/6/2021      -Ordered Boost Plus with each meal.  -Communicated food preferences to kitchen.  -Communicated need for meal set up/feeding assistance with all meals to nursing staff.    Nutrition Assessment     Admission Problem List:  Dementia with worsening behavior  Hyponatremia  [place]  Legally blind  Acute on chronic constipation  Back pain      Applicable PMH:  HTN, HLD  [place]  Dementia  Legally blind  SDH s/p craniotomy (2016)  Anemia  Chronic idiopathic constipation      Reported/Observed/Food/Nutrition Related History   RD notes patient has had poor PO intake this admission. RD spoke with dtr who reports it is due to combination of factors: sleepy, food preferences, legally blind and need for assistance at mealtimes. Dtr provided RD with patient's food preferences/future meal orders and requested that strawberry Boost Plus be ordered with meals. Patient typically eats breakfast and dinner daily with a light snack between these meals (e.g. Boost, cookie, muffin). States patient has been in the process of having teeth removed so she does best with soft textures at this time.    Likes: scrambled eggs/omelette, breakfast meats, toast with butter, fresh fruit, milk, coffee, mashed potatoes, roast, vegetables, milkshakes, ice cream, cookies, muffins, chicken salad, soups, baked potato.    Dislikes: salads    Anthropometrics   Height: 63 in  Weight: 126 lbs (bed scale weight 10/7 per ns doc)  BMI: 22.4  BMI classification: Normal: 18.5-24.9kg/m2   IBW: 115 lbs    Labs reviewed   Labs reviewed: Yes    Medications reviewed   Medications reviewed: Yes  Pertinent: colace, remeron    Current Nutrition Prescription   PO: Diet Regular; Cardiac    Average PO intake: 16% x 12 meals    Nutrition Diagnosis     Problem Inadequate oral intake   Etiology  Decreased appetite, food preferences, dementia, sleepy   Signs/Symptoms PO intake: 16% x 12 meals     Intervention   Intervention: Follow treatment progress, Care plan reviewed, Advise alternate selection, Interview for preferences, Encourage intake, Supplement provided    -Ordered Boost Plus with each meal.  -Communicated food preferences to kitchen.  -Communicated need for meal set up/feeding assistance with all meals to nursing staff.    Goal:   General: Nutrition support treatment  PO: Increase intake    Monitoring/Evaluation:   Monitoring/Evaluation: Per protocol, PO intake, Supplement intake, Pertinent labs, Weight, Symptoms    Indigo Villalba RD  Time Spent: 30 min

## 2021-10-12 NOTE — PLAN OF CARE
Goal Outcome Evaluation:  Plan of Care Reviewed With: patient        Progress: no change  Outcome Summary: Outcome Summary: VSS.  89 year old white female that was admitted on 10/6/2021 for frequent falls and forgetfulness at home.  She is on day 6 of this hospitalization.    She has an IV in place and patent now to saline lock.  She has a brief in place.  Have administered scheduled and medications as indicated.  Patient is confused and needs to be reminded and refocused to stay in bed and that she is not get up without assistance.  Increased frequency of patient rounding to keep her safe from a fall.  Will continue to monitor patient throught the rest of this shift.

## 2021-10-12 NOTE — PROGRESS NOTES
[facility] Medicine Services  PROGRESS NOTE    Patient Name: Keila Alberts  : 7/15/1928  MRN: [id number]    Date of Admission: 10/6/2021  Primary Care Physician: Ronaldo Travis MD    Subjective   Subjective     CC:  Dementia    HPI:  More alert today    ROS:   unable to obtain because of the patient current medical condition    Objective   Objective     Vital Signs:   Temp:  [97.7 °F (36.5 °C)-98.3 °F (36.8 °C)] 98 °F (36.7 °C)  Heart Rate:  [68-78] 78  Resp:  [16-17] 16  BP: (105-178)/(54-78) 146/78     Physical Exam:  General: alert, but not oriented   Head: Atraumatic and normocephalic, without obvious abnormality  Eyes:   Conjunctivae and sclerae normal, no Icterus. No pallor  Ears:  Ears appear intact with no abnormalities noted  Throat: No oral lesions, no thrush, oral mucosa moist  Neck: Supple, trachea midline, no thyromegaly  Back:   No kyphoscoliosis present. No tenderness to palpation,   no sacral edema  Lungs: Clear to auscultation bilaterally, equal air entry, no wheezing or crackles  Heart:  Normal S1 and S2, no murmur, no gallop, No JVD, no lower extremity swelling  Abdomen:  Soft, no tenderness, no organomegaly, normal bowel sounds  Normal bowel sounds, no masses, no organomegaly. Soft, nontender, nondistended, no guarding, no rebound tenderness.  Extremities: No gross abnormalities, no clubbing, pulses palpable and equal bilaterally  Skin: No bleeding, bruising or rash, normal skin turgor and elasticity  Neurologic: Nonfocal  Psych: Completely disoriented     Results Reviewed:  LAB RESULTS:      Lab 10/08/21  0707 10/07/21  0434 10/06/21  1217   WBC 10.50 10.57 10.72   HEMOGLOBIN 9.5* 8.8* 10.5*   HEMATOCRIT 28.5* 25.5* 31.2*   PLATELETS 352 334 417   NEUTROS ABS 5.30 6.39 7.55*   IMMATURE GRANS (ABS) 0.06* 0.05 0.07*   LYMPHS ABS 1.94 1.78 1.34   MONOS ABS 0.97* 0.94* 0.78   EOS ABS 2.12* 1.31* 0.90*   .4* 95.9 98.1*   CRP 2.16*  --   --    PROCALCITONIN 0.06   --   --          Lab 10/09/21  0850 10/08/21  0707 10/07/21  0434 10/06/21  1217   SODIUM 133* 130* 130* 127*   POTASSIUM 3.7 4.0 3.4* 3.9   CHLORIDE 101 98 94* 91*   CO2 23.0 21.0* 25.0 23.0   ANION GAP 9.0 11.0 11.0 13.0   BUN 11 17 13 17   CREATININE 0.72 0.88 0.77 0.97   GLUCOSE 99 99 104* 133*   CALCIUM 8.2 8.5 8.4 9.4   MAGNESIUM  --  1.9  --  2.1   PHOSPHORUS  --  3.3  --   --          Lab 10/08/21  0707 10/06/21  1217   TOTAL PROTEIN 6.0 7.2   ALBUMIN 3.40* 4.20   GLOBULIN 2.6 3.0   ALT (SGPT) 17 32   AST (SGOT) 16 17   BILIRUBIN 0.3 0.5   ALK PHOS 73 80         Lab 10/06/21  1217   TROPONIN T <0.010                 Brief Urine Lab Results  (Last result in the past 365 days)      Color   Clarity   Blood   Leuk Est   Nitrite   Protein   CREAT   Urine HCG        10/06/21 1324 Yellow   Cloudy   Negative   Small (1+)   Negative   Trace                 Microbiology Results Abnormal     Procedure Component Value - Date/Time    Urine Culture - Urine, Urine, Catheter [[id number]] Collected: 10/06/21 1324    Lab Status: Final result Specimen: Urine, Catheter Updated: 10/07/21 1050     Urine Culture <10,000 CFU/mL Mixed Tony Isolated    Narrative:      Specimen contains mixed organisms of questionable pathogenicity which indicates contamination with commensal tony.  Further identification is unlikely to provide clinically useful information.  Suggest recollection.    COVID PRE-OP / PRE-PROCEDURE SCREENING ORDER (NO ISOLATION) - Swab, Nasopharynx [[id number]]  (Normal) Collected: 10/06/21 2137    Lab Status: Final result Specimen: Swab from Nasopharynx Updated: 10/06/21 2214    Narrative:      The following orders were created for panel order COVID PRE-OP / PRE-PROCEDURE SCREENING ORDER (NO ISOLATION) - Swab, Nasopharynx.  Procedure                               Abnormality         Status                     ---------                               -----------         ------                     COVID-19, ABBOTT  IN-HOUS...[[id number]]  Normal              Final result                 Please view results for these tests on the individual orders.    COVID-19, ABBOTT IN-HOUSE,NASAL Swab (NO TRANSPORT MEDIA) 2 HR TAT - Swab, Nasopharynx [[id number]]  (Normal) Collected: 10/06/21 2137    Lab Status: Final result Specimen: Swab from Nasopharynx Updated: 10/06/21 2214     COVID19 Presumptive Negative    Narrative:      Fact sheet for providers: https://www.fda.gov/media/326204/download     Fact sheet for patients: https://www.fda.gov/media/795513/download    Test performed by PCR.  If inconsistent with clinical signs and symptoms patient should be tested with different authorized molecular test.          No radiology results from the last 24 hrs    Results for orders placed during the hospital encounter of 03/16/18    Adult Transthoracic Echo Complete W/ Cont if Necessary Per Protocol    Interpretation Summary  · Left ventricular systolic function is hyperdynamic (EF > 70).  · Left ventricular diastolic dysfunction (grade I) consistent with impaired relaxation.  · Moderate to severe aortic valve regurgitation is present.  · Mild aortic valve stenosis is present.      I have reviewed the medications:  Scheduled Meds:amLODIPine, 5 mg, Oral, Daily  cetirizine, 5 mg, Oral, Daily  citalopram, 40 mg, Oral, Daily  docusate sodium, 200 mg, Oral, Daily  losartan, 100 mg, Oral, Daily  mirtazapine, 15 mg, Oral, Nightly  QUEtiapine, 25 mg, Oral, Nightly  sodium chloride, 10 mL, Intravenous, Q12H      Continuous Infusions:   PRN Meds:.•  acetaminophen  •  senna-docusate sodium **AND** [DISCONTINUED] polyethylene glycol **AND** bisacodyl **AND** bisacodyl  •  hydrALAZINE  •  HYDROcodone-acetaminophen  •  influenza vaccine  •  magnesium hydroxide  •  QUEtiapine  •  sodium chloride  •  sodium chloride    Assessment/Plan   Assessment & Plan     Active Hospital Problems    Diagnosis  POA   • **Dementia (HCC) [F03.90]  Yes   • [place] (hard of hearing)  [H91.90]  Yes   • Legally blind [H54.8]  Yes   • Acute constipation [K59.00]  Yes   • Back pain [M54.9]  Yes   • Heart murmur [R01.1]  Yes   • Macular degeneration [H35.30]  Yes   • Hyponatremia [E87.1]  Yes   • Chronic idiopathic constipation [K59.04]  Yes   • Essential hypertension [I10]  Yes   • History of subdural hematoma (post traumatic) [Z87.828]  Not Applicable   • Normocytic anemia [D64.9]  Yes      Resolved Hospital Problems   No resolved problems to display.        Brief Hospital Course to date:  Keila Alberts is a 89 y.o. female patient with past medical history of essential hypertension, dyslipidemia, arthritis, legally blind with macular degeneration, subdural hematoma status post craniotomy in 2016, and dementia who presented to the hospital with worsening dementia, recent fall with worsening back pain.    Assessment and plan:  Worsening dementia with behavioral changes  · Dementia has been getting worse over the last 3 weeks  · CT head from 09/29/2021 is on remarkable for any acute changes. MRI brain showed severe, chronic small-vessel ischemic disease with mild cerebral atrophy / age-related volume loss   · Thyroid panel and B12 is within normal limits  · No infectious etiology identified  · Appreciate neurology evaluation - neurology feels this her worsening is normal progression of her dementia  · She has been seen by neurology service on outpatient basis and was started on Seroquel  · Continue Remeron 15mg QHS   · seroquel adjusted to address daytime drowsiness  · Family interested in placement.  CM is working on disposition    Hyponatremia  · Mild and chronic  · Asymptomatic, continue to encourage p.o. intake    Constipation  · Bowel regimen    Back pain  Recent fall  · CT lumbar spine done in ED showed chronic changes with no acute fracture  · As needed Norco  · Continue PT and OT    Essential hypertension  · Continue losartan and Norvasc    Anemia of chronic disease  · Stable  hemoglobin  · Continue to monitor intermittently     DVT prophylaxis:  Mechanical DVT prophylaxis orders are present.       AM-PAC 6 Clicks Score (PT): 18 (10/12/21 1430)    Disposition: I expect the patient to be discharged to be determined    CODE STATUS:   Code Status and Medical Interventions:   Ordered at: 10/06/21 1958     Level Of Support Discussed With:    Health Care Surrogate     Code Status:    No CPR     Medical Interventions (Level of Support Prior to Arrest):    Full     Comments:    DNR/ DNI         Summer Lozada MD  10/12/21

## 2021-10-12 NOTE — THERAPY TREATMENT NOTE
Patient Name: Keila Alberts  : 7/15/1928    MRN: [id number]                              Today's Date: 10/12/2021       Admit Date: 10/6/2021    Visit Dx:     ICD-10-CM ICD-9-CM   1. Recurrent falls  R29.6 V15.88   2. Acute midline low back pain without sciatica  M54.50 724.2   3. Dementia without behavioral disturbance, unspecified dementia type (HCC)  F03.90 294.20   4. Impaired functional mobility, balance, gait, and endurance  Z74.09 V49.89     Patient Active Problem List   Diagnosis   • History of subdural hematoma (post traumatic)   • Essential hypertension   • Chronic idiopathic constipation   • Normocytic anemia   • s/p Left Craniotomy for Evacuation of Left SDH 16    • Hyponatremia   • 6.9 x 5.3cm pelvic mass, seen on imaging prior to admission   • Closed left hip fracture, s/p left hip percutaneous pinning   • Macular degeneration   • Heart murmur   • [place] (hard of hearing)   • Legally blind   • Acute constipation   • Back pain   • Dementia (HCC)     Past Medical History:   Diagnosis Date   • Acute/Subacute Traumatic SDH (subdural hematoma) 2016   • Arthritis    • Dementia (HCC)    • Hyperlipidemia    • Hypertension    • Legally blind     2/2 macular degeneration    • Macular degeneration    • Memory loss      Past Surgical History:   Procedure Laterality Date   • APPENDECTOMY     • HALIAM HOLE Left 2016    Procedure: HALIMA HOLE;  Surgeon: Jos Christian MD;  Location:  [facility];  Service:    • CRANIOTOMY     • HIP PERCUTANEOUS PINNING Left 2017    Procedure: HIP PERCUTANEOUS PINNING;  Surgeon: Edgar Albert MD;  Location:  [facility];  Service:    • JOINT REPLACEMENT     • KNEE ARTHROPLASTY, PARTIAL REPLACEMENT     • SHOULDER ARTHROSCOPY     • TOTAL HIP ARTHROPLASTY Bilateral    • TOTAL SHOULDER REPLACEMENT Left       General Information     Row Name 10/12/21 6934          Physical Therapy Time and Intention    Mode of Treatment physical therapy  -MB     [place]  Name 10/12/21 1430          General Information    Patient Profile Reviewed yes  -MB     Existing Precautions/Restrictions fall; other (see comments)  dementia  -MB     Barriers to Rehab medically complex; previous functional deficit; cognitive status; hearing deficit; visual deficit  -MB     Row Name 10/12/21 1430          Cognition    Orientation Status (Cognition) oriented to; person  -MB     Row Name 10/12/21 1430          Safety Issues, Functional Mobility    Safety Issues Affecting Function (Mobility) awareness of need for assistance; insight into deficits/self-awareness; judgment; positioning of assistive device; safety precaution awareness; safety precautions follow-through/compliance  -MB     Impairments Affecting Function (Mobility) balance; cognition; endurance/activity tolerance; strength; postural/trunk control  -MB           User Key  (r) = Recorded By, (t) = Taken By, (c) = Cosigned By    Initials Name Provider Type    Yesica Quiroz, PT Physical Therapist               Mobility     Row Name 10/12/21 1430          Bed Mobility    Supine-Sit [place] (Bed Mobility) contact guard; verbal cues; nonverbal cues (demo/gesture)  -MB     Sit-Supine [place] (Bed Mobility) contact guard; verbal cues; nonverbal cues (demo/gesture)  -MB     Assistive Device (Bed Mobility) bed rails  -MB     Comment (Bed Mobility) Pt. required assist to support trunk.  -MB     Row Name 10/12/21 1430          Transfers    Comment (Transfers) STS from EOB/toilet w/ VCs/tactile cues for safe hand placement and chair(toilet) approach.  -MB     Row Name 10/12/21 1430          Sit-Stand Transfer    Sit-Stand [place] (Transfers) contact guard; verbal cues; nonverbal cues (demo/gesture)  -MB     Assistive Device (Sit-Stand Transfers) walker, front-wheeled  -MB     Row Name 10/12/21 1430          Gait/Stairs (Locomotion)    [place] Level (Gait) minimum assist (75% patient effort); verbal cues; nonverbal cues  (demo/gesture)  -MB     Assistive Device (Gait) walker, front-wheeled  -MB     Distance in Feet (Gait) 10ft x 2  -MB     Deviations/Abnormal Patterns (Gait) elaina decreased; base of support, narrow; stride length decreased  -MB     Bilateral Gait Deviations forward flexed posture; heel strike decreased  -MB     Comment (Gait/Stairs) Pt. amb w/ step through gait pattern w/ slow pace and required VCs/tactile cues for forward gaze and assist to manage RW.  -MB           User Key  (r) = Recorded By, (t) = Taken By, (c) = Cosigned By    Initials Name Provider Type    MB Yesica Goodson, PT Physical Therapist               Obj/Interventions     Row Name 10/12/21 1559          Motor Skills    Therapeutic Exercise shoulder; hip; knee; ankle  -MB     Row Name 10/12/21 1559          Shoulder (Therapeutic Exercise)    Shoulder (Therapeutic Exercise) AROM (active range of motion)  -MB     Shoulder AROM (Therapeutic Exercise) bilateral; flexion; aBduction; 10 repetitions  -MB     Row Name 10/12/21 1559          Hip (Therapeutic Exercise)    Hip (Therapeutic Exercise) strengthening exercise  -MB     Hip Strengthening (Therapeutic Exercise) bilateral; marching while seated; 10 repetitions  -MB     Row Name 10/12/21 1559          Knee (Therapeutic Exercise)    Knee (Therapeutic Exercise) strengthening exercise  -MB     Knee Strengthening (Therapeutic Exercise) bilateral; LAQ (long arc quad); 10 repetitions  -MB     Row Name 10/12/21 1559          Ankle (Therapeutic Exercise)    Ankle (Therapeutic Exercise) AROM (active range of motion)  -MB     Ankle AROM (Therapeutic Exercise) bilateral; dorsiflexion; plantarflexion; 10 repetitions  -MB     Row Name 10/12/21 1559          Balance    Static Standing Balance WFL  -MB     Dynamic Standing Balance mild impairment; supported  -MB     Balance Interventions standing; sit to stand; occupation based/functional task; weight shifting activity  -MB           User Key  (r) = Recorded By,  (t) = Taken By, (c) = Cosigned By    Initials Name Provider Type    Yesica Quiroz, PT Physical Therapist               Goals/Plan    No documentation.                Clinical Impression     Row Name 10/12/21 1600          Pain    Additional Documentation Pain Scale: FACES Pre/Post-Treatment (Group)  -MB     Row Name 10/12/21 1600          Pain Scale: FACES Pre/Post-Treatment    Pain: FACES Scale, Pretreatment 0-->no hurt  -MB     Posttreatment Pain Rating 0-->no hurt  -MB     Row Name 10/12/21 1600          Plan of Care Review    Plan of Care Reviewed With patient  -MB     Progress improving  -MB     Outcome Summary Patient limited by decreased safety awareness, but demonstrates improving independence w/ mobility.  She completed bed mobility w/ CGA, transfers w/ RW and CGA, and ambulated 10ft x 2 w/ RW and min A.  Patient also participated in seated BUE/BLE TherEx w/ good effort and no apparent distress.  Will continue to progress as medically appropriate.  -MB     Row Name 10/12/21 1600          Vital Signs    Pre Patient Position Supine  -MB     Intra Patient Position Standing  -MB     Post Patient Position Supine  -MB     Row Name 10/12/21 1600          Positioning and Restraints    Pre-Treatment Position in bed  -MB     Post Treatment Position bed  -MB     In Bed notified nsg; supine; call light within reach; encouraged to call for assist; exit alarm on  -MB           User Key  (r) = Recorded By, (t) = Taken By, (c) = Cosigned By    Initials Name Provider Type    Yesica Quiroz, PT Physical Therapist               Outcome Measures     Row Name 10/12/21 1430 10/12/21 0800       How much help from another person do you currently need...    Turning from your back to your side while in flat bed without using bedrails? 4  -MB 3  -JM    Moving from lying on back to sitting on the side of a flat bed without bedrails? 3  -MB 2  -JM    Moving to and from a bed to a chair (including a wheelchair)? 3  -MB  2  -JM    Standing up from a chair using your arms (e.g., wheelchair, bedside chair)? 3  -MB 2  -JM    Climbing 3-5 steps with a railing? 2  -MB 1  -JM    To walk in hospital room? 3  -MB 1  -JM    AM-PAC 6 Clicks Score (PT) 18  -MB 11  -JM    Row Name 10/12/21 1430          Functional Assessment    Outcome Measure Options AM-PAC 6 Clicks Basic Mobility (PT)  -MB           User Key  (r) = Recorded By, (t) = Taken By, (c) = Cosigned By    Initials Name Provider Type    Yesica Quiroz, PT Physical Therapist    Addie Solano, RN Registered Nurse                             Physical Therapy Education                 Title: PT OT SLP Therapies (In Progress)     Topic: Physical Therapy (In Progress)     Point: Mobility training (In Progress)     Learning Progress Summary           Patient Acceptance, E,TB, VU,NR by  at 10/12/2021 0024    Acceptance, E,TB, VU,NR by  at 10/8/2021 0135    Acceptance, E,TB, VU by SD at 10/6/2021 1847   Family Acceptance, E, NR by  at 10/8/2021 1441    Acceptance, E,TB, VU by SD at 10/6/2021 1847   Caregiver Acceptance, E,TB, VU,NR by  at 10/12/2021 0024                   Point: Home exercise program (In Progress)     Learning Progress Summary           Patient Acceptance, E,TB, VU,NR by  at 10/12/2021 0024    Acceptance, E,TB, VU,NR by  at 10/8/2021 0135    Acceptance, E,TB, VU by SD at 10/6/2021 1847   Family Acceptance, E, NR by  at 10/8/2021 1441    Acceptance, E,TB, VU by SD at 10/6/2021 1847   Caregiver Acceptance, E,TB, VU,NR by  at 10/12/2021 0024                   Point: Body mechanics (In Progress)     Learning Progress Summary           Patient Acceptance, E,TB, VU,NR by  at 10/12/2021 0024    Acceptance, E,TB, VU,NR by  at 10/8/2021 0135    Acceptance, E,TB, VU by SD at 10/6/2021 1847   Family Acceptance, E, NR by  at 10/8/2021 1441    Acceptance, E,TB, VU by SD at 10/6/2021 1847   Caregiver Acceptance, E,TB, VU,NR by  at 10/12/2021 0024                    Point: Precautions (In Progress)     Learning Progress Summary           Patient Acceptance, E,TB, VU,NR by  at 10/12/2021 0024    Acceptance, E,TB, VU,NR by  at 10/8/2021 0135    Acceptance, E,TB, VU by SD at 10/6/2021 1847   Family Acceptance, E, NR by  at 10/8/2021 1441    Acceptance, E,TB, VU by SD at 10/6/2021 1847   Caregiver Acceptance, E,TB, VU,NR by  at 10/12/2021 0024                               User Key     Initials Effective Dates Name Provider Type Discipline     06/16/21 -  Akiko Douglas, PT Physical Therapist PT    SD 06/16/21 -  Alma Rosa Chen, RN Registered Nurse Nurse     08/12/20 -  Vi Ga, RN Registered Nurse Nurse              PT Recommendation and Plan     Plan of Care Reviewed With: patient  Progress: improving  Outcome Summary: Patient limited by decreased safety awareness, but demonstrates improving independence w/ mobility.  She completed bed mobility w/ CGA, transfers w/ RW and CGA, and ambulated 10ft x 2 w/ RW and min A.  Patient also participated in seated BUE/BLE TherEx w/ good effort and no apparent distress.  Will continue to progress as medically appropriate.     Time Calculation:    PT Charges     Row Name 10/12/21 1603             Time Calculation    Start Time 1430  -MB      PT Received On 10/12/21  -MB      PT Goal Re-Cert Due Date 10/18/21  -MB              Time Calculation- PT    Total Timed Code Minutes- PT 30 minute(s)  -MB              Timed Charges    41608 - PT Therapeutic Exercise Minutes 20  -MB      23379 - Gait Training Minutes  10  -MB              Total Minutes    Timed Charges Total Minutes 30  -MB       Total Minutes 30  -MB            User Key  (r) = Recorded By, (t) = Taken By, (c) = Cosigned By    Initials Name Provider Type    Yesica Quiroz, PT Physical Therapist              Therapy Charges for Today     Code Description Service Date Service Provider Modifiers Qty    32837047464 HC PT THER PROC EA 15 MIN  10/12/2021 Yesica Goodson, PT GP 1    85588255903 HC GAIT TRAINING EA 15 MIN 10/12/2021 Yesica Goodson, PT GP 1          PT G-Codes  Outcome Measure Options: AM-PAC 6 Clicks Basic Mobility (PT)  AM-PAC 6 Clicks Score (PT): 18  AM-PAC 6 Clicks Score (OT): 15    Yesica Goodson, PT  10/12/2021

## 2021-10-12 NOTE — CASE MANAGEMENT/SOCIAL WORK
Discharge Planning Assessment  [facility]     Patient Name: Keila Alberts  MRN: [id number]  Today's Date: 10/12/2021    Admit Date: 10/6/2021     Discharge Needs Assessment    No documentation.                Discharge Plan     Row Name 10/12/21 0939       Plan    Plan SNF to LTC    Patient/Family in Agreement with Plan yes    Plan Comments Referral called again to [facility], [facility]. Waiting to hear from [facility].    Final Discharge Disposition Code 30 - still a patient              Continued Care and Services - Admitted Since 10/6/2021     Destination     Service Provider Request Status Selected Services Address Phone Fax Patient Preferred    [facility]  Pending - No [facility] N/[facility] [address] [facility] [address] [phone] --       Internal Comment last updated by Nishi Robles, RN 10/7/2021 0856    10/7 referral called to [facility]  Pending - No Request Sent N/A [address] [facility] [id number] [phone] --       Internal Comment last updated by Nishi Robles, RN 10/7/2021 0926    10/7 referral called to [facility]  Pending - No [facility] Sent N[facility]A [address] [facility] [address] [phone] --       Internal Comment last updated by Nishi Robles, RN 10/7/2021 0936    10/7 referral called to [facility]  Pending - No Request Sent N/A [address] [facility] [facility] [address] [phone] --       Internal Comment last updated by Nishi Robles, RN 10/11/2021 1313    10/11 referral called to [facility]                            Demographic Summary    No documentation.                Functional Status    No documentation.                Psychosocial    No documentation.                Abuse/Neglect    No documentation.                Legal     No documentation.                Substance Abuse    No documentation.                Patient Forms    No documentation.                   Nishi Robles RN

## 2021-10-12 NOTE — NURSING NOTE
Patient has been attempting to get up out of bed, states she has to go home.  Administered prn medications as indicated. See MAR

## 2021-10-13 NOTE — CASE MANAGEMENT/SOCIAL WORK
Discharge Planning Assessment  [facility]     Patient Name: Keila Alberts  MRN: [id number]  Today's Date: 10/13/2021    Admit Date: 10/6/2021     Discharge Needs Assessment    No documentation.                Discharge Plan     Row Name 10/13/21 1610       Plan    Plan SNF to LTC    Patient/Family in Agreement with Plan yes    Plan Comments [facility] declined due to no Medicaid bed available. Called and spoke with [facility] with [facility]. The referral is being reviewed.    Final Discharge Disposition Code 30 - still a patient              Continued Care and Services - Admitted Since 10/6/2021     Destination     Service Provider Request Status Selected Services Address Phone Fax Patient Preferred    [facility]  Pending - No Request Sent N/A [address] [facility] [id number] [phone] --       Internal Comment last updated by Nishi Robles, RN 10/7/2021 0926    10/7 referral called to [facility]  Pending - No Request Sent N/A [address] [address] [phone] --       Internal Comment last updated by Nishi Robles, RN 10/7/2021 0936    10/7 referral called to [facility]  Pending - No Request Sent N/A [address] [facility] [address] [phone] --       Internal Comment last updated by Nishi Robles, RN 10/11/2021 1313    10/11 [facility] called to [facility]  Declined  No Medicaid Bed Available N/A [address] [facility] [address] [phone] --       Internal Comment last updated by Nishi Robles, RN 10/7/2021 0856    10/7 referral called to [facility]                            Demographic Summary    No documentation.                Functional Status    No documentation.                Psychosocial    No documentation.                Abuse/Neglect     No documentation.                Legal    No documentation.                Substance Abuse    No documentation.                Patient Forms    No documentation.                   Nishi Robles RN

## 2021-10-13 NOTE — PLAN OF CARE
Goal Outcome Evaluation:  Plan of Care Reviewed With: patient        Progress: no change  Outcome Summary: Outcome Summary: VSS.  89 year old white female that was admitted on 10/6/2021 for frequent falls and forgetfulness at home.  She is on day 7 of this hospitalization.    She has an IV in place and patent now to saline lock.  She has a brief in place.  Have administered scheduled and medications as indicated.  Patient is confused and needs to be reminded and refocused to stay in bed and that she is not get up without assistance.  Increased frequency of patient rounding to keep her safe from a fall.  Will continue to monitor patient throught the rest of this shift.

## 2021-10-13 NOTE — PROGRESS NOTES
[facility] Medicine Services  PROGRESS NOTE    Patient Name: Keila Alberts  : 7/15/1928  MRN: [id number]    Date of Admission: 10/6/2021  Primary Care Physician: Ronaldo Travis MD    Subjective   Subjective   CC:  Dementia    HPI:  Patient sitting up in bed very alert and answering questions.  Patient ate all of her lunch but a small part of her potato and was discussing it.  Knows who she is and that she is in the hospital, but is still very confused.  No family in the room.    ROS:   unable to obtain because of the patient current medical condition    Objective   Objective   Vital Signs:   Temp:  [97.9 °F (36.6 °C)-98.2 °F (36.8 °C)] 97.9 °F (36.6 °C)  Heart Rate:  [63-78] 68  Resp:  [14-17] 17  BP: ()/(61-78) 98/61  Physical Exam:  Constitutional: Alert, pleasant younger than appears elderly female sitting up in bed in NAD  Eyes: EOMI, sclerae anicteric, no conjunctival injection  Head: NCAT  ENT: [place], moist mucous membranes   Respiratory: Nonlabored, symmetrical chest expansion, CTAB  Cardiovascular: RRR, no R/G, +murmur, +DP pulses bilaterally  Gastrointestinal: Soft, NT, ND +BS  Musculoskeletal: PITT; no LE edema bilaterally  Neurologic: Confused but oriented to self, strength symmetric in all extremities, follows all commands, speech clear  Skin: No rashes on exposed skin  Psychiatric: Pleasant and cooperative; normal affect    Results Reviewed:  LAB RESULTS:      Lab 10/08/21  0707 10/07/21  0434   WBC 10.50 10.57   HEMOGLOBIN 9.5* 8.8*   HEMATOCRIT 28.5* 25.5*   PLATELETS 352 334   NEUTROS ABS 5.30 6.39   IMMATURE GRANS (ABS) 0.06* 0.05   LYMPHS ABS 1.94 1.78   MONOS ABS 0.97* 0.94*   EOS ABS 2.12* 1.31*   .4* 95.9   CRP 2.16*  --    PROCALCITONIN 0.06  --          Lab 10/09/21  0850 10/08/21  0707 10/07/21  0434   SODIUM 133* 130* 130*   POTASSIUM 3.7 4.0 3.4*   CHLORIDE 101 98 94*   CO2 23.0 21.0* 25.0   ANION GAP 9.0 11.0 11.0   BUN 11 17 13   CREATININE 0.72  0.88 0.77   GLUCOSE 99 99 104*   CALCIUM 8.2 8.5 8.4   MAGNESIUM  --  1.9  --    PHOSPHORUS  --  3.3  --          Lab 10/08/21  0707   TOTAL PROTEIN 6.0   ALBUMIN 3.40*   GLOBULIN 2.6   ALT (SGPT) 17   AST (SGOT) 16   BILIRUBIN 0.3   ALK PHOS 73                     Brief Urine Lab Results  (Last result in the past 365 days)      Color   Clarity   Blood   Leuk Est   Nitrite   Protein   CREAT   Urine HCG        10/06/21 1324 Yellow   Cloudy   Negative   Small (1+)   Negative   Trace                 Microbiology Results Abnormal     Procedure Component Value - Date/Time    Urine Culture - Urine, Urine, Catheter [[id number]] Collected: 10/06/21 1324    Lab Status: Final result Specimen: Urine, Catheter Updated: 10/07/21 1050     Urine Culture <10,000 CFU/mL Mixed Tony Isolated    Narrative:      Specimen contains mixed organisms of questionable pathogenicity which indicates contamination with commensal tony.  Further identification is unlikely to provide clinically useful information.  Suggest recollection.    COVID PRE-OP / PRE-PROCEDURE SCREENING ORDER (NO ISOLATION) - Swab, Nasopharynx [[id number]]  (Normal) Collected: 10/06/21 2137    Lab Status: Final result Specimen: Swab from Nasopharynx Updated: 10/06/21 2214    Narrative:      The following orders were created for panel order COVID PRE-OP / PRE-PROCEDURE SCREENING ORDER (NO ISOLATION) - Swab, Nasopharynx.  Procedure                               Abnormality         Status                     ---------                               -----------         ------                     COVID-19, ABBOTT IN-HOUS...[[id number]]  Normal              Final result                 Please view results for these tests on the individual orders.    COVID-19, ABBOTT IN-HOUSE,NASAL Swab (NO TRANSPORT MEDIA) 2 HR TAT - Swab, Nasopharynx [[id number]]  (Normal) Collected: 10/06/21 2137    Lab Status: Final result Specimen: Swab from Nasopharynx Updated: 10/06/21 2214     COVID19  Presumptive Negative    Narrative:      Fact sheet for providers: https://www.fda.gov/media/817014/download     Fact sheet for patients: https://www.fda.gov/media/396715/download    Test performed by PCR.  If inconsistent with clinical signs and symptoms patient should be tested with different authorized molecular test.          No radiology results from the last 24 hrs    Results for orders placed during the hospital encounter of 03/16/18    Adult Transthoracic Echo Complete W/ Cont if Necessary Per Protocol    Interpretation Summary  · Left ventricular systolic function is hyperdynamic (EF > 70).  · Left ventricular diastolic dysfunction (grade I) consistent with impaired relaxation.  · Moderate to severe aortic valve regurgitation is present.  · Mild aortic valve stenosis is present.      I have reviewed the medications:  Scheduled Meds:amLODIPine, 5 mg, Oral, Daily  cetirizine, 5 mg, Oral, Daily  citalopram, 40 mg, Oral, Daily  docusate sodium, 200 mg, Oral, Daily  losartan, 100 mg, Oral, Daily  mirtazapine, 15 mg, Oral, Nightly  QUEtiapine, 25 mg, Oral, Nightly  sodium chloride, 10 mL, Intravenous, Q12H      Continuous Infusions:   PRN Meds:.•  acetaminophen  •  senna-docusate sodium **AND** [DISCONTINUED] polyethylene glycol **AND** bisacodyl **AND** bisacodyl  •  hydrALAZINE  •  HYDROcodone-acetaminophen  •  influenza vaccine  •  magnesium hydroxide  •  QUEtiapine  •  sodium chloride  •  sodium chloride    Assessment/Plan   Assessment & Plan     Active Hospital Problems    Diagnosis  POA   • **Dementia (HCC) [F03.90]  Yes   • [place] (hard of hearing) [H91.90]  Yes   • Legally blind [H54.8]  Yes   • Acute constipation [K59.00]  Yes   • Back pain [M54.9]  Yes   • Heart murmur [R01.1]  Yes   • Macular degeneration [H35.30]  Yes   • Hyponatremia [E87.1]  Yes   • Chronic idiopathic constipation [K59.04]  Yes   • Essential hypertension [I10]  Yes   • History of subdural hematoma (post traumatic) [Z87.828]  Not  Applicable   • Normocytic anemia [D64.9]  Yes      Resolved Hospital Problems   No resolved problems to display.        Brief Hospital Course to date:  Keila Alberts is a 89 y.o. female patient with past medical history of essential hypertension, dyslipidemia, arthritis, legally blind with macular degeneration, subdural hematoma status post craniotomy in 2016, and dementia who presented to the hospital with worsening dementia, recent fall with worsening back pain.    These problems are new to me today    This patient's problems and plans were partially entered by my partner and updated as appropriate by me 10/13/21.    Worsening dementia with behavioral changes  · Dementia has been getting worse over the last 3 weeks  · CT head from 09/29/2021 is on remarkable for any acute changes. MRI brain showed severe, chronic small-vessel ischemic disease with mild cerebral atrophy / age-related volume loss   · Thyroid panel and B12 is within normal limits  · No infectious etiology identified  · Appreciate neurology evaluation - neurology feels this her worsening is normal progression of her dementia  · She has been seen by neurology service on outpatient basis and was started on Seroquel  · Continue Remeron 15mg QHS   · seroquel adjusted to address daytime drowsiness  · Family interested in placement.  CM is working on disposition    Hyponatremia  · Mild and chronic  · Asymptomatic, continue to encourage p.o. intake    Constipation  · Bowel regimen    Back pain  Recent fall  · CT lumbar spine done in ED showed chronic changes with no acute fracture  · As needed Norco  · Continue PT and OT    Essential hypertension  · Continue losartan and Norvasc    Anemia of chronic disease  · Stable hemoglobin  · Continue to monitor intermittently     DVT prophylaxis:  Mechanical DVT prophylaxis orders are present.       AM-PAC 6 Clicks Score (PT): 12 (10/13/21 0800)    Disposition: I expect the patient to be discharged to be  determined    CODE STATUS:   Code Status and Medical Interventions:   Ordered at: 10/06/21 1958     Level Of Support Discussed With:    Health Care Surrogate     Code Status:    No CPR     Medical Interventions (Level of Support Prior to Arrest):    Full     Comments:    DNR/ DNI         Diane Mooney, APRN  10/13/21

## 2021-10-14 NOTE — PLAN OF CARE
Goal Outcome Evaluation:  Plan of Care Reviewed With: patient, daughter        Progress: improving  Outcome Summary: Patient demo. very good effort w/ mobility; however, limited by cognition/decreased safety awareness.  She completed bed mobility w/ min A, transfers w/ min A, and progressed forward ambulation to 50ft w/ RW and min A (w/ rests).  PT continues to recommend SNF rehab at D/C.

## 2021-10-14 NOTE — PROGRESS NOTES
Clinical Nutrition   Reason For Visit: Follow-up protocol    Patient Name: Keila Alberts  YOB: 1928  MRN: [id number]  Date of Encounter: 10/14/21 11:06 EDT  Admission date: 10/6/2021      Nutrition Assessment     Admission Problem List:  Dementia with worsening behavior  Hyponatremia  [place]  Legally blind  Acute on chronic constipation  Back pain      Applicable PMH:  HTN, HLD  [place]  Dementia  Legally blind  SDH s/p craniotomy (2016)  Anemia  Chronic idiopathic constipation      Reported/Observed/Food/Nutrition Related History   10/14) RD spoke with RN who reports patient more alert after having seroquel adjusted a couple of days ago. Reports patient is eating much better - consumed 75% of her breakfast meal tray this morning and drank a whole carton of Boost Plus. States patient is wild and ready to go.      10/12) RD notes patient has had poor PO intake this admission. RD spoke with dtr who reports it is due to combination of factors: sleepy, food preferences, legally blind and need for assistance at mealtimes. Dtr provided RD with patient's food preferences/future meal orders and requested that strawberry Boost Plus be ordered with meals. Patient typically eats breakfast and dinner daily with a light snack between these meals (e.g. Boost, cookie, muffin). [facility] patient has been in the process of having teeth removed so she does best with soft textures at this time.    Likes: scrambled eggs/omelette, breakfast meats, toast with butter, fresh fruit, milk, coffee, mashed potatoes, roast, vegetables, milkshakes, ice cream, cookies, muffins, chicken salad, soups, baked potato.  Dislikes: salads    Anthropometrics   Height: 63 in  Weight: 126 lbs (bed scale weight 10/7 per nsg doc)  BMI: 22.4  BMI classification: Normal: 18.5-24.9kg/m2   IBW: 115 lbs    Labs reviewed   Labs reviewed: Yes    Medications reviewed   Medications reviewed: Yes  Pertinent: colace, remeron    Current Nutrition Prescription   PO:  Diet Regular   Oral Nutrition Supplement: Boost Plus 3x daily    Average PO intake: 42% x 3 meals    Nutrition Diagnosis     10/12, 10/14  Problem Inadequate oral intake   Etiology Decreased appetite, food preferences, dementia, sleepy   Signs/Symptoms PO intake: 42% x 3 meals   Status: ongoing/improving    Intervention   Intervention: Follow treatment progress, Care plan reviewed, Encourage intake    -Continue Boost Plus with meals.    Goal:   General: Nutrition support treatment  PO: Increase intake, Continue positive trend    Monitoring/Evaluation:   Monitoring/Evaluation: Per protocol, PO intake, Supplement intake, Pertinent labs, Weight    Indigo Villalba RD  Time Spent: 20 min

## 2021-10-14 NOTE — PLAN OF CARE
Goal Outcome Evaluation:  Plan of Care Reviewed With: patient        Progress: no change  Outcome Summary: VSS.  89 year old white female that was admitted on 10/6/2021 for frequent falls and forgetfulness at home.  She is on day 8 of this hospitalization.    She has an IV in place and patent now to saline lock.  She has a brief in place.  Have administered scheduled and medications as indicated.  Patient is confused and needs to be reminded and refocused to stay in bed and that she is not get up without assistance.  Increased frequency of patient rounding to keep her safe from a fall.  Will continue to monitor patient throught the rest of this shift.

## 2021-10-14 NOTE — THERAPY TREATMENT NOTE
Patient Name: Keila Alberts  : 7/15/1928    MRN: [id number]                              Today's Date: 10/14/2021       Admit Date: 10/6/2021    Visit Dx:     ICD-10-CM ICD-9-CM   1. Recurrent falls  R29.6 V15.88   2. Acute midline low back pain without sciatica  M54.50 724.2   3. Dementia without behavioral disturbance, unspecified dementia type (HCC)  F03.90 294.20   4. Impaired functional mobility, balance, gait, and endurance  Z74.09 V49.89     Patient Active Problem List   Diagnosis   • History of subdural hematoma (post traumatic)   • Essential hypertension   • Chronic idiopathic constipation   • Normocytic anemia   • s/p Left Craniotomy for Evacuation of Left SDH 16    • Hyponatremia   • 6.9 x 5.3cm pelvic mass, seen on imaging prior to admission   • Closed left hip fracture, s/p left hip percutaneous pinning   • Macular degeneration   • Heart murmur   • [place] (hard of hearing)   • Legally blind   • Acute constipation   • Back pain   • Dementia (HCC)     Past Medical History:   Diagnosis Date   • Acute/Subacute Traumatic SDH (subdural hematoma) 2016   • Arthritis    • Dementia (HCC)    • Hyperlipidemia    • Hypertension    • Legally blind     2/2 macular degeneration    • Macular degeneration    • Memory loss      Past Surgical History:   Procedure Laterality Date   • APPENDECTOMY     • HALIMA HOLE Left 2016    Procedure: HALIMA HOLE;  Surgeon: Jos Christian MD;  Location:  [facility];  Service:    • CRANIOTOMY     • HIP PERCUTANEOUS PINNING Left 2017    Procedure: HIP PERCUTANEOUS PINNING;  Surgeon: Edgar Albert MD;  Location:  [facility];  Service:    • JOINT REPLACEMENT     • KNEE ARTHROPLASTY, PARTIAL REPLACEMENT     • SHOULDER ARTHROSCOPY     • TOTAL HIP ARTHROPLASTY Bilateral    • TOTAL SHOULDER REPLACEMENT Left       General Information     Row Name 10/14/21 5430          Physical Therapy Time and Intention    Document Type therapy note (daily note)  -MB      Mode of Treatment physical therapy  -MB     Row Name 10/14/21 1430          General Information    Patient Profile Reviewed yes  -MB     Existing Precautions/Restrictions fall; other (see comments)  dementia  -MB     Barriers to Rehab hearing deficit; medically complex; previous functional deficit; cognitive status; visual deficit  very [place], legallly blind  -MB     Row Name 10/14/21 1430          Living Environment    Lives With child(rose), adult  -MB     Row Name 10/14/21 1430          Cognition    Orientation Status (Cognition) oriented to; person  -MB     Row Name 10/14/21 1430          Safety Issues, Functional Mobility    Safety Issues Affecting Function (Mobility) awareness of need for assistance; insight into deficits/self-awareness; judgment; positioning of assistive device; problem-solving; safety precaution awareness; safety precautions follow-through/compliance; sequencing abilities  -MB     Impairments Affecting Function (Mobility) balance; cognition; endurance/activity tolerance; strength; postural/trunk control  -MB           User Key  (r) = Recorded By, (t) = Taken By, (c) = Cosigned By    Initials Name Provider Type    Yesica Quiroz, PT Physical Therapist               Mobility     Row Name 10/14/21 1430          Bed Mobility    Supine-Sit [place] (Bed Mobility) minimum assist (75% patient effort); verbal cues; nonverbal cues (demo/gesture)  -MB     Sit-Supine [place] (Bed Mobility) not tested  -MB     Assistive Device (Bed Mobility) bed rails; head of bed elevated  -MB     Comment (Bed Mobility) Pt. required assist to raise trunk/shoulders to upright sitting EOB.  -MB     Row Name 10/14/21 1430          Transfers    Comment (Transfers) STS x 5 from EOB/toilet/recliner w/ VCs/tactile cues for set up, safe hand placement, and sequencing.  Minor posterior LOB upon standing.  -MB     Row Name 10/14/21 1430          Sit-Stand Transfer    Sit-Stand [place] (Transfers) minimum  assist (75% patient effort); verbal cues; nonverbal cues (demo/gesture)  -MB     Assistive Device (Sit-Stand Transfers) walker, front-wheeled  -MB     Row Name 10/14/21 1430          Gait/Stairs (Locomotion)    [place] Level (Gait) minimum assist (75% patient effort); verbal cues; nonverbal cues (demo/gesture)  -MB     Assistive Device (Gait) walker, front-wheeled  -MB     Distance in Feet (Gait) 10ft + 10ft +30ft  -MB     Deviations/Abnormal Patterns (Gait) elaina decreased; base of support, narrow; stride length decreased  -MB     Bilateral Gait Deviations forward flexed posture; heel strike decreased  -MB     Right Sided Gait Deviations weight shift ability decreased; heel strike decreased  -MB     Comment (Gait/Stairs) Pt. amb. w/ step through gait mechanics, increased forward flexion, R foot inversion, and slow elaina.  She required VCs/tactile cues for upright posture, increased B heelstrike, and assist to manage RW.  -MB           User Key  (r) = Recorded By, (t) = Taken By, (c) = Cosigned By    Initials Name Provider Type    Yesica Quiroz, PT Physical Therapist               Obj/Interventions     Row Name 10/14/21 1430          Motor Skills    Therapeutic Exercise shoulder; hip; knee; ankle  -MB     Row Name 10/14/21 1430          Shoulder (Therapeutic Exercise)    Shoulder AROM (Therapeutic Exercise) bilateral; extension; 10 repetitions  -MB     Row Name 10/14/21 1430          Hip (Therapeutic Exercise)    Hip Strengthening (Therapeutic Exercise) bilateral; marching while seated; 10 repetitions; 5 repetitions  -MB     Row Name 10/14/21 1430          Knee (Therapeutic Exercise)    Knee Strengthening (Therapeutic Exercise) bilateral; LAQ (long arc quad); 5 repetitions; 10 repetitions  -MB     Row Name 10/14/21 1430          Ankle (Therapeutic Exercise)    Ankle AROM (Therapeutic Exercise) bilateral; dorsiflexion; plantarflexion; 10 repetitions  -MB     Row Name 10/14/21 1430          Balance     Static Standing Balance mild impairment; supported  -MB     Dynamic Standing Balance mild impairment; supported  -MB     Balance Interventions standing; sit to stand; occupation based/functional task; weight shifting activity  -MB           User Key  (r) = Recorded By, (t) = Taken By, (c) = Cosigned By    Initials Name Provider Type    Yesica Quiroz, PT Physical Therapist               Goals/Plan    No documentation.                Clinical Impression     Row Name 10/14/21 1430          Pain    Additional Documentation Pain Scale: FACES Pre/Post-Treatment (Group)  -MB     Row Name 10/14/21 1430          Pain Scale: FACES Pre/Post-Treatment    Pain: FACES Scale, Pretreatment 0-->no hurt  -MB     Posttreatment Pain Rating 0-->no hurt  -MB     Row Name 10/14/21 1430          Plan of Care Review    Plan of Care Reviewed With patient; daughter  -MB     Progress improving  -MB     Outcome Summary Patient demo. very good effort w/ mobility; however, limited by cognition/decreased safety awareness.  She completed bed mobility w/ min A, transfers w/ min A, and progressed forward ambulation to 50ft w/ RW and min A (w/ rests).  PT continues to recommend SNF rehab at D/C.  -MB     Row Name 10/14/21 1430          Vital Signs    Pre Systolic BP Rehab --  VSS.  RN cleared for PT.  -MB     Pre Patient Position Supine  -MB     Intra Patient Position Standing  -MB     Post Patient Position Sitting  -MB     Row Name 10/14/21 1430          Positioning and Restraints    Pre-Treatment Position in bed  -MB     Post Treatment Position chair  -MB     In Chair notified nsg; reclined; call light within reach; encouraged to call for assist; exit alarm on; waffle cushion; with family/caregiver; legs elevated  -MB           User Key  (r) = Recorded By, (t) = Taken By, (c) = Cosigned By    Initials Name Provider Type    Yesica Quiroz, PT Physical Therapist               Outcome Measures     Row Name 10/14/21 1611 10/14/21  0800       How much help from another person do you currently need...    Turning from your back to your side while in flat bed without using bedrails? 4  -MB 3  -MT    Moving from lying on back to sitting on the side of a flat bed without bedrails? 3  -MB 3  -MT    Moving to and from a bed to a chair (including a wheelchair)? 3  -MB 2  -MT    Standing up from a chair using your arms (e.g., wheelchair, bedside chair)? 3  -MB 2  -MT    Climbing 3-5 steps with a railing? 2  -MB 1  -MT    To walk in hospital room? 3  -MB 2  -MT    AM-PAC 6 Clicks Score (PT) 18  -MB 13  -MT    Row Name 10/14/21 1611          Functional Assessment    Outcome Measure Options AM-PAC 6 Clicks Basic Mobility (PT)  -MB           User Key  (r) = Recorded By, (t) = Taken By, (c) = Cosigned By    Initials Name Provider Type    Yesica Quiroz, PT Physical Therapist    Vale Mccormack, RN Registered Nurse                             Physical Therapy Education                 Title: PT OT SLP Therapies (In Progress)     Topic: Physical Therapy (In Progress)     Point: Mobility training (In Progress)     Learning Progress Summary           Patient Acceptance, E, NR by JORGE at 10/13/2021 1648   Family Acceptance, E, NR by  at 10/8/2021 1441   Caregiver Acceptance, E,TB, VU,NR by  at 10/12/2021 0024      Show all documentation for this point (5)                 Point: Home exercise program (In Progress)     Learning Progress Summary           Patient Acceptance, E, NR by JORGE at 10/13/2021 1648   Family Acceptance, E, NR by SANDI at 10/8/2021 1441   Caregiver Acceptance, E,TB, VU,NR by  at 10/12/2021 0024      Show all documentation for this point (5)                 Point: Body mechanics (In Progress)     Learning Progress Summary           Patient Acceptance, E, NR by JORGE at 10/13/2021 1648   Family Acceptance, E, NR by  at 10/8/2021 1441   Caregiver Acceptance, E,TB, VU,NR by  at 10/12/2021 0024      Show all documentation for this  point (5)                 Point: Precautions (In Progress)     Learning Progress Summary           Patient Acceptance, E, NR by  at 10/13/2021 1648   Family Acceptance, E, NR by  at 10/8/2021 1441   Caregiver Acceptance, E,TB, VU,NR by  at 10/12/2021 0024      Show all documentation for this point (5)                             User Key     Initials Effective Dates Name Provider Type Discipline     06/16/21 -  Akiko Douglas, PT Physical Therapist PT     06/16/21 -  Addie Banks RN Registered Nurse Nurse     08/12/20 -  Vi Ga RN Registered Nurse Nurse              PT Recommendation and Plan     Plan of Care Reviewed With: patient, daughter  Progress: improving  Outcome Summary: Patient demo. very good effort w/ mobility; however, limited by cognition/decreased safety awareness.  She completed bed mobility w/ min A, transfers w/ min A, and progressed forward ambulation to 50ft w/ RW and min A (w/ rests).  PT continues to recommend SNF rehab at D/C.     Time Calculation:    PT Charges     Row Name 10/14/21 1612             Time Calculation    Start Time 1430  -MB      PT Received On 10/14/21  -MB      PT Goal Re-Cert Due Date 10/18/21  -MB              Time Calculation- PT    Total Timed Code Minutes- PT 45 minute(s)  -MB              Timed Charges    61586 - PT Therapeutic Exercise Minutes 15  -MB      66450 - Gait Training Minutes  15  -MB      94012 - PT Therapeutic Activity Minutes 15  -MB              Total Minutes    Timed Charges Total Minutes 45  -MB       Total Minutes 45  -MB            User Key  (r) = Recorded By, (t) = Taken By, (c) = Cosigned By    Initials Name Provider Type    Yesica Quiroz, PT Physical Therapist              Therapy Charges for Today     Code Description Service Date Service Provider Modifiers Qty    75519136898 HC PT THER PROC EA 15 MIN 10/14/2021 Yesica Goodson, PT GP 1    74361549223 HC GAIT TRAINING EA 15 MIN 10/14/2021 Hamzah  Yesica DAMON, PT GP 1    [id number]  PT THERAPEUTIC ACT EA 15 MIN 10/14/2021 Yesica Goodson, PT GP 1          PT G-Codes  Outcome Measure Options: AM-PAC 6 Clicks Basic Mobility (PT)  AM-PAC 6 Clicks Score (PT): 18  AM-PAC 6 Clicks Score (OT): 15    Yesica Goodson PT  10/14/2021

## 2021-10-14 NOTE — PROGRESS NOTES
[facility] Medicine Services  PROGRESS NOTE    Patient Name: Keila Alberts  : 7/15/1928  MRN: [id number]    Date of Admission: 10/6/2021  Primary Care Physician: Ronaldo Travis MD    Subjective   Subjective   CC:  Dementia    HPI:  Patient sitting up in bed. Tells me she just woke up from a nap and feeling well. RN reports eating and drinking well. No overnight issues  ROS:  KAYLEY 2/2 dementia    Objective   Objective   Vital Signs:   Temp:  [97.8 °F (36.6 °C)-98.8 °F (37.1 °C)] 98 °F (36.7 °C)  Heart Rate:  [66-90] 75  Resp:  [16-18] 17  BP: (127-183)/(60-87) 160/74  Physical Exam:  Constitutional: Alert, pleasant younger than appears elderly female sitting up in bed in NAD, legally blind and extremely [place]  Eyes: EOMI, sclerae anicteric, no conjunctival injection  Head: NCAT  ENT: [place], moist mucous membranes   Respiratory: Nonlabored, symmetrical chest expansion, CTAB  Cardiovascular: RRR, no R/G, +murmur, +DP pulses bilaterally  Gastrointestinal: Soft, NT, ND +BS  Musculoskeletal: PITT; no LE edema bilaterally  Neurologic: Confused but oriented to self, strength symmetric in all extremities, follows all commands, speech clear  Skin: No rashes on exposed skin  Psychiatric: Pleasant and cooperative; normal affect  No change in exam from 10/13    Results Reviewed:  LAB RESULTS:      Lab 10/08/21  0707   WBC 10.50   HEMOGLOBIN 9.5*   HEMATOCRIT 28.5*   PLATELETS 352   NEUTROS ABS 5.30   IMMATURE GRANS (ABS) 0.06*   LYMPHS ABS 1.94   MONOS ABS 0.97*   EOS ABS 2.12*   .4*   CRP 2.16*   PROCALCITONIN 0.06         Lab 10/14/21  0836 10/09/21  0850 10/08/21  0707   SODIUM 136 133* 130*   POTASSIUM 3.9 3.7 4.0   CHLORIDE 102 101 98   CO2 24.0 23.0 21.0*   ANION GAP 10.0 9.0 11.0   BUN 21 11 17   CREATININE 1.16* 0.72 0.88   GLUCOSE 141* 99 99   CALCIUM 9.0 8.2 8.5   MAGNESIUM 2.1  --  1.9   PHOSPHORUS  --   --  3.3         Lab 10/08/21  0707   TOTAL PROTEIN 6.0   ALBUMIN 3.40*   GLOBULIN  2.6   ALT (SGPT) 17   AST (SGOT) 16   BILIRUBIN 0.3   ALK PHOS 73                     Brief Urine Lab Results  (Last result in the past 365 days)      Color   Clarity   Blood   Leuk Est   Nitrite   Protein   CREAT   Urine HCG        10/06/21 1324 Yellow   Cloudy   Negative   Small (1+)   Negative   Trace                 Microbiology Results Abnormal     Procedure Component Value - Date/Time    Urine Culture - Urine, Urine, Catheter [[id number]] Collected: 10/06/21 1324    Lab Status: Final result Specimen: Urine, Catheter Updated: 10/07/21 1050     Urine Culture <10,000 CFU/mL Mixed Tony Isolated    Narrative:      Specimen contains mixed organisms of questionable pathogenicity which indicates contamination with commensal tony.  Further identification is unlikely to provide clinically useful information.  Suggest recollection.    COVID PRE-OP / PRE-PROCEDURE SCREENING ORDER (NO ISOLATION) - Swab, Nasopharynx [[id number]]  (Normal) Collected: 10/06/21 2137    Lab Status: Final result Specimen: Swab from Nasopharynx Updated: 10/06/21 2214    Narrative:      The following orders were created for panel order COVID PRE-OP / PRE-PROCEDURE SCREENING ORDER (NO ISOLATION) - Swab, Nasopharynx.  Procedure                               Abnormality         Status                     ---------                               -----------         ------                     COVID-19, ABBOTT IN-HOUS...[[id number]]  Normal              Final result                 Please view results for these tests on the individual orders.    COVID-19, ABBOTT IN-HOUSE,NASAL Swab (NO TRANSPORT MEDIA) 2 HR TAT - Swab, Nasopharynx [[id number]]  (Normal) Collected: 10/06/21 2137    Lab Status: Final result Specimen: Swab from Nasopharynx Updated: 10/06/21 2214     COVID19 Presumptive Negative    Narrative:      Fact sheet for providers: https://www.fda.gov/media/673537/download     Fact sheet for patients: https://www.fda.gov/media/641794/download    Test  performed by PCR.  If inconsistent with clinical signs and symptoms patient should be tested with different authorized molecular test.          No radiology results from the last 24 hrs    Results for orders placed during the hospital encounter of 03/16/18    Adult Transthoracic Echo Complete W/ Cont if Necessary Per Protocol    Interpretation Summary  · Left ventricular systolic function is hyperdynamic (EF > 70).  · Left ventricular diastolic dysfunction (grade I) consistent with impaired relaxation.  · Moderate to severe aortic valve regurgitation is present.  · Mild aortic valve stenosis is present.      I have reviewed the medications:  Scheduled Meds:amLODIPine, 5 mg, Oral, Daily  cetirizine, 5 mg, Oral, Daily  citalopram, 40 mg, Oral, Daily  docusate sodium, 200 mg, Oral, Daily  losartan, 100 mg, Oral, Daily  mirtazapine, 15 mg, Oral, Nightly  QUEtiapine, 25 mg, Oral, Nightly  sodium chloride, 10 mL, Intravenous, Q12H      Continuous Infusions:sodium chloride 0.9 % with KCl 20 mEq, 75 mL/hr      PRN Meds:.•  acetaminophen  •  senna-docusate sodium **AND** [DISCONTINUED] polyethylene glycol **AND** bisacodyl **AND** bisacodyl  •  hydrALAZINE  •  HYDROcodone-acetaminophen  •  influenza vaccine  •  magnesium hydroxide  •  magnesium sulfate **OR** magnesium sulfate in D5W 1g/100mL (PREMIX) **OR** magnesium sulfate  •  QUEtiapine  •  sodium chloride  •  sodium chloride    Assessment/Plan   Assessment & Plan     Active Hospital Problems    Diagnosis  POA   • **Dementia (HCC) [F03.90]  Yes   • [place] (hard of hearing) [H91.90]  Yes   • Legally blind [H54.8]  Yes   • Acute constipation [K59.00]  Yes   • Back pain [M54.9]  Yes   • Heart murmur [R01.1]  Yes   • Macular degeneration [H35.30]  Yes   • Hyponatremia [E87.1]  Yes   • Chronic idiopathic constipation [K59.04]  Yes   • Essential hypertension [I10]  Yes   • History of subdural hematoma (post traumatic) [Z87.828]  Not Applicable   • Normocytic anemia [D64.9]  Yes       Resolved Hospital Problems   No resolved problems to display.        Brief Hospital Course to date:  Keila Alberts is a 89 y.o. female patient with past medical history of essential hypertension, dyslipidemia, arthritis, legally blind with macular degeneration, subdural hematoma status post craniotomy in 2016, and dementia who presented to the hospital with worsening dementia, recent fall with worsening back pain.    These problems are new to me today    This patient's problems and plans were partially entered by my partner and updated as appropriate by me 10/14/21.    Worsening dementia with behavioral changes  · Dementia has been getting worse over the last few weeks  · CT head from 09/29/2021 is on remarkable for any acute changes. MRI brain showed severe, chronic small-vessel ischemic disease with mild cerebral atrophy / age-related volume loss   · Thyroid panel and B12 is within normal limits  · No infectious etiology identified  · Appreciate neurology evaluation - neurology feels this her worsening is normal progression of her dementia  · She has been seen by neurology service on outpatient basis and was started on Seroquel  · Continue Remeron 15mg QHS   · seroquel adjusted to address daytime drowsiness and appears to be interactive during days  · Family interested in placement.  CM is working on disposition    Hyponatremia  · resolved  · Asymptomatic, continue to encourage p.o. intake    Constipation  · Bowel regimen    Back pain  Recent fall  · CT lumbar spine done in ED showed chronic changes with no acute fracture  · As needed Norco  · Continue PT and OT    Essential hypertension  · Continue losartan and Norvasc    Anemia of chronic disease  · Stable hemoglobin  · Continue to monitor intermittently     VICK  -mild. Will give NS +20K today. Encourage oral intake    DVT prophylaxis:  Mechanical DVT prophylaxis orders are present.       AM-PAC 6 Clicks Score (PT): 13 (10/14/21 0800)    Disposition: I  expect the patient to be discharged to SNf when bed available.    CODE STATUS:   Code Status and Medical Interventions:   Ordered at: 10/06/21 1958     Level Of Support Discussed With:    Health Care Surrogate     Code Status:    No CPR     Medical Interventions (Level of Support Prior to Arrest):    Full     Comments:    DNR/ DNI         Rose Slaughter, APRN  10/14/21

## 2021-10-15 NOTE — CASE MANAGEMENT/SOCIAL WORK
Discharge Planning Assessment  [facility]     Patient Name: Keila Alberts  MRN: [id number]  Today's Date: 10/15/2021    Admit Date: 10/6/2021     Discharge Needs Assessment    No documentation.                Discharge Plan     Row Name 10/15/21 1526       Plan    Plan [facility]    Patient/Family in Agreement with Plan yes    Plan Comments Received a call from liaisonGrace, with [facility]. [facility] is offering a bed. Called & spoke with her daughter, Aretha. She isn't exactly sold on the idea of this particular location. She wants the [place] location, but after speaking with Grace twice via phone, there are no beds at this time.  Per Grace, Mrs Alberts can go to the [facility] and transition to the [facility] when a bed is available. Aretha is going to speak with her family about this over the weekend and will present a final yes/no on Monday morning. I placed a Covid order and secured a [facility] ambulance for Monday at 1230. Sent a secure chat to KATARZYNA Merino.    Final Discharge Disposition Code 04 - intermediate care facility              Continued Care and Services - Admitted Since 10/6/2021     Destination Coordination complete.    Service Provider Request Status Selected Services Address Phone Fax Patient Preferred    [facility]   Selected Intermediate Care [address] [facility] [address] [phone] --       Internal Comment last updated by Nishi Robles, RN 10/11/2021 1313    10/11 referral called to Grace               [facility]  Pending - No Request Sent N[facility]A [address] [facility] [address] [address] [phone] --       Internal Comment last updated by Nishi Robles, RN 10/7/2021 0936    10/7 referral called to [facility]  Pending - No Request Sent N/A [address] [facility] [address] [phone]  [phone] --       Internal Comment last updated by Nishi Robles, RN 10/14/2021 1432    10/14 referral manually faxed               [facility]  Declined  No Medicaid Bed Available N/A [address], [facility] [id number] [phone] --       Internal Comment last updated by Nishi Robles, RN 10/7/2021 0856    10/7 referral called to [facility]  Declined  No Medicaid Bed Available N/A [address] [facility] [address] [phone] --       Internal Comment last updated by Nishi Robles, RN 10/7/2021 0926    10/7 referral called to [facility]                            Demographic Summary    No documentation.                Functional Status    No documentation.                Psychosocial    No documentation.                Abuse/Neglect    No documentation.                Legal    No documentation.                Substance Abuse    No documentation.                Patient Forms    No documentation.                   Nishi Robles, RN

## 2021-10-15 NOTE — PLAN OF CARE
Problem: Fall Injury Risk  Goal: Absence of Fall and Fall-Related Injury  Outcome: Ongoing, Progressing  Intervention: Promote Injury-Free Environment  Recent Flowsheet Documentation  Taken 10/15/2021 0800 by Le Montiel RN  Safety Promotion/Fall Prevention:   safety round/check completed   toileting scheduled     Problem: Behavior Regulation Impairment (Dementia Signs/Symptoms)  Goal: Improved Behavioral Control (Dementia Signs/Symptoms)  Outcome: Ongoing, Progressing   Goal Outcome Evaluation:      Continues to be confused. Trying to get out of  bed frequently. Moved to room closer to nurse station.

## 2021-10-15 NOTE — PLAN OF CARE
Goal Outcome Evaluation:  Plan of Care Reviewed With: patient, family        Progress: improving  Outcome Summary: Pt demonstrated increased activity tolerance, ambulated to bathroom with Vince/RW, completed toilet tx with CGA, Vince for toileting tasks. Pt performed grooming tasks standing sink side with SUP, seated rest breaks as needed, cues to locate items on sink countertop. Pt c/o lower back pain with increased activity. Nursing notified. Continue per OT POC.

## 2021-10-15 NOTE — PLAN OF CARE
Confused. PRN med given.  IVF infusing.  RA.   No difficulty noted with oral meds and swallowing.  Purewick, urine output adequate.  Fall risk, bed alarm.   SBP > 170 PRN med given.  Skin integrity maintained.

## 2021-10-15 NOTE — PROGRESS NOTES
[facility] Medicine Services  PROGRESS NOTE    Patient Name: Keila Alberts  : 7/15/1928  MRN: [id number]    Date of Admission: 10/6/2021  Primary Care Physician: Ronaldo Travis MD    Subjective   Subjective   CC:  Dementia    HPI:    Resting in bed this morning. States that she has a hernia and it is tender today. Pulled her gown up to show me her umbilical hernia today. No other complaints.     ROS:  Difficult to obtain due to mental status    Objective   Objective   Vital Signs:   Temp:  [98.3 °F (36.8 °C)-98.5 °F (36.9 °C)] 98.3 °F (36.8 °C)  Heart Rate:  [74-82] 74  Resp:  [16-17] 16  BP: (143-182)/(69-88) 161/79  Physical Exam:    Constitutional: No acute distress, awake, alert, sitting upright in bed, smiling  HENT: NCAT, mucous membranes moist  Respiratory: Clear to auscultation bilaterally, respiratory effort normal on room air  Cardiovascular: RRR, no murmurs, rubs, or gallops  Gastrointestinal: Positive bowel sounds, soft, nontender, nondistended  Musculoskeletal: No bilateral ankle edema  Psychiatric: Appropriate affect, cooperative  Neurologic: Oriented to self, confused, strength symmetric in all extremities, speech clear  Skin: No rashes noted to exposed skin       Results Reviewed:  LAB RESULTS:          Lab 10/15/21  0626 10/14/21  0836 10/09/21  0850   SODIUM 137 136 133*   POTASSIUM 4.3 3.9 3.7   CHLORIDE 103 102 101   CO2 23.0 24.0 23.0   ANION GAP 11.0 10.0 9.0   BUN 19 21 11   CREATININE 0.96 1.16* 0.72   GLUCOSE 100* 141* 99   CALCIUM 8.6 9.0 8.2   MAGNESIUM  --  2.1  --                          Brief Urine Lab Results  (Last result in the past 365 days)      Color   Clarity   Blood   Leuk Est   Nitrite   Protein   CREAT   Urine HCG        10/06/21 1324 Yellow   Cloudy   Negative   Small (1+)   Negative   Trace                 Microbiology Results Abnormal     Procedure Component Value - Date/Time    Urine Culture - Urine, Urine, Catheter [[id number]] Collected:  10/06/21 1324    Lab Status: Final result Specimen: Urine, Catheter Updated: 10/07/21 1050     Urine Culture <10,000 CFU/mL Mixed Tony Isolated    Narrative:      Specimen contains mixed organisms of questionable pathogenicity which indicates contamination with commensal tony.  Further identification is unlikely to provide clinically useful information.  Suggest recollection.    COVID PRE-OP / PRE-PROCEDURE SCREENING ORDER (NO ISOLATION) - Swab, Nasopharynx [[id number]]  (Normal) Collected: 10/06/21 2137    Lab Status: Final result Specimen: Swab from Nasopharynx Updated: 10/06/21 2214    Narrative:      The following orders were created for panel order COVID PRE-OP / PRE-PROCEDURE SCREENING ORDER (NO ISOLATION) - Swab, Nasopharynx.  Procedure                               Abnormality         Status                     ---------                               -----------         ------                     COVID-19, ABBOTT IN-HOUS...[[id number]]  Normal              Final result                 Please view results for these tests on the individual orders.    COVID-19, ABBOTT IN-HOUSE,NASAL Swab (NO TRANSPORT MEDIA) 2 HR TAT - Swab, Nasopharynx [[id number]]  (Normal) Collected: 10/06/21 2137    Lab Status: Final result Specimen: Swab from Nasopharynx Updated: 10/06/21 2214     COVID19 Presumptive Negative    Narrative:      Fact sheet for providers: https://www.fda.gov/media/194229/download     Fact sheet for patients: https://www.fda.gov/media/778117/download    Test performed by PCR.  If inconsistent with clinical signs and symptoms patient should be tested with different authorized molecular test.          No radiology results from the last 24 hrs    Results for orders placed during the hospital encounter of 03/16/18    Adult Transthoracic Echo Complete W/ Cont if Necessary Per Protocol    Interpretation Summary  · Left ventricular systolic function is hyperdynamic (EF > 70).  · Left ventricular diastolic dysfunction  (grade I) consistent with impaired relaxation.  · Moderate to severe aortic valve regurgitation is present.  · Mild aortic valve stenosis is present.      I have reviewed the medications:  Scheduled Meds:amLODIPine, 5 mg, Oral, Daily  cetirizine, 5 mg, Oral, Daily  citalopram, 40 mg, Oral, Daily  docusate sodium, 200 mg, Oral, Daily  losartan, 100 mg, Oral, Daily  mirtazapine, 15 mg, Oral, Nightly  QUEtiapine, 25 mg, Oral, Nightly  sodium chloride, 10 mL, Intravenous, Q12H      Continuous Infusions:   PRN Meds:.•  acetaminophen  •  senna-docusate sodium **AND** [DISCONTINUED] polyethylene glycol **AND** bisacodyl **AND** bisacodyl  •  hydrALAZINE  •  HYDROcodone-acetaminophen  •  influenza vaccine  •  magnesium hydroxide  •  magnesium sulfate **OR** magnesium sulfate in D5W 1g/100mL (PREMIX) **OR** magnesium sulfate  •  QUEtiapine  •  sodium chloride  •  sodium chloride    Assessment/Plan   Assessment & Plan     Active Hospital Problems    Diagnosis  POA   • **Dementia (HCC) [F03.90]  Yes   • [place] (hard of hearing) [H91.90]  Yes   • Legally blind [H54.8]  Yes   • Acute constipation [K59.00]  Yes   • Back pain [M54.9]  Yes   • Heart murmur [R01.1]  Yes   • Macular degeneration [H35.30]  Yes   • Hyponatremia [E87.1]  Yes   • Chronic idiopathic constipation [K59.04]  Yes   • Essential hypertension [I10]  Yes   • History of subdural hematoma (post traumatic) [Z87.828]  Not Applicable   • Normocytic anemia [D64.9]  Yes      Resolved Hospital Problems   No resolved problems to display.        Brief Hospital Course to date:  Keila Alberts is a 89 y.o. female patient with past medical history of essential hypertension, dyslipidemia, arthritis, legally blind with macular degeneration, subdural hematoma status post craniotomy in 2016, and dementia who presented to the hospital with worsening dementia, recent fall with worsening back pain.    This patient's problems and plans were partially entered by my partner and updated as  appropriate by me 10/15/21.    Worsening dementia with behavioral changes  · Dementia has been getting worse over the last few weeks  · CT head from 09/29/2021 is un remarkable for any acute changes. MRI brain showed severe, chronic small-vessel ischemic disease with mild cerebral atrophy / age-related volume loss.   · Thyroid panel and B12 is within normal limits  · No infectious etiology identified  · Appreciate neurology evaluation - neurology feels this her worsening is normal progression of her dementia  · She has been seen by neurology service on outpatient basis and was started on Seroquel nightly.   · Continue Remeron 15mg QHS   · seroquel adjusted to address daytime drowsiness and appears to be interactive during days. I ordered a one time dose of low dose seroquel this morning due to some mild restlessness and trying to get out of bed unassisted often. Will monitor response before scheduling anything again in am.   · Family interested in placement.  CM is working on disposition    Hyponatremia  · resolved  · Asymptomatic, continue to encourage p.o. intake    Constipation  · Bowel regimen    Back pain  Recent fall  · CT lumbar spine done in ED showed chronic changes with no acute fracture  · As needed Norco  · Continue PT and OT    Essential hypertension  · Continue losartan and Norvasc    Anemia of chronic disease  · Stable hemoglobin  · Continue to monitor intermittently     VICK  -mild. S/p NS +20K with resolution of VICK. Monitor      DVT prophylaxis:  Mechanical DVT prophylaxis orders are present.       AM-PAC 6 Clicks Score (PT): 18 (10/14/21 1611)    Disposition: I expect the patient to be discharged to SNf when bed available.    CODE STATUS:   Code Status and Medical Interventions:   Ordered at: 10/06/21 1958     Level Of Support Discussed With:    Health Care Surrogate     Code Status:    No CPR     Medical Interventions (Level of Support Prior to Arrest):    Full     Comments:    DNR/ DNI          Brigitte Porter, APRN  10/15/21

## 2021-10-15 NOTE — THERAPY TREATMENT NOTE
Patient Name: Keila Alberts  : 7/15/1928    MRN: [id number]                              Today's Date: 10/15/2021       Admit Date: 10/6/2021    Visit Dx:     ICD-10-CM ICD-9-CM   1. Recurrent falls  R29.6 V15.88   2. Acute midline low back pain without sciatica  M54.50 724.2   3. Dementia without behavioral disturbance, unspecified dementia type (HCC)  F03.90 294.20   4. Impaired functional mobility, balance, gait, and endurance  Z74.09 V49.89     Patient Active Problem List   Diagnosis   • History of subdural hematoma (post traumatic)   • Essential hypertension   • Chronic idiopathic constipation   • Normocytic anemia   • s/p Left Craniotomy for Evacuation of Left SDH 16    • Hyponatremia   • 6.9 x 5.3cm pelvic mass, seen on imaging prior to admission   • Closed left hip fracture, s/p left hip percutaneous pinning   • Macular degeneration   • Heart murmur   • [place] (hard of hearing)   • Legally blind   • Acute constipation   • Back pain   • Dementia (HCC)     Past Medical History:   Diagnosis Date   • Acute/Subacute Traumatic SDH (subdural hematoma) 2016   • Arthritis    • Dementia (HCC)    • Hyperlipidemia    • Hypertension    • Legally blind     2/2 macular degeneration    • Macular degeneration    • Memory loss      Past Surgical History:   Procedure Laterality Date   • APPENDECTOMY     • HALIMA HOLE Left 2016    Procedure: HALIMA HOLE;  Surgeon: Jos Christian MD;  Location:  [facility];  Service:    • CRANIOTOMY     • HIP PERCUTANEOUS PINNING Left 2017    Procedure: HIP PERCUTANEOUS PINNING;  Surgeon: Edgar Albert MD;  Location:  [facility] OR;  Service:    • JOINT REPLACEMENT     • KNEE ARTHROPLASTY, PARTIAL REPLACEMENT     • SHOULDER ARTHROSCOPY     • TOTAL HIP ARTHROPLASTY Bilateral    • TOTAL SHOULDER REPLACEMENT Left       General Information     Row Name 10/15/21 1348          OT Time and Intention    Document Type therapy note (daily note)  -BRADLEY     Mode of  Treatment occupational therapy  -BRADLEY     Row Name 10/15/21 1348          General Information    Patient Profile Reviewed yes  -BRADLEY     Existing Precautions/Restrictions fall; other (see comments)  hx dementia  -BRADLEY     Barriers to Rehab medically complex; cognitive status; visual deficit; hearing deficit  -BRADLEY     Row Name 10/15/21 1348          Cognition    Orientation Status (Cognition) oriented to; person  -BRADLEY     Row Name 10/15/21 1348          Safety Issues, Functional Mobility    Safety Issues Affecting Function (Mobility) awareness of need for assistance; insight into deficits/self-awareness; judgment; positioning of assistive device; problem-solving; safety precaution awareness; safety precautions follow-through/compliance  -BRADLEY     Impairments Affecting Function (Mobility) balance; cognition; endurance/activity tolerance; postural/trunk control; strength  -BRADLEY     Cognitive Impairments, Mobility Safety/Performance awareness, need for assistance; insight into deficits/self-awareness; judgment; problem-solving/reasoning; safety precaution awareness; safety precaution follow-through  -BRADLEY           User Key  (r) = Recorded By, (t) = Taken By, (c) = Cosigned By    Initials Name Provider Type    BRADLEY Sheila Haji OT Occupational Therapist                 Mobility/ADL's     Row Name 10/15/21 1343          Bed Mobility    Bed Mobility supine-sit; sit-supine  -BRADLEY     Supine-Sit Beat[place] (Bed Mobility) nonverbal cues (demo/gesture); contact guard  -BRADLEY     Sit-Supine Beatrice (Bed Mobility) nonverbal cues (demo/gesture); contact guard  -BRADLEY     Bed Mobility, Safety Issues cognitive deficits limit understanding  -BRADLEY     Assistive Device (Bed Mobility) bed rails; head of bed elevated  -BRADLEY     Comment (Bed Mobility) Assist for trunk for supine to sit EOB  -BRADLEY     Row Name 10/15/21 1346          Transfers    Transfers sit-stand transfer; toilet transfer  -BRADLEY     Comment (Transfers) STS from EOBx2, toiletx1, chair placed  sink sidex2; cues for grab use  -     Assistive Device (Bed-Chair Transfers) walker, front-wheeled  -BRADLEY     Sit-Stand [place] (Transfers) verbal cues; nonverbal cues (demo/gesture); contact guard  -     [place] Level (Toilet Transfer) contact guard; verbal cues; nonverbal cues (demo/gesture)  -BRADLEY     Assistive Device (Toilet Transfer) commode; grab bars/safety frame; walker, front-wheeled  -BRADLEY     Row Name 10/15/21 1349          Sit-Stand Transfer    Assistive Device (Sit-Stand Transfers) walker, front-wheeled  -BRADLEY     Row Name 10/15/21 1349          Toilet Transfer    Type (Toilet Transfer) stand-sit; sit-stand; stand pivot/stand step  -     Row Name 10/15/21 [facility]9          Functional Mobility    Functional Mobility- Ind. Level verbal cues required; minimum assist (75% patient effort)  -     Functional Mobility-Distance (Feet) 10  -     Functional Mobility- Comment pt ambulated to/from bathroom from bedside; cues to use RW to ambulate from sink side to bed  -     Row Name 10/15/21 1342          Activities of Daily Living    BADL Assessment/Intervention grooming; toileting  -     Row Name 10/15/21 1343          Toileting Assessment/Training    [place] Level (Toileting) adjust/manage clothing; perform perineal hygiene; minimum assist (75% patient effort)  -     Assistive Devices (Toileting) commode  -BRADLEY     Position (Toileting) supported standing  -BRADLEY     Comment (Toileting) Vince for clothing management standing at commode; MARTINEZ for hygiene using washcloth in standing with assist for balance  -     Row Name 10/15/21 1349          Grooming Assessment/Training    [place] Level (Grooming) oral care regimen; wash face, hands; supervision; verbal cues; hair care, combing/brushing; minimum assist (75% patient effort)  -BRADLEY     Position (Grooming) sink side; unsupported standing  -BRADLEY     Comment (Grooming) cues for seated rest breaks between tasks; cues to locate items on sink countertop;  assist to comb knots out in back  -BRADLEY           User Key  (r) = Recorded By, (t) = Taken By, (c) = Cosigned By    Initials Name Provider Type    Sheila Snider OT Occupational Therapist               Obj/Interventions     Row Name 10/15/21 8337          Balance    Balance Assessment sitting static balance; sitting dynamic balance; standing static balance; standing dynamic balance  -     Static Sitting Balance WFL; unsupported; sitting, edge of bed  -BRADLEY     Dynamic Sitting Balance WFL; unsupported; sitting, edge of bed  -BRADLEY     Static Standing Balance mild impairment; unsupported; standing  -BRADLEY     Dynamic Standing Balance mild impairment; supported  -BRADLEY     Balance Interventions standing; occupation based/functional task  -BRADLEY     Comment, Balance Close SUP for grooming tasks performed standing sink side with seat rest breaks as needed.  -           User Key  (r) = Recorded By, (t) = Taken By, (c) = Cosigned By    Initials Name Provider Type    Sheila Snider OT Occupational Therapist               Goals/Plan    No documentation.                Clinical Impression     Row Name 10/15/21 0667          Pain Assessment    Additional Documentation Pain Scale: FACES Pre/Post-Treatment (Group)  -[facility] Name 10/15/21 0444          Pain Scale: Numbers Pre/Post-Treatment    Pain Intervention(s) Repositioned; Rest  -     Row Name 10/15/21 7665          Pain Scale: FACES Pre/Post-Treatment    Pain: FACES Scale, Pretreatment 0-->no hurt  -BRADLEY     Posttreatment Pain Rating 6-->hurts even more  -BRADLEY     Pain Location - Orientation lower  -BRADLEY     Pain Location back  -     Pre/Posttreatment Pain Comment Pt reported increased LBP with increased activity  -     Row Name 10/15/21 4352          Plan of Care Review    Plan of Care Reviewed With patient; family  -     Progress improving  -     Outcome Summary Pt demonstrated increased activity tolerance, ambulated to bathroom with Vince/RW, completed toilet tx with  CGA, Vince for toileting tasks. Pt performed grooming tasks standing sink side with SUP, seated rest breaks as needed, cues to locate items on sink countertop. Pt c/o lower back pain with increased activity. Nursing notified. Continue per OT POC.  -BRADLEY     Row Name 10/15/21 1357          Therapy Plan Review/Discharge Plan (OT)    Anticipated Discharge Disposition (OT) skilled nursing facility  -BRADLEY     Row Name 10/15/21 1357          Vital Signs    O2 Delivery Pre Treatment room air  -BRADLEY     O2 Delivery Intra Treatment room air  -BRADLEY     O2 Delivery Post Treatment room air  -BRADLEY     Pre Patient Position Supine  -BRADLEY     Intra Patient Position Standing  -BRADLEY     Post Patient Position Supine  -BRADLEY     Row Name 10/15/21 135          Positioning and Restraints    Pre-Treatment Position in bed  -BRADLEY     Post Treatment Position bed  -BRADLEY     In Bed notified nsg; fowlers; call light within reach; encouraged to call for assist; exit alarm on; with family/caregiver; legs elevated  -BRADLEY           User Key  (r) = Recorded By, (t) = Taken By, (c) = Cosigned By    Initials Name Provider Type    Sheila Snider OT Occupational Therapist               Outcome Measures     Row Name 10/15/21 7155          How much help from another is currently needed...    Putting on and taking off regular lower body clothing? 2  -BRALDEY     Bathing (including washing, rinsing, and drying) 2  -BRADLEY     Toileting (which includes using toilet bed pan or urinal) 3  -BRADLEY     Putting on and taking off regular upper body clothing 3  -BRADLEY     Taking care of personal grooming (such as brushing teeth) 3  -BRADLEY     Eating meals 3  -BRADLEY     AM-PAC 6 Clicks Score (OT) 16  -BRADLEY     Row Name 10/15/21 0006          Functional Assessment    Outcome Measure Options AM-PAC 6 Clicks Daily Activity (OT)  -BRADLEY           User Key  (r) = Recorded By, (t) = Taken By, (c) = Cosigned By    Initials Name Provider Type    Sheila Snider OT Occupational Therapist                Occupational  Therapy Education                 Title: PT OT SLP Therapies (In Progress)     Topic: Occupational Therapy (In Progress)     Point: ADL training (Done)     Description:   Instruct learner(s) on proper safety adaptation and remediation techniques during self care or transfers.   Instruct in proper use of assistive devices.              Learning Progress Summary           Patient Acceptance, E, VU by  at 10/15/2021 1407    Comment: Role of OT; repositioning for pain management; benefits of activity   Family Acceptance, E, VU by  at 10/15/2021 1407    Comment: Role of OT; repositioning for pain management; benefits of activity   Caregiver Acceptance, E,TB, VU,NR by  at 10/12/2021 0024      Show all documentation for this point (7)                 Point: Home exercise program (In Progress)     Description:   Instruct learner(s) on appropriate technique for monitoring, assisting and/or progressing therapeutic exercises/activities.              Learning Progress Summary           Patient Acceptance, E, NR by  at 10/13/2021 1648   Family Acceptance, E,TB, VU,NR by  at 10/9/2021 0751   Caregiver Acceptance, E,TB, VU,NR by  at 10/12/2021 0024      Show all documentation for this point (7)                 Point: Precautions (Done)     Description:   Instruct learner(s) on prescribed precautions during self-care and functional transfers.              Learning Progress Summary           Patient Acceptance, E, VU by  at 10/15/2021 1407    Comment: Role of OT; repositioning for pain management; benefits of activity   Family Acceptance, E, VU by  at 10/15/2021 1407    Comment: Role of OT; repositioning for pain management; benefits of activity   Caregiver Acceptance, E,TB, VU,NR by  at 10/12/2021 0024      Show all documentation for this point (7)                 Point: Body mechanics (Done)     Description:   Instruct learner(s) on proper positioning and spine alignment during self-care, functional mobility  activities and/or exercises.              Learning Progress Summary           Patient Acceptance, E, VU by  at 10/15/2021 1407    Comment: Role of OT; repositioning for pain management; benefits of activity   Family Acceptance, E, VU by  at 10/15/2021 1407    Comment: Role of OT; repositioning for pain management; benefits of activity   Caregiver Acceptance, E,TB, VU,NR by  at 10/12/2021 0024      Show all documentation for this point (7)                             User Key     Initials Effective Dates Name Provider Type Discipline    BRADLEY 06/16/21 -  Sheila Haji OT Occupational Therapist OT     06/16/21 -  Addie Banks, RN Registered Nurse Nurse     08/12/20 -  Vi Ga, RN Registered Nurse Nurse              OT Recommendation and Plan     Plan of Care Review  Plan of Care Reviewed With: patient, family  Progress: improving  Outcome Summary: Pt demonstrated increased activity tolerance, ambulated to bathroom with Vince/RW, completed toilet tx with CGA, Vince for toileting tasks. Pt performed grooming tasks standing sink side with SUP, seated rest breaks as needed, cues to locate items on sink countertop. Pt c/o lower back pain with increased activity. Nursing notified. Continue per OT POC.     Time Calculation:    Time Calculation- OT     Row Name 10/15/21 1310             Time Calculation- OT    OT Start Time 1310  -BRADLEY      OT Received On 10/15/21  -BRADLEY              Timed Charges    58763 - OT Self Care/Mgmt Minutes 32  -BRADLEY              Total Minutes    Timed Charges Total Minutes 32  -BRADLEY       Total Minutes 32  -BRADLEY            User Key  (r) = Recorded By, (t) = Taken By, (c) = Cosigned By    Initials Name Provider Type    Sheila Snider OT Occupational Therapist              Therapy Charges for Today     Code Description Service Date Service Provider Modifiers Qty    24419294291 HC OT SELF CARE/MGMT/TRAIN EA 15 MIN 10/15/2021 Sheila Haji OT GO 2               Sheila Haji  OT  10/15/2021

## 2021-10-16 NOTE — PLAN OF CARE
Goal Outcome Evaluation:  Plan of Care Reviewed With: patient           Outcome Summary: Pt able to increase walking distance and walk with less assistance. She amb 10' + 100' with RW, CGA when walking forward and min A for turns.

## 2021-10-16 NOTE — NURSING NOTE
Pt mildly restless the first bit of shift. Scheduled meds taken and multiple trips to the bathroom before settling down and sleeping. VSS with no c/o pain. Plan is to DC to [facility] Monday via [facility] ambulance at 1230 pending pt's daughters final decision on placement. Will CTM and provide care.

## 2021-10-16 NOTE — THERAPY TREATMENT NOTE
Patient Name: Keila Alberts  : 7/15/1928    MRN: [id number]                              Today's Date: 10/16/2021       Admit Date: 10/6/2021    Visit Dx:     ICD-10-CM ICD-9-CM   1. Recurrent falls  R29.6 V15.88   2. Acute midline low back pain without sciatica  M54.50 724.2   3. Dementia without behavioral disturbance, unspecified dementia type (HCC)  F03.90 294.20   4. Impaired functional mobility, balance, gait, and endurance  Z74.09 V49.89     Patient Active Problem List   Diagnosis   • History of subdural hematoma (post traumatic)   • Essential hypertension   • Chronic idiopathic constipation   • Normocytic anemia   • s/p Left Craniotomy for Evacuation of Left SDH 16    • Hyponatremia   • 6.9 x 5.3cm pelvic mass, seen on imaging prior to admission   • Closed left hip fracture, s/p left hip percutaneous pinning   • Macular degeneration   • Heart murmur   • [place] (hard of hearing)   • Legally blind   • Acute constipation   • Back pain   • Dementia (HCC)     Past Medical History:   Diagnosis Date   • Acute/Subacute Traumatic SDH (subdural hematoma) 2016   • Arthritis    • Dementia (HCC)    • Hyperlipidemia    • Hypertension    • Legally blind     2/2 macular degeneration    • Macular degeneration    • Memory loss      Past Surgical History:   Procedure Laterality Date   • APPENDECTOMY     • HALIMA HOLE Left 2016    Procedure: HALIMA HOLE;  Surgeon: Jos Christian MD;  Location:  [facility];  Service:    • CRANIOTOMY     • HIP PERCUTANEOUS PINNING Left 2017    Procedure: HIP PERCUTANEOUS PINNING;  Surgeon: Edgar Albert MD;  Location:  [facility] OR;  Service:    • JOINT REPLACEMENT     • KNEE ARTHROPLASTY, PARTIAL REPLACEMENT     • SHOULDER ARTHROSCOPY     • TOTAL HIP ARTHROPLASTY Bilateral    • TOTAL SHOULDER REPLACEMENT Left       General Information     Row Name 10/16/21 1500          Physical Therapy Time and Intention    Mode of Treatment physical therapy  -     Row  Name 10/16/21 1500          General Information    Patient Profile Reviewed yes  -LS     Existing Precautions/Restrictions fall; other (see comments)  dementia, [place]  -LS     Row Name 10/16/21 1500          Cognition    Orientation Status (Cognition) oriented to; person  -LS           User Key  (r) = Recorded By, (t) = Taken By, (c) = Cosigned By    Initials Name Provider Type    Florina Higginbotham, OLIVER Physical Therapist               Mobility     Row Name 10/16/21 1500          Bed Mobility    Bed Mobility supine-sit  -LS     Supine-Sit [place] (Bed Mobility) independent  -LS     Comment (Bed Mobility) indep supine to sit from flat bed surface without bedrail  -LS     Row Name 10/16/21 1500          Transfers    Comment (Transfers) sit to stand from bed, commode using grab bar, and from chair X 2. vcs for safe hand placement  -LS     Row Name 10/16/21 1500          Sit-Stand Transfer    Sit-Stand [place] (Transfers) verbal cues; contact guard; nonverbal cues (demo/gesture)  -LS     Assistive Device (Sit-Stand Transfers) walker, front-wheeled  -LS     Row Name 10/16/21 1500          Gait/Stairs (Locomotion)    [place] Level (Gait) minimum assist (75% patient effort)  -LS     Assistive Device (Gait) walker, front-wheeled  -LS     Distance in Feet (Gait) 10' + 100'  -LS     Deviations/Abnormal Patterns (Gait) gait speed decreased; bilateral deviations; stride length decreased; weight shifting decreased  -LS     Bilateral Gait Deviations forward flexed posture  -LS     Comment (Gait/Stairs) CGA when walking forward. min A to maneuver turns with RW.  -LS           User Key  (r) = Recorded By, (t) = Taken By, (c) = Cosigned By    Initials Name Provider Type    Florina Higginbotham PT Physical Therapist               Obj/Interventions     Row Name 10/16/21 1500          Motor Skills    Therapeutic Exercise --  rest breaks between exs  -     Row Name 10/16/21 1500          Shoulder (Therapeutic  Exercise)    Shoulder AROM (Therapeutic Exercise) bilateral; flexion; 10 repetitions  -     Row Name 10/16/21 1500          Hip (Therapeutic Exercise)    Hip Strengthening (Therapeutic Exercise) right; left; marching while seated; 10 repetitions  -     Row Name 10/16/21 1500          Knee (Therapeutic Exercise)    Knee Strengthening (Therapeutic Exercise) right; left; LAQ (long arc quad); 10 repetitions; 2 sets  -     Row Name 10/16/21 1500          Ankle (Therapeutic Exercise)    Ankle AROM (Therapeutic Exercise) bilateral; dorsiflexion; plantarflexion; 10 repetitions  -     Row Name 10/16/21 1500          Balance    Balance Interventions standing; sit to stand; supported; weight shifting activity  -     Row Name 10/16/21 1500          Elbow/Forearm (Therapeutic Exercise)    Elbow/Forearm (Therapeutic Exercise) AROM (active range of motion)  -     Elbow/Forearm AROM (Therapeutic Exercise) bilateral; flexion; extension; 10 repetitions  -           User Key  (r) = Recorded By, (t) = Taken By, (c) = Cosigned By    Initials Name Provider Type    Florina Higginbotham, OLIVER Physical Therapist               Goals/Plan    No documentation.                Clinical Impression     Row Name 10/16/21 1500          Pain    Additional Documentation Pain Scale: Numbers Pre/Post-Treatment (Group)  -[facility] Name 10/16/21 1500          Pain Scale: Numbers Pre/Post-Treatment    Pretreatment Pain Rating 0/10 - no pain  -     Posttreatment Pain Rating 0/10 - no pain  -     Row Name 10/16/21 1500          Plan of Care Review    Plan of Care Reviewed With patient  -LS     Outcome Summary Pt able to increase walking distance and walk with less assistance. She amb 10' + 100' with RW, CGA when walking forward and min A for turns.  -           User Key  (r) = Recorded By, (t) = Taken By, (c) = Cosigned By    Initials Name Provider Type    Florina Higginbotham, PT Physical Therapist               Outcome Measures      Row Name 10/16/21 1500          How much help from another person do you currently need...    Turning from your back to your side while in flat bed without using bedrails? 4  -LS     Moving from lying on back to sitting on the side of a flat bed without bedrails? 4  -LS     Moving to and from a bed to a chair (including a wheelchair)? 3  -LS     Standing up from a chair using your arms (e.g., wheelchair, bedside chair)? 3  -LS     Climbing 3-5 steps with a railing? 2  -LS     To walk in hospital room? 3  -LS     AM-PAC 6 Clicks Score (PT) 19  -LS           User Key  (r) = Recorded By, (t) = Taken By, (c) = Cosigned By    Initials Name Provider Type    Florina Higginbotham, PT Physical Therapist                             Physical Therapy Education                 Title: PT OT SLP Therapies (In Progress)     Topic: Physical Therapy (In Progress)     Point: Mobility training (In Progress)     Learning Progress Summary           Patient Acceptance, E, VU,NR by LS at 10/16/2021 1500   Family Acceptance, E, NR by  at 10/8/2021 1441   Caregiver Acceptance, E,TB, VU,NR by  at 10/12/2021 0024      Show all documentation for this point (7)                 Point: Home exercise program (In Progress)     Learning Progress Summary           Patient Acceptance, E, VU,NR,NL by MS at 10/16/2021 0909   Family Acceptance, E, NR by  at 10/8/2021 1441   Caregiver Acceptance, E,TB, VU,NR by  at 10/12/2021 0024      Show all documentation for this point (6)                 Point: Body mechanics (In Progress)     Learning Progress Summary           Patient Acceptance, E, VU,NR,NL by MS at 10/16/2021 0909   Family Acceptance, E, NR by  at 10/8/2021 1441   Caregiver Acceptance, E,TB, VU,NR by  at 10/12/2021 0024      Show all documentation for this point (6)                 Point: Precautions (In Progress)     Learning Progress Summary           Patient Acceptance, E, VU,NR,NL by MS at 10/16/2021 0909   Family Acceptance, E,  NR by  at 10/8/2021 1441   Caregiver Acceptance, E,TB, VU,NR by  at 10/12/2021 0024      Show all documentation for this point (6)                             User Key     Initials Effective Dates Name Provider Type Discipline     06/16/21 -  Akiko Douglas, PT Physical Therapist PT     06/16/21 -  Florina Mitchell PT Physical Therapist PT    MS 06/16/21 -  Le Montiel, RN Registered Nurse Nurse     08/12/20 -  Vi Ga, RN Registered Nurse Nurse              PT Recommendation and Plan     Plan of Care Reviewed With: patient  Outcome Summary: Pt able to increase walking distance and walk with less assistance. She amb 10' + 100' with RW, CGA when walking forward and min A for turns.     Time Calculation:    PT Charges     Row Name 10/16/21 1500             Time Calculation    Start Time 1500  -LS      PT Received On 10/16/21  -LS      PT Goal Re-Cert Due Date 10/18/21  -              Time Calculation- PT    Total Timed Code Minutes- PT 30 minute(s)  -LS              Timed Charges    58972 - PT Therapeutic Exercise Minutes 15  -LS      17043 - PT Therapeutic Activity Minutes 15  -LS              Total Minutes    Timed Charges Total Minutes 30  -LS       Total Minutes 30  -LS            User Key  (r) = Recorded By, (t) = Taken By, (c) = Cosigned By    Initials Name Provider Type     Florina Mitchell, PT Physical Therapist              Therapy Charges for Today     Code Description Service Date Service Provider Modifiers Qty    22505297070 HC PT THER PROC EA 15 MIN 10/16/2021 Florina Mitchell, PT GP 1    68975704529 HC PT THERAPEUTIC ACT EA 15 MIN 10/16/2021 Florina Mitchell, PT GP 1          PT G-Codes  Outcome Measure Options: AM-PAC 6 Clicks Daily Activity (OT)  AM-PAC 6 Clicks Score (PT): 19  AM-PAC 6 Clicks Score (OT): 16    Florina Mitchell PT  10/16/2021

## 2021-10-16 NOTE — PROGRESS NOTES
[facility] Medicine Services  PROGRESS NOTE    Patient Name: Keila Alberts  : 7/15/1928  MRN: [id number]    Date of Admission: 10/6/2021  Primary Care Physician: Ronaldo Travis MD    Subjective   Subjective   CC:  Hospital follow up Dementia    HPI:    Upright in bed eating lunch with daughter at bedside.  Good appetite. Not attempting to get out of bed. Alert and cooperative during my visit.      ROS:  Difficult to obtain due to mental status    Objective   Objective   Vital Signs:   Temp:  [97.8 °F (36.6 °C)-98.3 °F (36.8 °C)] 98.1 °F (36.7 °C)  Heart Rate:  [72-85] 76  Resp:  [16] 16  BP: (132-161)/(68-80) 158/78  Physical Exam:    Constitutional: No acute distress, awake, alert, sitting upright in bed, daughter at bedside.  HENT: NCAT, mucous membranes moist, hard of hearing  Respiratory: Clear to auscultation bilaterally, respiratory effort normal on room air  Cardiovascular: RRR, no murmurs, rubs, or gallops  Gastrointestinal: Positive bowel sounds, soft, nontender, nondistended  Musculoskeletal: No bilateral ankle edema  Psychiatric: Appropriate affect, cooperative, pleasant  Neurologic: Oriented to self, confused, strength symmetric in all extremities, speech clear  Skin: No rashes noted to exposed skin       Results Reviewed:  LAB RESULTS:          Lab 10/15/21  0626 10/14/21  0836   SODIUM 137 136   POTASSIUM 4.3 3.9   CHLORIDE 103 102   CO2 23.0 24.0   ANION GAP 11.0 10.0   BUN 19 21   CREATININE 0.96 1.16*   GLUCOSE 100* 141*   CALCIUM 8.6 9.0   MAGNESIUM  --  2.1                         Brief Urine Lab Results  (Last result in the past 365 days)      Color   Clarity   Blood   Leuk Est   Nitrite   Protein   CREAT   Urine HCG        10/06/21 1324 Yellow   Cloudy   Negative   Small (1+)   Negative   Trace                 Microbiology Results Abnormal     Procedure Component Value - Date/Time    Urine Culture - Urine, Urine, Catheter [[id number]] Collected: 10/06/21 1324    Lab  Status: Final result Specimen: Urine, Catheter Updated: 10/07/21 1050     Urine Culture <10,000 CFU/mL Mixed Tony Isolated    Narrative:      Specimen contains mixed organisms of questionable pathogenicity which indicates contamination with commensal tony.  Further identification is unlikely to provide clinically useful information.  Suggest recollection.    COVID PRE-OP / PRE-PROCEDURE SCREENING ORDER (NO ISOLATION) - Swab, Nasopharynx [[id number]]  (Normal) Collected: 10/06/21 2137    Lab Status: Final result Specimen: Swab from Nasopharynx Updated: 10/06/21 2214    Narrative:      The following orders were created for panel order COVID PRE-OP / PRE-PROCEDURE SCREENING ORDER (NO ISOLATION) - Swab, Nasopharynx.  Procedure                               Abnormality         Status                     ---------                               -----------         ------                     COVID-19, ABBOTT IN-HOUS...[[id number]]  Normal              Final result                 Please view results for these tests on the individual orders.    COVID-19, ABBOTT IN-HOUSE,NASAL Swab (NO TRANSPORT MEDIA) 2 HR TAT - Swab, Nasopharynx [[id number]]  (Normal) Collected: 10/06/21 2137    Lab Status: Final result Specimen: Swab from Nasopharynx Updated: 10/06/21 2214     COVID19 Presumptive Negative    Narrative:      Fact sheet for providers: https://www.fda.gov/media/347942/download     Fact sheet for patients: https://www.fda.gov/media/048001/download    Test performed by PCR.  If inconsistent with clinical signs and symptoms patient should be tested with different authorized molecular test.          No radiology results from the last 24 hrs    Results for orders placed during the hospital encounter of 03/16/18    Adult Transthoracic Echo Complete W/ Cont if Necessary Per Protocol    Interpretation Summary  · Left ventricular systolic function is hyperdynamic (EF > 70).  · Left ventricular diastolic dysfunction (grade I) consistent  with impaired relaxation.  · Moderate to severe aortic valve regurgitation is present.  · Mild aortic valve stenosis is present.      I have reviewed the medications:  Scheduled Meds:amLODIPine, 5 mg, Oral, Daily  cetirizine, 5 mg, Oral, Daily  citalopram, 40 mg, Oral, Daily  docusate sodium, 200 mg, Oral, Daily  losartan, 100 mg, Oral, Daily  mirtazapine, 15 mg, Oral, Nightly  QUEtiapine, 50 mg, Oral, Nightly  sodium chloride, 10 mL, Intravenous, Q12H      Continuous Infusions:   PRN Meds:.•  acetaminophen  •  senna-docusate sodium **AND** [DISCONTINUED] polyethylene glycol **AND** bisacodyl **AND** bisacodyl  •  hydrALAZINE  •  HYDROcodone-acetaminophen  •  influenza vaccine  •  magnesium hydroxide  •  magnesium sulfate **OR** magnesium sulfate in D5W 1g/100mL (PREMIX) **OR** magnesium sulfate  •  QUEtiapine  •  sodium chloride  •  sodium chloride    Assessment/Plan   Assessment & Plan     Active Hospital Problems    Diagnosis  POA   • **Dementia (HCC) [F03.90]  Yes   • [place] (hard of hearing) [H91.90]  Yes   • Legally blind [H54.8]  Yes   • Acute constipation [K59.00]  Yes   • Back pain [M54.9]  Yes   • Heart murmur [R01.1]  Yes   • Macular degeneration [H35.30]  Yes   • Hyponatremia [E87.1]  Yes   • Chronic idiopathic constipation [K59.04]  Yes   • Essential hypertension [I10]  Yes   • History of subdural hematoma (post traumatic) [Z87.828]  Not Applicable   • Normocytic anemia [D64.9]  Yes      Resolved Hospital Problems   No resolved problems to display.        Brief Hospital Course to date:  Keila Alberts is a 89 y.o. female patient with past medical history of essential hypertension, dyslipidemia, arthritis, legally blind with macular degeneration, subdural hematoma status post craniotomy in 2016, and dementia who presented to the hospital with worsening dementia, recent fall with worsening back pain.    This patient's problems and plans were partially entered by my partner and updated as appropriate by me  10/16/21.    Worsening dementia with behavioral changes  · Dementia has been getting worse over the last few weeks  · CT head from 09/29/2021 is un remarkable for any acute changes. MRI brain showed severe, chronic small-vessel ischemic disease with mild cerebral atrophy / age-related volume loss.   · Thyroid panel and B12 is within normal limits  · No infectious etiology identified  · Appreciate neurology evaluation - neurology feels this her worsening is normal progression of her dementia  · She has been seen by neurology service on outpatient basis and was started on Seroquel nightly.   · Continue Remeron 15mg QHS   · Adjusted seroquel to 50 mg nightly on 10/15. Will switch administration time to dinner. Seroquel 12.5 mg BID PRN ordered. Received a dose this morning with improvement. She appears to do much better when her family is in the room. It is difficult for them to come stay at night because the patient's  is currently on hospice.   · Family interested in placement.  CM is working on disposition    Hyponatremia  · resolved  · Asymptomatic, continue to encourage p.o. intake    Constipation  · Continue Bowel regimen    Back pain  Recent fall  · CT lumbar spine done in ED showed chronic changes with no acute fracture  · As needed Norco  · Continue PT and OT    Essential hypertension  · Continue losartan and Norvasc    Anemia of chronic disease  · Stable hemoglobin  · Continue to monitor intermittently     VICK  -mild. S/p NS +20K with resolution of VICK. Monitor      DVT prophylaxis:  Mechanical DVT prophylaxis orders are present.       AM-PAC 6 Clicks Score (PT): 18 (10/14/21 1611)    Disposition: I expect the patient to be discharged to SNF, hopefully on Monday.     CODE STATUS:   Code Status and Medical Interventions:   Ordered at: 10/06/21 1958     Level Of Support Discussed With:    Health Care Surrogate     Code Status:    No CPR     Medical Interventions (Level of Support Prior to Arrest):    Full      Comments:    DNR/ DNI         Brigitte Porter, APRN  10/16/21

## 2021-10-16 NOTE — PLAN OF CARE
Problem: Adult Inpatient Plan of Care  Goal: Optimal Comfort and Wellbeing  Outcome: Ongoing, Progressing  Goal: Readiness for Transition of Care  Outcome: Ongoing, Progressing     Problem: Fall Injury Risk  Goal: Absence of Fall and Fall-Related Injury  Outcome: Ongoing, Progressing  Intervention: Promote Injury-Free Environment  Recent Flowsheet Documentation  Taken 10/16/2021 0800 by Le Montiel RN  Safety Promotion/Fall Prevention:   safety round/check completed   toileting scheduled   Goal Outcome Evaluation:      Continues to be confused, trying to get out of bed, wants to go home. Waiting for placement.

## 2021-10-17 NOTE — PLAN OF CARE
Goal Outcome Evaluation:           Progress: no change     Pt agitated and restless throughout shift. Alert to self only; orientation worsening as shift progresses. Up with x2 and walker; very unsteady today. Awaiting placement tomorrow. Family at bedside.

## 2021-10-17 NOTE — PROGRESS NOTES
[facility] Medicine Services  PROGRESS NOTE    Patient Name: Keila Alberts  : 7/15/1928  MRN: [id number]    Date of Admission: 10/6/2021  Primary Care Physician: Ronaldo Travis MD    Subjective   Subjective   CC:  Hospital follow up Dementia    HPI:    R hand tremor today. Tremor is better when distracted. Discussed with son in law at bedside regarding disposition plans     ROS:  Difficult to obtain due to mental status    Objective   Objective   Vital Signs:   Temp:  [97.7 °F (36.5 °C)-98.5 °F (36.9 °C)] 98 °F (36.7 °C)  Heart Rate:  [69-89] 79  Resp:  [18] 18  BP: (104-166)/(64-83) 166/83  Physical Exam:    Constitutional: No acute distress, awake, alert, sitting upright in bed, son in law at bedside  HENT: NCAT, mucous membranes moist, hard of hearing  Respiratory: Clear to auscultation bilaterally, respiratory effort normal on room air  Cardiovascular: RRR, no murmurs, rubs, or gallops  Gastrointestinal: Positive bowel sounds, soft, nontender, nondistended  Musculoskeletal: No bilateral ankle edema  Psychiatric: Appropriate affect, cooperative, pleasant  Neurologic: Oriented to self, confused, strength symmetric in all extremities, speech clear  Skin: No rashes noted to exposed skin       Results Reviewed:  LAB RESULTS:          Lab 10/15/21  0626 10/14/21  0836   SODIUM 137 136   POTASSIUM 4.3 3.9   CHLORIDE 103 102   CO2 23.0 24.0   ANION GAP 11.0 10.0   BUN 19 21   CREATININE 0.96 1.16*   GLUCOSE 100* 141*   CALCIUM 8.6 9.0   MAGNESIUM  --  2.1                         Brief Urine Lab Results  (Last result in the past 365 days)      Color   Clarity   Blood   Leuk Est   Nitrite   Protein   CREAT   Urine HCG        10/06/21 1324 Yellow   Cloudy   Negative   Small (1+)   Negative   Trace                 Microbiology Results Abnormal     Procedure Component Value - Date/Time    Urine Culture - Urine, Urine, Catheter [[id number]] Collected: 10/06/21 1324    Lab Status: Final result  Specimen: Urine, Catheter Updated: 10/07/21 1050     Urine Culture <10,000 CFU/mL Mixed Tony Isolated    Narrative:      Specimen contains mixed organisms of questionable pathogenicity which indicates contamination with commensal tony.  Further identification is unlikely to provide clinically useful information.  Suggest recollection.    COVID PRE-OP / PRE-PROCEDURE SCREENING ORDER (NO ISOLATION) - Swab, Nasopharynx [[id number]]  (Normal) Collected: 10/06/21 2137    Lab Status: Final result Specimen: Swab from Nasopharynx Updated: 10/06/21 2214    Narrative:      The following orders were created for panel order COVID PRE-OP / PRE-PROCEDURE SCREENING ORDER (NO ISOLATION) - Swab, Nasopharynx.  Procedure                               Abnormality         Status                     ---------                               -----------         ------                     COVID-19, ABBOTT IN-HOUS...[[id number]]  Normal              Final result                 Please view results for these tests on the individual orders.    COVID-19, ABBOTT IN-HOUSE,NASAL Swab (NO TRANSPORT MEDIA) 2 HR TAT - Swab, Nasopharynx [[id number]]  (Normal) Collected: 10/06/21 2137    Lab Status: Final result Specimen: Swab from Nasopharynx Updated: 10/06/21 2214     COVID19 Presumptive Negative    Narrative:      Fact sheet for providers: https://www.fda.gov/media/637738/download     Fact sheet for patients: https://www.fda.gov/media/732024/download    Test performed by PCR.  If inconsistent with clinical signs and symptoms patient should be tested with different authorized molecular test.          No radiology results from the last 24 hrs    Results for orders placed during the hospital encounter of 03/16/18    Adult Transthoracic Echo Complete W/ Cont if Necessary Per Protocol    Interpretation Summary  · Left ventricular systolic function is hyperdynamic (EF > 70).  · Left ventricular diastolic dysfunction (grade I) consistent with impaired  relaxation.  · Moderate to severe aortic valve regurgitation is present.  · Mild aortic valve stenosis is present.      I have reviewed the medications:  Scheduled Meds:amLODIPine, 5 mg, Oral, Daily  cetirizine, 5 mg, Oral, Daily  citalopram, 40 mg, Oral, Daily  docusate sodium, 200 mg, Oral, Daily  losartan, 100 mg, Oral, Daily  mirtazapine, 15 mg, Oral, Nightly  QUEtiapine, 50 mg, Oral, Nightly  sodium chloride, 10 mL, Intravenous, Q12H      Continuous Infusions:   PRN Meds:.•  acetaminophen  •  senna-docusate sodium **AND** [DISCONTINUED] polyethylene glycol **AND** bisacodyl **AND** bisacodyl  •  hydrALAZINE  •  HYDROcodone-acetaminophen  •  influenza vaccine  •  magnesium hydroxide  •  magnesium sulfate **OR** magnesium sulfate in D5W 1g/100mL (PREMIX) **OR** magnesium sulfate  •  QUEtiapine  •  sodium chloride  •  sodium chloride    Assessment/Plan   Assessment & Plan     Active Hospital Problems    Diagnosis  POA   • **Dementia (HCC) [F03.90]  Yes   • [place] (hard of hearing) [H91.90]  Yes   • Legally blind [H54.8]  Yes   • Acute constipation [K59.00]  Yes   • Back pain [M54.9]  Yes   • Heart murmur [R01.1]  Yes   • Macular degeneration [H35.30]  Yes   • Hyponatremia [E87.1]  Yes   • Chronic idiopathic constipation [K59.04]  Yes   • Essential hypertension [I10]  Yes   • History of subdural hematoma (post traumatic) [Z87.828]  Not Applicable   • Normocytic anemia [D64.9]  Yes      Resolved Hospital Problems   No resolved problems to display.        Brief Hospital Course to date:  Keila Alberts is a 89 y.o. female patient with past medical history of essential hypertension, dyslipidemia, arthritis, legally blind with macular degeneration, subdural hematoma status post craniotomy in 2016, and dementia who presented to the hospital with worsening dementia, recent fall with worsening back pain.    This patient's problems and plans were partially entered by my partner and updated as appropriate by me  10/17/21.    Worsening dementia with behavioral changes  · Dementia has been getting worse over the last few weeks  · CT head from 09/29/2021 is un remarkable for any acute changes. MRI brain showed severe, chronic small-vessel ischemic disease with mild cerebral atrophy / age-related volume loss.   · Thyroid panel and B12 is within normal limits  · No infectious etiology identified  · Appreciate neurology evaluation - neurology feels this her worsening is normal progression of her dementia  · She has been seen by neurology service on outpatient basis and was started on Seroquel nightly.   · Continue Remeron 15mg QHS   · Adjusted seroquel to 50 mg with dinner Seroquel 12.5 mg BID PRN ordered.   · Family interested in placement.  CM is working on disposition. Ambulance is scheduled for tmrw at 12:40    Hyponatremia  · resolved  · Asymptomatic, continue to encourage p.o. intake    Constipation  · Continue Bowel regimen    Back pain  Recent fall  · CT lumbar spine done in ED showed chronic changes with no acute fracture  · As needed Norco  · Continue PT and OT    Essential hypertension  · Continue losartan and Norvasc    Anemia of chronic disease  · Stable hemoglobin  · Continue to monitor intermittently     VICK  -mild. S/p NS +20K with resolution of VICK. Monitor      DVT prophylaxis:  Mechanical DVT prophylaxis orders are present.       AM-PAC 6 Clicks Score (PT): 19 (10/17/21 0800)    Disposition: I expect the patient to be discharged to SNF, hopefully on Monday.     CODE STATUS:   Code Status and Medical Interventions:   Ordered at: 10/06/21 1958     Level Of Support Discussed With:    Health Care Surrogate     Code Status:    No CPR     Medical Interventions (Level of Support Prior to Arrest):    Full     Comments:    DNR/ DNI         Summer Lozada MD  10/17/21

## 2021-10-18 PROBLEM — K59.00 ACUTE CONSTIPATION: Status: RESOLVED | Noted: 2021-01-01 | Resolved: 2021-01-01

## 2021-10-18 PROBLEM — M54.9 BACK PAIN: Status: RESOLVED | Noted: 2021-01-01 | Resolved: 2021-01-01

## 2021-10-18 NOTE — PLAN OF CARE
Problem: Adult Inpatient Plan of Care  Goal: Plan of Care Review  Outcome: Ongoing, Progressing  Flowsheets (Taken 10/18/2021 0401)  Outcome Summary: Pt has rested well at times with multiple trips to the bathroom. Agitated and restless at start of shift with multiple  attempts to get OOB.   VSS and no c/o pain or discomfort. Plan is to DC to SNF Mon. Will CTM and provide care.   Goal Outcome Evaluation:              Outcome Summary: Pt has rested well at times with multiple trips to the bathroom. Agitated and restless at start of shift with multiple  attempts to get OOB.   VSS and no c/o pain or discomfort. Plan is to DC to SNF Mon. Will CTM and provide care.

## 2021-10-18 NOTE — DISCHARGE SUMMARY
[facility] Medicine Services  DISCHARGE SUMMARY    Patient Name: Keila Alberts  : 7/15/1928  MRN: [id number]    Date of Admission: 10/6/2021 12:07 PM  Date of Discharge:  10/18/2021  Primary Care Physician: Ronaldo Travis MD    Consults     Date and Time Order Name Status Description    10/7/2021 12:36 AM Inpatient Neurology Consult General Completed           Hospital Course     Presenting Problem:   Recurrent falls [R29.6]  Hyponatremia [E87.1]    Active Hospital Problems    Diagnosis  POA   • **Dementia (HCC) [F03.90]  Yes   • [place] (hard of hearing) [H91.90]  Yes   • Legally blind [H54.8]  Yes   • Heart murmur [R01.1]  Yes   • Macular degeneration [H35.30]  Yes   • Chronic idiopathic constipation [K59.04]  Yes   • Essential hypertension [I10]  Yes   • History of subdural hematoma (post traumatic) [Z87.828]  Not Applicable   • Normocytic anemia [D64.9]  Yes      Resolved Hospital Problems    Diagnosis Date Resolved POA   • Acute constipation [K59.00] 10/18/2021 Yes   • Back pain [M54.9] 10/18/2021 Yes   • Hyponatremia [E87.1] 10/18/2021 Yes          Hospital Course:  Keila Alberts is a 89 y.o. female patient with past medical history of essential hypertension, dyslipidemia, arthritis, legally blind with macular degeneration, subdural hematoma status post craniotomy in 2016, and dementia who presented to the hospital with worsening dementia, recent fall with worsening back pain.          Worsening dementia with behavioral changes    Her dementia had been getting worse over the last few weeks. CT imaging performed 21 was unremarkable for any acute changes and MRI brain performed around that time showed severe chronic small vessel ischemic changes w mild cerebral atrophy/age related volume loss. A thyroid panel and b12 level was obtained and this was normal. No infectious etiology of worsening dementia was identified. Neurology was consulted and per their evaluation it was thought  that the worsening mental status was normal progression of her dementia. (She is known to neurology outpatient and was started on nightly seroquel). Remeron was continued. Due to increased drowsiness from seroquel the dose was adjusted while in the hospital with PRN orders as needed for agitation. It was noted that she was better whenever family members were present. After discussion with family, they were interested in placement and she is to [facility] to [facility] post discharge     Hyponatremia  Was present on admission, resolved with increased PO intake     Constipation  Continued Bowel regimen     Back pain  Recent fall  CT lumbar spine done in ED showed chronic changes with no acute fracture;  She received as needed norco with continued PT/OT     Essential hypertension  Continued losartan and Norvasc     Anemia of chronic disease  Hemoglobin remained stable throughout her hospital course, no transfusion was required     VICK  Resolved with fluids      Discharge Follow Up Recommendations for outpatient labs/diagnostics:  Mental status/agitation- she may need further adjustments of seroquel dosing depending on levels of agitation and drowsiness    Day of Discharge     HPI:   No specific complaints this am, states she would like to get up and that she is cold    Review of Systems  Unable to accurately obtain secondary to orientation level        Vital Signs:   Temp:  [97.5 °F (36.4 °C)-98 °F (36.7 °C)] 97.5 °F (36.4 °C)  Heart Rate:  [75-89] 75  Resp:  [17-18] 17  BP: (154-167)/(74-86) 159/79     Physical Exam:  Constitutional: No acute distress, awake, alert  HENT: NCAT, mucous membranes moist  Respiratory: Clear to auscultation bilaterally, respiratory effort normal   Cardiovascular: RRR, holosystolic murmur, no rubs or gallops  Gastrointestinal: Positive bowel sounds, soft, nontender, nondistended  Musculoskeletal: No bilateral ankle edema  Psychiatric: Appropriate affect, cooperative  Neurologic:  Oriented to person, strength symmetric in all extremities, Cranial Nerves grossly intact to confrontation, speech clear  Skin: No rashes      Pertinent  and/or Most Recent Results     LAB RESULTS:          Lab 10/15/21  0626 10/14/21  0836   SODIUM 137 136   POTASSIUM 4.3 3.9   CHLORIDE 103 102   CO2 23.0 24.0   ANION GAP 11.0 10.0   BUN 19 21   CREATININE 0.96 1.16*   GLUCOSE 100* 141*   CALCIUM 8.6 9.0   MAGNESIUM  --  2.1                         Brief Urine Lab Results  (Last result in the past 365 days)      Color   Clarity   Blood   Leuk Est   Nitrite   Protein   CREAT   Urine HCG        10/06/21 1324 Yellow   Cloudy   Negative   Small (1+)   Negative   Trace               Microbiology Results (last 10 days)     Procedure Component Value - Date/Time    COVID PRE-OP / PRE-PROCEDURE SCREENING ORDER (NO ISOLATION) - Swab, Nasopharynx [[id number]]  (Normal) Collected: 10/17/21 1813    Lab Status: Final result Specimen: Swab from Nasopharynx Updated: 10/17/21 2003    Narrative:      The following orders were created for panel order COVID PRE-OP / PRE-PROCEDURE SCREENING ORDER (NO ISOLATION) - Swab, Nasopharynx.  Procedure                               Abnormality         Status                     ---------                               -----------         ------                     COVID-19, ABBOTT IN-HOUS...[[id number]]  Normal              Final result                 Please view results for these tests on the individual orders.    COVID-19, ABBOTT IN-HOUSE,NASAL Swab (NO TRANSPORT MEDIA) 2 HR TAT - Swab, Nasopharynx [[id number]]  (Normal) Collected: 10/17/21 1813    Lab Status: Final result Specimen: Swab from Nasopharynx Updated: 10/17/21 2003     COVID19 Presumptive Negative    Narrative:      Fact sheet for providers: https://www.fda.gov/media/371116/download     Fact sheet for patients: https://www.fda.gov/media/101573/download    Test performed by PCR.  If inconsistent with clinical signs and symptoms patient  should be tested with different authorized molecular test.          EEG    Result Date: 10/8/2021  Reason for referral: 89 y.o.female with altered mental status, prior left frontal craniotomy for subdural hematoma Technical Summary:  A 19 channel digital EEG was performed using the international 10-20 placement system, including eye leads and EKG leads. Duration: 20 minutes Video: On Findings: The patient is drowsy during the study.  Diffuse low to medium amplitude 3-5 Hz intermixed delta and theta activity are seen over both hemispheres.  Faster frequencies in the theta and beta range or more prominent over the left frontal temporal leads, which may represent in part a breach refractive from prior craniotomy.  Photic stimulation does not change the background.  Toward the end of the study, when the patient is roused, there is an increase in faster theta frequencies, more prominent over the left than the right. 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      MRI Brain Without Contrast    Result Date: 10/8/2021  EXAMINATION: MRI BRAIN WO CONTRAST-  INDICATION: Mental status change, unknown cause; R29.6-Repeated falls; M54.50-Low back pain, unspecified; F03.90-Unspecified dementia without behavioral disturbance.  TECHNIQUE: Multiplanar MRI of the brain with and without intravenous contrast administration.  COMPARISON: CT head 09/29/2021.  FINDINGS: No restriction on diffusion-weighted sequences to suggest acute ischemia. Midline structures are symmetric without evidence of mass, mass effect or midline shift. Ventricles and  sulci demonstrate prominence of the ventricles and mild prominence of the sulci towards the vertex with severe increased T2 and FLAIR signal findings in the periventricular and deep white matter suggesting severely advanced chronic small vessel ischemic disease. Globes and orbits retain normal T2 characteristics. Paranasal sinuses with moderate air-fluid level in the right maxillary sinus along with minimal inspissated fluid and mild circumferential mucosal thickening of the left maxillary sinus. Mastoid air cells are grossly clear and well pneumatized. No cerebellopontine angle mass lesion with normal signal flow voids to the distal internal carotid and vertebrobasilar arteries. Pituitary and sella within normal limits. Cervicomedullary junction is widely patent.      1. No acute infarction. 2. Prominence of the lateral ventricles may represent central volume loss, however, normal pressure hydrocephalus could have a similar appearance due to prominence. 3. Severe chronic small vessel ischemic disease within the supratentorial white matter along with mild cerebral atrophy or age-related volume loss. 4. Air-fluid level and mucosal thickening within the maxillary sinuses right greater left with the right maxillary sinus moderate air-fluid level concerning for sinusitis.  D:  10/07/2021 E:  10/08/2021  This report was finalized on 10/8/2021 11:16 AM by Dr. Mike Lucas.        Results for orders placed during the hospital encounter of 03/16/18    Duplex Carotid Ultrasound CAR    Interpretation Summary  · Right internal carotid artery stenosis of 0-49%.  · Left internal carotid artery stenosis of 0-49%.      Results for orders placed during the hospital encounter of 03/16/18    Duplex Carotid Ultrasound CAR    Interpretation Summary  · Right internal carotid artery stenosis of 0-49%.  · Left internal carotid artery stenosis of 0-49%.      Results for orders placed during the hospital encounter of 03/16/18    Adult  Transthoracic Echo Complete W/ Cont if Necessary Per Protocol    Interpretation Summary  · Left ventricular systolic function is hyperdynamic (EF > 70).  · Left ventricular diastolic dysfunction (grade I) consistent with impaired relaxation.  · Moderate to severe aortic valve regurgitation is present.  · Mild aortic valve stenosis is present.      Plan for Follow-up of Pending Labs/Results:    Discharge Details        Discharge Medications      New Medications      Instructions Start Date   bisacodyl 5 MG EC tablet  Commonly known as: DULCOLAX   5 mg, Oral, Daily PRN      bisacodyl 10 MG suppository  Commonly known as: DULCOLAX   10 mg, Rectal, Daily PRN      sennosides-docusate 8.6-50 MG per tablet  Commonly known as: PERICOLACE   2 tablets, Oral, 2 Times Daily PRN         Changes to Medications      Instructions Start Date   cetirizine 10 MG tablet  Commonly known as: zyrTEC  What changed: how much to take   5 mg, Oral, Daily      docusate sodium 100 MG capsule  Commonly known as: COLACE  What changed:   · how much to take  · when to take this   200 mg, Oral, Daily      HYDROcodone-acetaminophen 5-325 MG per tablet  Commonly known as: NORCO  What changed: reasons to take this   1 tablet, Oral, Every 6 Hours PRN      losartan 100 MG tablet  Commonly known as: COZAAR  What changed:   · medication strength  · how much to take   100 mg, Oral, Daily      QUEtiapine 50 MG tablet  Commonly known as: SEROquel  What changed:   · medication strength  · how much to take  · when to take this   50 mg, Oral, Nightly      QUEtiapine 25 MG tablet  Commonly known as: SEROquel  What changed: You were already taking a medication with the same name, and this prescription was added. Make sure you understand how and when to take each.   12.5 mg, Oral, 2 Times Daily PRN         Continue These Medications      Instructions Start Date   amLODIPine 5 MG tablet  Commonly known as: NORVASC   5 mg, Oral, Daily      citalopram 20 MG  tablet  Commonly known as: CeleXA   40 mg, Oral, Daily      mirtazapine 15 MG tablet  Commonly known as: REMERON   15 mg, Oral, Nightly      multivitamin tablet tablet  Commonly known as: THERAGRAN   Oral, Daily      multivitamins-minerals capsule capsule   1 capsule, Oral, Daily         Stop These Medications    acetaminophen 500 MG tablet  Commonly known as: TYLENOL     digoxin 125 MCG tablet  Commonly known as: LANOXIN     magnesium hydroxide 400 MG/5ML suspension  Commonly known as: MILK OF MAGNESIA            Allergies   Allergen Reactions   • Penicillin G Unknown - Low Severity   • Erythromycin Rash         Discharge Disposition:  Rehab Facility or Unit (DC - External)[facility]    Diet:  Hospital:  Diet Order   Procedures   • Diet Regular       Activity:  with assistance    Restrictions or Other Recommendations:  Fall risk       CODE STATUS:    Code Status and Medical Interventions:   Ordered at: 10/06/21 1958     Level Of Support Discussed With:    Health Care Surrogate     Code Status:    No CPR     Medical Interventions (Level of Support Prior to Arrest):    Full     Comments:    DNR/ DNI       Future Appointments   Date Time Provider Department Center   1/17/2022 11:00 AM Sheila Martinez PA-C [facility]                 Summer Lozada MD  10/18/21      Time Spent on Discharge:  I spent  30 minutes on this discharge activity which included: face-to-face encounter with the patient, reviewing the data in the system, coordination of the care with the nursing staff as well as consultants, documentation, and entering orders.

## 2021-10-18 NOTE — CASE MANAGEMENT/SOCIAL WORK
Case Management Discharge Note      Final Note: Mrs Alberts is being DC today to [facility] for LTC. Nurse to call report to [phone]. The facility will retrieve the DC summary & negative Covid from Epic. Secure chat sent to Dr Lozada regarding plans. Primary nurse made aware via phone call. Called & spoke with Mrs Alberts's daughter, Aretha, and she is still in agreement with the plans. Per conversation this morning with liaison, Grace, with [facility], a bed isn't available at [facility]. Hoping to transfer to that location when a bed opens. [facility] is on the chartlet.         Selected Continued Care - Admitted Since 10/6/2021     Destination Coordination complete.    Service Provider Selected Services Address Phone Fax Patient Preferred    [facility] [place] [facility] [address] [place] [address] [phone] --       Internal Comment last updated by Nishi Robles, RN 10/11/2021 1313    10/11 referral called to Grace                     Durable Medical Equipment    No services have been selected for the patient.              Dialysis/Infusion    No services have been selected for the patient.              Home Medical Care    No services have been selected for the patient.              Therapy    No services have been selected for the patient.              Community Resources    No services have been selected for the patient.              Community & DME    No services have been selected for the patient.                       Final Discharge Disposition Code: 04 - [facility] care [facility]

## 2021-11-01 NOTE — TELEPHONE ENCOUNTER
Provider: FELECIA  Caller: ELSI  Relationship to Patient: REFERRED HOME CARE   Phone Number: [phone]  Reason for Call: ELSI CALLED IN FROM [facility] ABOUT NEEDING A VERBAL PA TO MAKE SURE FELE[facility]IA WILL CONTINUE CARE.     PLEASE ADVISE.    1.75

## 2021-11-03 NOTE — TELEPHONE ENCOUNTER
Rx Refill Note  Requested Prescriptions     Pending Prescriptions Disp Refills   • losartan (COZAAR) 50 MG tablet [Pharmacy Med Name: Losartan Potassium 50 MG Oral Tablet] 90 tablet 0     Sig: Take 1 tablet by mouth once daily   • amLODIPine (NORVASC) 5 MG tablet [Pharmacy Med Name: amLODIPine Besylate 5 MG Oral Tablet] 90 tablet 0     Sig: Take 1 tablet by mouth once daily   • citalopram (CeleXA) 20 MG tablet [Pharmacy Med Name: Citalopram Hydrobromide 20 MG Oral Tablet] 90 tablet 0     Sig: Take 1 tablet by mouth once daily   • digoxin (LANOXIN) 125 MCG tablet [Pharmacy Med Name: Digoxin 125 MCG Oral Tablet] 90 tablet 0     Sig: Take 1 tablet by mouth once daily      Last office visit with prescribing clinician: 9/28/2021      Next office visit with prescribing clinician: Visit date not found            Suzan Aranda  11/03/21, 17:18 EDT

## 2021-11-04 NOTE — TELEPHONE ENCOUNTER
Please call patient's family.  We received refill request for multiple medications.  2 of which which include losartan and digoxin were stopped at the time of discharge from the hospital.  Is she still taking those?  If she even at home or if she in a care facility?  These may have been a automatic refill request from the pharmacy.

## 2021-11-04 NOTE — TELEPHONE ENCOUNTER
Please call.  If she is in rehab, I would not want to refill any of these yet until she is discharged.  They should be supplying her medication for her.

## 2021-11-04 NOTE — TELEPHONE ENCOUNTER
Called spoke with latanya she stated she is still in the Rehab. Not sure how much longer. Stated they will try to get her in long term care but unsure at this moment. Stated that she knows of keith is still taking both.

## 2021-11-14 NOTE — DISCHARGE INSTRUCTIONS
Apply ice as needed over scalp hematoma.    Give Tylenol as needed help with pain.    Return to the ER as needed with any further concern or worsening of symptoms.

## 2021-11-14 NOTE — ED PROVIDER NOTES
Subjective   89-year-old female with baseline dementia who lives at a nursing home who presents for evaluation after a witnessed fall.  For the patient typically scoots herself around in wheelchair.  While scoot around in wheelchair she actually fell back and struck her head against the ground.  There is no reported loss of consciousness.  The patient does have underlying history of A. fib but does not take any anticoagulation on a regular basis.  There has been no other new or different complaints or symptoms recently.  The patient has a baseline history of anemia.  There is no report of any recent bleeding.  The patient herself is oriented to place, and person, but not fully oriented to time.  The patient has no acute complaints.  Observed to be resting comfortably.  There is no report of any new or different occasions.          Review of Systems   Constitutional: Negative for chills, fatigue and fever.   HENT: Negative for congestion, ear pain, postnasal drip, sinus pressure and sore throat.    Eyes: Negative for pain, redness and visual disturbance.   Respiratory: Negative for cough, chest tightness and shortness of breath.    Cardiovascular: Negative for chest pain, palpitations and leg swelling.   Gastrointestinal: Negative for abdominal pain, anal bleeding, blood in stool, diarrhea, nausea and vomiting.   Endocrine: Negative for polydipsia and polyuria.   Genitourinary: Negative for difficulty urinating, dysuria, frequency and urgency.   Musculoskeletal: Negative for arthralgias, back pain and neck pain.   Skin: Negative for pallor and rash.   Allergic/Immunologic: Negative for environmental allergies and immunocompromised state.   Neurological: Negative for dizziness, weakness and headaches.   Hematological: Negative for adenopathy.   Psychiatric/Behavioral: Negative for confusion, self-injury and suicidal ideas. The patient is not nervous/anxious.    All other systems reviewed and are negative.      Past  Medical History:   Diagnosis Date   • Acute/Subacute Traumatic SDH (subdural hematoma) 11/27/2016    Nov 2016   • Arthritis    • Dementia (HCC)    • Hyperlipidemia    • Hypertension    • Legally blind     2/2 macular degeneration    • Macular degeneration    • Memory loss        Allergies   Allergen Reactions   • Penicillin G Unknown - Low Severity   • Erythromycin Rash       Past Surgical History:   Procedure Laterality Date   • APPENDECTOMY     • HALIMA HOLE Left 11/27/2016    Procedure: HALIMA HOLE;  Surgeon: Jos Christian MD;  Location:  [facility];  Service:    • CRANIOTOMY  2016   • HIP PERCUTANEOUS PINNING Left 1/14/2017    Procedure: HIP PERCUTANEOUS PINNING;  Surgeon: Edgar Albert MD;  Location:  [facility];  Service:    • JOINT REPLACEMENT     • KNEE ARTHROPLASTY, PARTIAL REPLACEMENT     • SHOULDER ARTHROSCOPY     • TOTAL HIP ARTHROPLASTY Bilateral    • TOTAL SHOULDER REPLACEMENT Left        Family History   Problem Relation Age of Onset   • Arthritis Other    • Cancer Other    • Heart attack Other    • Hypertension Other    • Heart disease Mother    • Heart disease Father        Social History     Socioeconomic History   • Marital status:    Tobacco Use   • Smoking status: Never Smoker   • Smokeless tobacco: Never Used   Vaping Use   • Vaping Use: Never used   Substance and Sexual Activity   • Alcohol use: Never   • Drug use: Never   • Sexual activity: Never     Comment:            Objective   Physical Exam  Vitals and nursing note reviewed.   Constitutional:       General: She is not in acute distress.     Appearance: Normal appearance. She is well-developed. She is not toxic-appearing or diaphoretic.   HENT:      Head: Normocephalic and atraumatic.        Right Ear: External ear normal.      Left Ear: External ear normal.      Nose: Nose normal.   Eyes:      General: Lids are normal.      Pupils: Pupils are equal, round, and reactive to light.   Neck:      Trachea: No tracheal  deviation.   Cardiovascular:      Rate and Rhythm: Normal rate and regular rhythm.      Pulses: No decreased pulses.      Heart sounds: Normal heart sounds. No murmur heard.  No friction rub. No gallop.    Pulmonary:      Effort: Pulmonary effort is normal. No respiratory distress.      Breath sounds: Normal breath sounds. No decreased breath sounds, wheezing, rhonchi or rales.   Abdominal:      General: Bowel sounds are normal.      Palpations: Abdomen is soft.      Tenderness: There is no abdominal tenderness. There is no guarding or rebound.   Musculoskeletal:         General: No deformity. Normal range of motion.      Cervical back: Normal range of motion and neck supple.   Lymphadenopathy:      Cervical: No cervical adenopathy.   Skin:     General: Skin is warm and dry.      Findings: No rash.   Neurological:      Mental Status: She is alert and oriented to person, place, and time.      Cranial Nerves: No cranial nerve deficit.      Sensory: No sensory deficit.   Psychiatric:         Speech: Speech normal.         Behavior: Behavior normal.         Thought Content: Thought content normal.         Judgment: Judgment normal.         Procedures           ED Course                                           MDM  Number of Diagnoses or Management Options  Chronic anemia: new and requires workup  Chronic hyponatremia: new and requires workup  Contusion of scalp, initial encounter: new and requires workup  Fall, initial encounter: new and requires workup  Diagnosis management comments: Lab evaluation shows hyponatremia that is stable.  H&H is also relative stable to the patient's previous baseline.    CT scan of the head without contrast shows no acute injury.       Amount and/or Complexity of Data Reviewed  Clinical lab tests: ordered and reviewed  Tests in the radiology section of CPT®: ordered and reviewed  Decide to obtain previous medical records or to obtain history from someone other than the patient:  yes  Review and summarize past medical records: yes  Independent visualization of images, tracings, or specimens: yes        Final diagnoses:   Fall, initial encounter   Contusion of scalp, initial encounter   Chronic anemia   Chronic hyponatremia       ED Disposition  ED Disposition     ED Disposition Condition Comment    Discharge Stable           Ronaldo Travis MD  [address] [facility] [address]  [phone]    In 1 week           Medication List      No changes were made to your prescriptions during this visit.          Trinity Ureña MD  11/13/21 0242

## 2021-12-23 NOTE — ED PROVIDER NOTES
[place]    EMERGENCY DEPARTMENT ENCOUNTER      Pt Name: Keila Alberts  MRN: [id number]  YOB: 1928  Date of evaluation: 12/23/2021  Provider: Norbert Padilla DO    CHIEF COMPLAINT       Chief Complaint   Patient presents with   • Abdominal Pain         HISTORY OF PRESENT ILLNESS  (Location/Symptom, Timing/Onset, Context/Setting, Quality, Duration, Modifying Factors, Severity.)   Keila Alberts is a 89 y.o. female who presents to the emergency department for evaluation from [facility] in [place] secondary to concern for possible abdominal mass.  The history is provided by EMS on arrival to our nursing staff as the patient herself does not know why she is here and states she does not have any acute complaints.  She denies any abdominal pain, fullness, states she does not want to have any work-up completed and wants to go back home.  She denies any cough congestion fever chills, no chest pain or shortness of breath.  She states she has been eating and drinking well, no difficulties with any urinary or bowel complaints.  Patient self does not have any other acute complaints at this time.      Nursing notes were reviewed.    REVIEW OF SYSTEMS    (2-9 systems for level 4, 10 or more for level 5)   ROS:  General:  No fevers, no chills, no weakness  Cardiovascular:  No chest pain, no palpitations  Respiratory:  No shortness of breath, no cough, no wheezing  Gastrointestinal:  No pain, no nausea, no vomiting, no diarrhea  Musculoskeletal:  No muscle pain, no joint pain  Skin:  No rash  Neurologic:  No headache  Psychiatric:  No anxiety  Genitourinary:  No dysuria, no hematur change elderly, hard of hearing, ia    Except as noted above the remainder of the review of systems was reviewed and negative.       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    • Macular degeneration    • Memory loss          SURGICAL HISTORY       Past Surgical History:   Procedure Laterality Date   • APPENDECTOMY     • HALIMA HOLE Left 11/27/2016    Procedure: HALIMA HOLE;  Surgeon: oJs Christian MD;  Location:  [facility];  Service:    • CRANIOTOMY  2016   • HIP PERCUTANEOUS PINNING Left 1/14/2017    Procedure: HIP PERCUTANEOUS PINNING;  Surgeon: Edgar Albert MD;  Location:  [facility];  Service:    • JOINT REPLACEMENT     • KNEE ARTHROPLASTY, PARTIAL REPLACEMENT     • SHOULDER ARTHROSCOPY     • TOTAL HIP ARTHROPLASTY Bilateral    • TOTAL SHOULDER REPLACEMENT Left          CURRENT MEDICATIONS       Current Facility-Administered Medications:   •  mirtazapine (REMERON SOL-TAB) disintegrating tablet 15 mg, 15 mg, Oral, Nightly, Norbert Padilla DO, 15 mg at 12/23/21 2119  •  [COMPLETED] Insert peripheral IV, , , Once **AND** sodium chloride 0.9 % flush 10 mL, 10 mL, Intravenous, PRN, Norbert Padilla DO    Current Outpatient Medications:   •  Acetaminophen (ACETAMIN PO), Take  by mouth., Disp: , Rfl:   •  busPIRone (BUSPAR) 15 MG tablet, Take 15 mg by mouth Daily., Disp: , Rfl:   •  busPIRone (BUSPAR) 7.5 MG tablet, Take 7.5 mg by mouth 2 (Two) Times a Day., Disp: , Rfl:   •  docusate sodium (COLACE) 100 MG capsule, Take 2 capsules by mouth Daily., Disp: 100 capsule, Rfl: 2  •  HYDROcodone-acetaminophen (NORCO) 5-325 MG per tablet, Take 1 tablet by mouth Every 6 (Six) Hours As Needed for Moderate Pain ., Disp: 30 tablet, Rfl: 0  •  hydrOXYzine (ATARAX) 50 MG tablet, Take 50 mg by mouth Daily., Disp: , Rfl:   •  losartan (COZAAR) 100 MG tablet, Take 1 tablet by mouth Daily., Disp: 30 tablet, Rfl: 1  •  bisacodyl (DULCOLAX) 10 MG suppository, Unwrap and Insert 1 suppository into the rectum Daily As Needed for Constipation (Use if bisacodyl oral is ineffective)., Disp: 12 each, Rfl: 0  •  bisacodyl  (DULCOLAX) 5 MG EC tablet, Take 1 tablet by mouth Daily As Needed for Constipation (Use if polyethylene glycol is ineffective)., Disp: 30 tablet, Rfl: 2  •  cetirizine (zyrTEC) 10 MG tablet, Take 0.5 tablets by mouth Daily., Disp: 15 tablet, Rfl: 1  •  citalopram (CeleXA) 20 MG tablet, Take 2 tablets by mouth Daily., Disp: 30 tablet, Rfl: 3  •  mirtazapine (REMERON) 15 MG tablet, Take 1 tablet by mouth Every Night., Disp: 30 tablet, Rfl: 11  •  Multiple Vitamin (MULTI-VITAMIN DAILY PO), Take  by mouth Daily., Disp: , Rfl:   •  Multiple Vitamins-Minerals (PRESERVISION AREDS 2) capsule, Take 1 capsule by mouth Daily., Disp: , Rfl:   •  QUEtiapine (SEROquel) 25 MG tablet, Take 0.5 tablets by mouth 2 (Two) Times a Day As Needed (Agitation, or if not asleep 2 hours after evening dose)., Disp: 30 tablet, Rfl: 3  •  QUEtiapine (SEROquel) 25 MG tablet, Take 1 tablet by mouth Every Night., Disp: 20 tablet, Rfl: 0  •  QUEtiapine (SEROquel) 50 MG tablet, Take 1 tablet by mouth Every Night., Disp: 30 tablet, Rfl: 2  •  sennosides-docusate (PERICOLACE) 8.6-50 MG per tablet, Take 2 tablets by mouth 2 (Two) Times a Day As Needed for Constipation., Disp: 30 tablet, Rfl: 2    ALLERGIES     Penicillin g and Erythromycin    FAMILY HISTORY       Family History   Problem Relation Age of Onset   • Arthritis Other    • Cancer Other    • Heart attack Other    • Hypertension Other    • Heart disease Mother    • Heart disease Father           SOCIAL HISTORY       Social History     Socioeconomic History   • Marital status:    Tobacco Use   • Smoking status: Never Smoker   • Smokeless tobacco: Never Used   Vaping Use   • Vaping Use: Never used   Substance and Sexual Activity   • Alcohol use: Never   • Drug use: Never   • Sexual activity: Never     Comment:          PHYSICAL EXAM    (up to 7 for level 4, 8 or more for level 5)     Vitals:    12/23/21 1502 12/23/21 1526 12/23/21 1845 12/23/21 1900   BP: 151/99  (!) 200/103 (!)  "207/104   BP Location: Left arm      Patient Position: Sitting      Pulse: 84  98 106   Resp:  20     Temp: 98.5 °F (36.9 °C)      TempSrc: Oral      SpO2: 92%  90% 91%   Weight: 54.4 kg (120 lb)      Height: 165.1 cm (65\")          Physical Exam  General : Patient is elderly, hard of hearing, awake, alert, oriented, in no acute distress, nontoxic appearing  HEENT: Pupils are equally round and reactive to light, EOMI, conjunctivae clear, sclerae white  Neck: Neck is supple, full range of motion, trachea midline  Cardiac: Heart regular rate, rhythm, no murmurs, rubs, or gallops  Lungs: Lungs are clear to auscultation, there is no wheezing, rhonchi, or rales. There is no use of accessory muscles  Abdomen: Abdomen is soft, nontender, nondistended.  No peritoneal signs on examination, bowel sounds are present diffusely throughout.  There are no firm or pulsatile masses, no rebound rigidity or guarding.   Musculoskeletal: 5 out of 5 strength in all 4 extremities.  No focal muscle deficits are appreciated  Neuro: Motor intact, sensory intact, level of consciousness is normal  Dermatology: Skin is warm and dry  Psych: Mentation is consistent with age-related dementia.      DIAGNOSTIC RESULTS     EKG: All EKGs are interpreted by the Emergency Department Physician who either signs or Co-signs this chart in the absence of a cardiologist.    No orders to display       RADIOLOGY:   Non-plain film images such as CT, Ultrasound and MRI are read by the radiologist. Plain radiographic images are visualized and preliminarily interpreted by the emergency physician with the below findings:      [] Radiologist's Report Reviewed:  CT Abdomen Pelvis Without Contrast   Final Result      1. No acute abdominal or pelvic findings.   2. Stable calcified uterine fibroid in the pelvis.   3. Uncomplicated colonic diverticulosis.               Signer Name: Frank Mcgee MD    Signed: 12/23/2021 7:26 PM    Workstation Name: [id number]     " [facility]            ED BEDSIDE ULTRASOUND:   Performed by ED Physician - none    LABS:    I have reviewed and interpreted all of the currently available lab results from this visit (if applicable):  Results for orders placed or performed during the hospital encounter of 12/23/21   Comprehensive Metabolic Panel    Specimen: Blood   Result Value Ref Range    Glucose 108 (H) 65 - 99 mg/dL    BUN 14 8 - 23 mg/dL    Creatinine 0.85 0.57 - 1.00 mg/dL    Sodium 133 (L) 136 - 145 mmol/L    Potassium 3.9 3.5 - 5.2 mmol/L    Chloride 99 98 - 107 mmol/L    CO2 24.0 22.0 - 29.0 mmol/L    Calcium 8.5 8.2 - 9.6 mg/dL    Total Protein 6.2 6.0 - 8.5 g/dL    Albumin 3.40 (L) 3.50 - 5.20 g/dL    ALT (SGPT) 13 1 - 33 U/L    AST (SGOT) 16 1 - 32 U/L    Alkaline Phosphatase 90 39 - 117 U/L    Total Bilirubin 0.5 0.0 - 1.2 mg/dL    eGFR Non African Amer 62 >60 mL/min/1.73    Globulin 2.8 gm/dL    A/G Ratio 1.2 g/dL    BUN/Creatinine Ratio 16.5 7.0 - 25.0    Anion Gap 10.0 5.0 - 15.0 mmol/L   Lipase    Specimen: Blood   Result Value Ref Range    Lipase 8 (L) 13 - 60 U/L   Urinalysis With Microscopic If Indicated (No Culture) - Urine, Catheter    Specimen: Urine, Catheter   Result Value Ref Range    Color, UA Yellow Yellow, Straw    Appearance, UA Clear Clear    pH, UA 6.0 5.0 - 8.0    Specific Gravity, UA 1.025 1.001 - 1.030    Glucose, UA Negative Negative    Ketones, UA Trace (A) Negative    Bilirubin, UA Negative Negative    Blood, UA Negative Negative    Protein, UA 30 mg/dL (1+) (A) Negative    Leuk Esterase, UA Trace (A) Negative    Nitrite, UA Negative Negative    Urobilinogen, UA 1.0 E.U./dL 0.2 - 1.0 E.U./dL   Lactic Acid, Plasma    Specimen: Blood   Result Value Ref Range    Lactate 0.7 0.5 - 2.0 mmol/L   Procalcitonin    Specimen: Blood   Result Value Ref Range    Procalcitonin 0.21 0.00 - 0.25 ng/mL   CBC Auto Differential    Specimen: Blood   Result Value Ref Range    WBC 12.98 (H) 3.40 - 10.80  "10*3/mm3    RBC 2.61 (L) 3.77 - 5.28 10*6/mm3    Hemoglobin 8.6 (L) 12.0 - 15.9 g/dL    Hematocrit 25.1 (L) 34.0 - 46.6 %    MCV 96.2 79.0 - 97.0 fL    MCH 33.0 26.6 - 33.0 pg    MCHC 34.3 31.5 - 35.7 g/dL    RDW 15.4 12.3 - 15.4 %    RDW-SD 54.7 (H) 37.0 - 54.0 fl    MPV 8.8 6.0 - 12.0 fL    Platelets 413 140 - 450 10*3/mm3    Neutrophil % 59.1 42.7 - 76.0 %    Lymphocyte % 17.6 (L) 19.6 - 45.3 %    Monocyte % 7.4 5.0 - 12.0 %    Eosinophil % 15.3 (H) 0.3 - 6.2 %    Basophil % 0.4 0.0 - 1.5 %    Immature Grans % 0.2 0.0 - 0.5 %    Neutrophils, Absolute 7.68 (H) 1.70 - 7.00 10*3/mm3    Lymphocytes, Absolute 2.28 0.70 - 3.10 10*3/mm3    Monocytes, Absolute 0.96 (H) 0.10 - 0.90 10*3/mm3    Eosinophils, Absolute 1.98 (H) 0.00 - 0.40 10*3/mm3    Basophils, Absolute 0.05 0.00 - 0.20 10*3/mm3    Immature Grans, Absolute 0.03 0.00 - 0.05 10*3/mm3    nRBC 0.0 0.0 - 0.2 /100 WBC   Urinalysis, Microscopic Only - Urine, Catheter    Specimen: Urine, Catheter   Result Value Ref Range    RBC, UA 0-2 None Seen, 0-2 /HPF    WBC, UA 0-2 None Seen, 0-2 /HPF    Bacteria, UA None Seen None Seen, Trace /HPF    Squamous Epithelial Cells, UA 0-2 None Seen, 0-2 /HPF    Hyaline Casts, UA 0-6 0 - 6 /LPF    Methodology Automated Microscopy         All other labs were within normal range or not returned as of this dictation.      EMERGENCY DEPARTMENT COURSE and DIFFERENTIAL DIAGNOSIS/MDM:   Vitals:    Vitals:    12/23/21 1502 12/23/21 1526 12/23/21 1845 12/23/21 1900   BP: 151/99  (!) 200/103 (!) 207/104   BP Location: Left arm      Patient Position: Sitting      Pulse: 84  98 106   Resp:  20     Temp: 98.5 °F (36.9 °C)      TempSrc: Oral      SpO2: 92%  90% 91%   Weight: 54.4 kg (120 lb)      Height: 165.1 cm (65\")               Patient with underlying dementia, difficulty with hearing who presented for evaluation secondary to abdominal fullness, family and staff are concerned about a possible abdominal mass and she was post to have a CT " scheduled in the near future but they want it evaluated sooner which brought her into the hospital today for eval.  The patient is nontoxic-appearing, abdomen is soft, there is no peritoneal signs on examination.  She does have a small umbilical hernia palpable, we will proceed forward with abdominal imaging for further evaluation.  Blood work labs and imaging reviewed.  No acute or significant amount is appreciated.  The patient's daughter/failure number is here and states she does have pretty bad dementia and at her nursing facility was supposed to be receiving Seroquel and Remeron but the facility had not been giving it to her and they given her some Ativan as needed.  Gi is likely not helping with the patient's dementia and she has had intermittent episodes of just egressing not fully wanting to interact well with staff.  We did give her dose of her Seroquel, Norco.  We discussed admission versus continuing with therapies at home which I feel is reasonable but I have any source or sign for admission at this time, she does have underlying arthritic changes and will need her arthritis medications, we will write her for her mood stabilizing medications, have her follow-up with her facility providers in a few days for reevaluation.  Return precautions discussed.  Patient daughter would like the patient to be at her facility rather than in the hospital at this time. I had a discussion with the patient/family regarding diagnosis, diagnostic results, treatment plan, and medications.  The patient/family indicated understanding of these instructions.  I spent adequate time at the bedside preceding discharge necessary to personally discuss the aftercare instructions, giving patient education, providing explanations of the results of our evaluations/findings, and my decision making to assure that the patient/family understand the plan of care.  Time was allotted to answer questions at that time and throughout the ED  course.  Emphasis was placed on timely follow-up after discharge.  I also discussed the potential for the development of an acute emergent condition requiring further evaluation, admission, or even surgical intervention. I discussed that we found nothing during the visit today indicating the need for further workup, admission, or the presence of an unstable medical condition.  I encouraged the patient to return to the emergency department immediately for ANY concerns, worsening, new complaints, or if symptoms persist and unable to seek follow-up in a timely fashion.  The patient/family expressed understanding and agreement with this plan.  The patient will follow-up with their PCP in 1-2 days for reevaluation.       MEDICATIONS ADMINISTERED IN ED:  Medications   sodium chloride 0.9 % flush 10 mL (has no administration in time range)   mirtazapine (REMERON SOL-TAB) disintegrating tablet 15 mg (15 mg Oral Given 12/23/21 2119)   sodium chloride 0.9 % bolus 500 mL (0 mL Intravenous Stopped 12/23/21 1705)   QUEtiapine (SEROquel) tablet 50 mg (50 mg Oral Given 12/23/21 2045)   HYDROcodone-acetaminophen (NORCO) 5-325 MG per tablet 1 tablet (1 tablet Oral Given 12/23/21 2002)       PROCEDURES:  Procedures    CRITICAL CARE TIME    Total Critical Care time was 0 minutes, excluding separately reportable procedures.   There was a high probability of clinically significant/life threatening deterioration in the patient's condition which required my urgent intervention.      FINAL IMPRESSION      1. History of uterine fibroid    2. Chronic anemia    3. Arthritis    4. Other chronic pain          DISPOSITION/PLAN     ED Disposition     ED Disposition Condition Comment    Discharge Stable           PATIENT REFERRED TO:  Ronaldo Traivs MD  [address] [place] [id number] [phone]    In 2 days      [facility] Emergency Department  [address] [facility] [address] [phone]    If  symptoms worsen      DISCHARGE MEDICATIONS:     Medication List      CHANGE how you take these medications    * QUEtiapine 50 MG tablet  Commonly known as: SEROquel  Take 1 tablet by mouth Every Night.  What changed: Another medication with the same name was added. Make sure you understand how and when to take each.     * QUEtiapine 25 MG tablet  Commonly known as: SEROquel  Take 0.5 tablets by mouth 2 (Two) Times a Day As Needed (Agitation, or if not asleep 2 hours after evening dose).  What changed: Another medication with the same name was added. Make sure you understand how and when to take each.     * QUEtiapine 25 MG tablet  Commonly known as: SEROquel  Take 1 tablet by mouth Every Night.  What changed: You were already taking a medication with the same name, and this prescription was added. Make sure you understand how and when to take each.         * This list has 3 medication(s) that are the same as other medications prescribed for you. Read the directions carefully, and ask your doctor or other care provider to review them with you.            CONTINUE taking these medications    ACETAMIN PO     * bisacodyl 5 MG EC tablet  Commonly known as: DULCOLAX  Take 1 tablet by mouth Daily As Needed for Constipation (Use if polyethylene glycol is ineffective).     * bisacodyl 10 MG suppository  Commonly known as: DULCOLAX  Unwrap and Insert 1 suppository into the rectum Daily As Needed for Constipation (Use if bisacodyl oral is ineffective).     * busPIRone 7.5 MG tablet  Commonly known as: BUSPAR     * busPIRone 15 MG tablet  Commonly known as: BUSPAR     cetirizine 10 MG tablet  Commonly known as: zyrTEC  Take 0.5 tablets by mouth Daily.     citalopram 20 MG tablet  Commonly known as: CeleXA  Take 2 tablets by mouth Daily.     docusate sodium 100 MG capsule  Commonly known as: COLACE  Take 2 capsules by mouth Daily.     HYDROcodone-acetaminophen 5-325 MG per tablet  Commonly known as: NORCO  Take 1 tablet by mouth  Every 6 (Six) Hours As Needed for Moderate Pain .     hydrOXYzine 50 MG tablet  Commonly known as: ATARAX     losartan 100 MG tablet  Commonly known as: COZAAR  Take 1 tablet by mouth Daily.     mirtazapine 15 MG tablet  Commonly known as: REMERON  Take 1 tablet by mouth Every Night.     multivitamin tablet tablet  Commonly known as: THERAGRAN     multivitamins-minerals capsule capsule     sennosides-docusate 8.6-50 MG per tablet  Commonly known as: PERICOLACE  Take 2 tablets by mouth 2 (Two) Times a Day As Needed for Constipation.         * This list has 4 medication(s) that are the same as other medications prescribed for you. Read the directions carefully, and ask your doctor or other care provider to review them with you.            STOP taking these medications    amLODIPine 5 MG tablet  Commonly known as: NORVASC           Where to Get Your Medications      These medications were sent to [facility] - [place] [address]    Hours: 7:00 AM-5:30 PM M-F, 8:00 AM-4:30 PM Sat-Sun Phone: [phone]   · QUEtiapine 25 MG tablet             Comment: Please note this report has been produced using speech recognition software.      Norbert Padilla DO  Attending Emergency Physician               Norbert Padilla DO  12/24/21 0112

## 2021-12-24 NOTE — DISCHARGE INSTRUCTIONS
Please take your medications as prescribed including her Seroquel at night to help you rest.  Make sure the patient takes her other medications as prescribed, follow-up with her PCP in a couple days for reevaluation.  Return to the ED with any worsening symptoms or any further concerns.

## 2021-12-29 NOTE — TELEPHONE ENCOUNTER
Pt's daughter wants you to look at the CT of the abdomen and pelvis done at recent ER visit and see if you can see anything about her low back pain. Any issues that could be causing her pain.

## 2021-12-29 NOTE — TELEPHONE ENCOUNTER
Caller: Aretha Lockhart    Relationship: Emergency Contact    Best call back number: [phone]    What is the best time to reach you: ANYTIME    Who are you requesting to speak with (clinical staff, provider,  specific staff member): PROVIDER    Do you know the name of the person who called: SELF    What was the call regarding: PATIENT DAUGHTER STATES THAT SHE WOULD LIKE A CALL ABOUT HER MOTHERS XRAY RESULTS.     Do you require a callback: YES

## 2021-12-29 NOTE — TELEPHONE ENCOUNTER
Daughter is calling asking about the xray of her back, she has left a message for Dr. Ronaldo Travis to look at xray, told her will have to have  Review this. Nursing home is telling daughter the pain med. Is not helping, there are DrsAbel On staff, [facility].  Home, she says. Told daughter if feels her mother is really in severe pain can ask them to take to ER. Told daughter also to ask for her to be seen by Neurology    Reason for Disposition  • [1] MODERATE back pain (e.g., interferes with normal activities) AND [2] present > 3 days    Additional Information  • Negative: Passed out (i.e., lost consciousness, collapsed and was not responding)  • Negative: Shock suspected (e.g., cold/pale/clammy skin, too weak to stand, low BP, rapid pulse)  • Negative: Sounds like a life-threatening emergency to the triager  • Negative: Major injury to the back (e.g., MVA, fall > 10 feet or 3 meters, penetrating injury, etc.)  • Negative: Followed a tailbone injury  • Negative: [1] Pain in the upper back over the ribs (rib cage) AND [2] radiates (travels, goes) into chest  • Negative: [1] Pain in the upper back over the ribs (rib cage) AND [2] worsened by coughing (or clearly increases with breathing)  • Negative: Back pain during pregnancy  • Negative: Pain mainly in flank (i.e., in the side, over the lower ribs or just below the ribs)  • Negative: [1] SEVERE back pain (e.g., excruciating) AND [2] sudden onset AND [3] age > 60 years  • Negative: [1] Unable to urinate (or only a few drops) > 4 hours AND [2] bladder feels very full (e.g., palpable bladder or strong urge to urinate)  • Negative: [1] Loss of bladder or bowel control (urine or bowel incontinence; wetting self, leaking stool) AND [2] new-onset  • Negative: Numbness in groin or rectal area (i.e., loss of sensation)  • Negative: [1] SEVERE abdominal pain AND [2] present > 1 hour  • Negative: [1] Abdominal pain AND [2] age > 60 years  • Negative: Weakness of a leg or foot  "(e.g., unable to bear weight, dragging foot)  • Negative: Unable to walk  • Negative: Patient sounds very sick or weak to the triager  • Negative: [1] SEVERE back pain (e.g., excruciating, unable to do any normal activities) AND [2] not improved 2 hours after pain medicine  • Negative: [1] Pain radiates into the thigh or further down the leg AND [2] both legs  • Negative: [1] Fever > 100.0 F (37.8 C) AND [2] flank pain (i.e., in side, below ribs and above hip)  • Negative: [1] Pain or burning with passing urine (urination) AND [2] flank pain (i.e., in side, below ribs and above hip)  • Negative: Numbness in a leg or foot (i.e., loss of sensation)  • Negative: [1] Numbness in an arm or hand (i.e., loss of sensation) AND [2] upper back pain  • Negative: High-risk adult (e.g., history of cancer, HIV, or IV drug use)  • Negative: Soft tissue infection (e.g., abscess, cellulitis) or other serious infection (e.g., bacteremia) in last 2 weeks  • Negative: [1] Fever AND [2] no symptoms of UTI  (Exception: has generalized muscle pains, not localized back pain)  • Negative: Rash in same area as pain (may be described as \"small blisters\")  • Negative: Blood in urine (red, pink, or tea-colored)    Answer Assessment - Initial Assessment Questions  1. ONSET: \"When did the pain begin?\"       Pain began back in October worse now   2. LOCATION: \"Where does it hurt?\" (upper, mid or lower back)      Lower back pain  3. SEVERITY: \"How bad is the pain?\"  (e.g., Scale 1-10; mild, moderate, or severe)    - MILD (1-3): doesn't interfere with normal activities     - MODERATE (4-7): interferes with normal activities or awakens from sleep     - SEVERE (8-10): excruciating pain, unable to do any normal activities       Moderate to severe  4. PATTERN: \"Is the pain constant?\" (e.g., yes, no; constant, intermittent)       constant  5. RADIATION: \"Does the pain shoot into your legs or elsewhere?\"      no  6. CAUSE:  \"What do you think is causing " "the back pain?\"       ukwn  7. BACK OVERUSE:  \"Any recent lifting of heavy objects, strenuous work or exercise?\"      no  8. MEDICATIONS: \"What have you taken so far for the pain?\" (e.g., nothing, acetaminophen, NSAIDS)      Taking meds not helping  9. NEUROLOGIC SYMPTOMS: \"Do you have any weakness, numbness, or problems with bowel/bladder control?\"     Wears pull ups nothing new   10. OTHER SYMPTOMS: \"Do you have any other symptoms?\" (e.g., fever, abdominal pain, burning with urination, blood in urine)        Has fibroid cyst  11. PREGNANCY: \"Is there any chance you are pregnant?\" (e.g., yes, no; LMP)        no    Protocols used: BACK PAIN-ADULT-AH      "

## 2021-12-30 NOTE — TELEPHONE ENCOUNTER
Please call patient's daughter.  She is currently in the nursing home and was sent to the ER.  There was a message from the on-call nurse.  Please let the daughter know that I looked over all of the labs and x-ray/CT scan.  The CT scan did not show anything abnormal or concerning that would be causing her pain.  Blood work was all stable except she did have the persistent anemia or low red blood cell count.    Unfortunately, since another physician is seeing her at the nursing home, I am not able to order anything for her.  She would need to contact the physician that is seeing her at the nursing home.

## 2022-01-01 ENCOUNTER — TELEPHONE (OUTPATIENT)
Dept: NEUROLOGY | Facility: CLINIC | Age: 87
End: 2022-01-01

## 2022-01-11 NOTE — TELEPHONE ENCOUNTER
Provider: SUSANA IZQUIERDO  Caller: FAWN NICHOLSON  Relationship to Patient: DAUGHTER  Pharmacy: NA  Phone Number: [phone]  Reason for Call: PATIENTS DAUGHTER CANCELLED UPCOMING APPOINTMENT. STATES HAINES IS CURRENTLY IN [facility]. PLEASE ADVISE.   When was the patient last seen: 10-6-21

## 2023-01-12 NOTE — PLAN OF CARE
01/12/23 1445   Appointment Info   Signing Clinician's Name / Credentials (PT) Ena Stoddard PT   Rehab Comments (PT) Patient in bed .C/O dizziness and neck pain       Present no  (daughter interpreted)   Language Kayce   Living Environment   People in Home child(chanel), adult;grandchild(chanel)   Current Living Arrangements house   Home Accessibility no concerns   Transportation Anticipated family or friend will provide   Living Environment Comments pt stays on main floor   Self-Care   Usual Activity Tolerance good   Current Activity Tolerance fair   Equipment Currently Used at Home walker, rolling   Fall history within last six months yes   Number of times patient has fallen within last six months 2   Activity/Exercise/Self-Care Comment patient independent with mobility .Supervised with dressing  and bathing .Gets assist with meals,cleaning ,laundry and transportation   General Information   Onset of Illness/Injury or Date of Surgery 01/11/23   Referring Physician Vance Luna   Patient/Family Therapy Goals Statement (PT) to go home   Pertinent History of Current Problem (include personal factors and/or comorbidities that impact the POC) Admitted with CVA symptoms-dizziness /vertigo falls.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   Existing Precautions/Restrictions fall   Weight-Bearing Status - LLE full weight-bearing   Weight-Bearing Status - RLE full weight-bearing   Cognition   Affect/Mental Status (Cognition) WFL   Pain Assessment   Patient Currently in Pain Yes, see Vital Sign flowsheet   Posture    Posture Forward head position   Range of Motion (ROM)   Range of Motion ROM is WFL   Strength (Manual Muscle Testing)   Strength (Manual Muscle Testing) strength is WFL   Bed Mobility   Bed Mobility supine-sit;sit-supine   Supine-Sit [place] (Bed Mobility) supervision   Sit-Supine [place] (Bed Mobility) supervision   Sit-Stand Transfer   Sit-Stand Cotton  Goal Outcome Evaluation:  Plan of Care Reviewed With: patient        Progress: improving  Outcome Summary: Patient limited by decreased safety awareness, but demonstrates improving independence w/ mobility.  She completed bed mobility w/ CGA, transfers w/ RW and CGA, and ambulated 10ft x 2 w/ RW and min A.  Patient also participated in seated BUE/BLE TherEx w/ good effort and no apparent distress.  Will continue to progress as medically appropriate.   (Transfers) verbal cues;contact guard   Assistive Device (Sit-Stand Transfers) walker, front-wheeled   Comment, (Sit-Stand Transfer) cues for safety/hands placement   Stand-Sit Transfer   Stand-Sit [place] (Transfers) supervision;verbal cues   Assistive Device (Stand-Sit Transfers) walker, front-wheeled   Comment, (Stand-Sit Transfer) cues for safety/hands placement   Gait/Stairs (Locomotion)   [place] Level (Gait) contact guard   Assistive Device (Gait) walker, front-wheeled   Distance in Feet 10 feet to BR   Distance in Feet (Gait) 150 feet   Pattern (Gait) step-through   Deviations/Abnormal Patterns (Gait) base of support, narrow;festinating/shuffling   Maintains Weight-bearing Status (Gait) able to maintain   Clinical Impression   Criteria for Skilled Therapeutic Intervention Yes, treatment indicated   PT Diagnosis (PT) impared mobility   Influenced by the following impairments dizziness,pain   Functional limitations due to impairments gait,balance   Clinical Presentation (PT Evaluation Complexity) Evolving/Changing   Clinical Presentation Rationale ptpresensta as med diagnosed   Clinical Decision Making (Complexity) low complexity   Planned Therapy Interventions (PT) balance training;gait training;transfer training   Anticipated Equipment Needs at Discharge (PT) walker, rolling   Risk & Benefits of therapy have been explained evaluation/treatment results reviewed;patient;daughter   Clinical Impression Comments Patient is a 81 yo Kayce speaking male admitted with dizziness, falls,CVA symptoms.Limited mobility due to c/o dizziness and neck pain .Appropriate for skilled PT to mobilize in preparation for returning home .Has family around  almost 24 hrs a day to assist s needed.   PT Total Evaluation Time   PT Eval, Low Complexity Minutes (26242) 15   Physical Therapy Goals   PT Frequency Daily   PT Goals Transfers   PT: Transfers Supervision/stand-by assist;Bed to/from chair;Assistive device;Sit to/from  stand   PT: Gait Supervision/stand-by assist;Rolling walker;Greater than 200 feet   Interventions   Interventions Quick Adds Gait Training;Therapeutic Activity   Therapeutic Activity   Therapeutic Activities: dynamic activities to improve functional performance Minutes (42944) 8   Symptoms Noted During/After Treatment Dizziness   Treatment Detail/Skilled Intervention toilet transfer with SBA/I .Patient used toilet in standing and washed hands by sink with SBA   Gait Training   Gait Training Minutes (62242) 8   Symptoms Noted During/After Treatment (Gait Training) dizziness   Treatment Detail/Skilled Intervention amb out on hallway   [place] Level (Gait Training) contact guard   Physical Assistance Level (Gait Training) supervision;1 person assist;verbal cues  (to lift head)   Weight Bearing (Gait Training) full weight-bearing   Assistive Device (Gait Training) rolling walker   Pattern Analysis (Gait Training) swing-through gait   Gait Analysis Deviations decreased step length;decreased swing-to-stance ratio;decreased toe-to-floor clearance;decreased weight-shifting ability   Impairments (Gait Analysis/Training) other (see comments)  (dizziness)   PT Discharge Planning   PT Plan distance and safety with FWW ,safe transfers,bal ex as able/ramakrishna   PT Discharge Recommendation (DC Rec) home with assist   PT Rationale for DC Rec patient lives with family and able to assist pt as needed   PT Brief overview of current status SBA for mobility ,CGA with amb with FWW due to dizziness   Total Session Time   Timed Code Treatment Minutes 16   Total Session Time (sum of timed and untimed services) 31

## (undated) DEVICE — MEDI-VAC YANKAUER SUCTION HANDLE W/BULBOUS TIP: Brand: CARDINAL HEALTH

## (undated) DEVICE — Device

## (undated) DEVICE — CANNULA,ADULT,SOFT-TOUCH,7TUBE,SC: Brand: MEDLINE

## (undated) DEVICE — PETROLATUM DRESSING. FINE MESH GAUZE IMPREGNATED WITH 3% BISMUTH TRIBROMOPHENATE  IN A PETROLATUM BLEND.: Brand: XEROFORM PETROLATUM

## (undated) DEVICE — GW CALIB W FLUTS 2.8X150X300MM

## (undated) DEVICE — MEDI-VAC NON-CONDUCTIVE SUCTION TUBING: Brand: CARDINAL HEALTH

## (undated) DEVICE — DRSNG WND GZ PAD BORDERED 4X8IN STRL